# Patient Record
Sex: MALE | Race: OTHER | Employment: OTHER
[De-identification: names, ages, dates, MRNs, and addresses within clinical notes are randomized per-mention and may not be internally consistent; named-entity substitution may affect disease eponyms.]

---

## 2017-01-02 ENCOUNTER — OFFICE VISIT (OUTPATIENT)
Dept: FAMILY MEDICINE CLINIC | Facility: CLINIC | Age: 82
End: 2017-01-02

## 2017-01-02 VITALS
HEART RATE: 80 BPM | BODY MASS INDEX: 26.88 KG/M2 | OXYGEN SATURATION: 96 % | HEIGHT: 71 IN | DIASTOLIC BLOOD PRESSURE: 78 MMHG | WEIGHT: 192 LBS | SYSTOLIC BLOOD PRESSURE: 148 MMHG | TEMPERATURE: 97.5 F | RESPIRATION RATE: 17 BRPM

## 2017-01-02 DIAGNOSIS — I10 ESSENTIAL HYPERTENSION: Primary | ICD-10-CM

## 2017-01-02 DIAGNOSIS — E03.9 HYPOTHYROIDISM, UNSPECIFIED TYPE: ICD-10-CM

## 2017-01-02 PROCEDURE — 99213 OFFICE O/P EST LOW 20 MIN: CPT | Performed by: FAMILY MEDICINE

## 2017-01-02 RX ORDER — LEVOTHYROXINE SODIUM 0.03 MG/1
25 TABLET ORAL DAILY
Qty: 30 TABLET | Refills: 1 | Status: SHIPPED | OUTPATIENT
Start: 2017-01-02 | End: 2017-01-05 | Stop reason: SDUPTHER

## 2017-01-02 RX ORDER — ENALAPRIL MALEATE 10 MG/1
10 TABLET ORAL DAILY
Qty: 30 TABLET | Refills: 1 | Status: SHIPPED | OUTPATIENT
Start: 2017-01-02 | End: 2017-01-05 | Stop reason: SDUPTHER

## 2017-01-02 ASSESSMENT — ENCOUNTER SYMPTOMS
CHEST TIGHTNESS: 0
NAUSEA: 0
VOMITING: 0
DIARRHEA: 0
EYE DISCHARGE: 0
RHINORRHEA: 0
COUGH: 0
ABDOMINAL PAIN: 0
EYE PAIN: 0
SORE THROAT: 0

## 2017-01-05 ENCOUNTER — OFFICE VISIT (OUTPATIENT)
Dept: FAMILY MEDICINE CLINIC | Facility: CLINIC | Age: 82
End: 2017-01-05

## 2017-01-05 VITALS
SYSTOLIC BLOOD PRESSURE: 131 MMHG | HEIGHT: 68 IN | BODY MASS INDEX: 28.49 KG/M2 | TEMPERATURE: 98 F | OXYGEN SATURATION: 96 % | HEART RATE: 90 BPM | WEIGHT: 188 LBS | RESPIRATION RATE: 20 BRPM | DIASTOLIC BLOOD PRESSURE: 72 MMHG

## 2017-01-05 DIAGNOSIS — R06.09 DOE (DYSPNEA ON EXERTION): ICD-10-CM

## 2017-01-05 DIAGNOSIS — M20.42 HAMMER TOES, BILATERAL: ICD-10-CM

## 2017-01-05 DIAGNOSIS — E03.9 ACQUIRED HYPOTHYROIDISM: ICD-10-CM

## 2017-01-05 DIAGNOSIS — I10 ESSENTIAL HYPERTENSION: Primary | ICD-10-CM

## 2017-01-05 DIAGNOSIS — Z79.4 TYPE 2 DIABETES MELLITUS WITH DIABETIC POLYNEUROPATHY, WITH LONG-TERM CURRENT USE OF INSULIN (HCC): ICD-10-CM

## 2017-01-05 DIAGNOSIS — R01.1 MURMUR, CARDIAC: ICD-10-CM

## 2017-01-05 DIAGNOSIS — B35.1 ONYCHOMYCOSIS OF TOENAIL: ICD-10-CM

## 2017-01-05 DIAGNOSIS — M20.41 HAMMER TOES, BILATERAL: ICD-10-CM

## 2017-01-05 DIAGNOSIS — E78.5 HYPERLIPIDEMIA WITH TARGET LDL LESS THAN 70: ICD-10-CM

## 2017-01-05 DIAGNOSIS — E11.42 TYPE 2 DIABETES MELLITUS WITH DIABETIC POLYNEUROPATHY, WITH LONG-TERM CURRENT USE OF INSULIN (HCC): ICD-10-CM

## 2017-01-05 DIAGNOSIS — Z23 NEED FOR TDAP VACCINATION: ICD-10-CM

## 2017-01-05 LAB — HBA1C MFR BLD: 6.7 %

## 2017-01-05 PROCEDURE — 83036 HEMOGLOBIN GLYCOSYLATED A1C: CPT | Performed by: FAMILY MEDICINE

## 2017-01-05 PROCEDURE — 99204 OFFICE O/P NEW MOD 45 MIN: CPT | Performed by: FAMILY MEDICINE

## 2017-01-05 RX ORDER — LANOLIN ALCOHOL/MO/W.PET/CERES
1000 CREAM (GRAM) TOPICAL DAILY
COMMUNITY
End: 2017-09-01 | Stop reason: CLARIF

## 2017-01-05 RX ORDER — GLUCOSAMINE HCL/CHONDROITIN SU 500-400 MG
CAPSULE ORAL
Qty: 100 STRIP | Refills: 3 | Status: SHIPPED | OUTPATIENT
Start: 2017-01-05 | End: 2017-08-11 | Stop reason: SDUPTHER

## 2017-01-05 RX ORDER — ENALAPRIL MALEATE 10 MG/1
10 TABLET ORAL DAILY
Qty: 30 TABLET | Refills: 2 | Status: SHIPPED | OUTPATIENT
Start: 2017-01-05 | End: 2017-02-16 | Stop reason: SDUPTHER

## 2017-01-05 RX ORDER — BLOOD PRESSURE TEST KIT
KIT MISCELLANEOUS
Qty: 100 EACH | Refills: 2 | Status: SHIPPED | OUTPATIENT
Start: 2017-01-05

## 2017-01-05 RX ORDER — ATORVASTATIN CALCIUM 20 MG/1
20 TABLET, FILM COATED ORAL DAILY
Qty: 30 TABLET | Refills: 3 | Status: SHIPPED | OUTPATIENT
Start: 2017-01-05 | End: 2017-01-31 | Stop reason: SDUPTHER

## 2017-01-05 RX ORDER — ASPIRIN 81 MG/1
81 TABLET ORAL DAILY
Qty: 30 TABLET | Refills: 3 | Status: SHIPPED | OUTPATIENT
Start: 2017-01-05 | End: 2017-02-16

## 2017-01-05 RX ORDER — LEVOTHYROXINE SODIUM 0.03 MG/1
25 TABLET ORAL DAILY
Qty: 90 TABLET | Refills: 1 | Status: SHIPPED | OUTPATIENT
Start: 2017-01-05 | End: 2017-02-16 | Stop reason: SDUPTHER

## 2017-01-05 RX ORDER — LANCETS
EACH MISCELLANEOUS
Qty: 100 EACH | Refills: 3 | Status: SHIPPED | OUTPATIENT
Start: 2017-01-05 | End: 2017-10-19 | Stop reason: SDUPTHER

## 2017-01-05 ASSESSMENT — ENCOUNTER SYMPTOMS
CONSTIPATION: 0
NAUSEA: 0
WHEEZING: 0
ABDOMINAL DISTENTION: 0
CHEST TIGHTNESS: 0
DIARRHEA: 0
SHORTNESS OF BREATH: 1
VOMITING: 0
ABDOMINAL PAIN: 0

## 2017-01-05 ASSESSMENT — PATIENT HEALTH QUESTIONNAIRE - PHQ9
1. LITTLE INTEREST OR PLEASURE IN DOING THINGS: 0
2. FEELING DOWN, DEPRESSED OR HOPELESS: 1
SUM OF ALL RESPONSES TO PHQ QUESTIONS 1-9: 1
SUM OF ALL RESPONSES TO PHQ9 QUESTIONS 1 & 2: 1

## 2017-01-06 ENCOUNTER — TELEPHONE (OUTPATIENT)
Dept: FAMILY MEDICINE CLINIC | Facility: CLINIC | Age: 82
End: 2017-01-06

## 2017-01-06 DIAGNOSIS — D64.89 ANEMIA DUE TO OTHER CAUSE: ICD-10-CM

## 2017-01-06 DIAGNOSIS — Z79.4 TYPE 2 DIABETES MELLITUS WITH DIABETIC POLYNEUROPATHY, WITH LONG-TERM CURRENT USE OF INSULIN (HCC): Primary | ICD-10-CM

## 2017-01-06 DIAGNOSIS — E11.42 TYPE 2 DIABETES MELLITUS WITH DIABETIC POLYNEUROPATHY, WITH LONG-TERM CURRENT USE OF INSULIN (HCC): Primary | ICD-10-CM

## 2017-01-06 PROBLEM — D64.9 ANEMIA: Status: ACTIVE | Noted: 2017-01-06

## 2017-01-09 ENCOUNTER — TELEPHONE (OUTPATIENT)
Dept: FAMILY MEDICINE CLINIC | Facility: CLINIC | Age: 82
End: 2017-01-09

## 2017-01-16 DIAGNOSIS — D64.89 ANEMIA DUE TO OTHER CAUSE: ICD-10-CM

## 2017-01-16 DIAGNOSIS — R31.29 MICROSCOPIC HEMATURIA: Primary | ICD-10-CM

## 2017-01-31 DIAGNOSIS — E78.5 HYPERLIPIDEMIA WITH TARGET LDL LESS THAN 70: ICD-10-CM

## 2017-01-31 RX ORDER — ATORVASTATIN CALCIUM 20 MG/1
TABLET, FILM COATED ORAL
Qty: 90 TABLET | Refills: 3 | Status: SHIPPED | OUTPATIENT
Start: 2017-01-31 | End: 2018-02-26 | Stop reason: SDUPTHER

## 2017-02-01 ENCOUNTER — TELEPHONE (OUTPATIENT)
Dept: FAMILY MEDICINE CLINIC | Facility: CLINIC | Age: 82
End: 2017-02-01

## 2017-02-06 ENCOUNTER — TELEPHONE (OUTPATIENT)
Dept: FAMILY MEDICINE CLINIC | Facility: CLINIC | Age: 82
End: 2017-02-06

## 2017-02-16 ENCOUNTER — OFFICE VISIT (OUTPATIENT)
Dept: FAMILY MEDICINE CLINIC | Facility: CLINIC | Age: 82
End: 2017-02-16

## 2017-02-16 VITALS
TEMPERATURE: 98.4 F | DIASTOLIC BLOOD PRESSURE: 67 MMHG | SYSTOLIC BLOOD PRESSURE: 133 MMHG | BODY MASS INDEX: 28.55 KG/M2 | WEIGHT: 188.4 LBS | HEIGHT: 68 IN | OXYGEN SATURATION: 97 % | HEART RATE: 67 BPM

## 2017-02-16 DIAGNOSIS — Z23 NEED FOR INFLUENZA VACCINATION: ICD-10-CM

## 2017-02-16 DIAGNOSIS — E11.42 TYPE 2 DIABETES MELLITUS WITH DIABETIC POLYNEUROPATHY, WITHOUT LONG-TERM CURRENT USE OF INSULIN (HCC): Primary | ICD-10-CM

## 2017-02-16 DIAGNOSIS — R31.29 MICROSCOPIC HEMATURIA: ICD-10-CM

## 2017-02-16 DIAGNOSIS — Z23 PNEUMOCOCCAL VACCINATION ADMINISTERED AT CURRENT VISIT: ICD-10-CM

## 2017-02-16 DIAGNOSIS — I11.9 LVH (LEFT VENTRICULAR HYPERTROPHY) DUE TO HYPERTENSIVE DISEASE, WITHOUT HEART FAILURE: ICD-10-CM

## 2017-02-16 DIAGNOSIS — Z12.5 SCREENING PSA (PROSTATE SPECIFIC ANTIGEN): ICD-10-CM

## 2017-02-16 DIAGNOSIS — I10 ESSENTIAL HYPERTENSION: ICD-10-CM

## 2017-02-16 DIAGNOSIS — E03.9 ACQUIRED HYPOTHYROIDISM: ICD-10-CM

## 2017-02-16 DIAGNOSIS — I35.0 AORTIC STENOSIS, MODERATE: ICD-10-CM

## 2017-02-16 DIAGNOSIS — R71.8 OTHER ABNORMALITY OF RED BLOOD CELLS: ICD-10-CM

## 2017-02-16 DIAGNOSIS — D64.89 ANEMIA DUE TO OTHER CAUSE: ICD-10-CM

## 2017-02-16 DIAGNOSIS — E78.5 HYPERLIPIDEMIA WITH TARGET LDL LESS THAN 70: ICD-10-CM

## 2017-02-16 DIAGNOSIS — E11.21 DIABETIC NEPHROPATHY ASSOCIATED WITH TYPE 2 DIABETES MELLITUS (HCC): ICD-10-CM

## 2017-02-16 DIAGNOSIS — I51.89 DIASTOLIC DYSFUNCTION WITHOUT HEART FAILURE: ICD-10-CM

## 2017-02-16 PROCEDURE — G0008 ADMIN INFLUENZA VIRUS VAC: HCPCS | Performed by: FAMILY MEDICINE

## 2017-02-16 PROCEDURE — 99215 OFFICE O/P EST HI 40 MIN: CPT | Performed by: FAMILY MEDICINE

## 2017-02-16 PROCEDURE — 90662 IIV NO PRSV INCREASED AG IM: CPT | Performed by: FAMILY MEDICINE

## 2017-02-16 PROCEDURE — G0009 ADMIN PNEUMOCOCCAL VACCINE: HCPCS | Performed by: FAMILY MEDICINE

## 2017-02-16 PROCEDURE — 90732 PPSV23 VACC 2 YRS+ SUBQ/IM: CPT | Performed by: FAMILY MEDICINE

## 2017-02-16 RX ORDER — ENALAPRIL MALEATE 10 MG/1
10 TABLET ORAL DAILY
Qty: 90 TABLET | Refills: 2 | Status: SHIPPED | OUTPATIENT
Start: 2017-02-16 | End: 2017-05-12 | Stop reason: SDUPTHER

## 2017-02-16 RX ORDER — LEVOTHYROXINE SODIUM 0.03 MG/1
25 TABLET ORAL DAILY
Qty: 90 TABLET | Refills: 2 | Status: SHIPPED | OUTPATIENT
Start: 2017-02-16 | End: 2018-03-16 | Stop reason: SDUPTHER

## 2017-02-16 ASSESSMENT — ENCOUNTER SYMPTOMS
VOMITING: 0
SHORTNESS OF BREATH: 1
WHEEZING: 0
ABDOMINAL PAIN: 0
COUGH: 0
DIARRHEA: 0
CONSTIPATION: 0
NAUSEA: 0
CHEST TIGHTNESS: 0
ABDOMINAL DISTENTION: 0

## 2017-02-17 ENCOUNTER — HOSPITAL ENCOUNTER (OUTPATIENT)
Age: 82
Discharge: HOME | End: 2017-02-17
Payer: MEDICARE

## 2017-02-17 DIAGNOSIS — D64.89 ANEMIA DUE TO OTHER CAUSE: ICD-10-CM

## 2017-02-17 DIAGNOSIS — R71.8 OTHER ABNORMALITY OF RED BLOOD CELLS: ICD-10-CM

## 2017-02-17 DIAGNOSIS — Z12.5 SCREENING PSA (PROSTATE SPECIFIC ANTIGEN): ICD-10-CM

## 2017-02-17 DIAGNOSIS — R31.29 MICROSCOPIC HEMATURIA: ICD-10-CM

## 2017-02-17 LAB
ABSOLUTE EOS #: 0.1 K/UL (ref 0–0.4)
ABSOLUTE LYMPH #: 1.3 K/UL (ref 1–4.8)
ABSOLUTE MONO #: 0.5 K/UL (ref 0.1–1.3)
ANION GAP SERPL CALCULATED.3IONS-SCNC: 16 MMOL/L (ref 9–17)
BASOPHILS # BLD: 0 % (ref 0–2)
BASOPHILS ABSOLUTE: 0 K/UL (ref 0–0.2)
BUN BLDV-MCNC: 19 MG/DL (ref 8–23)
BUN/CREAT BLD: ABNORMAL (ref 9–20)
CALCIUM SERPL-MCNC: 9.4 MG/DL (ref 8.6–10.4)
CHLORIDE BLD-SCNC: 97 MMOL/L (ref 98–107)
CO2: 26 MMOL/L (ref 20–31)
CONTROL: YES
CREAT SERPL-MCNC: 0.84 MG/DL (ref 0.7–1.2)
DIFFERENTIAL TYPE: ABNORMAL
EOSINOPHILS RELATIVE PERCENT: 1 % (ref 0–4)
FERRITIN: 349 UG/L (ref 30–400)
GFR AFRICAN AMERICAN: >60 ML/MIN
GFR NON-AFRICAN AMERICAN: >60 ML/MIN
GFR SERPL CREATININE-BSD FRML MDRD: ABNORMAL ML/MIN/{1.73_M2}
GFR SERPL CREATININE-BSD FRML MDRD: ABNORMAL ML/MIN/{1.73_M2}
GLUCOSE BLD-MCNC: 183 MG/DL (ref 70–99)
HCT VFR BLD CALC: 37.2 % (ref 41–53)
HEMOCCULT STL QL: NEGATIVE
HEMOGLOBIN: 11.9 G/DL (ref 13.5–17.5)
IRON SATURATION: 20 % (ref 20–55)
IRON: 44 UG/DL (ref 59–158)
LYMPHOCYTES # BLD: 20 % (ref 24–44)
MCH RBC QN AUTO: 30 PG (ref 26–34)
MCHC RBC AUTO-ENTMCNC: 32 G/DL (ref 31–37)
MCV RBC AUTO: 93.8 FL (ref 80–100)
MONOCYTES # BLD: 7 % (ref 1–7)
PATHOLOGIST REVIEW: NORMAL
PDW BLD-RTO: 13.9 % (ref 11.5–14.9)
PLATELET # BLD: 170 K/UL (ref 150–450)
PLATELET ESTIMATE: ABNORMAL
PMV BLD AUTO: 9.6 FL (ref 6–12)
POTASSIUM SERPL-SCNC: 4.3 MMOL/L (ref 3.7–5.3)
PROSTATE SPECIFIC ANTIGEN: 0.5 UG/L
RBC # BLD: 3.96 M/UL (ref 4.5–5.9)
RBC # BLD: ABNORMAL 10*6/UL
SEG NEUTROPHILS: 72 % (ref 36–66)
SEGMENTED NEUTROPHILS ABSOLUTE COUNT: 4.7 K/UL (ref 1.3–9.1)
SODIUM BLD-SCNC: 139 MMOL/L (ref 135–144)
TOTAL IRON BINDING CAPACITY: 219 UG/DL (ref 250–450)
UNSATURATED IRON BINDING CAPACITY: 175 UG/DL (ref 112–347)
WBC # BLD: 6.6 K/UL (ref 3.5–11)
WBC # BLD: ABNORMAL 10*3/UL

## 2017-02-17 PROCEDURE — 83550 IRON BINDING TEST: CPT

## 2017-02-17 PROCEDURE — 84155 ASSAY OF PROTEIN SERUM: CPT

## 2017-02-17 PROCEDURE — 84153 ASSAY OF PSA TOTAL: CPT

## 2017-02-17 PROCEDURE — 84156 ASSAY OF PROTEIN URINE: CPT

## 2017-02-17 PROCEDURE — 84165 PROTEIN E-PHORESIS SERUM: CPT

## 2017-02-17 PROCEDURE — 80048 BASIC METABOLIC PNL TOTAL CA: CPT

## 2017-02-17 PROCEDURE — 82728 ASSAY OF FERRITIN: CPT

## 2017-02-17 PROCEDURE — 83540 ASSAY OF IRON: CPT

## 2017-02-17 PROCEDURE — 84166 PROTEIN E-PHORESIS/URINE/CSF: CPT

## 2017-02-17 PROCEDURE — 85025 COMPLETE CBC W/AUTO DIFF WBC: CPT

## 2017-02-17 PROCEDURE — 36415 COLL VENOUS BLD VENIPUNCTURE: CPT

## 2017-02-17 PROCEDURE — 82274 ASSAY TEST FOR BLOOD FECAL: CPT | Performed by: FAMILY MEDICINE

## 2017-02-20 ENCOUNTER — PATIENT MESSAGE (OUTPATIENT)
Dept: FAMILY MEDICINE CLINIC | Age: 82
End: 2017-02-20

## 2017-02-20 DIAGNOSIS — D50.8 OTHER IRON DEFICIENCY ANEMIA: Primary | ICD-10-CM

## 2017-02-20 PROBLEM — D50.9 IRON DEFICIENCY ANEMIA: Status: ACTIVE | Noted: 2017-02-20

## 2017-02-20 RX ORDER — LANOLIN ALCOHOL/MO/W.PET/CERES
325 CREAM (GRAM) TOPICAL
Qty: 30 TABLET | Refills: 3 | Status: SHIPPED | OUTPATIENT
Start: 2017-02-20 | End: 2018-01-19 | Stop reason: ALTCHOICE

## 2017-02-21 ENCOUNTER — OFFICE VISIT (OUTPATIENT)
Dept: UROLOGY | Facility: CLINIC | Age: 82
End: 2017-02-21

## 2017-02-21 ENCOUNTER — HOSPITAL ENCOUNTER (OUTPATIENT)
Age: 82
Setting detail: SPECIMEN
Discharge: HOME | End: 2017-02-21
Payer: MEDICARE

## 2017-02-21 VITALS
HEIGHT: 70 IN | SYSTOLIC BLOOD PRESSURE: 140 MMHG | HEART RATE: 77 BPM | TEMPERATURE: 97.6 F | DIASTOLIC BLOOD PRESSURE: 80 MMHG | WEIGHT: 188.49 LBS | BODY MASS INDEX: 26.99 KG/M2

## 2017-02-21 DIAGNOSIS — R31.29 MICROHEMATURIA: Primary | ICD-10-CM

## 2017-02-21 LAB
ALBUMIN (CALCULATED): 4 G/DL (ref 3.2–5.2)
ALBUMIN PERCENT: 58 % (ref 45–65)
ALPHA 1 PERCENT: 4 % (ref 3–6)
ALPHA 2 PERCENT: 12 % (ref 6–13)
ALPHA-1-GLOBULIN: 0.2 G/DL (ref 0.1–0.4)
ALPHA-2-GLOBULIN: 0.8 G/DL (ref 0.5–0.9)
BETA GLOBULIN: 0.9 G/DL (ref 0.7–1.4)
BETA PERCENT: 14 % (ref 11–19)
BILIRUBIN, POC: ABNORMAL
BLOOD URINE, POC: ABNORMAL
CLARITY, POC: CLEAR
COLOR, POC: YELLOW
GAMMA GLOBULIN %: 12 % (ref 9–20)
GAMMA GLOBULIN: 0.8 G/DL (ref 0.5–1.5)
GLUCOSE URINE, POC: ABNORMAL
KETONES, POC: ABNORMAL
LEUKOCYTE EST, POC: ABNORMAL
NITRITE, POC: ABNORMAL
P E INTERPRETATION, U: NORMAL
PATHOLOGIST: NORMAL
PATHOLOGIST: NORMAL
PH, POC: ABNORMAL
PROTEIN ELECTROPHORESIS, SERUM: NORMAL
PROTEIN, POC: ABNORMAL
SPECIFIC GRAVITY, POC: ABNORMAL
SPECIMEN TYPE: NORMAL
TOTAL PROT. SUM,%: 100 % (ref 98–102)
TOTAL PROT. SUM: 6.7 G/DL (ref 6.3–8.2)
TOTAL PROTEIN: 6.8 G/DL (ref 6.4–8.3)
URINE TOTAL PROTEIN: 17 MG/DL
UROBILINOGEN, POC: ABNORMAL

## 2017-02-21 PROCEDURE — 99204 OFFICE O/P NEW MOD 45 MIN: CPT | Performed by: UROLOGY

## 2017-02-21 PROCEDURE — 81002 URINALYSIS NONAUTO W/O SCOPE: CPT | Performed by: UROLOGY

## 2017-02-21 ASSESSMENT — ENCOUNTER SYMPTOMS
COUGH: 1
NAUSEA: 0
COLOR CHANGE: 0
EYE PAIN: 1
ABDOMINAL PAIN: 0
WHEEZING: 0
BACK PAIN: 1
VOMITING: 0
EYE REDNESS: 0
SHORTNESS OF BREATH: 1

## 2017-02-27 ENCOUNTER — OFFICE VISIT (OUTPATIENT)
Dept: PODIATRY | Facility: CLINIC | Age: 82
End: 2017-02-27

## 2017-02-27 VITALS
HEART RATE: 71 BPM | WEIGHT: 180 LBS | BODY MASS INDEX: 25.77 KG/M2 | HEIGHT: 70 IN | SYSTOLIC BLOOD PRESSURE: 132 MMHG | DIASTOLIC BLOOD PRESSURE: 73 MMHG

## 2017-02-27 DIAGNOSIS — E11.42 TYPE 2 DIABETES MELLITUS WITH DIABETIC POLYNEUROPATHY, WITHOUT LONG-TERM CURRENT USE OF INSULIN (HCC): ICD-10-CM

## 2017-02-27 DIAGNOSIS — M21.961 FOOT DEFORMITY, BILATERAL: ICD-10-CM

## 2017-02-27 DIAGNOSIS — B35.1 ONYCHOMYCOSIS: Primary | ICD-10-CM

## 2017-02-27 DIAGNOSIS — M21.962 FOOT DEFORMITY, BILATERAL: ICD-10-CM

## 2017-02-27 PROCEDURE — 11721 DEBRIDE NAIL 6 OR MORE: CPT | Performed by: PODIATRIST

## 2017-02-27 PROCEDURE — 99203 OFFICE O/P NEW LOW 30 MIN: CPT | Performed by: PODIATRIST

## 2017-02-27 ASSESSMENT — ENCOUNTER SYMPTOMS
VOMITING: 0
COLOR CHANGE: 0
DIARRHEA: 0
CONSTIPATION: 0
NAUSEA: 0

## 2017-03-01 LAB — UROTHELIAL CANCER DETECTION: NORMAL

## 2017-03-02 ENCOUNTER — TELEPHONE (OUTPATIENT)
Dept: PODIATRY | Facility: CLINIC | Age: 82
End: 2017-03-02

## 2017-03-03 ENCOUNTER — HOSPITAL ENCOUNTER (OUTPATIENT)
Dept: ULTRASOUND IMAGING | Age: 82
Discharge: HOME | End: 2017-03-03
Payer: MEDICARE

## 2017-03-03 DIAGNOSIS — R31.29 MICROHEMATURIA: ICD-10-CM

## 2017-03-03 PROCEDURE — 76775 US EXAM ABDO BACK WALL LIM: CPT

## 2017-03-09 ENCOUNTER — HOSPITAL ENCOUNTER (OUTPATIENT)
Dept: DIABETES SERVICES | Age: 82
Setting detail: THERAPIES SERIES
Discharge: HOME | End: 2017-03-09
Payer: MEDICARE

## 2017-03-09 VITALS
HEIGHT: 70 IN | SYSTOLIC BLOOD PRESSURE: 152 MMHG | DIASTOLIC BLOOD PRESSURE: 71 MMHG | WEIGHT: 187.39 LBS | BODY MASS INDEX: 26.83 KG/M2

## 2017-03-09 PROCEDURE — G0108 DIAB MANAGE TRN  PER INDIV: HCPCS

## 2017-03-21 ENCOUNTER — OFFICE VISIT (OUTPATIENT)
Dept: UROLOGY | Age: 82
End: 2017-03-21
Payer: MEDICARE

## 2017-03-21 VITALS
WEIGHT: 187.39 LBS | HEIGHT: 70 IN | BODY MASS INDEX: 26.83 KG/M2 | HEART RATE: 81 BPM | TEMPERATURE: 97.6 F | SYSTOLIC BLOOD PRESSURE: 133 MMHG | DIASTOLIC BLOOD PRESSURE: 65 MMHG

## 2017-03-21 DIAGNOSIS — N40.1 BENIGN NON-NODULAR PROSTATIC HYPERPLASIA WITH LOWER URINARY TRACT SYMPTOMS: Primary | ICD-10-CM

## 2017-03-21 DIAGNOSIS — R31.9 HEMATURIA: ICD-10-CM

## 2017-03-21 PROCEDURE — 99213 OFFICE O/P EST LOW 20 MIN: CPT | Performed by: UROLOGY

## 2017-03-21 ASSESSMENT — ENCOUNTER SYMPTOMS
COUGH: 0
VOMITING: 0
WHEEZING: 0
BACK PAIN: 0
NAUSEA: 0
COLOR CHANGE: 0
EYE PAIN: 0
SHORTNESS OF BREATH: 0
EYE REDNESS: 0
ABDOMINAL PAIN: 0

## 2017-04-10 ENCOUNTER — OFFICE VISIT (OUTPATIENT)
Dept: FAMILY MEDICINE CLINIC | Age: 82
End: 2017-04-10
Payer: MEDICARE

## 2017-04-10 VITALS
TEMPERATURE: 97.1 F | HEART RATE: 86 BPM | DIASTOLIC BLOOD PRESSURE: 75 MMHG | SYSTOLIC BLOOD PRESSURE: 137 MMHG | OXYGEN SATURATION: 96 % | WEIGHT: 186 LBS | BODY MASS INDEX: 26.63 KG/M2

## 2017-04-10 DIAGNOSIS — R05.3 PERSISTENT COUGH: ICD-10-CM

## 2017-04-10 DIAGNOSIS — J01.00 ACUTE NON-RECURRENT MAXILLARY SINUSITIS: Primary | ICD-10-CM

## 2017-04-10 PROCEDURE — 99213 OFFICE O/P EST LOW 20 MIN: CPT | Performed by: NURSE PRACTITIONER

## 2017-04-10 RX ORDER — BENZONATATE 100 MG/1
100 CAPSULE ORAL 3 TIMES DAILY PRN
Qty: 30 CAPSULE | Refills: 0 | Status: SHIPPED | OUTPATIENT
Start: 2017-04-10 | End: 2017-04-20

## 2017-04-10 RX ORDER — PREDNISONE 20 MG/1
20 TABLET ORAL
COMMUNITY
Start: 2017-04-05 | End: 2017-04-10

## 2017-04-10 RX ORDER — AMOXICILLIN AND CLAVULANATE POTASSIUM 875; 125 MG/1; MG/1
1 TABLET, FILM COATED ORAL 2 TIMES DAILY
Qty: 20 TABLET | Refills: 0 | Status: SHIPPED | OUTPATIENT
Start: 2017-04-10 | End: 2017-04-20

## 2017-04-10 RX ORDER — FLUTICASONE PROPIONATE 50 MCG
1 SPRAY, SUSPENSION (ML) NASAL DAILY
Qty: 1 BOTTLE | Refills: 0 | Status: SHIPPED | OUTPATIENT
Start: 2017-04-10 | End: 2018-07-20 | Stop reason: SDUPTHER

## 2017-04-10 RX ORDER — DEXTROMETHORPHAN POLISTIREX 30 MG/5ML
60 SUSPENSION ORAL
COMMUNITY
Start: 2017-04-05 | End: 2017-04-12

## 2017-04-10 ASSESSMENT — ENCOUNTER SYMPTOMS
ABDOMINAL PAIN: 0
SINUS PRESSURE: 1
VOMITING: 0
RHINORRHEA: 1
COUGH: 1
NAUSEA: 0
SHORTNESS OF BREATH: 1

## 2017-04-12 ENCOUNTER — HOSPITAL ENCOUNTER (OUTPATIENT)
Dept: DIABETES SERVICES | Age: 82
Setting detail: THERAPIES SERIES
Discharge: HOME | End: 2017-04-12
Payer: MEDICARE

## 2017-04-12 ENCOUNTER — HOSPITAL ENCOUNTER (OUTPATIENT)
Age: 82
Setting detail: THERAPIES SERIES
Discharge: HOME | End: 2017-04-12
Payer: MEDICARE

## 2017-04-12 DIAGNOSIS — E11.42 TYPE 2 DIABETES MELLITUS WITH DIABETIC POLYNEUROPATHY, WITHOUT LONG-TERM CURRENT USE OF INSULIN (HCC): Primary | ICD-10-CM

## 2017-04-12 PROCEDURE — G0108 DIAB MANAGE TRN  PER INDIV: HCPCS

## 2017-05-09 ENCOUNTER — HOSPITAL ENCOUNTER (OUTPATIENT)
Dept: DIABETES SERVICES | Age: 82
Setting detail: THERAPIES SERIES
Discharge: HOME | End: 2017-05-09
Payer: MEDICARE

## 2017-05-09 DIAGNOSIS — E11.42 TYPE 2 DIABETES MELLITUS WITH DIABETIC POLYNEUROPATHY, WITHOUT LONG-TERM CURRENT USE OF INSULIN (HCC): Primary | ICD-10-CM

## 2017-05-09 PROCEDURE — G0108 DIAB MANAGE TRN  PER INDIV: HCPCS

## 2017-05-12 ENCOUNTER — OFFICE VISIT (OUTPATIENT)
Dept: FAMILY MEDICINE CLINIC | Age: 82
End: 2017-05-12
Payer: MEDICARE

## 2017-05-12 VITALS
WEIGHT: 187 LBS | SYSTOLIC BLOOD PRESSURE: 160 MMHG | RESPIRATION RATE: 20 BRPM | TEMPERATURE: 98.3 F | DIASTOLIC BLOOD PRESSURE: 74 MMHG | BODY MASS INDEX: 26.77 KG/M2 | HEART RATE: 82 BPM | HEIGHT: 70 IN | OXYGEN SATURATION: 98 %

## 2017-05-12 DIAGNOSIS — I10 ESSENTIAL HYPERTENSION: ICD-10-CM

## 2017-05-12 DIAGNOSIS — N40.1 BENIGN PROSTATIC HYPERPLASIA WITH LOWER URINARY TRACT SYMPTOMS, UNSPECIFIED MORPHOLOGY: ICD-10-CM

## 2017-05-12 DIAGNOSIS — E78.5 HYPERLIPIDEMIA WITH TARGET LDL LESS THAN 100: ICD-10-CM

## 2017-05-12 DIAGNOSIS — Z23 NEED FOR TDAP VACCINATION: ICD-10-CM

## 2017-05-12 DIAGNOSIS — E11.42 TYPE 2 DIABETES MELLITUS WITH DIABETIC POLYNEUROPATHY, WITHOUT LONG-TERM CURRENT USE OF INSULIN (HCC): Primary | ICD-10-CM

## 2017-05-12 LAB — HBA1C MFR BLD: 7.7 %

## 2017-05-12 PROCEDURE — 83036 HEMOGLOBIN GLYCOSYLATED A1C: CPT | Performed by: FAMILY MEDICINE

## 2017-05-12 PROCEDURE — 99214 OFFICE O/P EST MOD 30 MIN: CPT | Performed by: FAMILY MEDICINE

## 2017-05-12 RX ORDER — TAMSULOSIN HYDROCHLORIDE 0.4 MG/1
0.4 CAPSULE ORAL DAILY
Qty: 30 CAPSULE | Refills: 3 | Status: SHIPPED | OUTPATIENT
Start: 2017-05-12 | End: 2017-10-13 | Stop reason: SDUPTHER

## 2017-05-12 RX ORDER — ENALAPRIL MALEATE 20 MG/1
20 TABLET ORAL DAILY
Qty: 90 TABLET | Refills: 1 | Status: SHIPPED | OUTPATIENT
Start: 2017-05-12 | End: 2017-11-13 | Stop reason: SDUPTHER

## 2017-05-12 RX ORDER — FERROUS SULFATE 325(65) MG
TABLET ORAL
Refills: 3 | COMMUNITY
Start: 2017-02-20 | End: 2017-05-20 | Stop reason: SDUPTHER

## 2017-05-12 ASSESSMENT — ENCOUNTER SYMPTOMS
VOMITING: 0
ABDOMINAL DISTENTION: 0
SHORTNESS OF BREATH: 1
CONSTIPATION: 0
NAUSEA: 0
DIARRHEA: 0
WHEEZING: 0
COUGH: 0
ABDOMINAL PAIN: 0
CHEST TIGHTNESS: 0

## 2017-05-19 ENCOUNTER — HOSPITAL ENCOUNTER (OUTPATIENT)
Age: 82
Discharge: HOME | End: 2017-05-19
Payer: MEDICARE

## 2017-05-19 DIAGNOSIS — E78.5 HYPERLIPIDEMIA WITH TARGET LDL LESS THAN 100: ICD-10-CM

## 2017-05-19 DIAGNOSIS — E11.42 TYPE 2 DIABETES MELLITUS WITH DIABETIC POLYNEUROPATHY, WITHOUT LONG-TERM CURRENT USE OF INSULIN (HCC): ICD-10-CM

## 2017-05-19 DIAGNOSIS — I10 ESSENTIAL HYPERTENSION: ICD-10-CM

## 2017-05-19 LAB
ALBUMIN SERPL-MCNC: 3.8 G/DL (ref 3.5–5.2)
ALBUMIN/GLOBULIN RATIO: ABNORMAL (ref 1–2.5)
ALP BLD-CCNC: 55 U/L (ref 40–129)
ALT SERPL-CCNC: 12 U/L (ref 5–41)
ANION GAP SERPL CALCULATED.3IONS-SCNC: 14 MMOL/L (ref 9–17)
AST SERPL-CCNC: 19 U/L
BILIRUB SERPL-MCNC: 0.49 MG/DL (ref 0.3–1.2)
BUN BLDV-MCNC: 25 MG/DL (ref 8–23)
BUN/CREAT BLD: ABNORMAL (ref 9–20)
CALCIUM SERPL-MCNC: 9.2 MG/DL (ref 8.6–10.4)
CHLORIDE BLD-SCNC: 102 MMOL/L (ref 98–107)
CHOLESTEROL/HDL RATIO: 3.7
CHOLESTEROL: 127 MG/DL
CO2: 25 MMOL/L (ref 20–31)
CREAT SERPL-MCNC: 0.98 MG/DL (ref 0.7–1.2)
GFR AFRICAN AMERICAN: >60 ML/MIN
GFR NON-AFRICAN AMERICAN: >60 ML/MIN
GFR SERPL CREATININE-BSD FRML MDRD: ABNORMAL ML/MIN/{1.73_M2}
GFR SERPL CREATININE-BSD FRML MDRD: ABNORMAL ML/MIN/{1.73_M2}
GLUCOSE BLD-MCNC: 166 MG/DL (ref 70–99)
HCT VFR BLD CALC: 33.3 % (ref 41–53)
HDLC SERPL-MCNC: 34 MG/DL
HEMOGLOBIN: 10.9 G/DL (ref 13.5–17.5)
LDL CHOLESTEROL: 76 MG/DL (ref 0–130)
MCH RBC QN AUTO: 31 PG (ref 26–34)
MCHC RBC AUTO-ENTMCNC: 32.6 G/DL (ref 31–37)
MCV RBC AUTO: 95 FL (ref 80–100)
PDW BLD-RTO: 14.6 % (ref 11.5–14.9)
PLATELET # BLD: 157 K/UL (ref 150–450)
PMV BLD AUTO: 9.1 FL (ref 6–12)
POTASSIUM SERPL-SCNC: 4.7 MMOL/L (ref 3.7–5.3)
RBC # BLD: 3.5 M/UL (ref 4.5–5.9)
SODIUM BLD-SCNC: 141 MMOL/L (ref 135–144)
TOTAL PROTEIN: 7.2 G/DL (ref 6.4–8.3)
TRIGL SERPL-MCNC: 87 MG/DL
VLDLC SERPL CALC-MCNC: ABNORMAL MG/DL (ref 1–30)
WBC # BLD: 3.9 K/UL (ref 3.5–11)

## 2017-05-19 PROCEDURE — 80061 LIPID PANEL: CPT

## 2017-05-19 PROCEDURE — 85027 COMPLETE CBC AUTOMATED: CPT

## 2017-05-19 PROCEDURE — 36415 COLL VENOUS BLD VENIPUNCTURE: CPT

## 2017-05-19 PROCEDURE — 80053 COMPREHEN METABOLIC PANEL: CPT

## 2017-05-20 DIAGNOSIS — D64.89 ANEMIA DUE TO OTHER CAUSE: Primary | ICD-10-CM

## 2017-05-22 ENCOUNTER — NURSE ONLY (OUTPATIENT)
Dept: FAMILY MEDICINE CLINIC | Age: 82
End: 2017-05-22
Payer: MEDICARE

## 2017-05-22 VITALS
RESPIRATION RATE: 20 BRPM | HEART RATE: 60 BPM | SYSTOLIC BLOOD PRESSURE: 130 MMHG | OXYGEN SATURATION: 100 % | DIASTOLIC BLOOD PRESSURE: 50 MMHG

## 2017-05-22 DIAGNOSIS — I10 ESSENTIAL HYPERTENSION: Primary | ICD-10-CM

## 2017-05-22 PROCEDURE — 99211 OFF/OP EST MAY X REQ PHY/QHP: CPT | Performed by: FAMILY MEDICINE

## 2017-06-12 ENCOUNTER — OFFICE VISIT (OUTPATIENT)
Dept: FAMILY MEDICINE CLINIC | Age: 82
End: 2017-06-12
Payer: MEDICARE

## 2017-06-12 VITALS
SYSTOLIC BLOOD PRESSURE: 140 MMHG | HEART RATE: 63 BPM | TEMPERATURE: 99.1 F | DIASTOLIC BLOOD PRESSURE: 64 MMHG | HEIGHT: 70 IN | BODY MASS INDEX: 26.34 KG/M2 | WEIGHT: 184 LBS | OXYGEN SATURATION: 96 % | RESPIRATION RATE: 20 BRPM

## 2017-06-12 DIAGNOSIS — I10 ESSENTIAL HYPERTENSION: ICD-10-CM

## 2017-06-12 DIAGNOSIS — D50.8 OTHER IRON DEFICIENCY ANEMIA: ICD-10-CM

## 2017-06-12 DIAGNOSIS — E11.42 TYPE 2 DIABETES MELLITUS WITH DIABETIC POLYNEUROPATHY, WITHOUT LONG-TERM CURRENT USE OF INSULIN (HCC): Primary | ICD-10-CM

## 2017-06-12 DIAGNOSIS — I35.0 AORTIC STENOSIS, MODERATE: ICD-10-CM

## 2017-06-12 DIAGNOSIS — R06.09 DOE (DYSPNEA ON EXERTION): ICD-10-CM

## 2017-06-12 DIAGNOSIS — E03.9 ACQUIRED HYPOTHYROIDISM: ICD-10-CM

## 2017-06-12 DIAGNOSIS — E78.5 HYPERLIPIDEMIA WITH TARGET LDL LESS THAN 70: ICD-10-CM

## 2017-06-12 PROCEDURE — 99215 OFFICE O/P EST HI 40 MIN: CPT | Performed by: FAMILY MEDICINE

## 2017-06-12 ASSESSMENT — ENCOUNTER SYMPTOMS
NAUSEA: 0
ABDOMINAL PAIN: 0
SHORTNESS OF BREATH: 1
CHEST TIGHTNESS: 0
VOMITING: 0
DIARRHEA: 0
CONSTIPATION: 0
COUGH: 0
ABDOMINAL DISTENTION: 0
WHEEZING: 0

## 2017-06-19 ENCOUNTER — NURSE ONLY (OUTPATIENT)
Dept: FAMILY MEDICINE CLINIC | Age: 82
End: 2017-06-19
Payer: MEDICARE

## 2017-06-19 ENCOUNTER — OFFICE VISIT (OUTPATIENT)
Dept: GASTROENTEROLOGY | Age: 82
End: 2017-06-19
Payer: MEDICARE

## 2017-06-19 VITALS
HEART RATE: 78 BPM | WEIGHT: 183.2 LBS | DIASTOLIC BLOOD PRESSURE: 69 MMHG | BODY MASS INDEX: 26.23 KG/M2 | TEMPERATURE: 98.1 F | SYSTOLIC BLOOD PRESSURE: 139 MMHG | RESPIRATION RATE: 12 BRPM | OXYGEN SATURATION: 99 % | HEIGHT: 70 IN

## 2017-06-19 VITALS — HEART RATE: 60 BPM | DIASTOLIC BLOOD PRESSURE: 65 MMHG | SYSTOLIC BLOOD PRESSURE: 152 MMHG

## 2017-06-19 DIAGNOSIS — Z12.11 SCREENING FOR COLORECTAL CANCER: ICD-10-CM

## 2017-06-19 DIAGNOSIS — R13.19 OTHER DYSPHAGIA: ICD-10-CM

## 2017-06-19 DIAGNOSIS — K21.9 GASTROESOPHAGEAL REFLUX DISEASE, ESOPHAGITIS PRESENCE NOT SPECIFIED: ICD-10-CM

## 2017-06-19 DIAGNOSIS — I10 ESSENTIAL HYPERTENSION: Primary | ICD-10-CM

## 2017-06-19 DIAGNOSIS — D64.89 ANEMIA DUE TO OTHER CAUSE: Primary | ICD-10-CM

## 2017-06-19 DIAGNOSIS — Z12.12 SCREENING FOR COLORECTAL CANCER: ICD-10-CM

## 2017-06-19 PROCEDURE — 99211 OFF/OP EST MAY X REQ PHY/QHP: CPT | Performed by: FAMILY MEDICINE

## 2017-06-19 PROCEDURE — 99204 OFFICE O/P NEW MOD 45 MIN: CPT | Performed by: INTERNAL MEDICINE

## 2017-06-19 RX ORDER — AMLODIPINE BESYLATE 5 MG/1
5 TABLET ORAL DAILY
Qty: 30 TABLET | Refills: 3 | Status: SHIPPED | OUTPATIENT
Start: 2017-06-19 | End: 2017-09-01 | Stop reason: CLARIF

## 2017-06-19 ASSESSMENT — ENCOUNTER SYMPTOMS
ANAL BLEEDING: 0
DIARRHEA: 0
RECTAL PAIN: 0
ABDOMINAL PAIN: 0
TROUBLE SWALLOWING: 1
ALLERGIC/IMMUNOLOGIC NEGATIVE: 1
CONSTIPATION: 0
ABDOMINAL DISTENTION: 0
BACK PAIN: 1
VOMITING: 0
GASTROINTESTINAL NEGATIVE: 1
BLOOD IN STOOL: 0
RESPIRATORY NEGATIVE: 1
NAUSEA: 0

## 2017-06-27 ENCOUNTER — NURSE ONLY (OUTPATIENT)
Dept: FAMILY MEDICINE CLINIC | Age: 82
End: 2017-06-27

## 2017-06-27 VITALS — DIASTOLIC BLOOD PRESSURE: 71 MMHG | HEART RATE: 82 BPM | SYSTOLIC BLOOD PRESSURE: 136 MMHG

## 2017-06-27 DIAGNOSIS — I10 ESSENTIAL HYPERTENSION: Primary | ICD-10-CM

## 2017-07-17 ENCOUNTER — HOSPITAL ENCOUNTER (OUTPATIENT)
Age: 82
Discharge: HOME | End: 2017-07-17
Payer: MEDICARE

## 2017-07-17 ENCOUNTER — INITIAL CONSULT (OUTPATIENT)
Dept: ONCOLOGY | Age: 82
End: 2017-07-17
Payer: MEDICARE

## 2017-07-17 VITALS
RESPIRATION RATE: 16 BRPM | TEMPERATURE: 98 F | WEIGHT: 178.1 LBS | BODY MASS INDEX: 25.55 KG/M2 | SYSTOLIC BLOOD PRESSURE: 156 MMHG | HEART RATE: 65 BPM | DIASTOLIC BLOOD PRESSURE: 71 MMHG

## 2017-07-17 DIAGNOSIS — D64.9 ANEMIA, UNSPECIFIED TYPE: Primary | ICD-10-CM

## 2017-07-17 DIAGNOSIS — D64.9 ANEMIA, UNSPECIFIED TYPE: ICD-10-CM

## 2017-07-17 LAB
ABSOLUTE EOS #: 0.1 K/UL (ref 0–0.4)
ABSOLUTE LYMPH #: 1.7 K/UL (ref 1–4.8)
ABSOLUTE MONO #: 0.5 K/UL (ref 0.1–1.2)
ABSOLUTE RETIC #: 0.03 M/UL (ref 0.02–0.1)
BASOPHILS # BLD: 1 %
BASOPHILS ABSOLUTE: 0 K/UL (ref 0–0.2)
DIFFERENTIAL TYPE: ABNORMAL
EOSINOPHILS RELATIVE PERCENT: 2 %
FERRITIN: 434 UG/L (ref 30–400)
FOLATE: 14.7 NG/ML
FREE KAPPA/LAMBDA RATIO: 1.41 (ref 0.26–1.65)
HCT VFR BLD CALC: 33.4 % (ref 41–53)
HEMOGLOBIN: 11 G/DL (ref 13.5–17.5)
IRON SATURATION: 34 % (ref 20–55)
IRON: 73 UG/DL (ref 59–158)
KAPPA FREE LIGHT CHAINS QNT: 4.36 MG/DL (ref 0.37–1.94)
LAMBDA FREE LIGHT CHAINS QNT: 3.09 MG/DL (ref 0.57–2.63)
LYMPHOCYTES # BLD: 33 %
MCH RBC QN AUTO: 31.3 PG (ref 26–34)
MCHC RBC AUTO-ENTMCNC: 33 G/DL (ref 31–37)
MCV RBC AUTO: 94.8 FL (ref 80–100)
MONOCYTES # BLD: 10 %
PDW BLD-RTO: 13.7 % (ref 12.5–15.4)
PLATELET # BLD: 212 K/UL (ref 140–450)
PLATELET ESTIMATE: ABNORMAL
PMV BLD AUTO: 8.3 FL (ref 6–12)
RBC # BLD: 3.52 M/UL (ref 4.5–5.9)
RBC # BLD: ABNORMAL 10*6/UL
RETIC %: 0.8 % (ref 0.5–2)
SEG NEUTROPHILS: 54 %
SEGMENTED NEUTROPHILS ABSOLUTE COUNT: 2.8 K/UL (ref 1.8–7.7)
TOTAL IRON BINDING CAPACITY: 212 UG/DL (ref 250–450)
UNSATURATED IRON BINDING CAPACITY: 139 UG/DL (ref 112–347)
VITAMIN B-12: 847 PG/ML (ref 211–946)
WBC # BLD: 5.1 K/UL (ref 3.5–11)
WBC # BLD: ABNORMAL 10*3/UL

## 2017-07-17 PROCEDURE — 36415 COLL VENOUS BLD VENIPUNCTURE: CPT

## 2017-07-17 PROCEDURE — G0463 HOSPITAL OUTPT CLINIC VISIT: HCPCS

## 2017-07-17 PROCEDURE — 83540 ASSAY OF IRON: CPT

## 2017-07-17 PROCEDURE — 86334 IMMUNOFIX E-PHORESIS SERUM: CPT

## 2017-07-17 PROCEDURE — 85045 AUTOMATED RETICULOCYTE COUNT: CPT

## 2017-07-17 PROCEDURE — 82607 VITAMIN B-12: CPT

## 2017-07-17 PROCEDURE — 99201 HC NEW PT, E/M LEVEL 1: CPT

## 2017-07-17 PROCEDURE — 82728 ASSAY OF FERRITIN: CPT

## 2017-07-17 PROCEDURE — 83883 ASSAY NEPHELOMETRY NOT SPEC: CPT

## 2017-07-17 PROCEDURE — 85025 COMPLETE CBC W/AUTO DIFF WBC: CPT

## 2017-07-17 PROCEDURE — 99203 OFFICE O/P NEW LOW 30 MIN: CPT | Performed by: INTERNAL MEDICINE

## 2017-07-17 PROCEDURE — 82746 ASSAY OF FOLIC ACID SERUM: CPT

## 2017-07-17 PROCEDURE — 83550 IRON BINDING TEST: CPT

## 2017-07-19 LAB
PATHOLOGIST: NORMAL
SERUM IFX INTERP: NORMAL

## 2017-08-04 ENCOUNTER — HOSPITAL ENCOUNTER (OUTPATIENT)
Age: 82
Discharge: HOME | End: 2017-08-04
Payer: MEDICARE

## 2017-08-04 DIAGNOSIS — E11.42 TYPE 2 DIABETES MELLITUS WITH DIABETIC POLYNEUROPATHY, WITHOUT LONG-TERM CURRENT USE OF INSULIN (HCC): ICD-10-CM

## 2017-08-04 DIAGNOSIS — D50.9 IRON DEFICIENCY ANEMIA, UNSPECIFIED IRON DEFICIENCY ANEMIA TYPE: ICD-10-CM

## 2017-08-04 DIAGNOSIS — I10 ESSENTIAL HYPERTENSION: ICD-10-CM

## 2017-08-04 LAB
ALBUMIN SERPL-MCNC: 3.8 G/DL (ref 3.5–5.2)
ALBUMIN/GLOBULIN RATIO: ABNORMAL (ref 1–2.5)
ALP BLD-CCNC: 56 U/L (ref 40–129)
ALT SERPL-CCNC: 10 U/L (ref 5–41)
ANION GAP SERPL CALCULATED.3IONS-SCNC: 13 MMOL/L (ref 9–17)
AST SERPL-CCNC: 15 U/L
BILIRUB SERPL-MCNC: 0.43 MG/DL (ref 0.3–1.2)
BUN BLDV-MCNC: 20 MG/DL (ref 8–23)
BUN/CREAT BLD: ABNORMAL (ref 9–20)
CALCIUM SERPL-MCNC: 9.2 MG/DL (ref 8.6–10.4)
CHLORIDE BLD-SCNC: 101 MMOL/L (ref 98–107)
CO2: 27 MMOL/L (ref 20–31)
CREAT SERPL-MCNC: 1.01 MG/DL (ref 0.7–1.2)
GFR AFRICAN AMERICAN: >60 ML/MIN
GFR NON-AFRICAN AMERICAN: >60 ML/MIN
GFR SERPL CREATININE-BSD FRML MDRD: ABNORMAL ML/MIN/{1.73_M2}
GFR SERPL CREATININE-BSD FRML MDRD: ABNORMAL ML/MIN/{1.73_M2}
GLUCOSE BLD-MCNC: 177 MG/DL (ref 70–99)
HCT VFR BLD CALC: 32.3 % (ref 41–53)
HEMOGLOBIN: 10.4 G/DL (ref 13.5–17.5)
MCH RBC QN AUTO: 30.9 PG (ref 26–34)
MCHC RBC AUTO-ENTMCNC: 32.2 G/DL (ref 31–37)
MCV RBC AUTO: 95.8 FL (ref 80–100)
PDW BLD-RTO: 14.2 % (ref 11.5–14.9)
PLATELET # BLD: 163 K/UL (ref 150–450)
PMV BLD AUTO: 9.1 FL (ref 6–12)
POTASSIUM SERPL-SCNC: 4.6 MMOL/L (ref 3.7–5.3)
RBC # BLD: 3.37 M/UL (ref 4.5–5.9)
SODIUM BLD-SCNC: 141 MMOL/L (ref 135–144)
TOTAL PROTEIN: 7 G/DL (ref 6.4–8.3)
WBC # BLD: 4.7 K/UL (ref 3.5–11)

## 2017-08-04 PROCEDURE — 80053 COMPREHEN METABOLIC PANEL: CPT

## 2017-08-04 PROCEDURE — 36415 COLL VENOUS BLD VENIPUNCTURE: CPT

## 2017-08-04 PROCEDURE — 85027 COMPLETE CBC AUTOMATED: CPT

## 2017-08-11 DIAGNOSIS — Z79.4 TYPE 2 DIABETES MELLITUS WITH DIABETIC POLYNEUROPATHY, WITH LONG-TERM CURRENT USE OF INSULIN (HCC): ICD-10-CM

## 2017-08-11 DIAGNOSIS — E11.42 TYPE 2 DIABETES MELLITUS WITH DIABETIC POLYNEUROPATHY, WITH LONG-TERM CURRENT USE OF INSULIN (HCC): ICD-10-CM

## 2017-08-14 ENCOUNTER — OFFICE VISIT (OUTPATIENT)
Dept: FAMILY MEDICINE CLINIC | Age: 82
End: 2017-08-14
Payer: MEDICARE

## 2017-08-14 VITALS
RESPIRATION RATE: 17 BRPM | OXYGEN SATURATION: 96 % | HEIGHT: 70 IN | SYSTOLIC BLOOD PRESSURE: 139 MMHG | WEIGHT: 181 LBS | HEART RATE: 78 BPM | TEMPERATURE: 97.2 F | BODY MASS INDEX: 25.91 KG/M2 | DIASTOLIC BLOOD PRESSURE: 62 MMHG

## 2017-08-14 DIAGNOSIS — M89.9 BONE DISORDER: ICD-10-CM

## 2017-08-14 DIAGNOSIS — M54.2 CHRONIC NECK PAIN: ICD-10-CM

## 2017-08-14 DIAGNOSIS — G89.29 CHRONIC NECK PAIN: ICD-10-CM

## 2017-08-14 DIAGNOSIS — I10 ESSENTIAL HYPERTENSION: ICD-10-CM

## 2017-08-14 DIAGNOSIS — E78.5 HYPERLIPIDEMIA WITH TARGET LDL LESS THAN 70: ICD-10-CM

## 2017-08-14 DIAGNOSIS — Z91.81 AT HIGH RISK FOR FALLS: ICD-10-CM

## 2017-08-14 DIAGNOSIS — E11.42 TYPE 2 DIABETES MELLITUS WITH DIABETIC POLYNEUROPATHY, WITHOUT LONG-TERM CURRENT USE OF INSULIN (HCC): Primary | ICD-10-CM

## 2017-08-14 DIAGNOSIS — H91.8X3 OTHER SPECIFIED HEARING LOSS OF BOTH EARS: ICD-10-CM

## 2017-08-14 DIAGNOSIS — D64.9 ANEMIA, UNSPECIFIED TYPE: ICD-10-CM

## 2017-08-14 DIAGNOSIS — I35.0 AORTIC STENOSIS, MODERATE: ICD-10-CM

## 2017-08-14 PROBLEM — H91.93 BILATERAL HEARING LOSS: Status: ACTIVE | Noted: 2017-08-14

## 2017-08-14 LAB — HBA1C MFR BLD: 7.1 %

## 2017-08-14 PROCEDURE — 99215 OFFICE O/P EST HI 40 MIN: CPT | Performed by: FAMILY MEDICINE

## 2017-08-14 PROCEDURE — 83036 HEMOGLOBIN GLYCOSYLATED A1C: CPT | Performed by: FAMILY MEDICINE

## 2017-08-14 RX ORDER — BLOOD SUGAR DIAGNOSTIC
STRIP MISCELLANEOUS
Qty: 100 STRIP | Refills: 3 | Status: SHIPPED | OUTPATIENT
Start: 2017-08-14 | End: 2018-01-18 | Stop reason: SDUPTHER

## 2017-08-14 RX ORDER — ACETAMINOPHEN 500 MG
500 TABLET ORAL EVERY 6 HOURS PRN
Qty: 120 TABLET | Refills: 1 | Status: SHIPPED | OUTPATIENT
Start: 2017-08-14 | End: 2017-09-01 | Stop reason: CLARIF

## 2017-08-14 ASSESSMENT — ENCOUNTER SYMPTOMS
SHORTNESS OF BREATH: 1
WHEEZING: 0
COUGH: 0
ABDOMINAL DISTENTION: 0
CONSTIPATION: 0
VOMITING: 0
ABDOMINAL PAIN: 0
CHEST TIGHTNESS: 0
NAUSEA: 0
DIARRHEA: 0
TROUBLE SWALLOWING: 0

## 2017-08-21 ENCOUNTER — HOSPITAL ENCOUNTER (OUTPATIENT)
Dept: PHYSICAL THERAPY | Age: 82
Setting detail: THERAPIES SERIES
Discharge: HOME | End: 2017-08-21
Payer: MEDICARE

## 2017-08-21 PROCEDURE — G0283 ELEC STIM OTHER THAN WOUND: HCPCS

## 2017-08-21 PROCEDURE — G8982 BODY POS GOAL STATUS: HCPCS

## 2017-08-21 PROCEDURE — G8981 BODY POS CURRENT STATUS: HCPCS

## 2017-08-21 PROCEDURE — 97161 PT EVAL LOW COMPLEX 20 MIN: CPT

## 2017-08-21 ASSESSMENT — PAIN DESCRIPTION - LOCATION: LOCATION: NECK

## 2017-08-21 ASSESSMENT — PAIN DESCRIPTION - DESCRIPTORS: DESCRIPTORS: CONSTANT;ACHING;SHARP

## 2017-08-21 ASSESSMENT — PAIN DESCRIPTION - PAIN TYPE: TYPE: CHRONIC PAIN

## 2017-08-21 ASSESSMENT — PAIN SCALES - GENERAL: PAINLEVEL_OUTOF10: 8

## 2017-08-21 ASSESSMENT — PAIN DESCRIPTION - FREQUENCY: FREQUENCY: CONTINUOUS

## 2017-08-21 ASSESSMENT — PAIN DESCRIPTION - ORIENTATION: ORIENTATION: RIGHT;LEFT;UPPER

## 2017-08-21 ASSESSMENT — PAIN DESCRIPTION - PROGRESSION: CLINICAL_PROGRESSION: GRADUALLY WORSENING

## 2017-08-21 ASSESSMENT — PAIN DESCRIPTION - ONSET: ONSET: ON-GOING

## 2017-08-23 ENCOUNTER — HOSPITAL ENCOUNTER (OUTPATIENT)
Dept: PHYSICAL THERAPY | Age: 82
Setting detail: THERAPIES SERIES
Discharge: HOME | End: 2017-08-23
Payer: MEDICARE

## 2017-08-23 PROCEDURE — 97110 THERAPEUTIC EXERCISES: CPT

## 2017-08-23 PROCEDURE — G0283 ELEC STIM OTHER THAN WOUND: HCPCS

## 2017-08-23 ASSESSMENT — PAIN DESCRIPTION - ORIENTATION: ORIENTATION: RIGHT;LEFT;UPPER

## 2017-08-23 ASSESSMENT — PAIN SCALES - GENERAL: PAINLEVEL_OUTOF10: 2

## 2017-08-23 ASSESSMENT — PAIN DESCRIPTION - PAIN TYPE: TYPE: CHRONIC PAIN

## 2017-08-23 ASSESSMENT — PAIN DESCRIPTION - LOCATION: LOCATION: NECK

## 2017-08-29 ENCOUNTER — HOSPITAL ENCOUNTER (OUTPATIENT)
Age: 82
Setting detail: OUTPATIENT SURGERY
Discharge: HOME | End: 2017-08-29
Attending: INTERNAL MEDICINE | Admitting: INTERNAL MEDICINE
Payer: MEDICARE

## 2017-08-29 VITALS
BODY MASS INDEX: 25.77 KG/M2 | DIASTOLIC BLOOD PRESSURE: 77 MMHG | SYSTOLIC BLOOD PRESSURE: 121 MMHG | HEART RATE: 54 BPM | TEMPERATURE: 97.2 F | OXYGEN SATURATION: 98 % | WEIGHT: 180 LBS | RESPIRATION RATE: 18 BRPM | HEIGHT: 70 IN

## 2017-08-29 LAB — GLUCOSE BLD-MCNC: 148 MG/DL (ref 75–110)

## 2017-08-29 PROCEDURE — 2580000003 HC RX 258: Performed by: INTERNAL MEDICINE

## 2017-08-29 PROCEDURE — 99152 MOD SED SAME PHYS/QHP 5/>YRS: CPT | Performed by: INTERNAL MEDICINE

## 2017-08-29 PROCEDURE — 7100000010 HC PHASE II RECOVERY - FIRST 15 MIN: Performed by: INTERNAL MEDICINE

## 2017-08-29 PROCEDURE — 88342 IMHCHEM/IMCYTCHM 1ST ANTB: CPT

## 2017-08-29 PROCEDURE — 6360000002 HC RX W HCPCS: Performed by: INTERNAL MEDICINE

## 2017-08-29 PROCEDURE — 82947 ASSAY GLUCOSE BLOOD QUANT: CPT

## 2017-08-29 PROCEDURE — 3609027000 HC COLONOSCOPY: Performed by: INTERNAL MEDICINE

## 2017-08-29 PROCEDURE — 88305 TISSUE EXAM BY PATHOLOGIST: CPT

## 2017-08-29 PROCEDURE — 7100000011 HC PHASE II RECOVERY - ADDTL 15 MIN: Performed by: INTERNAL MEDICINE

## 2017-08-29 PROCEDURE — 6370000000 HC RX 637 (ALT 250 FOR IP): Performed by: INTERNAL MEDICINE

## 2017-08-29 PROCEDURE — 3609012400 HC EGD TRANSORAL BIOPSY SINGLE/MULTIPLE: Performed by: INTERNAL MEDICINE

## 2017-08-29 RX ORDER — SODIUM CHLORIDE, SODIUM LACTATE, POTASSIUM CHLORIDE, CALCIUM CHLORIDE 600; 310; 30; 20 MG/100ML; MG/100ML; MG/100ML; MG/100ML
INJECTION, SOLUTION INTRAVENOUS CONTINUOUS
Status: DISCONTINUED | OUTPATIENT
Start: 2017-08-29 | End: 2017-08-29 | Stop reason: HOSPADM

## 2017-08-29 RX ORDER — MEPERIDINE HYDROCHLORIDE 50 MG/ML
INJECTION INTRAMUSCULAR; INTRAVENOUS; SUBCUTANEOUS PRN
Status: DISCONTINUED | OUTPATIENT
Start: 2017-08-29 | End: 2017-08-29 | Stop reason: HOSPADM

## 2017-08-29 RX ORDER — MIDAZOLAM HYDROCHLORIDE 1 MG/ML
INJECTION INTRAMUSCULAR; INTRAVENOUS PRN
Status: DISCONTINUED | OUTPATIENT
Start: 2017-08-29 | End: 2017-08-29 | Stop reason: HOSPADM

## 2017-08-29 RX ADMIN — SODIUM CHLORIDE, POTASSIUM CHLORIDE, SODIUM LACTATE AND CALCIUM CHLORIDE: 600; 310; 30; 20 INJECTION, SOLUTION INTRAVENOUS at 07:38

## 2017-08-29 ASSESSMENT — PAIN SCALES - GENERAL: PAINLEVEL_OUTOF10: 0

## 2017-08-29 ASSESSMENT — PAIN - FUNCTIONAL ASSESSMENT: PAIN_FUNCTIONAL_ASSESSMENT: 0-10

## 2017-08-30 LAB — SURGICAL PATHOLOGY REPORT: NORMAL

## 2017-08-31 ENCOUNTER — APPOINTMENT (OUTPATIENT)
Dept: PHYSICAL THERAPY | Age: 82
End: 2017-08-31
Payer: MEDICARE

## 2017-08-31 PROBLEM — K29.70 HELICOBACTER PYLORI GASTRITIS: Status: ACTIVE | Noted: 2017-08-31

## 2017-08-31 PROBLEM — B96.81 HELICOBACTER PYLORI GASTRITIS: Status: ACTIVE | Noted: 2017-08-31

## 2017-08-31 RX ORDER — AMOXICILLIN 500 MG/1
1000 TABLET, FILM COATED ORAL 2 TIMES DAILY
Qty: 40 TABLET | Refills: 0 | Status: SHIPPED | OUTPATIENT
Start: 2017-08-31 | End: 2017-09-10

## 2017-08-31 RX ORDER — OMEPRAZOLE 20 MG/1
20 CAPSULE, DELAYED RELEASE ORAL 2 TIMES DAILY
Qty: 20 CAPSULE | Refills: 0 | Status: SHIPPED | OUTPATIENT
Start: 2017-08-31 | End: 2017-11-15 | Stop reason: SDUPTHER

## 2017-08-31 RX ORDER — METRONIDAZOLE 500 MG/1
500 TABLET ORAL 3 TIMES DAILY
Qty: 30 TABLET | Refills: 0 | Status: SHIPPED | OUTPATIENT
Start: 2017-08-31 | End: 2017-09-10

## 2017-09-01 ENCOUNTER — HOSPITAL ENCOUNTER (OUTPATIENT)
Age: 82
Discharge: HOME | End: 2017-09-01
Payer: MEDICARE

## 2017-09-01 ENCOUNTER — OFFICE VISIT (OUTPATIENT)
Dept: FAMILY MEDICINE CLINIC | Age: 82
End: 2017-09-01
Payer: MEDICARE

## 2017-09-01 VITALS
TEMPERATURE: 97.3 F | BODY MASS INDEX: 25.91 KG/M2 | WEIGHT: 181 LBS | HEIGHT: 70 IN | OXYGEN SATURATION: 98 % | DIASTOLIC BLOOD PRESSURE: 43 MMHG | RESPIRATION RATE: 18 BRPM | HEART RATE: 55 BPM | SYSTOLIC BLOOD PRESSURE: 127 MMHG

## 2017-09-01 DIAGNOSIS — E11.42 TYPE 2 DIABETES MELLITUS WITH DIABETIC POLYNEUROPATHY, WITHOUT LONG-TERM CURRENT USE OF INSULIN (HCC): ICD-10-CM

## 2017-09-01 DIAGNOSIS — M89.9 BONE DISORDER: ICD-10-CM

## 2017-09-01 DIAGNOSIS — J20.9 ACUTE BRONCHITIS DUE TO INFECTION: Primary | ICD-10-CM

## 2017-09-01 LAB
ALBUMIN SERPL-MCNC: 3.7 G/DL (ref 3.5–5.2)
ALBUMIN/GLOBULIN RATIO: ABNORMAL (ref 1–2.5)
ALP BLD-CCNC: 59 U/L (ref 40–129)
ALT SERPL-CCNC: 10 U/L (ref 5–41)
ANION GAP SERPL CALCULATED.3IONS-SCNC: 13 MMOL/L (ref 9–17)
AST SERPL-CCNC: 31 U/L
BILIRUB SERPL-MCNC: 0.6 MG/DL (ref 0.3–1.2)
BUN BLDV-MCNC: 24 MG/DL (ref 8–23)
BUN/CREAT BLD: ABNORMAL (ref 9–20)
CALCIUM SERPL-MCNC: 8.8 MG/DL (ref 8.6–10.4)
CHLORIDE BLD-SCNC: 100 MMOL/L (ref 98–107)
CO2: 27 MMOL/L (ref 20–31)
CREAT SERPL-MCNC: 1.28 MG/DL (ref 0.7–1.2)
GFR AFRICAN AMERICAN: >60 ML/MIN
GFR NON-AFRICAN AMERICAN: 53 ML/MIN
GFR SERPL CREATININE-BSD FRML MDRD: ABNORMAL ML/MIN/{1.73_M2}
GFR SERPL CREATININE-BSD FRML MDRD: ABNORMAL ML/MIN/{1.73_M2}
GLUCOSE BLD-MCNC: 214 MG/DL (ref 70–99)
HCT VFR BLD CALC: 31 % (ref 41–53)
HEMOGLOBIN: 10.1 G/DL (ref 13.5–17.5)
MCH RBC QN AUTO: 31.5 PG (ref 26–34)
MCHC RBC AUTO-ENTMCNC: 32.4 G/DL (ref 31–37)
MCV RBC AUTO: 97 FL (ref 80–100)
PDW BLD-RTO: 14.2 % (ref 11.5–14.9)
PLATELET # BLD: 154 K/UL (ref 150–450)
PMV BLD AUTO: 10.3 FL (ref 6–12)
POTASSIUM SERPL-SCNC: 4.5 MMOL/L (ref 3.7–5.3)
RBC # BLD: 3.2 M/UL (ref 4.5–5.9)
SODIUM BLD-SCNC: 140 MMOL/L (ref 135–144)
TOTAL PROTEIN: 6.7 G/DL (ref 6.4–8.3)
WBC # BLD: 5.2 K/UL (ref 3.5–11)

## 2017-09-01 PROCEDURE — 80053 COMPREHEN METABOLIC PANEL: CPT

## 2017-09-01 PROCEDURE — 85027 COMPLETE CBC AUTOMATED: CPT

## 2017-09-01 PROCEDURE — 99213 OFFICE O/P EST LOW 20 MIN: CPT | Performed by: FAMILY MEDICINE

## 2017-09-01 PROCEDURE — 36415 COLL VENOUS BLD VENIPUNCTURE: CPT

## 2017-09-01 PROCEDURE — 82306 VITAMIN D 25 HYDROXY: CPT

## 2017-09-01 RX ORDER — INHALER, ASSIST DEVICES
SPACER (EA) MISCELLANEOUS
Qty: 1 EACH | Refills: 0 | Status: SHIPPED | OUTPATIENT
Start: 2017-09-01 | End: 2017-09-19 | Stop reason: ALTCHOICE

## 2017-09-01 RX ORDER — GUAIFENESIN AND CODEINE PHOSPHATE 100; 10 MG/5ML; MG/5ML
5 SOLUTION ORAL 4 TIMES DAILY PRN
Qty: 140 ML | Refills: 0 | Status: SHIPPED | OUTPATIENT
Start: 2017-09-01 | End: 2017-09-08

## 2017-09-01 RX ORDER — ALBUTEROL SULFATE 90 UG/1
2 AEROSOL, METERED RESPIRATORY (INHALATION) EVERY 6 HOURS PRN
Qty: 1 INHALER | Refills: 1 | Status: SHIPPED | OUTPATIENT
Start: 2017-09-01 | End: 2017-09-19 | Stop reason: ALTCHOICE

## 2017-09-01 RX ORDER — AMOXICILLIN 500 MG/1
CAPSULE ORAL
Refills: 0 | COMMUNITY
Start: 2017-08-31 | End: 2017-09-01 | Stop reason: CLARIF

## 2017-09-01 ASSESSMENT — ENCOUNTER SYMPTOMS
SHORTNESS OF BREATH: 1
COUGH: 1
WHEEZING: 1
STRIDOR: 0
CHEST TIGHTNESS: 0
CHOKING: 0

## 2017-09-02 DIAGNOSIS — E55.9 VITAMIN D DEFICIENCY: Primary | ICD-10-CM

## 2017-09-02 LAB — VITAMIN D 25-HYDROXY: 21.1 NG/ML (ref 30–100)

## 2017-09-02 RX ORDER — CHOLECALCIFEROL (VITAMIN D3) 50 MCG
2000 TABLET ORAL DAILY
Qty: 30 TABLET | Refills: 3 | Status: SHIPPED | OUTPATIENT
Start: 2017-09-02 | End: 2018-04-19 | Stop reason: SDUPTHER

## 2017-09-19 ENCOUNTER — OFFICE VISIT (OUTPATIENT)
Dept: UROLOGY | Age: 82
End: 2017-09-19
Payer: MEDICARE

## 2017-09-19 VITALS
HEIGHT: 70 IN | WEIGHT: 176 LBS | TEMPERATURE: 97.7 F | BODY MASS INDEX: 25.2 KG/M2 | DIASTOLIC BLOOD PRESSURE: 67 MMHG | SYSTOLIC BLOOD PRESSURE: 149 MMHG | HEART RATE: 67 BPM

## 2017-09-19 DIAGNOSIS — N40.1 BENIGN NON-NODULAR PROSTATIC HYPERPLASIA WITH LOWER URINARY TRACT SYMPTOMS: Primary | ICD-10-CM

## 2017-09-19 DIAGNOSIS — R35.1 NOCTURIA: ICD-10-CM

## 2017-09-19 PROBLEM — I27.20 PULMONARY HYPERTENSION (HCC): Status: ACTIVE | Noted: 2017-09-19

## 2017-09-19 PROCEDURE — 99213 OFFICE O/P EST LOW 20 MIN: CPT | Performed by: UROLOGY

## 2017-09-19 RX ORDER — AMLODIPINE BESYLATE 5 MG/1
1 TABLET ORAL DAILY
Refills: 3 | COMMUNITY
Start: 2017-09-09 | End: 2017-10-10 | Stop reason: SDUPTHER

## 2017-09-19 RX ORDER — METRONIDAZOLE 500 MG/1
500 TABLET ORAL DAILY
COMMUNITY
End: 2017-09-25 | Stop reason: ALTCHOICE

## 2017-09-19 ASSESSMENT — ENCOUNTER SYMPTOMS
SHORTNESS OF BREATH: 0
VOMITING: 0
NAUSEA: 0
WHEEZING: 0
EYE PAIN: 0
ABDOMINAL PAIN: 0
COLOR CHANGE: 0
EYE REDNESS: 0
BACK PAIN: 1
COUGH: 0

## 2017-09-20 ENCOUNTER — HOSPITAL ENCOUNTER (OUTPATIENT)
Dept: PHYSICAL THERAPY | Age: 82
Setting detail: THERAPIES SERIES
Discharge: HOME | End: 2017-09-20
Payer: MEDICARE

## 2017-09-20 PROCEDURE — G0283 ELEC STIM OTHER THAN WOUND: HCPCS

## 2017-09-20 PROCEDURE — G8981 BODY POS CURRENT STATUS: HCPCS

## 2017-09-20 PROCEDURE — G8982 BODY POS GOAL STATUS: HCPCS

## 2017-09-20 PROCEDURE — 97110 THERAPEUTIC EXERCISES: CPT

## 2017-09-20 ASSESSMENT — PAIN DESCRIPTION - ORIENTATION: ORIENTATION: RIGHT;LEFT;UPPER

## 2017-09-20 ASSESSMENT — PAIN SCALES - GENERAL: PAINLEVEL_OUTOF10: 2

## 2017-09-20 ASSESSMENT — PAIN DESCRIPTION - LOCATION: LOCATION: NECK

## 2017-09-20 ASSESSMENT — PAIN DESCRIPTION - PAIN TYPE: TYPE: CHRONIC PAIN

## 2017-09-20 ASSESSMENT — PAIN DESCRIPTION - PROGRESSION: CLINICAL_PROGRESSION: GRADUALLY IMPROVING

## 2017-09-25 ENCOUNTER — TELEPHONE (OUTPATIENT)
Dept: ONCOLOGY | Age: 82
End: 2017-09-25

## 2017-09-25 ENCOUNTER — OFFICE VISIT (OUTPATIENT)
Dept: ONCOLOGY | Age: 82
End: 2017-09-25
Payer: MEDICARE

## 2017-09-25 VITALS
HEART RATE: 68 BPM | RESPIRATION RATE: 16 BRPM | DIASTOLIC BLOOD PRESSURE: 59 MMHG | SYSTOLIC BLOOD PRESSURE: 147 MMHG | WEIGHT: 174.5 LBS | BODY MASS INDEX: 25.04 KG/M2 | TEMPERATURE: 98.4 F

## 2017-09-25 DIAGNOSIS — A04.8 H. PYLORI INFECTION: ICD-10-CM

## 2017-09-25 DIAGNOSIS — K29.70 HELICOBACTER PYLORI GASTRITIS: ICD-10-CM

## 2017-09-25 DIAGNOSIS — Z86.2 HISTORY OF ANEMIA DUE TO CHRONIC KIDNEY DISEASE: ICD-10-CM

## 2017-09-25 DIAGNOSIS — B96.81 HELICOBACTER PYLORI GASTRITIS: ICD-10-CM

## 2017-09-25 DIAGNOSIS — D50.9 IRON DEFICIENCY ANEMIA, UNSPECIFIED IRON DEFICIENCY ANEMIA TYPE: Primary | ICD-10-CM

## 2017-09-25 DIAGNOSIS — K21.00 GASTROESOPHAGEAL REFLUX DISEASE WITH ESOPHAGITIS: ICD-10-CM

## 2017-09-25 DIAGNOSIS — N18.9 HISTORY OF ANEMIA DUE TO CHRONIC KIDNEY DISEASE: ICD-10-CM

## 2017-09-25 PROCEDURE — 99214 OFFICE O/P EST MOD 30 MIN: CPT | Performed by: INTERNAL MEDICINE

## 2017-09-25 RX ORDER — OMEPRAZOLE 20 MG/1
20 CAPSULE, DELAYED RELEASE ORAL 2 TIMES DAILY
Qty: 60 CAPSULE | Refills: 3 | Status: SHIPPED | OUTPATIENT
Start: 2017-09-25 | End: 2018-02-14 | Stop reason: SDUPTHER

## 2017-09-26 ENCOUNTER — OFFICE VISIT (OUTPATIENT)
Dept: GASTROENTEROLOGY | Age: 82
End: 2017-09-26
Payer: MEDICARE

## 2017-09-26 ENCOUNTER — HOSPITAL ENCOUNTER (OUTPATIENT)
Dept: PHYSICAL THERAPY | Age: 82
Setting detail: THERAPIES SERIES
Discharge: HOME | End: 2017-09-26
Payer: MEDICARE

## 2017-09-26 VITALS
OXYGEN SATURATION: 99 % | WEIGHT: 175.4 LBS | SYSTOLIC BLOOD PRESSURE: 112 MMHG | DIASTOLIC BLOOD PRESSURE: 57 MMHG | HEIGHT: 70 IN | HEART RATE: 78 BPM | BODY MASS INDEX: 25.11 KG/M2 | RESPIRATION RATE: 14 BRPM | TEMPERATURE: 98.1 F

## 2017-09-26 DIAGNOSIS — A04.8 H. PYLORI INFECTION: Primary | ICD-10-CM

## 2017-09-26 DIAGNOSIS — K21.00 GASTROESOPHAGEAL REFLUX DISEASE WITH ESOPHAGITIS: ICD-10-CM

## 2017-09-26 DIAGNOSIS — K59.01 SLOW TRANSIT CONSTIPATION: ICD-10-CM

## 2017-09-26 DIAGNOSIS — D64.9 ANEMIA, UNSPECIFIED TYPE: ICD-10-CM

## 2017-09-26 PROCEDURE — 99214 OFFICE O/P EST MOD 30 MIN: CPT | Performed by: INTERNAL MEDICINE

## 2017-09-26 PROCEDURE — 97110 THERAPEUTIC EXERCISES: CPT

## 2017-09-26 PROCEDURE — G0283 ELEC STIM OTHER THAN WOUND: HCPCS

## 2017-09-26 ASSESSMENT — PAIN DESCRIPTION - PROGRESSION: CLINICAL_PROGRESSION: GRADUALLY IMPROVING

## 2017-09-26 ASSESSMENT — ENCOUNTER SYMPTOMS
ALLERGIC/IMMUNOLOGIC NEGATIVE: 1
BLOOD IN STOOL: 0
CONSTIPATION: 0
DIARRHEA: 0
RESPIRATORY NEGATIVE: 1
ABDOMINAL DISTENTION: 0
NAUSEA: 0
RECTAL PAIN: 0
GASTROINTESTINAL NEGATIVE: 1
TROUBLE SWALLOWING: 1
ABDOMINAL PAIN: 0
VOMITING: 0
ANAL BLEEDING: 0
BACK PAIN: 1

## 2017-09-26 ASSESSMENT — PAIN DESCRIPTION - LOCATION: LOCATION: NECK

## 2017-09-26 ASSESSMENT — PAIN DESCRIPTION - ORIENTATION: ORIENTATION: RIGHT;LEFT;UPPER

## 2017-09-26 ASSESSMENT — PAIN DESCRIPTION - PAIN TYPE: TYPE: CHRONIC PAIN

## 2017-09-29 ENCOUNTER — HOSPITAL ENCOUNTER (OUTPATIENT)
Dept: PHYSICAL THERAPY | Age: 82
Setting detail: THERAPIES SERIES
Discharge: HOME | End: 2017-09-29
Payer: MEDICARE

## 2017-09-29 PROCEDURE — 97110 THERAPEUTIC EXERCISES: CPT

## 2017-09-29 PROCEDURE — G0283 ELEC STIM OTHER THAN WOUND: HCPCS

## 2017-09-29 RX ORDER — LORAZEPAM 1 MG/1
1 TABLET ORAL ONCE
Status: CANCELLED | OUTPATIENT
Start: 2017-09-29 | End: 2017-09-29

## 2017-09-29 ASSESSMENT — PAIN DESCRIPTION - PROGRESSION: CLINICAL_PROGRESSION: GRADUALLY IMPROVING

## 2017-09-29 ASSESSMENT — PAIN DESCRIPTION - LOCATION: LOCATION: NECK

## 2017-09-29 ASSESSMENT — PAIN SCALES - GENERAL: PAINLEVEL_OUTOF10: 0

## 2017-10-03 ENCOUNTER — HOSPITAL ENCOUNTER (OUTPATIENT)
Age: 82
Setting detail: OUTPATIENT SURGERY
Discharge: HOME | End: 2017-10-03
Attending: OPHTHALMOLOGY | Admitting: OPHTHALMOLOGY
Payer: MEDICARE

## 2017-10-03 VITALS
SYSTOLIC BLOOD PRESSURE: 137 MMHG | RESPIRATION RATE: 16 BRPM | OXYGEN SATURATION: 99 % | WEIGHT: 174 LBS | HEIGHT: 70 IN | HEART RATE: 55 BPM | DIASTOLIC BLOOD PRESSURE: 47 MMHG | BODY MASS INDEX: 24.91 KG/M2 | TEMPERATURE: 97.5 F

## 2017-10-03 PROBLEM — H25.12 CATARACT, NUCLEAR SCLEROTIC, LEFT EYE: Status: RESOLVED | Noted: 2017-10-03 | Resolved: 2017-10-03

## 2017-10-03 LAB — GLUCOSE BLD-MCNC: 167 MG/DL (ref 75–110)

## 2017-10-03 PROCEDURE — 3600000003 HC SURGERY LEVEL 3 BASE: Performed by: OPHTHALMOLOGY

## 2017-10-03 PROCEDURE — 3600000013 HC SURGERY LEVEL 3 ADDTL 15MIN: Performed by: OPHTHALMOLOGY

## 2017-10-03 PROCEDURE — 2580000003 HC RX 258: Performed by: OPHTHALMOLOGY

## 2017-10-03 PROCEDURE — 7100000011 HC PHASE II RECOVERY - ADDTL 15 MIN: Performed by: OPHTHALMOLOGY

## 2017-10-03 PROCEDURE — 7100000010 HC PHASE II RECOVERY - FIRST 15 MIN: Performed by: OPHTHALMOLOGY

## 2017-10-03 PROCEDURE — 6370000000 HC RX 637 (ALT 250 FOR IP): Performed by: OPHTHALMOLOGY

## 2017-10-03 PROCEDURE — 2500000003 HC RX 250 WO HCPCS: Performed by: OPHTHALMOLOGY

## 2017-10-03 PROCEDURE — 6360000002 HC RX W HCPCS: Performed by: OPHTHALMOLOGY

## 2017-10-03 PROCEDURE — 99153 MOD SED SAME PHYS/QHP EA: CPT | Performed by: OPHTHALMOLOGY

## 2017-10-03 PROCEDURE — 82947 ASSAY GLUCOSE BLOOD QUANT: CPT

## 2017-10-03 PROCEDURE — V2632 POST CHMBR INTRAOCULAR LENS: HCPCS | Performed by: OPHTHALMOLOGY

## 2017-10-03 PROCEDURE — 99152 MOD SED SAME PHYS/QHP 5/>YRS: CPT | Performed by: OPHTHALMOLOGY

## 2017-10-03 PROCEDURE — A6402 STERILE GAUZE <= 16 SQ IN: HCPCS | Performed by: OPHTHALMOLOGY

## 2017-10-03 DEVICE — ACRYSOF(R) IQ ASPHERIC NATURAL IOL, SINGLE-PIECE ACRYLIC FOLDABLE PCL, UV WITH BLUE LIGHTFILTER, 13.0MM LENGTH, 6.0MM ANTERIORASYMMETRIC BICONVEX OPTIC, PLANAR HAPTICS.
Type: IMPLANTABLE DEVICE | Site: EYE | Status: FUNCTIONAL
Brand: ACRYSOF®

## 2017-10-03 RX ORDER — SODIUM CHLORIDE, SODIUM LACTATE, POTASSIUM CHLORIDE, CALCIUM CHLORIDE 600; 310; 30; 20 MG/100ML; MG/100ML; MG/100ML; MG/100ML
INJECTION, SOLUTION INTRAVENOUS CONTINUOUS
Status: DISCONTINUED | OUTPATIENT
Start: 2017-10-03 | End: 2017-10-03 | Stop reason: HOSPADM

## 2017-10-03 RX ORDER — TOBRAMYCIN 3 MG/ML
SOLUTION/ DROPS OPHTHALMIC PRN
Status: DISCONTINUED | OUTPATIENT
Start: 2017-10-03 | End: 2017-10-03 | Stop reason: HOSPADM

## 2017-10-03 RX ORDER — TOBRAMYCIN 3 MG/ML
1 SOLUTION/ DROPS OPHTHALMIC EVERY 5 MIN PRN
Status: COMPLETED | OUTPATIENT
Start: 2017-10-03 | End: 2017-10-03

## 2017-10-03 RX ORDER — BUPIVACAINE HYDROCHLORIDE 5 MG/ML
1 INJECTION, SOLUTION EPIDURAL; INTRACAUDAL ONCE
Status: COMPLETED | OUTPATIENT
Start: 2017-10-03 | End: 2017-10-03

## 2017-10-03 RX ORDER — LIDOCAINE HYDROCHLORIDE 10 MG/ML
INJECTION, SOLUTION INFILTRATION; PERINEURAL PRN
Status: DISCONTINUED | OUTPATIENT
Start: 2017-10-03 | End: 2017-10-03 | Stop reason: HOSPADM

## 2017-10-03 RX ORDER — BUPIVACAINE HYDROCHLORIDE 5 MG/ML
INJECTION, SOLUTION EPIDURAL; INTRACAUDAL PRN
Status: DISCONTINUED | OUTPATIENT
Start: 2017-10-03 | End: 2017-10-03 | Stop reason: HOSPADM

## 2017-10-03 RX ORDER — PHENYLEPHRINE HCL 2.5 %
1 DROPS OPHTHALMIC (EYE) EVERY 5 MIN PRN
Status: COMPLETED | OUTPATIENT
Start: 2017-10-03 | End: 2017-10-03

## 2017-10-03 RX ORDER — TIMOLOL MALEATE 5 MG/ML
SOLUTION/ DROPS OPHTHALMIC PRN
Status: DISCONTINUED | OUTPATIENT
Start: 2017-10-03 | End: 2017-10-03 | Stop reason: HOSPADM

## 2017-10-03 RX ORDER — CYCLOPENTOLATE HYDROCHLORIDE 10 MG/ML
1 SOLUTION/ DROPS OPHTHALMIC EVERY 5 MIN PRN
Status: COMPLETED | OUTPATIENT
Start: 2017-10-03 | End: 2017-10-03

## 2017-10-03 RX ORDER — KETOROLAC TROMETHAMINE 5 MG/ML
1 SOLUTION OPHTHALMIC EVERY 5 MIN PRN
Status: COMPLETED | OUTPATIENT
Start: 2017-10-03 | End: 2017-10-03

## 2017-10-03 RX ORDER — MIDAZOLAM HYDROCHLORIDE 1 MG/ML
INJECTION INTRAMUSCULAR; INTRAVENOUS PRN
Status: DISCONTINUED | OUTPATIENT
Start: 2017-10-03 | End: 2017-10-03 | Stop reason: HOSPADM

## 2017-10-03 RX ORDER — BALANCED SALT SOLUTION ENRICHED WITH BICARBONATE, DEXTROSE, AND GLUTATHIONE
KIT INTRAOCULAR PRN
Status: DISCONTINUED | OUTPATIENT
Start: 2017-10-03 | End: 2017-10-03 | Stop reason: HOSPADM

## 2017-10-03 RX ADMIN — PHENYLEPHRINE HYDROCHLORIDE 1 DROP: 25 SOLUTION/ DROPS OPHTHALMIC at 06:57

## 2017-10-03 RX ADMIN — TOBRAMYCIN 1 DROP: 3 SOLUTION OPHTHALMIC at 07:00

## 2017-10-03 RX ADMIN — KETOROLAC TROMETHAMINE 1 DROP: 5 SOLUTION OPHTHALMIC at 06:44

## 2017-10-03 RX ADMIN — BUPIVACAINE HYDROCHLORIDE 2 MG: 5 INJECTION, SOLUTION EPIDURAL; INTRACAUDAL at 06:44

## 2017-10-03 RX ADMIN — CYCLOPENTOLATE HYDROCHLORIDE 1 DROP: 10 SOLUTION/ DROPS OPHTHALMIC at 06:44

## 2017-10-03 RX ADMIN — KETOROLAC TROMETHAMINE 1 DROP: 5 SOLUTION OPHTHALMIC at 06:57

## 2017-10-03 RX ADMIN — PHENYLEPHRINE HYDROCHLORIDE 1 DROP: 25 SOLUTION/ DROPS OPHTHALMIC at 06:44

## 2017-10-03 RX ADMIN — BUPIVACAINE HYDROCHLORIDE 2 MG: 5 INJECTION, SOLUTION EPIDURAL; INTRACAUDAL at 06:58

## 2017-10-03 RX ADMIN — SODIUM CHLORIDE, POTASSIUM CHLORIDE, SODIUM LACTATE AND CALCIUM CHLORIDE: 600; 310; 30; 20 INJECTION, SOLUTION INTRAVENOUS at 07:03

## 2017-10-03 RX ADMIN — TOBRAMYCIN 1 DROP: 3 SOLUTION OPHTHALMIC at 06:45

## 2017-10-03 RX ADMIN — CYCLOPENTOLATE HYDROCHLORIDE 1 DROP: 10 SOLUTION/ DROPS OPHTHALMIC at 06:57

## 2017-10-03 ASSESSMENT — PAIN SCALES - GENERAL
PAINLEVEL_OUTOF10: 0

## 2017-10-03 ASSESSMENT — PAIN - FUNCTIONAL ASSESSMENT: PAIN_FUNCTIONAL_ASSESSMENT: 0-10

## 2017-10-03 NOTE — IP AVS SNAPSHOT
Patient Information     Patient Name Pavel Chaves,  Catalina Bruno 9/20/1924         This is your updated medication list to keep with you all times      TAKE these medications     ACCU-CHEK JOEL PLUS strip   Generic drug:  glucose blood VI test strips   TEST 1-2 TIMES DAILY       Accu-Chek Multiclix Lancet Dev Kit   Please dispense according to patients insurance. Accu-Chek Multiclix Lancets Misc   Please dispense according to patients insurance. Alcohol Swabs Pads   Please dispense according to patients insurance/device. Testing 1-2 /day       amLODIPine 5 MG tablet   Commonly known as:  NORVASC       atorvastatin 20 MG tablet   Commonly known as:  LIPITOR   TAKE 1 TABLET BY MOUTH DAILY       enalapril 20 MG tablet   Commonly known as:  VASOTEC   Take 1 tablet by mouth daily DOSE INCREASED       ferrous sulfate 325 (65 Fe) MG EC tablet   Commonly known as:  FE TABS   Take 1 tablet by mouth daily (with breakfast)       fluticasone 50 MCG/ACT nasal spray   Commonly known as:  FLONASE   1 spray by Nasal route daily       levothyroxine 25 MCG tablet   Commonly known as:  LEVOTHROID   Take 1 tablet by mouth daily       metFORMIN 1000 MG tablet   Commonly known as:  GLUCOPHAGE   Take 1 tablet by mouth 2 times daily (with meals) DOSE INCREASED       * omeprazole 20 MG delayed release capsule   Commonly known as:  PRILOSEC   Take 1 capsule by mouth 2 times daily for 10 days       * omeprazole 20 MG delayed release capsule   Commonly known as:  PRILOSEC   Take 1 capsule by mouth 2 times daily       tamsulosin 0.4 MG capsule   Commonly known as:  FLOMAX   Take 1 capsule by mouth daily For benign prostatic hypertrophy. STOP IF ANY RASH DEVELOPS.       vitamin D 2000 units Tabs tablet   Take 1 tablet by mouth daily       * Notice: This list has 2 medication(s) that are the same as other medications prescribed for you. Read the directions carefully, and ask your doctor or other care provider to review them with you.

## 2017-10-03 NOTE — OP NOTE
viscoelastic and a foldable posterior chamber intraocular lens was injected into the capsular bag and rotated into position model SN60WF 19.5 diopter power. Irrigation/aspiration was used to remove all excess viscoelastic. The eye was pressurized and the wounds were check for leaks and none were found. The patient had antibiotic, steroid, timoptic and antibiotic/steroid ointment placed in the eye. The lid speculum was removed. The eye was covered with a shield. The patient went to the PACU in excellent condition, having tolerated the procedure extremely well and will follow up with me tomorrow for postop day 1 care. Post-op instructions were discussed with patient and family.      Michael Oliver MD

## 2017-10-03 NOTE — H&P
soft, non-tender  Ext: no edema  Neuro: no focal deficits    Assessment:   1. Visually significant cataract   2. See preoperative ophthalmology notes    Plan:   1. Risks, benefits, alternatives to surgery discussed with the patient and family. 2. All questions were answered to their satisfaction. 3. Ok to proceed with surgery as planned.     Stevie Andre

## 2017-10-03 NOTE — IP AVS SNAPSHOT
fluticasone 50 MCG/ACT nasal spray   Commonly known as:  FLONASE   1 spray by Nasal route daily                                         levothyroxine 25 MCG tablet   Commonly known as:  LEVOTHROID   Take 1 tablet by mouth daily                                         metFORMIN 1000 MG tablet   Commonly known as:  GLUCOPHAGE   Take 1 tablet by mouth 2 times daily (with meals) DOSE INCREASED                                         omeprazole 20 MG delayed release capsule   Commonly known as:  PRILOSEC   Take 1 capsule by mouth 2 times daily for 10 days                                         omeprazole 20 MG delayed release capsule   Commonly known as:  PRILOSEC   Take 1 capsule by mouth 2 times daily                                         tamsulosin 0.4 MG capsule   Commonly known as:  FLOMAX   Take 1 capsule by mouth daily For benign prostatic hypertrophy. STOP IF ANY RASH DEVELOPS.                                         vitamin D 2000 units Tabs tablet   Take 1 tablet by mouth daily                                                 Allergies as of 10/3/2017        Reactions    Aspirin     Anemia, hematuria    Sulfa Antibiotics Rash      Immunizations as of 10/3/2017     Name Date Dose VIS Date Route    Influenza, High Dose 2/16/2017 0.5 mL 8/7/2015 Intramuscular    Pneumococcal 13-valent Conjugate (Elham Cushing) 11/12/2015 -- -- --    Comment: eulogio    Pneumococcal Polysaccharide ([id number]) 2/16/2017 0.5 mL 4/24/2015 Intramuscular    Zoster 8/20/2015 -- -- --      Last Vitals          Most Recent Value    Temp  97.5 °F (36.4 °C)    Pulse  57    Resp  16    BP  (!)  131/48         After Visit Summary    This summary was created for you. Thank you for entrusting your care to us.   The following information includes details about your hospital/visit stay along with steps you should take to help with your recovery once you leave the hospital.  In this packet, you will find information about the topics listed below:    · Instructions about your medications including a list of your home medications  · A summary of your hospital visit  · Follow-up appointments once you have left the hospital  · Your care plan at home      You may receive a survey regarding the care you received during your stay. Your input is valuable to us. We encourage you to complete and return your survey in the envelope provided. We hope you will choose us in the future for your healthcare needs. Patient Information     Patient Name Pavel Chaves, Slade Bruno 9/20/1924      Care Provided at:     Name Address Phone       [facility] [address] [facility]  [address] [phone]            Your Visit    Here you will find information about your visit, including the reason for your visit. Please take this sheet with you when you visit your doctor or other health care provider in the future. It will help determine the best possible medical care for you at that time. If you have any questions once you leave the hospital, please call the department phone number listed below. Why you were here     Your primary diagnosis was:  Not on File    Your diagnoses also included:  Cataract, Nuclear Sclerotic, Left Eye      Visit Information     Date & Time Provider Department Dept. Phone    10/3/2017 Ellie Bass MD [address] [place] [address] [phone]       Follow-up Appointments    Below is a list of your follow-up and future appointments. This may not be a complete list as you may have made appointments directly with providers that we are not aware of or your providers may have made some for you. Please call your providers to confirm appointments. It is important to keep your appointments. Please bring your current insurance card, photo ID, co-pay, and all medication bottles to your appointment. If self-pay, payment is expected at the time of service. Follow-up Information     Follow up with Carlos Sánchez MD. Go in 1 day. Specialty:  Ophthalmology    Why:  For wound re-check    Contact information:    [facility] [address] [facility] [id number] [phone]        Future Appointments     10/4/2017 2:00 PM     Appointment with Carmenza East PT at [facility] ([phone])   [address]       10/5/2017 2:00 PM     Appointment with Carmenza East PT at [facility] ([phone])   [address]       11/15/2017 3:00 PM     Appointment with Yash Neff MD at [facility] ([phone])   Please arrive 15 minutes prior to appointment, bring photo ID and insurance card. Roberto [address]  [address]       12/18/2017 1:40 PM     Appointment with Whitney Morgan MD at [facility] ([phone])   Please arrive 15 minutes prior to appointment, bring photo ID and insurance card. [address] [facility].       1/2/2018 2:15 PM     Appointment with Stevenson Sever, MD at [facility] ([phone])   Please arrive 15 minutes prior to appointment, bring photo ID and insurance card. [facility]         Preventive Care        Date Due    Tetanus Combination Vaccine (1 - Tdap) 9/20/1943    Yearly Flu Vaccine (1) 9/1/2017                 Care Plan Once You Return Home    This section includes instructions you will need to follow once you leave the hospital.  Your care team will discuss these with you, so you and those caring for you know how to best care for your health needs at home. This section may also include educational information about certain health topics that may be of help to you. Discharge Instructions       1. Remove the shield at 1:00 p.m. today and begin all the eye drops. 2.  Repeat the eye drops at 5:00 p.m. and before bedtime one drop per bottle, any order. Wait 3-4 minutes between drops. The drops are:    ANTIBIOTIC:      Vigamox     Tobramycin     Zymaxid       Gatifloxacin     STEROID:        Prednisolone Acetate        Pred Forte      Durezol    ANTI-INFLAMMATORY         Nevenac       Ketorolac      Ilevro      Prolensa             3. Place one drop from each bottle by looking up, pull the lower lid down gently and let the drop fall in.    4. You may wipe the eyelid gently with a clean tissue or cotton. 5. Place only the shield over the eye when sleeping for (4) four days:  Fix with tape    6. In the morning remove the shield and start putting in the drops, one drop from each bottle. Replace the shield or wear glasses during the day. 7. Please call Dr Sintia Gaytan if you have any problems:    Office [phone] or [phone]    Home [phone]    8. Continue all your previous medications. You may use Aspirin, Tylenol, or Advil if needed. 9.  You have an appointment in the office tomorrow at    8 am    10. Please bring your drops into the office at your next visit. Important information for a smoker       SMOKING: QUIT SMOKING. THIS IS THE MOST IMPORTANT ACTION YOU CAN TAKE TO IMPROVE YOUR CURRENT AND FUTURE HEALTH. Call the [address] [place] [address] at [place] NOW ([phone])    Smoking harms nonsmokers. When nonsmokers are around people who smoke, they absorb nicotine, carbon monoxide, and other ingredients of tobacco smoke. DO NOT SMOKE AROUND CHILDREN     Children exposed to secondhand smoke are at an increased risk of:  Sudden Infant Death Syndrome (SIDS), acute respiratory infections, inflammation of the middle ear, and severe asthma. Over a longer time, it causes heart disease and lung cancer. There is no safe level of exposure to secondhand smoke.                 MyChart Signup Our records indicate that you have an active [employer] account. You can view your After Visit Summary by going to [email or web address] and logging in with your [employer]t username and password. If you don't have a [facility] username and password but a parent or guardian has access to your record, the parent or guardian should login with their own [employer]t username and password and access your record to view the After Visit Summary. Additional Information  If you have questions, please contact the physician practice where you receive care. Remember, [facility] is NOT to be used for urgent needs. For medical emergencies, dial 911. For questions regarding your [employer] account call [phone]. If you have a clinical question, please call your doctor's office. View your information online  ? Review your current list of  medications, immunization, and allergies. ? Review your future test results online . ? Review your discharge instructions provided by your caregivers at discharge    Certain functionality such as prescription refills, scheduling appointments or sending messages to your provider are not activated if your provider does not use [employer] in his/her office    For questions regarding your [employer] account call [phone]. If you have a clinical question, please call your doctor's office. The information on all pages of the After Visit Summary has been reviewed with me, the patient and/or responsible adult, by my health care provider(s). I had the opportunity to ask questions regarding this information. I understand I should dispose of my armband safely at home to protect my health information. A complete copy of the After Visit Summary has been given to me, the patient and/or responsible adult.            Patient Signature/Responsible Adult:____________________    Clinician Signature:_____________________    Date:_____________________

## 2017-10-04 ENCOUNTER — HOSPITAL ENCOUNTER (OUTPATIENT)
Dept: PHYSICAL THERAPY | Age: 82
Setting detail: THERAPIES SERIES
Discharge: HOME | End: 2017-10-04
Payer: MEDICARE

## 2017-10-04 PROCEDURE — 97110 THERAPEUTIC EXERCISES: CPT

## 2017-10-04 PROCEDURE — G0283 ELEC STIM OTHER THAN WOUND: HCPCS

## 2017-10-04 ASSESSMENT — PAIN DESCRIPTION - PROGRESSION: CLINICAL_PROGRESSION: GRADUALLY IMPROVING

## 2017-10-04 ASSESSMENT — PAIN SCALES - GENERAL: PAINLEVEL_OUTOF10: 0

## 2017-10-04 NOTE — PROGRESS NOTES
Physical Therapy  Daily Treatment Note  Date: 10/4/2017  Patient Name: Tiff Oliveros  MRN: [id number]     :   1924    Subjective:   General  Referring Practitioner: Dr. Raffi Cobb   PT Visit Information  Onset Date: 14  PT Insurance Information: [facility] Medicare  Total # of Visits Approved: 12  Total # of Visits to Date: 6  No Show: 0  Canceled Appointment: 2  Subjective  Subjective: Reports neck a lot better, sleeping better and doesn't feel stiff in the morning. Just minimal ache on shoulders. Pain Screening  Patient Currently in Pain: Denies  Pain Assessment  Pain Assessment: 0-10  Pain Level: 0  Clinical Progression: Gradually improving  Vital Signs  Patient Currently in Pain: Denies       Treatment Activities:     Exercises  Exercise 1: Cervical ROM all planes x 10  Exercise 2: B upper trap stretch, 15\"x3@  Exercise 3: pulley 20x@  Exercise 5: Biodex 1.5, 5'  Exercise 6: IFC and cervical HP 15'- seated  Exercise 7: Pulldown/rows, R2, 10x3@    Assessment:   Conditions Requiring Skilled Therapeutic Intervention  Body structures, Functions, Activity limitations: Decreased ADL status; Decreased ROM  Assessment: Other problem: Pain. Modified exercise program, had cataract removal 3 dyas ago, not allowed strenuous activity. Treatment Diagnosis: Pain, MM guarding, decreased ROM/ function of cervical spine  Prognosis: Good  Patient Education: Issued green Tband, given written instructions and demo of exercises. REQUIRES PT FOLLOW UP: Yes  Discharge Recommendations: Continue to assess pending progress       Plan:    Plan  Times per week: Prefers 2x/wk for 6 weeks  Current Treatment Recommendations: Strengthening, ROM, Manual Therapy - Joint Manipulation, Modalities, Home Exercise Program  Plan Comment: Recheck on next visit for possible discharge  Timed Code Treatment Minutes: 20 Minutes (Thera.  Ex.- 20'; Electrical stim - 15')     Therapy Time   Individual Concurrent Group Co-treatment   Time In 7387

## 2017-10-05 ENCOUNTER — HOSPITAL ENCOUNTER (OUTPATIENT)
Dept: PHYSICAL THERAPY | Age: 82
Setting detail: THERAPIES SERIES
Discharge: HOME | End: 2017-10-05
Payer: MEDICARE

## 2017-10-05 PROCEDURE — G8982 BODY POS GOAL STATUS: HCPCS

## 2017-10-05 PROCEDURE — G0283 ELEC STIM OTHER THAN WOUND: HCPCS

## 2017-10-05 PROCEDURE — G8983 BODY POS D/C STATUS: HCPCS

## 2017-10-05 PROCEDURE — 97110 THERAPEUTIC EXERCISES: CPT

## 2017-10-05 ASSESSMENT — PAIN DESCRIPTION - PAIN TYPE: TYPE: CHRONIC PAIN

## 2017-10-05 ASSESSMENT — PAIN DESCRIPTION - PROGRESSION: CLINICAL_PROGRESSION: GRADUALLY IMPROVING

## 2017-10-05 ASSESSMENT — PAIN SCALES - GENERAL: PAINLEVEL_OUTOF10: 0

## 2017-10-05 ASSESSMENT — PAIN DESCRIPTION - LOCATION: LOCATION: NECK

## 2017-10-05 NOTE — PROGRESS NOTES
Physical Therapy  Discharge Note  Date: 10/5/2017  Patient Name: Catia Kim  MRN: [id number]     :   1924    Subjective:   General  Referring Practitioner: Dr. Richmond Jj   PT Visit Information  Onset Date: 14  PT Insurance Information: [facility] Medicare  Total # of Visits Approved: 12  Total # of Visits to Date: 7  No Show: 0  Canceled Appointment: 2  Subjective  Subjective: States neck and shoulders a lot better, able to sleep better. Pain Screening  Patient Currently in Pain: Denies  Pain Assessment  Pain Assessment: 0-10  Pain Level: 0  Pain Type: Chronic pain  Pain Location: Neck  Clinical Progression: Gradually improving  Vital Signs  Patient Currently in Pain: Denies       Treatment Activities:     AROM RUE (degrees)  RUE AROM : WFL  AROM LUE (degrees)  LUE AROM : WFL  Spine  Cervical: Flexion: 60 with pain at end range ( NOW 64, NO PAIN )    Extension: 20 with pain *  ( NOW 50 WITH PAIN ) Sidebend: R 30 *  ( NOW 40 )  L 20 *  ( 32 ) Rotation: R 40 *  ( 55 ) L 40 *  ( 45 )   Exercises  Exercise 1: Cervical ROM all planes x 10  Exercise 2: B upper trap stretch, 15\"x3@  Exercise 3: pulley 20x@  Exercise 5: Biodex 1.5, 5'  Exercise 6: IFC and cervical HP 15'- seated  Exercise 7: Pulldown/rows, R2, 10x3@   Assessment:   Conditions Requiring Skilled Therapeutic Intervention  Body structures, Functions, Activity limitations: Decreased ADL status; Decreased ROM  Assessment: Other problem: Pain. Patient doing well, meeting 4/4 short term goals, 1/1 long term goal.  Treatment Diagnosis: Pain, MM guarding, decreased ROM/ function of cervical spine  Prognosis: Good  Patient Education: Reviewed HEP  REQUIRES PT FOLLOW UP: No      G-Code:  PT G-Codes  Functional Assessment Tool Used: OPTIMAL  Score: Reports no limitation - back to normal in sitting, sleeping, pushing  Functional Limitation: Changing and maintaining body position  Changing and Maintaining Body Position Goal Status ():  At least 1

## 2017-10-10 DIAGNOSIS — E11.42 TYPE 2 DIABETES MELLITUS WITH DIABETIC POLYNEUROPATHY, WITHOUT LONG-TERM CURRENT USE OF INSULIN (HCC): ICD-10-CM

## 2017-10-10 DIAGNOSIS — I10 ESSENTIAL HYPERTENSION: ICD-10-CM

## 2017-10-10 RX ORDER — AMLODIPINE BESYLATE 5 MG/1
5 TABLET ORAL DAILY
Qty: 30 TABLET | Refills: 3 | Status: SHIPPED | OUTPATIENT
Start: 2017-10-10 | End: 2018-01-04 | Stop reason: SDUPTHER

## 2017-10-13 DIAGNOSIS — N40.1 BENIGN PROSTATIC HYPERPLASIA WITH LOWER URINARY TRACT SYMPTOMS, UNSPECIFIED MORPHOLOGY: ICD-10-CM

## 2017-10-13 RX ORDER — TAMSULOSIN HYDROCHLORIDE 0.4 MG/1
CAPSULE ORAL
Qty: 30 CAPSULE | Refills: 5 | Status: SHIPPED | OUTPATIENT
Start: 2017-10-13 | End: 2018-03-16 | Stop reason: SDUPTHER

## 2017-10-19 DIAGNOSIS — E11.42 TYPE 2 DIABETES MELLITUS WITH DIABETIC POLYNEUROPATHY, WITH LONG-TERM CURRENT USE OF INSULIN (HCC): ICD-10-CM

## 2017-10-19 DIAGNOSIS — Z79.4 TYPE 2 DIABETES MELLITUS WITH DIABETIC POLYNEUROPATHY, WITH LONG-TERM CURRENT USE OF INSULIN (HCC): ICD-10-CM

## 2017-10-20 RX ORDER — LANCETS
EACH MISCELLANEOUS
Qty: 100 EACH | Refills: 5 | Status: SHIPPED | OUTPATIENT
Start: 2017-10-20 | End: 2018-06-03 | Stop reason: SDUPTHER

## 2017-11-08 ENCOUNTER — HOSPITAL ENCOUNTER (OUTPATIENT)
Age: 82
Discharge: HOME | End: 2017-11-08
Payer: MEDICARE

## 2017-11-08 ENCOUNTER — HOSPITAL ENCOUNTER (OUTPATIENT)
Dept: GENERAL RADIOLOGY | Age: 82
Discharge: HOME | End: 2017-11-08
Payer: MEDICARE

## 2017-11-08 DIAGNOSIS — M54.2 CHRONIC NECK PAIN: ICD-10-CM

## 2017-11-08 DIAGNOSIS — G89.29 CHRONIC NECK PAIN: ICD-10-CM

## 2017-11-08 DIAGNOSIS — A04.8 H. PYLORI INFECTION: ICD-10-CM

## 2017-11-08 DIAGNOSIS — K59.01 SLOW TRANSIT CONSTIPATION: ICD-10-CM

## 2017-11-08 DIAGNOSIS — K21.00 GASTROESOPHAGEAL REFLUX DISEASE WITH ESOPHAGITIS: ICD-10-CM

## 2017-11-08 DIAGNOSIS — D64.9 ANEMIA, UNSPECIFIED TYPE: ICD-10-CM

## 2017-11-08 LAB
ABSOLUTE EOS #: 0.1 K/UL (ref 0–0.4)
ABSOLUTE IMMATURE GRANULOCYTE: ABNORMAL K/UL (ref 0–0.3)
ABSOLUTE LYMPH #: 1.5 K/UL (ref 1–4.8)
ABSOLUTE MONO #: 0.5 K/UL (ref 0.1–1.3)
BASOPHILS # BLD: 0 %
BASOPHILS ABSOLUTE: 0 K/UL (ref 0–0.2)
DIFFERENTIAL TYPE: ABNORMAL
EOSINOPHILS RELATIVE PERCENT: 3 %
HCT VFR BLD CALC: 33 % (ref 41–53)
HEMOGLOBIN: 10.9 G/DL (ref 13.5–17.5)
IMMATURE GRANULOCYTES: ABNORMAL %
LYMPHOCYTES # BLD: 29 %
MCH RBC QN AUTO: 31.7 PG (ref 26–34)
MCHC RBC AUTO-ENTMCNC: 33 G/DL (ref 31–37)
MCV RBC AUTO: 96.1 FL (ref 80–100)
MONOCYTES # BLD: 9 %
PDW BLD-RTO: 14.2 % (ref 11.5–14.9)
PLATELET # BLD: 171 K/UL (ref 150–450)
PLATELET ESTIMATE: ABNORMAL
PMV BLD AUTO: 9.1 FL (ref 6–12)
RBC # BLD: 3.44 M/UL (ref 4.5–5.9)
RBC # BLD: ABNORMAL 10*6/UL
SEG NEUTROPHILS: 59 %
SEGMENTED NEUTROPHILS ABSOLUTE COUNT: 3.2 K/UL (ref 1.3–9.1)
WBC # BLD: 5.4 K/UL (ref 3.5–11)
WBC # BLD: ABNORMAL 10*3/UL

## 2017-11-08 PROCEDURE — 36415 COLL VENOUS BLD VENIPUNCTURE: CPT

## 2017-11-08 PROCEDURE — 85025 COMPLETE CBC W/AUTO DIFF WBC: CPT

## 2017-11-08 PROCEDURE — 72040 X-RAY EXAM NECK SPINE 2-3 VW: CPT

## 2017-11-09 NOTE — PROGRESS NOTES
Pt's son was made aware of results,a nd will discuss PT with his dad to see if that's something he wants to do.

## 2017-11-13 DIAGNOSIS — I10 ESSENTIAL HYPERTENSION: ICD-10-CM

## 2017-11-13 RX ORDER — ENALAPRIL MALEATE 20 MG/1
TABLET ORAL
Qty: 90 TABLET | Refills: 3 | Status: SHIPPED | OUTPATIENT
Start: 2017-11-13 | End: 2018-11-20 | Stop reason: SDUPTHER

## 2017-11-13 NOTE — TELEPHONE ENCOUNTER
Please Approve or Refuse. Next Visit Date:  11/15/2017    Hemoglobin A1C (%)   Date Value   08/14/2017 7.1   05/12/2017 7.7   01/05/2017 6.7             ( goal A1C is < 7)   Microalb/Crt.  Ratio (mcg/mg creat)   Date Value   01/06/2017 30 (H)     LDL Cholesterol (mg/dL)   Date Value   05/19/2017 76       (goal LDL is <100)   AST (U/L)   Date Value   09/01/2017 31     ALT (U/L)   Date Value   09/01/2017 10     BUN (mg/dL)   Date Value   09/01/2017 24 (H)     BP Readings from Last 3 Encounters:   10/03/17 (!) 137/47   09/26/17 (!) 112/57   09/25/17 (!) 147/59          (goal 120/80)        Patient Active Problem List:     Hypothyroidism     Type 2 diabetes mellitus with diabetic polyneuropathy, without long-term current use of insulin (HCC)     Essential hypertension     Hyperlipidemia with target LDL less than 70     Hammer toes, bilateral     Onychomycosis of toenail     Murmur, cardiac     PEREYRA (dyspnea on exertion)     Anemia, ISABELLA     Microscopic hematuria     LVH (left ventricular hypertrophy) due to hypertensive disease     Diastolic dysfunction without heart failure     Aortic stenosis, moderate     Diabetic nephropathy associated with type 2 diabetes mellitus (HCC)     Iron deficiency anemia     Benign prostatic hyperplasia with lower urinary tract symptoms     Chronic neck pain     At high risk for falls     Bilateral hearing loss     Bone disorder     Helicobacter pylori gastritis     Acute bronchitis due to infection     Vitamin D deficiency     Pulmonary hypertension     H. pylori infection     History of anemia due to chronic kidney disease     Gastroesophageal reflux disease with esophagitis

## 2017-11-15 ENCOUNTER — OFFICE VISIT (OUTPATIENT)
Dept: FAMILY MEDICINE CLINIC | Age: 82
End: 2017-11-15
Payer: MEDICARE

## 2017-11-15 ENCOUNTER — HOSPITAL ENCOUNTER (OUTPATIENT)
Age: 82
Discharge: HOME | End: 2017-11-15
Payer: MEDICARE

## 2017-11-15 VITALS
HEIGHT: 70 IN | BODY MASS INDEX: 25.71 KG/M2 | SYSTOLIC BLOOD PRESSURE: 161 MMHG | DIASTOLIC BLOOD PRESSURE: 64 MMHG | WEIGHT: 179.6 LBS | TEMPERATURE: 97.9 F | OXYGEN SATURATION: 98 % | HEART RATE: 70 BPM

## 2017-11-15 DIAGNOSIS — Z23 NEEDS FLU SHOT: ICD-10-CM

## 2017-11-15 DIAGNOSIS — K21.00 GASTROESOPHAGEAL REFLUX DISEASE WITH ESOPHAGITIS: ICD-10-CM

## 2017-11-15 DIAGNOSIS — I10 ESSENTIAL HYPERTENSION: ICD-10-CM

## 2017-11-15 DIAGNOSIS — I35.0 AORTIC STENOSIS, MODERATE: ICD-10-CM

## 2017-11-15 DIAGNOSIS — D50.9 IRON DEFICIENCY ANEMIA, UNSPECIFIED IRON DEFICIENCY ANEMIA TYPE: ICD-10-CM

## 2017-11-15 DIAGNOSIS — R10.13 DYSPEPSIA: Primary | ICD-10-CM

## 2017-11-15 DIAGNOSIS — E11.42 TYPE 2 DIABETES MELLITUS WITH DIABETIC POLYNEUROPATHY, WITHOUT LONG-TERM CURRENT USE OF INSULIN (HCC): ICD-10-CM

## 2017-11-15 DIAGNOSIS — R14.0 BLOATING: ICD-10-CM

## 2017-11-15 DIAGNOSIS — Z86.19 HISTORY OF HELICOBACTER PYLORI INFECTION: ICD-10-CM

## 2017-11-15 LAB
-: ABNORMAL
AMORPHOUS: ABNORMAL
BACTERIA: ABNORMAL
BILIRUBIN URINE: NEGATIVE
CASTS UA: ABNORMAL /LPF
COLOR: YELLOW
COMMENT UA: ABNORMAL
CREATININE URINE: 54.9 MG/DL (ref 39–259)
CRYSTALS, UA: ABNORMAL /HPF
EPITHELIAL CELLS UA: ABNORMAL /HPF
GLUCOSE URINE: NEGATIVE
HBA1C MFR BLD: 6.8 %
KETONES, URINE: NEGATIVE
LEUKOCYTE ESTERASE, URINE: NEGATIVE
MICROALBUMIN/CREAT 24H UR: 58 MG/L
MICROALBUMIN/CREAT UR-RTO: 106 MCG/MG CREAT
MUCUS: ABNORMAL
NITRITE, URINE: NEGATIVE
OTHER OBSERVATIONS UA: ABNORMAL
PH UA: 6 (ref 5–8)
PROTEIN UA: NEGATIVE
RBC UA: ABNORMAL /HPF
RENAL EPITHELIAL, UA: ABNORMAL /HPF
SPECIFIC GRAVITY UA: 1.01 (ref 1–1.03)
TRICHOMONAS: ABNORMAL
TURBIDITY: CLEAR
URINE HGB: ABNORMAL
UROBILINOGEN, URINE: NORMAL
WBC UA: ABNORMAL /HPF
YEAST: ABNORMAL

## 2017-11-15 PROCEDURE — 83036 HEMOGLOBIN GLYCOSYLATED A1C: CPT | Performed by: FAMILY MEDICINE

## 2017-11-15 PROCEDURE — G8417 CALC BMI ABV UP PARAM F/U: HCPCS | Performed by: FAMILY MEDICINE

## 2017-11-15 PROCEDURE — 99215 OFFICE O/P EST HI 40 MIN: CPT | Performed by: FAMILY MEDICINE

## 2017-11-15 PROCEDURE — 82570 ASSAY OF URINE CREATININE: CPT

## 2017-11-15 PROCEDURE — 4040F PNEUMOC VAC/ADMIN/RCVD: CPT | Performed by: FAMILY MEDICINE

## 2017-11-15 PROCEDURE — 1123F ACP DISCUSS/DSCN MKR DOCD: CPT | Performed by: FAMILY MEDICINE

## 2017-11-15 PROCEDURE — 90662 IIV NO PRSV INCREASED AG IM: CPT | Performed by: FAMILY MEDICINE

## 2017-11-15 PROCEDURE — 81001 URINALYSIS AUTO W/SCOPE: CPT

## 2017-11-15 PROCEDURE — G8427 DOCREV CUR MEDS BY ELIG CLIN: HCPCS | Performed by: FAMILY MEDICINE

## 2017-11-15 PROCEDURE — 1036F TOBACCO NON-USER: CPT | Performed by: FAMILY MEDICINE

## 2017-11-15 PROCEDURE — G0008 ADMIN INFLUENZA VIRUS VAC: HCPCS | Performed by: FAMILY MEDICINE

## 2017-11-15 PROCEDURE — G8484 FLU IMMUNIZE NO ADMIN: HCPCS | Performed by: FAMILY MEDICINE

## 2017-11-15 PROCEDURE — 82043 UR ALBUMIN QUANTITATIVE: CPT

## 2017-11-15 RX ORDER — METFORMIN HYDROCHLORIDE 500 MG/1
500 TABLET, EXTENDED RELEASE ORAL
Qty: 30 TABLET | Refills: 3 | Status: SHIPPED | OUTPATIENT
Start: 2017-11-15 | End: 2017-12-20 | Stop reason: ALTCHOICE

## 2017-11-15 ASSESSMENT — ENCOUNTER SYMPTOMS
SHORTNESS OF BREATH: 1
ABDOMINAL PAIN: 0
VOMITING: 0
NAUSEA: 0
DIARRHEA: 0
CONSTIPATION: 0
COUGH: 0
ABDOMINAL DISTENTION: 1
WHEEZING: 0
BLOOD IN STOOL: 0
CHEST TIGHTNESS: 0

## 2017-11-15 NOTE — PROGRESS NOTES
Addressed during office visit today, A1c abnormal, improved from prior, continue treatment recommended during the office visit

## 2017-11-15 NOTE — PATIENT INSTRUCTIONS
Call for GI appointment      Patient Education        Татьяна Maryellen de las pautas alimentarias para diabetes - [ Learning About Diabetes Food Guidelines ]  Instrucciones de cuidado  La planificación de las comidas es importante para el manejo de la diabetes. Ayuda a mantener el azúcar en la denver en el nivel ideal (que usted fija con jacobo médico). Usted no tiene que comer alimentos especiales. Puede comer lo mismo que jacobo kenya, incluso dulces de vez en cuando. Ольга debe prestar atención a la cantidad y la frecuencia con que come ciertos alimentos. Kaushal vez desee colaborar con un dietista o educador de diabetes certificado (CDE, por erin siglas en inglés) para que le ayuden a planificar las comidas y los refrigerios. Un dietista o CDE también puede ayudarle a bajar de peso si olamide es emanuel de erin objetivos. ¿Qué debería saber acerca de ingerir carbohidratos? Manejar la cantidad de carbohidratos que ingiere es calli parte importante de la alimentación saludable cuando tiene diabetes. Los carbohidratos se encuentran en muchos alimentos. · Sepa qué alimentos contienen carbohidratos. Y aprenda qué cantidad de carbohidratos contienen los diferentes alimentos. ¨ El pan, los cereales, la pasta y el arroz tienen aproximadamente 15 gramos de carbohidratos por porción. Calli porción equivale a 1 rebanada de pan (1 onza o 28 g), ½ taza de cereal cocido o 1/3 de taza de pasta o arroz cocidos. ¨ Las frutas tienen 15 gramos de carbohidratos por porción. [address] porción es 1 fruta fresca pequeña, jayshree calli [place] o calli naranja; ½ banana (plátano); ½ taza de fruta cocida o enlatada; ½ taza de jugo de fruta; 1 taza de melón o frambuesas; o 2 cucharadas de frutas secas. ¨ La leche y el yogur sin azúcar agregado tienen 15 gramos de carbohidratos por porción. [address] porción es 1 taza de [place] o 2/3 taza de yogur sin azúcar agregado. ¨ Las verduras con almidón tienen 15 gramos de carbohidratos por porción.  [address] porción es ½ taza de puré de papa o camote (batata, boniato); 1 taza de calabacín; ½ papa horneada pequeña; ½ taza de frijoles cocidos; o ½ taza de maíz (elote) o arvejas (chícharos) cocidos. · Aprenda cuántos carbohidratos debe consumir cada día y en cada comida. Un dietista o CDE le puede enseñar cómo llevar la cuenta de los carbohidratos que consume. A esto se le llama recuento de carbohidratos. · Si no está seguro de cómo [place] gramos de carbohidratos, utilice el [place] para planificar las comidas. Es calli [place] y rápida de asegurarse de que consuma comidas equilibradas. También le ayuda a distribuir los carbohidratos paulino el día. ¨ Divida el plato por tipo de alimento. Llene medio plato con verduras sin almidón, ponga carne u otras proteínas en calli cuarta parte del plato y granos o verduras con almidón en el último cuarto del plato. A esto puede agregarle un pequeño pedazo de fruta y 3 taza de [place] o yogur, según la cantidad de carbohidratos que deba consumir en calli comida. · Trate de comer aproximadamente la misma cantidad de carbohidratos en cada comida. No \"reserve\" jacobo cantidad diaria de carbohidratos para consumirlos en calli sixto comida. · Las proteínas contienen muy pocos o nada de carbohidratos por porción. Los ejemplos de proteínas incluyen carne de res, [place], [place], [place], [place], tofu, Moi-barre, requesón (\"cottage cheese\") y la mantequilla de cacahuate [place]). Calli porción de carne son 3 onzas (85 g), lo cual es aproximadamente del tamaño de calli baraja de naipes. Los ejemplos de porciones de sustitutos de la carne (equivalente a 1 onza o 28 g de carne) son 1/4 de taza de requesón, 1 huevo, 1 cucharada de [place] de cacahuate y ½ taza de tofu. ¿Cómo puede comer fuera y aún así comer de modo saludable? · Aprenda a calcular los tamaños de las porciones de alimentos que contienen carbohidratos. Si mide la comida en casa, será más fácil calcular la cantidad en calli porción de comida de restaurante.   · Si el 2017  Versión del contenido: 11.3  © 6405-9518 [employer]wise, Incorporated. Las instrucciones de cuidado fueron adaptadas bajo licencia por [facility] ([facility]). Si usted tiene [place] afección médica o sobre estas instrucciones, siempre pregunte a jacobo profesional de baudilio. Healthwise, Incorporated niega toda garantía o responsabilidad por jacobo uso de esta información.

## 2017-11-15 NOTE — PROGRESS NOTES
recently     Current monitoring regimen: home blood tests - daily  Home blood sugar records: fasting range: 143-140's-160's   Any episodes of hypoglycemia? no    Is He on ACE inhibitor or angiotensin II receptor blocker? Yes       Lab Results   Component Value Date    LABA1C 6.8 11/15/2017       . Not on Aspirin due to anemia and high risk for bleeding  Doesn't smoke      Urine microabumin was high. Lab Results   Component Value Date    LABMICR 30 (H) 01/06/2017       LDL controlled    Lab Results   Component Value Date    LDLCHOLESTEROL 76 05/19/2017       BP (!) 161/64   Pulse 70   Temp 97.9 °F (36.6 °C) (Tympanic)   Ht 5' 10\" (1.778 m)   Wt 179 lb 9.6 oz (81.5 kg)   SpO2 98% Comment: ra @ rest  BMI 25.77 kg/m²   Body mass index is 25.77 kg/m². Hypertension: Patient here for follow-up of elevated blood pressure. He is not exercising much, but he did complete the physical therapy and has been walking but not too much, and is adherent to low salt diet. Blood pressure is well controlled at home. Cardiac symptoms dyspnea and fatigue. Patient denies chest pain, chest pressure/discomfort, claudication, exertional chest pressure/discomfort, irregular heart beat, lower extremity edema, near-syncope, orthopnea, palpitations, paroxysmal nocturnal dyspnea, syncope and tachypnea. Cardiovascular risk factors: advanced age (older than 54 for men, 72 for women), diabetes mellitus, dyslipidemia, hypertension, male gender, microalbuminuria and sedentary lifestyle. Use of agents associated with hypertension: thyroid hormones. History of target organ damage: angina/ prior myocardial infarction and left ventricular hypertrophy, diastolic dysfunction. He has aortic stenosis which is moderate severe. He did see Dr. Reagan Linares and he discussed options for the aortic stenosis. He has chosen not to pursue surgery. Did take BP meds.  High BP today    BP Readings from Last 3 Encounters:   11/15/17 (!) 161/64   10/03/17 (!) 137/47   09/26/17 (!) 112/57      Pulse controlled. Pulse Readings from Last 3 Encounters:   11/15/17 70   10/03/17 55   09/26/17 78     Negative depression screening. PHQ Scores 1/5/2017   PHQ2 Score 1   PHQ9 Score 1     Interpretation of Total Score Depression Severity: 1-4 = Minimal depression, 5-9 = Mild depression, 10-14 = Moderate depression, 15-19 = Moderately severe depression, 20-27 = Severe depression    Discussed testing with the patient and all questions fully answered. Anemia stable  Hyperglycemia  CKD stage III  Hyperlipidemia has improved  Vitamin D deficiency  Hospital Outpatient Visit on 11/08/2017   Component Date Value Ref Range Status    WBC 11/08/2017 5.4  3.5 - 11.0 k/uL Final    RBC 11/08/2017 3.44* 4.5 - 5.9 m/uL Final    Hemoglobin 11/08/2017 10.9* 13.5 - 17.5 g/dL Final    Hematocrit 11/08/2017 33.0* 41 - 53 % Final    MCV 11/08/2017 96.1  80 - 100 fL Final    MCH 11/08/2017 31.7  26 - 34 pg Final    MCHC 11/08/2017 33.0  31 - 37 g/dL Final    RDW 11/08/2017 14.2  11.5 - 14.9 % Final    Platelets 73/98/5858 171  150 - 450 k/uL Final    MPV 11/08/2017 9.1  6.0 - 12.0 fL Final    Differential Type 11/08/2017 NOT REPORTED   Final    Seg Neutrophils 11/08/2017 59  % Final    Lymphocytes 11/08/2017 29  % Final    Monocytes 11/08/2017 9  % Final    Eosinophils % 11/08/2017 3  % Final    Basophils 11/08/2017 0  % Final    Immature Granulocytes 11/08/2017 NOT REPORTED  0 % Final    Segs Absolute 11/08/2017 3.20  1.3 - 9.1 k/uL Final    Absolute Lymph # 11/08/2017 1.50  1.0 - 4.8 k/uL Final    Absolute Mono # 11/08/2017 0.50  0.1 - 1.3 k/uL Final    Absolute Eos # 11/08/2017 0.10  0.0 - 0.4 k/uL Final    Basophils # 11/08/2017 0.00  0.0 - 0.2 k/uL Final    Comment: Performed at [facility]: [facility] [address]  [address]   [phone]      Absolute Immature Granulocyte 11/08/2017 NOT REPORTED  0.00 - 0.30 k/uL Final    WBC Morphology 11/08/2017 NOT REPORTED   Final    RBC Morphology 11/08/2017 NOT REPORTED   Final    Platelet Estimate 44/91/6454 NOT REPORTED   Final     Lab Results   Component Value Date     09/01/2017    K 4.5 09/01/2017     09/01/2017    CO2 27 09/01/2017    BUN 24 09/01/2017    CREATININE 1.28 09/01/2017    GLUCOSE 214 09/01/2017    CALCIUM 8.8 09/01/2017        Lab Results   Component Value Date    ALT 10 09/01/2017    AST 31 09/01/2017    ALKPHOS 59 09/01/2017    BILITOT 0.60 09/01/2017       Lab Results   Component Value Date    TSH 3.07 01/06/2017   Hyperlipidemia . he  is already on statin, tolerates it well, denies muscle cramps. Reports compliance with statin.        Lab Results   Component Value Date    CHOL 127 05/19/2017    CHOL 211 (H) 01/06/2017     Lab Results   Component Value Date    TRIG 87 05/19/2017    TRIG 94 01/06/2017     Lab Results   Component Value Date    HDL 34 (L) 05/19/2017    HDL 46 01/06/2017     Lab Results   Component Value Date    LDLCHOLESTEROL 76 05/19/2017    LDLCHOLESTEROL 146 (H) 01/06/2017       Lab Results   Component Value Date    CHOLHDLRATIO 3.7 05/19/2017    CHOLHDLRATIO 4.6 01/06/2017         Lab Results   Component Value Date    WROYNHKK36 847 07/17/2017     Lab Results   Component Value Date    FOLATE 14.7 07/17/2017     Lab Results   Component Value Date    VITD25 21.1 (L) 09/01/2017             Current Outpatient Prescriptions   Medication Sig Dispense Refill    enalapril (VASOTEC) 20 MG tablet TAKE 1 TABLET BY MOUTH DAILY 90 tablet 3    ACCU-CHEK FASTCLIX LANCETS MISC USE AS DIRECTED 100 each 5    tamsulosin (FLOMAX) 0.4 MG capsule TAKE 1 CAPSULE BY MOUTH DAILY, FOR BENGIN PROSTATIC HYPERTROPHY, STOP IF ANY RASH DEVELOPS 30 capsule 5    amLODIPine (NORVASC) 5 MG tablet TAKE 1 TABLET BY MOUTH DAILY 30 tablet 3    metFORMIN (GLUCOPHAGE) 1000 MG tablet TAKE 1 TABLET BY MOUTH TWICE DAILY WITH MEALS 60 tablet 3    omeprazole (PRILOSEC) 20 MG delayed release polyneuropathy, with long-term current use of insulin ([address] 10/27/2016       Social History     Social History    Marital status:      Spouse name: N/A    Number of children: N/A    Years of education: N/A     Occupational History    Not on file. Social History Main Topics    Smoking status: Former Smoker     Quit date: 10/27/1967    Smokeless tobacco: Never Used    Alcohol use No    Drug use: No    Sexual activity: Not Currently     Other Topics Concern    Not on file     Social History Narrative    No narrative on file     Counseling given: Yes                  Review of Systems   Constitutional: Positive for appetite change, fatigue and unexpected weight change. Negative for activity change, chills, diaphoresis and fever. HENT: Positive for hearing loss (has hearing aids). Eyes: Positive for visual disturbance. Respiratory: Positive for shortness of breath. Negative for cough, chest tightness and wheezing. Cardiovascular: Negative for chest pain, palpitations and leg swelling. Gastrointestinal: Positive for abdominal distention. Negative for abdominal pain, blood in stool, constipation, diarrhea, nausea and vomiting. Endocrine: Negative for cold intolerance, heat intolerance, polydipsia, polyphagia and polyuria. Genitourinary: Positive for difficulty urinating (better with Flomax, nocturia). Negative for dysuria, hematuria and urgency. Musculoskeletal: Positive for arthralgias and neck pain. Negative for myalgias. Neurological: Positive for numbness. Hematological: Does not bruise/bleed easily. Psychiatric/Behavioral: Positive for sleep disturbance (wakes up to urinate once or twice). Negative for dysphoric mood. The patient is not nervous/anxious. Physical Exam   Constitutional: He is oriented to person, place, and time. He appears well-developed and well-nourished. No distress. HENT:   Head: Normocephalic and atraumatic.    Right Ear: Decreased hearing Future  - Microalbumin / Creatinine Urine Ratio; Future  - metFORMIN (GLUCOPHAGE-XR) 500 MG extended release tablet; Take 1 tablet by mouth daily (with breakfast) **stop Metformin short acting** at noon with lunch  Dispense: 30 tablet; Refill: 3    -advised home blood glucose testing daily  -daily feet exam, Foot care: advised to wash feet daily, pat dry and apply lotion at night, avoiding between toes. Need to look at feet daily and report to a physician any signs of inflammation or skin damage  -annual dilated eye exam  -Low carb, low fat diet, increase fruits and vegetables, and exercise 4-5 times a day 30-40 minutes a day discussed  Not on Aspirin due to anemia  -continue ACEI and statin      3. Essential hypertension  Not controlled  Continue current treatment. Discussed low salt diet and BP and pulse monitoring daily, BP log given  Needs nurse visit appointment for BP check in 1 week    - CBC; Future  - Comprehensive Metabolic Panel; Future  - UA W/REFLEX CULTURE; Future    4. Bloating  - H. pylori antigen; Future  follow up with GI  Discussed need for colonoscopy    5. Needs flu shot    - INFLUENZA, HIGH DOSE, 65 YRS +, IM, PF, PREFILL SYR, 0.5ML (FLUZONE HD)    6. History of Helicobacter pylori infection  H pylori check     7. Aortic stenosis, moderate  Deferred cardiac surgery and I support his decision     8. Gastroesophageal reflux disease with esophagitis  Continue Prilosec 20 mg BID      9.  Iron deficiency anemia, unspecified iron deficiency anemia type  Needs colonoscopy  Iron once a day if tolerated        Orders Placed This Encounter   Procedures    INFLUENZA, HIGH DOSE, 65 YRS +, IM, PF, PREFILL SYR, 0.5ML (FLUZONE HD)    H. pylori antigen     Standing Status:   Future     Standing Expiration Date:   11/15/2018    CBC     Standing Status:   Future     Standing Expiration Date:   11/15/2018    Comprehensive Metabolic Panel     Standing Status:   Future     Standing Expiration Date: 11/15/2018    Microalbumin / Creatinine Urine Ratio     Standing Status:   Future     Number of Occurrences:   1     Standing Expiration Date:   11/16/2018    UA W/REFLEX CULTURE     Culture and sensitivity     Standing Status:   Future     Number of Occurrences:   1     Standing Expiration Date:   11/15/2018    POCT glycosylated hemoglobin (Hb A1C)       Medications Discontinued During This Encounter   Medication Reason    omeprazole (PRILOSEC) 20 MG delayed release capsule Duplicate Order    metFORMIN (GLUCOPHAGE) 1000 MG tablet Alternate therapy         Corbin received counseling on the following healthy behaviors: nutrition, exercise and medication adherence  Reviewed prior labs and health maintenance  Continue current medications, diet and exercise. Discussed use, benefit, and side effects of prescribed medications. Barriers to medication compliance addressed. Patient given educational materials - see patient instructions  Was a self-tracking handout given in paper form or via [employer]? Yes    Requested Prescriptions     Signed Prescriptions Disp Refills    metFORMIN (GLUCOPHAGE-XR) 500 MG extended release tablet 30 tablet 3     Sig: Take 1 tablet by mouth daily (with breakfast) **stop Metformin short acting** at noon with lunch       All patient questions answered. Patient voiced understanding. Quality Measures    Body mass index is 25.77 kg/m². Normal. Weight control planned discussed Healthy diet and regular exercise. BP: (!) 161/64. Blood pressure is normal. Treatment plan consists of DASH Eating Plan, Dietary Sodium Restriction, Increased Physical Activity, Patient In-home Blood Pressure Monitoring and No treatment change needed. Needs nurse visit appointment for BP check in 1 week     Fall Risk 1/5/2017   2 or more falls in past year? no   Fall with injury in past year? no     The patient does not have a history of falls. I did , complete a risk assessment for falls.  A plan of care for falls

## 2017-11-15 NOTE — PROGRESS NOTES
DIABETES and HYPERTENSION visit    BP Readings from Last 3 Encounters:   11/15/17 (!) 146/67   10/03/17 (!) 137/47   09/26/17 (!) 112/57        Hemoglobin A1C (%)   Date Value   08/14/2017 7.1   05/12/2017 7.7   01/05/2017 6.7     Microalb/Crt. Ratio (mcg/mg creat)   Date Value   01/06/2017 30 (H)     LDL Cholesterol (mg/dL)   Date Value   05/19/2017 76     HDL (mg/dL)   Date Value   05/19/2017 34 (L)     BUN (mg/dL)   Date Value   09/01/2017 24 (H)     CREATININE (mg/dL)   Date Value   09/01/2017 1.28 (H)     Glucose (mg/dL)   Date Value   09/01/2017 214 (H)            Have you changed or started any medications since your last visit including any over-the-counter medicines, vitamins, or herbal medicines? no   Have you stopped taking any of your medications? Is so, why? -  no  Are you having any side effects from any of your medications? - no    Have you seen any other physician or provider since your last visit? yes - onc   Have you had any other diagnostic tests since your last visit? yes - xray   Have you been seen in the emergency room and/or had an admission in a hospital since we last saw you?  no   Have you had your routine dental cleaning in the past 6 months?  no     Have you had your annual diabetic retinal (eye) exam? No   (ensure copy of exam is in the chart)    Do you have an active MyChart account? If no, what is the barrier?   Yes    Patient Care Team:  Molly Paul MD as PCP - General (Family Medicine)  Molly Paul MD as PCP - [facility] Attributed Provider  Lee Arora (Optometry)  Argentina Conway MD as Surgeon (Cardiology)  Benja Orellana MD as Consulting Physician (Urology)  Charlie Patel MD as Consulting Physician (Gastroenterology)  oJvani Gomez MD as Surgeon (Ophthalmology)  Bob Bell (Certified Nurse Practitioner)    Medical History Review  Past Medical, Family, and Social History reviewed and does contribute to the patient presenting condition    Health Maintenance   Topic Date Due    DTaP/Tdap/Td vaccine (1 - Tdap) 09/20/1943    Flu vaccine (1) 09/01/2017    Zostavax vaccine  Completed    Pneumococcal low/med risk  Completed

## 2017-11-16 NOTE — PROGRESS NOTES
Leonidhart comment sent to patient. Very little blood in the urine, same as before.   There is worsening proteins in the urine, nephropathy, related to diabetes, continue good control for diabetes, and an normal.

## 2017-11-22 ENCOUNTER — NURSE ONLY (OUTPATIENT)
Dept: FAMILY MEDICINE CLINIC | Age: 82
End: 2017-11-22
Payer: MEDICARE

## 2017-11-22 ENCOUNTER — TELEPHONE (OUTPATIENT)
Dept: FAMILY MEDICINE CLINIC | Age: 82
End: 2017-11-22

## 2017-11-22 VITALS — SYSTOLIC BLOOD PRESSURE: 126 MMHG | DIASTOLIC BLOOD PRESSURE: 62 MMHG

## 2017-11-22 DIAGNOSIS — I10 ESSENTIAL HYPERTENSION: Primary | ICD-10-CM

## 2017-11-22 PROCEDURE — 99211 OFF/OP EST MAY X REQ PHY/QHP: CPT | Performed by: FAMILY MEDICINE

## 2017-12-15 DIAGNOSIS — D50.8 OTHER IRON DEFICIENCY ANEMIA: Primary | ICD-10-CM

## 2017-12-18 ENCOUNTER — OFFICE VISIT (OUTPATIENT)
Dept: ONCOLOGY | Age: 82
End: 2017-12-18
Payer: MEDICARE

## 2017-12-18 ENCOUNTER — HOSPITAL ENCOUNTER (OUTPATIENT)
Age: 82
Discharge: HOME | End: 2017-12-18
Payer: MEDICARE

## 2017-12-18 VITALS
HEART RATE: 62 BPM | RESPIRATION RATE: 16 BRPM | SYSTOLIC BLOOD PRESSURE: 120 MMHG | DIASTOLIC BLOOD PRESSURE: 56 MMHG | BODY MASS INDEX: 24.88 KG/M2 | WEIGHT: 173.4 LBS | TEMPERATURE: 97.5 F

## 2017-12-18 DIAGNOSIS — D50.8 OTHER IRON DEFICIENCY ANEMIA: Primary | ICD-10-CM

## 2017-12-18 DIAGNOSIS — D50.8 OTHER IRON DEFICIENCY ANEMIA: ICD-10-CM

## 2017-12-18 LAB
ABSOLUTE EOS #: 0.1 K/UL (ref 0–0.4)
ABSOLUTE IMMATURE GRANULOCYTE: ABNORMAL K/UL (ref 0–0.3)
ABSOLUTE LYMPH #: 1.7 K/UL (ref 1–4.8)
ABSOLUTE MONO #: 0.4 K/UL (ref 0.1–1.2)
BASOPHILS # BLD: 1 % (ref 0–2)
BASOPHILS ABSOLUTE: 0 K/UL (ref 0–0.2)
DIFFERENTIAL TYPE: ABNORMAL
EOSINOPHILS RELATIVE PERCENT: 3 % (ref 1–4)
HCT VFR BLD CALC: 33.8 % (ref 41–53)
HEMOGLOBIN: 11.3 G/DL (ref 13.5–17.5)
IMMATURE GRANULOCYTES: ABNORMAL %
LYMPHOCYTES # BLD: 35 % (ref 24–44)
MCH RBC QN AUTO: 31.8 PG (ref 26–34)
MCHC RBC AUTO-ENTMCNC: 33.4 G/DL (ref 31–37)
MCV RBC AUTO: 95.3 FL (ref 80–100)
MONOCYTES # BLD: 8 % (ref 2–11)
PDW BLD-RTO: 14.2 % (ref 12.5–15.4)
PLATELET # BLD: 192 K/UL (ref 140–450)
PLATELET ESTIMATE: ABNORMAL
PMV BLD AUTO: 8.7 FL (ref 6–12)
RBC # BLD: 3.55 M/UL (ref 4.5–5.9)
RBC # BLD: ABNORMAL 10*6/UL
SEG NEUTROPHILS: 53 % (ref 36–66)
SEGMENTED NEUTROPHILS ABSOLUTE COUNT: 2.7 K/UL (ref 1.8–7.7)
WBC # BLD: 5 K/UL (ref 3.5–11)
WBC # BLD: ABNORMAL 10*3/UL

## 2017-12-18 PROCEDURE — 85025 COMPLETE CBC W/AUTO DIFF WBC: CPT

## 2017-12-18 PROCEDURE — 4040F PNEUMOC VAC/ADMIN/RCVD: CPT | Performed by: INTERNAL MEDICINE

## 2017-12-18 PROCEDURE — 1123F ACP DISCUSS/DSCN MKR DOCD: CPT | Performed by: INTERNAL MEDICINE

## 2017-12-18 PROCEDURE — 1036F TOBACCO NON-USER: CPT | Performed by: INTERNAL MEDICINE

## 2017-12-18 PROCEDURE — 36415 COLL VENOUS BLD VENIPUNCTURE: CPT

## 2017-12-18 PROCEDURE — G8420 CALC BMI NORM PARAMETERS: HCPCS | Performed by: INTERNAL MEDICINE

## 2017-12-18 PROCEDURE — G8427 DOCREV CUR MEDS BY ELIG CLIN: HCPCS | Performed by: INTERNAL MEDICINE

## 2017-12-18 PROCEDURE — 99214 OFFICE O/P EST MOD 30 MIN: CPT | Performed by: INTERNAL MEDICINE

## 2017-12-18 PROCEDURE — 99211 OFF/OP EST MAY X REQ PHY/QHP: CPT

## 2017-12-18 PROCEDURE — G8484 FLU IMMUNIZE NO ADMIN: HCPCS | Performed by: INTERNAL MEDICINE

## 2017-12-18 NOTE — PROGRESS NOTES
_           Chief Complaint   Patient presents with    Follow-up     review status of disease    Other     glucose levels have been high     DIAGNOSIS:       Normocytic anemia with evidence of iron deficiency. Anemia multifactorial, Anemia of chronic renal insufficiency, iron deficiency and possible bone marrow disease. Gastritis with positive H. pylori status post treatment      CURRENT THERAPY:         Oral iron replacement  Recent treatment for H. Pylori  We will give IV iron dextran December 2017    BRIEF CASE HISTORY:      Mr. Jinny Medrano is a very pleasant 80 y.o. male with history of multiple comorbidities as stated below. Patient had history of anemia for the last few months and he was maintained on oral iron. He has some GI symptoms and he is unable to tolerates treatment. Patient denies any active bleeding. No melena or hematochezia. No hematemesis. No history of heartburn or acid reflux. No hematuria. No nausea or vomiting. No weight loss or decreased appetite. No fever or lymphadenopathy. He has increasing weakness and fatigue. No other complaints. INTERIM HISTORY:   The patient is seen for follow-up anemia. He had evidence of iron deficiency. He had oral iron. He has significant side effects related to the oral treatment. He cannot tolerate the pills. He had GI workup which showed gastritis with no active bleeding. H Pylori was positive. Received antibiotics. Patient has had acid reflux. No melena or hematochezia. No other complaints. PAST MEDICAL HISTORY: has a past medical history of Acute bronchitis due to infection; Anemia; Aortic stenosis, moderate-severe; Diabetes ([address] Diabetic nephropathy associated with type 2 diabetes mellitus ([address] Diastolic dysfunction without heart failure; H. pylori infection; Helicobacter pylori gastritis; Hyperlipidemia;  Hypertension; · Genitourinary: No dysuria, hematuria, frequency or urgency. · Musculoskeletal: No muscle aches or pains. No limitation of movement. No back pain. No gait disturbance, No joint complaints. · Dermatologic: No skin rashes or pruritus. No skin lesions or discolorations. · Psychiatric: No depression, anxiety, or stress or signs of schizophrenia. No change in mood or affect. · Hematologic: No history of bleeding tendency. No bruises or ecchymosis. No history of clotting problems. · Infectious disease: No fever, chills or frequent infections. · Endocrine: No problems with opacity. No polydipsia or polyuria. No temperature intolerance. · Neurologic: No headaches or dizziness. No weakness or numbness of the extremities. No changes in balance, coordination,  memory, mentation, behavior. · Allergic/Immunologic: No nasal congestion or hives. No repeated infections. PHYSICAL EXAM:  The patient is not in acute distress. Vital signs: Blood pressure (!) 120/56, pulse 62, temperature 97.5 °F (36.4 °C), temperature source Oral, resp. rate 16, weight 173 lb 6.4 oz (78.7 kg). HEENT:  Eyes are normal. Ears, nose and throat are normal.  Neck: Supple. No lymph node enlargement. No thyroid enlargement. Trachea is centrally located. Chest:  Clear to auscultation. No wheezes or crepitations. Heart: Regular sinus rhythm. Abdomen: Soft, nontender. No hepatosplenomegaly. No masses. Extremities:  With no edema. Lymph Nodes:  No cervical, axillary or inguinal lymph node enlargement. Neurologic:  Conscious and oriented. No focal neurological deficits. Psychosocial: No depression, anxiety or stress. Skin: No rashes, bruises or ecchymoses.       Review of Diagnostic data:   Lab Results   Component Value Date    WBC 5.0 12/18/2017    HGB 11.3 (L) 12/18/2017    HCT 33.8 (L) 12/18/2017    MCV 95.3 12/18/2017     12/18/2017       Chemistry        Component Value Date/Time     09/01/2017 1355    K 4.5 09/01/2017 1355     09/01/2017 1355    CO2 27 09/01/2017 1355    BUN 24 (H) 09/01/2017 1355    CREATININE 1.28 (H) 09/01/2017 1355        Component Value Date/Time    CALCIUM 8.8 09/01/2017 1355    ALKPHOS 59 09/01/2017 1355    AST 31 09/01/2017 1355    ALT 10 09/01/2017 1355    BILITOT 0.60 09/01/2017 1355        Lab Results   Component Value Date    IRON 73 07/17/2017    TIBC 212 (L) 07/17/2017    FERRITIN 434 (H) 07/17/2017     Vitamin B12 and folate are normal.    IMPRESSION:   Normocytic anemia with evidence of iron deficiency. Intolerable side effects to oral iron. Anemia multifactorial, Anemia of chronic renal insufficiency, iron deficiency and possible bone marrow disease. Gastritis with positive H. pylori status post treatment    PLAN:   Obviously patient does not tolerate PO IRON or VITAMIN C. he continues to have iron deficiency and anemia related to iron deficiency. We will stop the oral iron and we will give IV Iron. Explained the benefits and side effects and he understands and agrees. In addition he has evidence of chronic renal insufficiency. And considering his age likely could be having element of MDS. However the present time he has minimal symptoms and I do not see a need to do bone marrow test.  Continue monitoring would be appropriate. Patient was treated recently for H. pylori infection. I will prescribed omeprazole to be used for GERD and gastritis symptoms. We will see him again in 3 months with repeated labs. Patient's questions were answered to the best of his satisfaction and he verbalized full understanding and agreement.

## 2017-12-20 ENCOUNTER — OFFICE VISIT (OUTPATIENT)
Dept: FAMILY MEDICINE CLINIC | Age: 82
End: 2017-12-20
Payer: MEDICARE

## 2017-12-20 VITALS
RESPIRATION RATE: 20 BRPM | WEIGHT: 176 LBS | SYSTOLIC BLOOD PRESSURE: 145 MMHG | HEART RATE: 56 BPM | BODY MASS INDEX: 25.2 KG/M2 | HEIGHT: 70 IN | DIASTOLIC BLOOD PRESSURE: 58 MMHG | TEMPERATURE: 96.8 F

## 2017-12-20 DIAGNOSIS — E11.42 TYPE 2 DIABETES MELLITUS WITH DIABETIC POLYNEUROPATHY, WITHOUT LONG-TERM CURRENT USE OF INSULIN (HCC): ICD-10-CM

## 2017-12-20 PROCEDURE — G8484 FLU IMMUNIZE NO ADMIN: HCPCS | Performed by: FAMILY MEDICINE

## 2017-12-20 PROCEDURE — 1036F TOBACCO NON-USER: CPT | Performed by: FAMILY MEDICINE

## 2017-12-20 PROCEDURE — G8417 CALC BMI ABV UP PARAM F/U: HCPCS | Performed by: FAMILY MEDICINE

## 2017-12-20 PROCEDURE — 1123F ACP DISCUSS/DSCN MKR DOCD: CPT | Performed by: FAMILY MEDICINE

## 2017-12-20 PROCEDURE — G8427 DOCREV CUR MEDS BY ELIG CLIN: HCPCS | Performed by: FAMILY MEDICINE

## 2017-12-20 PROCEDURE — 99213 OFFICE O/P EST LOW 20 MIN: CPT | Performed by: FAMILY MEDICINE

## 2017-12-20 PROCEDURE — 4040F PNEUMOC VAC/ADMIN/RCVD: CPT | Performed by: FAMILY MEDICINE

## 2017-12-20 ASSESSMENT — ENCOUNTER SYMPTOMS
ABDOMINAL PAIN: 1
ABDOMINAL DISTENTION: 1
SHORTNESS OF BREATH: 0
WHEEZING: 0
DIARRHEA: 1
NAUSEA: 1
COUGH: 0

## 2017-12-20 NOTE — PROGRESS NOTES
(with breakfast) Takes 2 or 3 times a week.) 30 tablet 3    levothyroxine (LEVOTHROID) 25 MCG tablet Take 1 tablet by mouth daily 90 tablet 2    atorvastatin (LIPITOR) 20 MG tablet TAKE 1 TABLET BY MOUTH DAILY 90 tablet 3    Lancets Misc. (ACCU-CHEK MULTICLIX LANCET DEV) KIT Please dispense according to patients insurance. 1 kit 0    Alcohol Swabs PADS Please dispense according to patients insurance/device. Testing 1-2 /day 100 each 2     No current facility-administered medications for this visit. Social History     Social History    Marital status:      Spouse name: N/A    Number of children: N/A    Years of education: N/A     Occupational History    Not on file. Social History Main Topics    Smoking status: Former Smoker     Quit date: 10/27/1967    Smokeless tobacco: Never Used    Alcohol use No    Drug use: No    Sexual activity: Not Currently     Other Topics Concern    Not on file     Social History Narrative    No narrative on file     Counseling given: Yes        Family History   Problem Relation Age of Onset    High Blood Pressure Mother     Diabetes Mother     High Blood Pressure Father     Thyroid Disease Father     Thyroid Disease Brother              -rest of complaints with corresponding details per ROS    The patient's past medical, surgical, social, and family history as well as his current medications and allergies were reviewed as documented in today's encounter. Review of Systems   Constitutional: Negative for chills, diaphoresis and fever. Respiratory: Negative for cough, shortness of breath and wheezing. Cardiovascular: Negative for chest pain and palpitations. Gastrointestinal: Positive for abdominal distention, abdominal pain, diarrhea and nausea. Endocrine: Negative for polydipsia, polyphagia and polyuria. Genitourinary: Negative for flank pain, frequency and urgency. Neurological: Negative for numbness and headaches. Physical Exam    PHYSICAL EXAM:   VITALS:   Vitals:    12/20/17 1402   BP: (!) 145/58   Pulse: 56   Resp: 20   Temp: 96.8 °F (36 °C)     GENERAL:  Patient is a well-developed, well-nourished male  in no acute distress, alert and oriented x3, appropriate and pleasant conversation. HEAD: Normocephalic, atraumatic. NECK: Supple. No masses. No lymphadenopathy. CARDIOVASCULAR: Regular rate and rhythm. PULMONARY: Lungs are clear to auscultation bilaterally. ABDOMEN: Soft, nontender, nondistended. Positive bowel sounds. MUSCULOSKELETAL: Strength 5/5 bilaterally in all extremities. No tenderness to   palpation of the ribs, long bones, or spine. NEUROLOGIC: Cranial nerves II through XII grossly intact. No focal deficits are noted. ASSESSMENT AND PLAN      1. Type 2 diabetes mellitus with diabetic polyneuropathy, without long-term current use of insulin (HCC)  -Discussed with patient in detail that your sugars are within normal range according to her age. Discontinued metformin due to side effects and intolerance, started on Januvia. Advised blood sugar log fasting and low carb diet.  - SITagliptin (JANUVIA) 50 MG tablet; Take 1 tablet by mouth daily  Dispense: 30 tablet; Refill: 3    2. Hypertension:  Controlled, slightly high today will monitor. No orders of the defined types were placed in this encounter. Medications Discontinued During This Encounter   Medication Reason    metFORMIN (GLUCOPHAGE-XR) 500 MG extended release tablet Alternate therapy       Corbin received counseling on the following healthy behaviors: nutrition, exercise and medication adherence  Reviewed prior labs and health maintenance  Continue current medications, diet and exercise. Discussed use, benefit, and side effects of prescribed medications. Barriers to medication compliance addressed.   Patient given educational materials - see patient instructions  Was a self-tracking handout given in paper form or via [facility]? Yes    Requested Prescriptions     Signed Prescriptions Disp Refills    SITagliptin (JANUVIA) 50 MG tablet 30 tablet 3     Sig: Take 1 tablet by mouth daily       All patient questions answered. Patient voiced understanding. Quality Measures    Body mass index is 25.25 kg/m². Normal. Weight control planned discussed Healthy diet and regular exercise. BP: (!) 145/58. Blood pressure is high. Treatment plan consists of Dietary Sodium Restriction and No treatment change needed. Fall Risk 1/5/2017   2 or more falls in past year? no   Fall with injury in past year? no     The patient does not have a history of falls. I did , complete a risk assessment for falls. A plan of care for falls in-office gait and balance testing performed using The Timed Up and Go Test was negative for increased falls risk- no further intervention is currently indicated, No Treatment plan indicated    Lab Results   Component Value Date    LDLCHOLESTEROL 76 05/19/2017    (goal LDL reduction with dx if diabetes is 50% LDL reduction)    PHQ Scores 1/5/2017   PHQ2 Score 1   PHQ9 Score 1     Interpretation of Total Score Depression Severity: 1-4 = Minimal depression, 5-9 = Mild depression, 10-14 = Moderate depression, 15-19 = Moderately severe depression, 20-27 = Severe depression        The patient's past medical, surgical, social, and family history as well as his   current medications and allergies were reviewed as documented in today's encounter. Medications, labs, diagnostic studies, consultations and follow-up as documented in this encounter. Return in about 4 weeks (around 1/17/2018) for for type DM< . Patient was seen with total face to face time of 15 minutes. More than 50% of this visit was counseling and education.        Future Appointments  Date Time Provider Toy Watts   1[facility]19/2018 1:30 PM [facility]   1[facility]29/2018 2:30 PM [facility]   2/15/2018 3:00

## 2017-12-20 NOTE — PATIENT INSTRUCTIONS
Patient Education          sitagliptin  Pronunciation:  MICHAEL hdez glip tin  Brand:  Januvia  What is the most important information I should know about sitagliptin? You should not use this medicine if you are in a state of diabetic ketoacidosis (call your doctor for treatment with insulin). What is sitagliptin? Sitagliptin is an oral diabetes medicine that helps control blood sugar levels. It works by regulating the levels of insulin your body produces after eating. Sitagliptin is for people with type 2 diabetes. Sitagliptin is sometimes used in combination with other diabetes medications, but is not for treating type 1 diabetes. Sitagliptin may also be used for purposes not listed in this medication guide. What should I discuss with my healthcare provider before taking sitagliptin? You should not use sitagliptin if you are allergic to it, or if you are in a state of diabetic ketoacidosis (call your doctor for treatment with insulin). To make sure sitagliptin is safe for you, tell your doctor if you have:  · kidney disease (or if you are on dialysis);  · pancreatitis;  · high triglycerides (a type of fat in the blood);  · gallstones; or  · a history of alcoholism. This medicine is not expected to harm an unborn baby. Tell your doctor if you are pregnant or plan to become pregnant. Your name may need to be listed on a sitagliptin pregnancy registry when you start using this medicine. It is not known whether sitagliptin passes into breast milk or if it could harm a nursing baby. Tell your doctor if you are breast-feeding a baby. Sitagliptin is not approved for use by anyone younger than 25years old. How should I take sitagliptin? Follow all directions on your prescription label. Your doctor may occasionally change your dose to make sure you get the best results. Do not take this medicine in larger or smaller amounts or for longer than recommended. You may take this medicine with or without food.  Follow your doctor's instructions. Your blood sugar will need to be checked often, and you may need other blood tests at your doctor's office. Low blood sugar (hypoglycemia) can happen to everyone who has diabetes. Symptoms include headache, hunger, sweating, pale skin, irritability, dizziness, feeling shaky, or trouble concentrating. Keep a source of sugar with you in case you have low blood sugar. Sugar sources include fruit juice, hard candy, crackers, raisins, and non-diet soda. Be sure your family and close friends know how to help you in an emergency. If you have severe hypoglycemia and cannot eat or drink, use a glucagon injection. Your doctor can prescribe a glucagon emergency injection kit and tell you how to use it. Also watch for signs of high blood sugar (hyperglycemia) such as increased thirst, increased urination, hunger, dry mouth, fruity breath odor, drowsiness, dry skin, blurred vision, and weight loss. Check your blood sugar carefully during times of stress, travel, illness, surgery or medical emergency, vigorous exercise, or if you drink alcohol or skip meals. These things can affect your glucose levels and your dose needs may also change. Do not change your medication dose or schedule without your doctor's advice. Sitagliptin is only part of a complete treatment program that may also include diet, exercise, weight control, regular blood sugar testing, and special medical care. Follow your doctor's instructions very closely. Store at room temperature away from moisture, heat, and light. What happens if I miss a dose? Take the missed dose as soon as you remember. Skip the missed dose if it is almost time for your next scheduled dose. Do not take extra medicine to make up the missed dose. What happens if I overdose? Seek emergency medical attention or call the Poison Help line at 1-918.152.5780.  You may have signs of low blood sugar, such as extreme weakness, blurred vision, sweating, trouble speaking, tremors, stomach pain, confusion, and seizure (convulsions). What should I avoid while taking sitagliptin? Follow your doctor's instructions about any restrictions on food, beverages, or activity. What are the possible side effects of sitagliptin? Get emergency medical help if you have signs of an allergic reaction: hives; difficulty breathing; swelling of your face, lips, tongue, or throat. Stop taking sitagliptin and call your doctor right away if you have symptoms of pancreatitis: severe pain in your upper stomach spreading to your back, nausea and vomiting, loss of appetite, or fast heartbeats. Call your doctor at once if you have:  · severe autoimmune reaction --itching, blisters, breakdown of the outer layer of skin;  · severe or ongoing pain in your joints;  · little or no urinating;  · swelling, weight gain, feeling short of breath; or  · severe skin reaction --fever, sore throat, swelling in your face or tongue, burning in your eyes, skin pain followed by a red or purple skin rash that spreads (especially in the face or upper body) and causes blistering and peeling. Common side effects may include:  · runny or stuffy nose, sore throat;  · headache, back pain, joint or muscle pain; or  · nausea, stomach pain, diarrhea, constipation. This is not a complete list of side effects and others may occur. Call your doctor for medical advice about side effects. You may report side effects to FDA at 6-973-FDA-4333. What other drugs will affect sitagliptin? Other drugs may increase or decrease the effects of sitagliptin on lowering your blood sugar. Tell your doctor about all medications you use. This includes prescription and over-the-counter medicines, vitamins, and herbal products. Not all possible interactions are listed in this medication guide. Where can I get more information? Your pharmacist can provide more information about sitagliptin.     Remember, keep this and all other medicines out

## 2017-12-20 NOTE — PROGRESS NOTES
presenting condition    Health Maintenance   Topic Date Due    DTaP/Tdap/Td vaccine (1 - Tdap) 09/20/1943    Zostavax vaccine  Completed    Flu vaccine  Completed    Pneumococcal low/med risk  Completed

## 2017-12-20 NOTE — PROGRESS NOTES
BP Readings from Last 3 Encounters:   12/20/17 (!) 145/58   12/18/17 (!) 120/56   11/22/17 126/62     Will monitor BP, has appointment with me coming up    Future Appointments  Date Time Provider Toy Watts   1/19/2018 1:30 PM Myah Hernandez MD [facility]   1/29/2018 2:30 PM Hitesh Ramey MD [facility]   2/15/2018 3:00 PM Myah Hernandez MD [facility]   3/19/2018 2:20 PM Lana Perera MD [address] [place] [address]

## 2017-12-22 ENCOUNTER — HOSPITAL ENCOUNTER (OUTPATIENT)
Age: 82
Discharge: HOME | End: 2017-12-22
Payer: MEDICARE

## 2017-12-22 DIAGNOSIS — I10 ESSENTIAL HYPERTENSION: ICD-10-CM

## 2017-12-22 DIAGNOSIS — E11.42 TYPE 2 DIABETES MELLITUS WITH DIABETIC POLYNEUROPATHY, WITHOUT LONG-TERM CURRENT USE OF INSULIN (HCC): ICD-10-CM

## 2017-12-22 LAB
ALBUMIN SERPL-MCNC: 4.1 G/DL (ref 3.5–5.2)
ALBUMIN/GLOBULIN RATIO: ABNORMAL (ref 1–2.5)
ALP BLD-CCNC: 61 U/L (ref 40–129)
ALT SERPL-CCNC: 13 U/L (ref 5–41)
ANION GAP SERPL CALCULATED.3IONS-SCNC: 12 MMOL/L (ref 9–17)
AST SERPL-CCNC: 15 U/L
BILIRUB SERPL-MCNC: 0.61 MG/DL (ref 0.3–1.2)
BUN BLDV-MCNC: 20 MG/DL (ref 8–23)
BUN/CREAT BLD: ABNORMAL (ref 9–20)
CALCIUM SERPL-MCNC: 10 MG/DL (ref 8.6–10.4)
CHLORIDE BLD-SCNC: 102 MMOL/L (ref 98–107)
CO2: 28 MMOL/L (ref 20–31)
CREAT SERPL-MCNC: 0.77 MG/DL (ref 0.7–1.2)
GFR AFRICAN AMERICAN: >60 ML/MIN
GFR NON-AFRICAN AMERICAN: >60 ML/MIN
GFR SERPL CREATININE-BSD FRML MDRD: ABNORMAL ML/MIN/{1.73_M2}
GFR SERPL CREATININE-BSD FRML MDRD: ABNORMAL ML/MIN/{1.73_M2}
GLUCOSE BLD-MCNC: 170 MG/DL (ref 70–99)
HCT VFR BLD CALC: 34 % (ref 41–53)
HEMOGLOBIN: 11 G/DL (ref 13.5–17.5)
MCH RBC QN AUTO: 31.1 PG (ref 26–34)
MCHC RBC AUTO-ENTMCNC: 32.3 G/DL (ref 31–37)
MCV RBC AUTO: 96 FL (ref 80–100)
PDW BLD-RTO: 14.5 % (ref 11.5–14.9)
PLATELET # BLD: 166 K/UL (ref 150–450)
PMV BLD AUTO: 8.9 FL (ref 6–12)
POTASSIUM SERPL-SCNC: 4.5 MMOL/L (ref 3.7–5.3)
RBC # BLD: 3.54 M/UL (ref 4.5–5.9)
SODIUM BLD-SCNC: 142 MMOL/L (ref 135–144)
TOTAL PROTEIN: 7.1 G/DL (ref 6.4–8.3)
WBC # BLD: 4.7 K/UL (ref 3.5–11)

## 2017-12-22 PROCEDURE — 80053 COMPREHEN METABOLIC PANEL: CPT

## 2017-12-22 PROCEDURE — 85027 COMPLETE CBC AUTOMATED: CPT

## 2017-12-22 PROCEDURE — 36415 COLL VENOUS BLD VENIPUNCTURE: CPT

## 2017-12-28 ENCOUNTER — HOSPITAL ENCOUNTER (OUTPATIENT)
Age: 82
Setting detail: SPECIMEN
Discharge: HOME | End: 2017-12-28
Payer: MEDICARE

## 2017-12-28 DIAGNOSIS — R14.0 BLOATING: ICD-10-CM

## 2017-12-28 DIAGNOSIS — R10.13 DYSPEPSIA: ICD-10-CM

## 2017-12-28 PROCEDURE — 87338 HPYLORI STOOL AG IA: CPT

## 2017-12-29 LAB
DIRECT EXAM: NEGATIVE
DIRECT EXAM: NORMAL
Lab: NORMAL
SPECIMEN DESCRIPTION: NORMAL
SPECIMEN DESCRIPTION: NORMAL
STATUS: NORMAL

## 2018-01-04 DIAGNOSIS — I10 ESSENTIAL HYPERTENSION: ICD-10-CM

## 2018-01-04 RX ORDER — AMLODIPINE BESYLATE 5 MG/1
TABLET ORAL
Qty: 90 TABLET | Refills: 3 | Status: SHIPPED | OUTPATIENT
Start: 2018-01-04 | End: 2019-02-15 | Stop reason: SDUPTHER

## 2018-01-04 NOTE — TELEPHONE ENCOUNTER
lower urinary tract symptoms     Chronic neck pain     At high risk for falls     Bilateral hearing loss     Bone disorder     Vitamin D deficiency     Pulmonary hypertension     History of anemia due to chronic kidney disease     Gastroesophageal reflux disease with esophagitis     History of Helicobacter pylori infection     Dyspepsia     Bloating

## 2018-01-08 DIAGNOSIS — K90.89 OTHER SPECIFIED INTESTINAL MALABSORPTION: ICD-10-CM

## 2018-01-08 PROBLEM — K90.9 MALABSORPTION: Status: ACTIVE | Noted: 2018-01-08

## 2018-01-08 RX ORDER — SODIUM CHLORIDE 0.9 % (FLUSH) 0.9 %
5 SYRINGE (ML) INJECTION PRN
Status: CANCELLED | OUTPATIENT
Start: 2018-01-15

## 2018-01-08 RX ORDER — 0.9 % SODIUM CHLORIDE 0.9 %
10 VIAL (ML) INJECTION ONCE
Status: CANCELLED | OUTPATIENT
Start: 2018-01-15 | End: 2018-01-15

## 2018-01-08 RX ORDER — DIPHENHYDRAMINE HYDROCHLORIDE 50 MG/ML
50 INJECTION INTRAMUSCULAR; INTRAVENOUS ONCE
Status: CANCELLED | OUTPATIENT
Start: 2018-01-15 | End: 2018-01-15

## 2018-01-08 RX ORDER — METHYLPREDNISOLONE SODIUM SUCCINATE 125 MG/2ML
125 INJECTION, POWDER, LYOPHILIZED, FOR SOLUTION INTRAMUSCULAR; INTRAVENOUS ONCE
Status: CANCELLED | OUTPATIENT
Start: 2018-01-15 | End: 2018-01-15

## 2018-01-08 RX ORDER — HEPARIN SODIUM (PORCINE) LOCK FLUSH IV SOLN 100 UNIT/ML 100 UNIT/ML
500 SOLUTION INTRAVENOUS PRN
Status: CANCELLED | OUTPATIENT
Start: 2018-01-15

## 2018-01-08 RX ORDER — SODIUM CHLORIDE 0.9 % (FLUSH) 0.9 %
10 SYRINGE (ML) INJECTION PRN
Status: CANCELLED | OUTPATIENT
Start: 2018-01-15

## 2018-01-08 RX ORDER — DIPHENHYDRAMINE HYDROCHLORIDE 50 MG/ML
25 INJECTION INTRAMUSCULAR; INTRAVENOUS ONCE
Status: CANCELLED | OUTPATIENT
Start: 2018-01-15 | End: 2018-01-15

## 2018-01-08 RX ORDER — SODIUM CHLORIDE 9 MG/ML
INJECTION, SOLUTION INTRAVENOUS ONCE
Status: CANCELLED | OUTPATIENT
Start: 2018-01-15 | End: 2018-01-15

## 2018-01-08 RX ORDER — SODIUM CHLORIDE 9 MG/ML
INJECTION, SOLUTION INTRAVENOUS CONTINUOUS
Status: CANCELLED | OUTPATIENT
Start: 2018-01-15

## 2018-01-15 ENCOUNTER — HOSPITAL ENCOUNTER (OUTPATIENT)
Dept: INFUSION THERAPY | Age: 83
Discharge: HOME | End: 2018-01-15
Payer: MEDICARE

## 2018-01-15 VITALS
TEMPERATURE: 97.6 F | HEART RATE: 46 BPM | DIASTOLIC BLOOD PRESSURE: 49 MMHG | SYSTOLIC BLOOD PRESSURE: 122 MMHG | RESPIRATION RATE: 18 BRPM

## 2018-01-15 DIAGNOSIS — K90.89 OTHER SPECIFIED INTESTINAL MALABSORPTION: ICD-10-CM

## 2018-01-15 DIAGNOSIS — D50.8 OTHER IRON DEFICIENCY ANEMIA: ICD-10-CM

## 2018-01-15 PROCEDURE — 2580000003 HC RX 258: Performed by: INTERNAL MEDICINE

## 2018-01-15 PROCEDURE — 96366 THER/PROPH/DIAG IV INF ADDON: CPT

## 2018-01-15 PROCEDURE — 6360000002 HC RX W HCPCS: Performed by: INTERNAL MEDICINE

## 2018-01-15 PROCEDURE — 96375 TX/PRO/DX INJ NEW DRUG ADDON: CPT

## 2018-01-15 PROCEDURE — 96365 THER/PROPH/DIAG IV INF INIT: CPT

## 2018-01-15 RX ORDER — SODIUM CHLORIDE 9 MG/ML
INJECTION, SOLUTION INTRAVENOUS ONCE
Status: COMPLETED | OUTPATIENT
Start: 2018-01-15 | End: 2018-01-15

## 2018-01-15 RX ORDER — DIPHENHYDRAMINE HYDROCHLORIDE 50 MG/ML
25 INJECTION INTRAMUSCULAR; INTRAVENOUS ONCE
Status: COMPLETED | OUTPATIENT
Start: 2018-01-15 | End: 2018-01-15

## 2018-01-15 RX ORDER — SODIUM CHLORIDE 0.9 % (FLUSH) 0.9 %
5 SYRINGE (ML) INJECTION PRN
Status: CANCELLED | OUTPATIENT
Start: 2018-01-15

## 2018-01-15 RX ORDER — DIPHENHYDRAMINE HYDROCHLORIDE 50 MG/ML
25 INJECTION INTRAMUSCULAR; INTRAVENOUS ONCE
Status: CANCELLED | OUTPATIENT
Start: 2018-01-15 | End: 2018-01-15

## 2018-01-15 RX ORDER — DIPHENHYDRAMINE HYDROCHLORIDE 50 MG/ML
50 INJECTION INTRAMUSCULAR; INTRAVENOUS ONCE
Status: CANCELLED | OUTPATIENT
Start: 2018-01-15 | End: 2018-01-15

## 2018-01-15 RX ORDER — 0.9 % SODIUM CHLORIDE 0.9 %
10 VIAL (ML) INJECTION ONCE
Status: CANCELLED | OUTPATIENT
Start: 2018-01-15 | End: 2018-01-15

## 2018-01-15 RX ORDER — EPINEPHRINE 1 MG/ML
0.3 INJECTION, SOLUTION, CONCENTRATE INTRAVENOUS PRN
Status: CANCELLED | OUTPATIENT
Start: 2018-01-15

## 2018-01-15 RX ORDER — SODIUM CHLORIDE 9 MG/ML
INJECTION, SOLUTION INTRAVENOUS CONTINUOUS
Status: CANCELLED | OUTPATIENT
Start: 2018-01-15

## 2018-01-15 RX ORDER — METHYLPREDNISOLONE SODIUM SUCCINATE 125 MG/2ML
125 INJECTION, POWDER, LYOPHILIZED, FOR SOLUTION INTRAMUSCULAR; INTRAVENOUS ONCE
Status: CANCELLED | OUTPATIENT
Start: 2018-01-15 | End: 2018-01-15

## 2018-01-15 RX ORDER — HEPARIN SODIUM (PORCINE) LOCK FLUSH IV SOLN 100 UNIT/ML 100 UNIT/ML
500 SOLUTION INTRAVENOUS PRN
Status: CANCELLED | OUTPATIENT
Start: 2018-01-15

## 2018-01-15 RX ORDER — SODIUM CHLORIDE 0.9 % (FLUSH) 0.9 %
10 SYRINGE (ML) INJECTION PRN
Status: CANCELLED | OUTPATIENT
Start: 2018-01-15

## 2018-01-15 RX ORDER — SODIUM CHLORIDE 9 MG/ML
INJECTION, SOLUTION INTRAVENOUS ONCE
Status: CANCELLED | OUTPATIENT
Start: 2018-01-15 | End: 2018-01-15

## 2018-01-15 RX ADMIN — IRON DEXTRAN 1475 MG: 50 INJECTION INTRAMUSCULAR; INTRAVENOUS at 10:44

## 2018-01-15 RX ADMIN — DIPHENHYDRAMINE HYDROCHLORIDE 25 MG: 50 INJECTION, SOLUTION INTRAMUSCULAR; INTRAVENOUS at 09:13

## 2018-01-15 RX ADMIN — Medication 25 MG: at 09:31

## 2018-01-15 RX ADMIN — SODIUM CHLORIDE: 9 INJECTION, SOLUTION INTRAVENOUS at 09:11

## 2018-01-17 ENCOUNTER — PATIENT MESSAGE (OUTPATIENT)
Dept: FAMILY MEDICINE CLINIC | Age: 83
End: 2018-01-17

## 2018-01-17 ENCOUNTER — TELEPHONE (OUTPATIENT)
Dept: FAMILY MEDICINE CLINIC | Age: 83
End: 2018-01-17

## 2018-01-17 DIAGNOSIS — E11.42 TYPE 2 DIABETES MELLITUS WITH DIABETIC POLYNEUROPATHY, WITHOUT LONG-TERM CURRENT USE OF INSULIN (HCC): Primary | ICD-10-CM

## 2018-01-18 RX ORDER — GLUCOSAMINE HCL/CHONDROITIN SU 500-400 MG
CAPSULE ORAL
Qty: 100 STRIP | Refills: 3 | Status: SHIPPED | OUTPATIENT
Start: 2018-01-18 | End: 2018-10-19 | Stop reason: SDUPTHER

## 2018-01-18 RX ORDER — BLOOD-GLUCOSE METER
KIT MISCELLANEOUS
Qty: 1 KIT | Refills: 0 | Status: SHIPPED | OUTPATIENT
Start: 2018-01-18 | End: 2018-10-19 | Stop reason: SDUPTHER

## 2018-01-19 ENCOUNTER — OFFICE VISIT (OUTPATIENT)
Dept: FAMILY MEDICINE CLINIC | Age: 83
End: 2018-01-19
Payer: MEDICARE

## 2018-01-19 VITALS
WEIGHT: 176 LBS | BODY MASS INDEX: 24.64 KG/M2 | DIASTOLIC BLOOD PRESSURE: 78 MMHG | SYSTOLIC BLOOD PRESSURE: 135 MMHG | OXYGEN SATURATION: 95 % | HEART RATE: 76 BPM | HEIGHT: 71 IN

## 2018-01-19 DIAGNOSIS — I10 ESSENTIAL HYPERTENSION: ICD-10-CM

## 2018-01-19 DIAGNOSIS — E78.5 HYPERLIPIDEMIA WITH TARGET LDL LESS THAN 70: ICD-10-CM

## 2018-01-19 DIAGNOSIS — E55.9 VITAMIN D DEFICIENCY: ICD-10-CM

## 2018-01-19 DIAGNOSIS — D50.8 OTHER IRON DEFICIENCY ANEMIA: ICD-10-CM

## 2018-01-19 DIAGNOSIS — I51.89 DIASTOLIC DYSFUNCTION WITHOUT HEART FAILURE: ICD-10-CM

## 2018-01-19 DIAGNOSIS — Z23 NEED FOR DIPHTHERIA-TETANUS-PERTUSSIS (TDAP) VACCINE: ICD-10-CM

## 2018-01-19 DIAGNOSIS — E03.9 ACQUIRED HYPOTHYROIDISM: ICD-10-CM

## 2018-01-19 DIAGNOSIS — E11.42 TYPE 2 DIABETES MELLITUS WITH DIABETIC POLYNEUROPATHY, WITHOUT LONG-TERM CURRENT USE OF INSULIN (HCC): Primary | ICD-10-CM

## 2018-01-19 DIAGNOSIS — E11.21 DIABETIC NEPHROPATHY ASSOCIATED WITH TYPE 2 DIABETES MELLITUS (HCC): ICD-10-CM

## 2018-01-19 PROCEDURE — 4040F PNEUMOC VAC/ADMIN/RCVD: CPT | Performed by: FAMILY MEDICINE

## 2018-01-19 PROCEDURE — 1123F ACP DISCUSS/DSCN MKR DOCD: CPT | Performed by: FAMILY MEDICINE

## 2018-01-19 PROCEDURE — G8420 CALC BMI NORM PARAMETERS: HCPCS | Performed by: FAMILY MEDICINE

## 2018-01-19 PROCEDURE — G8484 FLU IMMUNIZE NO ADMIN: HCPCS | Performed by: FAMILY MEDICINE

## 2018-01-19 PROCEDURE — G8427 DOCREV CUR MEDS BY ELIG CLIN: HCPCS | Performed by: FAMILY MEDICINE

## 2018-01-19 PROCEDURE — 99215 OFFICE O/P EST HI 40 MIN: CPT | Performed by: FAMILY MEDICINE

## 2018-01-19 PROCEDURE — 1036F TOBACCO NON-USER: CPT | Performed by: FAMILY MEDICINE

## 2018-01-19 RX ORDER — METFORMIN HYDROCHLORIDE 500 MG/1
TABLET, EXTENDED RELEASE ORAL
Refills: 3 | COMMUNITY
Start: 2018-01-16 | End: 2018-04-16 | Stop reason: SDUPTHER

## 2018-01-19 RX ORDER — SYRING-NEEDL,DISP,INSUL,0.3 ML 30 GX5/16"
SYRINGE, EMPTY DISPOSABLE MISCELLANEOUS
Qty: 1 DEVICE | Refills: 0 | Status: SHIPPED | OUTPATIENT
Start: 2018-01-19 | End: 2019-01-23 | Stop reason: SDUPTHER

## 2018-01-19 ASSESSMENT — PATIENT HEALTH QUESTIONNAIRE - PHQ9
SUM OF ALL RESPONSES TO PHQ9 QUESTIONS 1 & 2: 0
1. LITTLE INTEREST OR PLEASURE IN DOING THINGS: 0
2. FEELING DOWN, DEPRESSED OR HOPELESS: 0
SUM OF ALL RESPONSES TO PHQ QUESTIONS 1-9: 0

## 2018-01-19 ASSESSMENT — ENCOUNTER SYMPTOMS
SHORTNESS OF BREATH: 1
COUGH: 0
DIARRHEA: 0
ABDOMINAL DISTENTION: 0
WHEEZING: 0
VOMITING: 0
NAUSEA: 0
BLOOD IN STOOL: 0
CHEST TIGHTNESS: 0
ABDOMINAL PAIN: 0
CONSTIPATION: 0

## 2018-01-19 NOTE — PATIENT INSTRUCTIONS
contenido: 11.5  © 2318-7236 [employer], [employer]. Las instrucciones de cuidado fueron adaptadas bajo licencia por [facility] ([facility]). Si usted tiene [place] afección médica o sobre estas instrucciones, siempre pregunte a jacobo profesional de baudilio. [employer], Inc[employer] niega toda garantía o responsabilidad por jacobo uso de esta información. Patient Education        Recuento de carbohidratos cuando [place] insulina: Instrucciones de cuidado - [ Counting Carbohydrates: Care Instructions ]  Instrucciones de cuidado    Usted no tiene que comer alimentos especiales cuando tiene diabetes. Solo tiene que tener cuidado y comer alimentos saludables. Los carbohidratos aumentan el nivel de azúcar en la [place] más y con mayor rapidez que cualquier otro nutriente. Los carbohidratos se encuentran en postres, panes y cereales, y en frutas. También se encuentran en verduras con almidón. Estas incluyen las maricel, el maíz (elote), y granos jayshree el arroz y la pasta. Los carbohidratos también se encuentran en la [place] y el yogur. Mientras mayor cantidad de carbohidratos consuma en calli sixto comida, más aumentará jacobo nivel de azúcar en la [place]. Distribuir el consumo de carbohidratos a lo emma del día le ayuda a mantener el azúcar en la [place] en los límites ideales para usted. Si usted [place] insulina, contar carbohidratos le ayuda a adecuar la dosis de insulina a la cantidad de gramos de carbohidratos que consume en calli comida. De olamide modo, usted puede cambiar lo que come y la dosis de insulina de acuerdo a erin necesidades. Revisarse el nivel de azúcar en la [place] varias veces al día puede ayudarle a saber cómo los carbohidratos afectan jacobo nivel de azúcar en la [place]. Un dietista o educador de diabetes certificado puede ayudarle a planificar las comidas y los refrigerios. La atención de seguimiento es calli parte clave de jacobo tratamiento y seguridad.  Asegúrese de hacer y acudir a todas las citas, y llame a jacobo médico si está \"Sign Up Now\". Revisado: Con 13, 2017  Versión del contenido: 11.5  © 7700-4131 [employer]wise, Inc[employer]. Las instrucciones de cuidado fueron adaptadas bajo licencia por [facility] ([facility]). Si usted tiene [place] afección médica o sobre estas instrucciones, siempre pregunte a jacobo profesional de baudilio. [employer], Incorporated niega toda garantía o responsabilidad por jacobo uso de esta información. Patient Education        [place] de la diabetes y los dientes - [ Learning About Diabetes and Your Teeth ]  ¿Cómo afecta la diabetes a los dientes y a las encías? Cuando usted tiene diabetes, es importante controlar los niveles de azúcar en la [place] y cuidarse nitin los dientes y las encías. Cuando los niveles de azúcar en la [place] están altos, hay un mayor riesgo de:  · Enfermedad de las encías (periodontal). · Caries. · Infecciones por hongos en la boca, jayshree candidiasis (aftas). · Sequedad de boca o xerostomía. La boca necesita saliva para neutralizar los ácidos en la boca. Estos ácidos pueden provocar enfermedad de las encías y caries dental.  Mantener los niveles de azúcar en la [place] dentro de erin límites ideales puede ayudar a prevenir problemas con los dientes y las encías. Si usted tiene algún problema con erin dientes o encías, consulte a jacobo dentista. ¿Cómo puede cuidarse los dientes y las encías cuando tiene diabetes? · Cepíllese los [place] veces al día. · Límpiese con hilo dental a diario. Asegúrese de presionar el hilo dental contra los dientes y no contra las encías. · Examínese las encías a diario para bar si hay zonas en las que están enrojecidas o adoloridas. No dude en informar a jacobo dentista si tiene llagas en la boca. · Visite a jacobo dentista con regularidad para calli limpieza profesional de los dientes y para detectar problemas de las encías. Muchos dentistas recomiendan Momo Goode al año.  Recuérdele a jacobo dentista que usted tiene diabetes antes de cualquier

## 2018-01-19 NOTE — PROGRESS NOTES
Chief Complaint   Patient presents with    Diabetes     pt is bring blood sugar log    Hypertension    Results       Julio Bears  here today for follow up on chronic medical problems, go over labs and/or diagnostic studies, and medication refills. Diabetes (pt is bring blood sugar log); Hypertension; and Results    Comes with his son. Nayeli Gonzales has some [place] knowledge, but still needs help from his son and I frequently have to repeat the information. Nayeli Gonzales is able to express himself in basic [place]. He was born in [facility] [facility] and lived in [place] for many years, but then he returned back to [facility] where he lived for a few more years. Currently he lives in [place]. Diabetes Mellitus Type II, Follow-up: Patient here for follow-up of Type 2 diabetes mellitus. Current symptoms/problems include paresthesia of the feet and visual disturbances and have been stable. Symptoms have been present for several years. Known diabetic complications: peripheral neuropathy, cardiovascular disease and cataracts  Cardiovascular risk factors: advanced age (older than 54 for men, 72 for women), diabetes mellitus, dyslipidemia, hypertension, male gender and sedentary lifestyle  Current diabetic medications include : was seen recently by my partner and he was started on Januvia, but he is not taking it. Januvia too expensive  Taking Metformin again. Eye exam current (within one year): yes. Had cataract on the left eye, and has scheduled the right eye cataract surgery. Weight trend: stable    Wt Readings from Last 3 Encounters:   01/19/18 176 lb (79.8 kg)   12/20/17 176 lb (79.8 kg)   12/18/17 173 lb 6.4 oz (78.7 kg)       Prior visit with dietician: yes   Current diet: in general, a \"healthy\" diet    Except for \"some cake, regular icecream, candy , juice\" as his granddaughters give him often per his son. Current exercise: none, just limited walking.  We had a long discussion about need to exercise his limbs even when he is siting. He completed the physical therapy       Current monitoring regimen: home blood tests - daily  Home blood sugar records: fasting range: 130-217  Any episodes of hypoglycemia? no    Is He on ACE inhibitor or angiotensin II receptor blocker? Yes     A1c improved from prior of 7.1 on 8/14/17. He is not due for A1c    Lab Results   Component Value Date    LABA1C 6.8 11/15/2017       . NOT taking Aspirin due to anemia and risk of bleeding  Doesn't smoke      Urine microabumin was high, worsening nephropathy      Lab Results   Component Value Date    LABMICR 106 (H) 11/15/2017     Lab Results   Component Value Date     LABMICR 30 (H) 01/06/2017           LDL controlled    Lab Results   Component Value Date    LDLCHOLESTEROL 76 05/19/2017       -vital signs stable and within normal limits except     /78   Pulse 76   Ht 5' 11\" (1.803 m)   Wt 176 lb (79.8 kg)   SpO2 95%   BMI 24.55 kg/m²   Body mass index is 24.55 kg/m². Hypertension: Patient here for follow-up of elevated blood pressure. He is not exercising and is adherent to low salt diet. Blood pressure is well controlled at home. Cardiac symptoms dyspnea and fatigue. Patient denies chest pain, chest pressure/discomfort, claudication, exertional chest pressure/discomfort, irregular heart beat, lower extremity edema, near-syncope, orthopnea, palpitations, paroxysmal nocturnal dyspnea, syncope and tachypnea. Cardiovascular risk factors: advanced age (older than 54 for men, 72 for women), diabetes mellitus, dyslipidemia, hypertension, male gender, microalbuminuria and sedentary lifestyle. Use of agents associated with hypertension: thyroid hormones. History of target organ damage: angina/ prior myocardial infarction and left ventricular hypertrophy, diastolic dysfunction   Has moderate severe aortic stenosis. He saw cardiology and he chose not to have the surgery. BP controlled.  Radha Arthur reports compliance with BP medications, and  Not on file. Social History Main Topics    Smoking status: Former Smoker     Quit date: 10/27/1967    Smokeless tobacco: Never Used    Alcohol use No    Drug use: No    Sexual activity: Not Currently     Other Topics Concern    Not on file     Social History Narrative    No narrative on file     Counseling given: Yes            Quality & Risk Score Accuracy - MEDICARE ADVANTAGE    Visit Dx:  Type 2 diabetes mellitus with diabetic polyneuropathy, without long-term current use of insulin (HCC)  Stable Diabetes type 2 and complications based on lab values and symptoms. Continue present treatment plan, control of risk factors, and recheck at least yearly. Visit Dx:  Diabetic nephropathy associated with type 2 diabetes mellitus ([facility] [address]  Worsening Diabetes type 2 and complications based on lab values and symptoms. See progress note/orders for treatment plan changes. Last edited 01/24/18 07:08 EST by Rich Bush MD               Review of Systems   Constitutional: Positive for appetite change and fatigue. Negative for activity change, chills, diaphoresis, fever and unexpected weight change. HENT: Positive for hearing loss (has hearing aids). Eyes: Positive for visual disturbance. Respiratory: Positive for shortness of breath. Negative for cough, chest tightness and wheezing. Cardiovascular: Negative for chest pain, palpitations and leg swelling. Gastrointestinal: Negative for abdominal distention, abdominal pain, blood in stool, constipation, diarrhea, nausea and vomiting. Endocrine: Negative for cold intolerance, heat intolerance, polydipsia, polyphagia and polyuria. Genitourinary: Positive for difficulty urinating (better with Flomax, nocturia). Negative for dysuria, hematuria and urgency. Musculoskeletal: Positive for arthralgias (same as before). Negative for myalgias, neck pain and neck stiffness. Neurological: Positive for numbness.    Hematological: Does not bruise/bleed without Reflex; Future  - Lancet Device MISC; For use with lancets for blood glucose checks,  Please dispense according to patients's lancets  Dispense: 1 Device; Refill: 0  - Hemoglobin A1C; Future    -advised home blood glucose testing  daily  -daily feet exam, Foot care: advised to wash feet daily, pat dry and apply lotion at night, avoiding between toes. Need to look at feet daily and report to a physician any signs of inflammation or skin damage  -annual dilated eye exam  -Low carb, low fat diet, increase fruits and vegetables, and exercise 4-5 times a day 30-40 minutes a day discussed  -continue:curent treatment  -not on aspirin due to risk outweigh benefits   -continue ACEI and statin  Will have cataract surgery  Referred to [facility]   Doesn't want diabetic education   Avoid sweets or buy sugar free ice cream      2. Essential hypertension  Controlled  Discussed low salt diet and BP and pulse monitoring daily, BP log given  Continue current treatment. - CBC; Future  - Comprehensive Metabolic Panel; Future  - TSH without Reflex; Future    3. Diastolic dysfunction without heart failure    - CBC; Future  - Comprehensive Metabolic Panel; Future  - TSH without Reflex; Future    4. Acquired hypothyroidism  controlled    - TSH without Reflex; Future  -continue Synthroid 25 mcg  Advised to take Synthroid in the morning, on empty stomach, without any other meds and with a full glass of water. 5. Hyperlipidemia with target LDL less than 70  Controlled, almost at goal  Continue Lipitor 20 mg    6. Vitamin D deficiency    - Vitamin D 25 Hydroxy; Future  continue vitamin D 2000 units daily    7. Need for diphtheria-tetanus-pertussis (Tdap) vaccine    - Tdap (ADACEL) 5-2-15.5 LF-MCG/0.5 injection; Inject 0.5 mLs into the muscle once for 1 dose  Dispense: 0.5 mL; Refill: 0    8.  Diabetic nephropathy associated with type 2 diabetes mellitus (HCC)  Worsening nephropathy   Good Blood Glucose control Tdap (ADACEL) 5-2-15.5 LF-MCG/0.5 injection 0.5 mL 0     Sig: Inject 0.5 mLs into the muscle once for 1 dose       All patient questions answered. Patient voiced understanding. Quality Measures    Body mass index is 24.55 kg/m². Normal. Weight control planned discussed conventional weight loss and Healthy diet and regular exercise. BP: 135/78. Blood pressure is normal. Treatment plan consists of Weight Reduction, DASH Eating Plan, Dietary Sodium Restriction, Increased Physical Activity, Patient In-home Blood Pressure Monitoring and No treatment change needed. Fall Risk 1/19/2018 1/5/2017   2 or more falls in past year? no no   Fall with injury in past year? no no     The patient does not have a history of falls. I did , complete a risk assessment for falls. A plan of care for falls in-office gait and balance testing performed using The Timed Up and Go Test was positive for increased falls risk, No Treatment plan indicated, -Home exercises advised, given patient instructions; defers PT at this time as he had it last year    Lab Results   Component Value Date    LDLCHOLESTEROL 76 05/19/2017    (goal LDL reduction with dx if diabetes is 50% LDL reduction)      Negative depression screening. PHQ Scores 1/19/2018 1/5/2017   PHQ2 Score 0 1   PHQ9 Score 0 1     Interpretation of Total Score Depression Severity: 1-4 = Minimal depression, 5-9 = Mild depression, 10-14 = Moderate depression, 15-19 = Moderately severe depression, 20-27 = Severe depression          The patient's past medical, surgical, social, and family history as well as his   current medications and allergies were reviewed as documented in today's encounter. Medications, labs, diagnostic studies, consultations and follow-up as documented in this encounter. Return in about 3 months (around 4/19/2018) for ALWAYS NEEDS 30 MINS APPTS., DM2, HTN, HLP, LABS F/U. Patient was seen with total face to face time of  40 minutes.  More than 50% of this visit was counseling and education and communication as his first language [place] Future Appointments  Date Time Provider Department Center   3/19/2018 2:20 PM Ramos Singleton MD [address] [place] [address]   4/19/2018 1:30 PM Jay Wilson MD [facility] [address] [facility]       This note was completed by using the assistance of a speech-recognition program. However, inadvertent computerized transcription errors may be present. Although every effort was made to ensure accuracy, no guarantees can be provided that every mistake has been identified and corrected by editing .     Electronically signed by Jay Wilson MD on 1/24/2018 at 7:14 AM

## 2018-01-24 RX ORDER — BLOOD-GLUCOSE METER
EACH MISCELLANEOUS
Refills: 0 | COMMUNITY
Start: 2018-01-18 | End: 2019-09-20 | Stop reason: SDUPTHER

## 2018-02-14 RX ORDER — OMEPRAZOLE 20 MG/1
20 CAPSULE, DELAYED RELEASE ORAL 2 TIMES DAILY
Qty: 60 CAPSULE | Refills: 3 | Status: SHIPPED | OUTPATIENT
Start: 2018-02-14 | End: 2018-03-26 | Stop reason: SDUPTHER

## 2018-02-26 DIAGNOSIS — E78.5 HYPERLIPIDEMIA WITH TARGET LDL LESS THAN 70: ICD-10-CM

## 2018-02-26 RX ORDER — ATORVASTATIN CALCIUM 20 MG/1
TABLET, FILM COATED ORAL
Qty: 90 TABLET | Refills: 3 | Status: SHIPPED | OUTPATIENT
Start: 2018-02-26 | End: 2019-03-24 | Stop reason: SDUPTHER

## 2018-03-01 ENCOUNTER — HOSPITAL ENCOUNTER (OUTPATIENT)
Age: 83
Setting detail: OUTPATIENT SURGERY
Discharge: HOME | End: 2018-03-01
Attending: OPHTHALMOLOGY | Admitting: OPHTHALMOLOGY
Payer: MEDICARE

## 2018-03-01 VITALS
HEIGHT: 70 IN | OXYGEN SATURATION: 100 % | WEIGHT: 173 LBS | DIASTOLIC BLOOD PRESSURE: 47 MMHG | BODY MASS INDEX: 24.77 KG/M2 | HEART RATE: 54 BPM | TEMPERATURE: 97.7 F | SYSTOLIC BLOOD PRESSURE: 131 MMHG | RESPIRATION RATE: 16 BRPM

## 2018-03-01 PROBLEM — H25.11 AGE-RELATED NUCLEAR CATARACT, RIGHT: Status: RESOLVED | Noted: 2018-03-01 | Resolved: 2018-03-01

## 2018-03-01 LAB — GLUCOSE BLD-MCNC: 160 MG/DL (ref 75–110)

## 2018-03-01 PROCEDURE — 6370000000 HC RX 637 (ALT 250 FOR IP): Performed by: OPHTHALMOLOGY

## 2018-03-01 PROCEDURE — 2500000003 HC RX 250 WO HCPCS: Performed by: OPHTHALMOLOGY

## 2018-03-01 PROCEDURE — A6402 STERILE GAUZE <= 16 SQ IN: HCPCS | Performed by: OPHTHALMOLOGY

## 2018-03-01 PROCEDURE — 7100000010 HC PHASE II RECOVERY - FIRST 15 MIN: Performed by: OPHTHALMOLOGY

## 2018-03-01 PROCEDURE — 3600000012 HC SURGERY LEVEL 2 ADDTL 15MIN: Performed by: OPHTHALMOLOGY

## 2018-03-01 PROCEDURE — 7100000011 HC PHASE II RECOVERY - ADDTL 15 MIN: Performed by: OPHTHALMOLOGY

## 2018-03-01 PROCEDURE — 99153 MOD SED SAME PHYS/QHP EA: CPT | Performed by: OPHTHALMOLOGY

## 2018-03-01 PROCEDURE — 6360000002 HC RX W HCPCS: Performed by: OPHTHALMOLOGY

## 2018-03-01 PROCEDURE — 2580000003 HC RX 258: Performed by: OPHTHALMOLOGY

## 2018-03-01 PROCEDURE — 2500000003 HC RX 250 WO HCPCS

## 2018-03-01 PROCEDURE — 6360000002 HC RX W HCPCS

## 2018-03-01 PROCEDURE — 82947 ASSAY GLUCOSE BLOOD QUANT: CPT

## 2018-03-01 PROCEDURE — V2632 POST CHMBR INTRAOCULAR LENS: HCPCS | Performed by: OPHTHALMOLOGY

## 2018-03-01 PROCEDURE — 99152 MOD SED SAME PHYS/QHP 5/>YRS: CPT | Performed by: OPHTHALMOLOGY

## 2018-03-01 PROCEDURE — 3600000002 HC SURGERY LEVEL 2 BASE: Performed by: OPHTHALMOLOGY

## 2018-03-01 DEVICE — LENS INTOCU +20.0 DIOPT L13MM DIA6MM 0DEG HAPTIC ANG A: Type: IMPLANTABLE DEVICE | Site: EYE | Status: FUNCTIONAL

## 2018-03-01 RX ORDER — BALANCED SALT SOLUTION ENRICHED WITH BICARBONATE, DEXTROSE, AND GLUTATHIONE
KIT INTRAOCULAR PRN
Status: DISCONTINUED | OUTPATIENT
Start: 2018-03-01 | End: 2018-03-01 | Stop reason: HOSPADM

## 2018-03-01 RX ORDER — TIMOLOL MALEATE 5 MG/ML
SOLUTION/ DROPS OPHTHALMIC PRN
Status: DISCONTINUED | OUTPATIENT
Start: 2018-03-01 | End: 2018-03-01 | Stop reason: HOSPADM

## 2018-03-01 RX ORDER — MIDAZOLAM HYDROCHLORIDE 1 MG/ML
INJECTION INTRAMUSCULAR; INTRAVENOUS PRN
Status: DISCONTINUED | OUTPATIENT
Start: 2018-03-01 | End: 2018-03-01 | Stop reason: HOSPADM

## 2018-03-01 RX ORDER — PHENYLEPHRINE HCL 2.5 %
1 DROPS OPHTHALMIC (EYE) EVERY 5 MIN PRN
Status: COMPLETED | OUTPATIENT
Start: 2018-03-01 | End: 2018-03-01

## 2018-03-01 RX ORDER — TOBRAMYCIN 3 MG/ML
1 SOLUTION/ DROPS OPHTHALMIC EVERY 5 MIN PRN
Status: DISCONTINUED | OUTPATIENT
Start: 2018-03-01 | End: 2018-03-01 | Stop reason: HOSPADM

## 2018-03-01 RX ORDER — CYCLOPENTOLATE HYDROCHLORIDE 10 MG/ML
1 SOLUTION/ DROPS OPHTHALMIC EVERY 5 MIN PRN
Status: COMPLETED | OUTPATIENT
Start: 2018-03-01 | End: 2018-03-01

## 2018-03-01 RX ORDER — BUPIVACAINE HYDROCHLORIDE 5 MG/ML
1 INJECTION, SOLUTION EPIDURAL; INTRACAUDAL SEE ADMIN INSTRUCTIONS
Status: DISCONTINUED | OUTPATIENT
Start: 2018-03-01 | End: 2018-03-01 | Stop reason: HOSPADM

## 2018-03-01 RX ORDER — SODIUM CHLORIDE, SODIUM LACTATE, POTASSIUM CHLORIDE, CALCIUM CHLORIDE 600; 310; 30; 20 MG/100ML; MG/100ML; MG/100ML; MG/100ML
INJECTION, SOLUTION INTRAVENOUS CONTINUOUS
Status: DISCONTINUED | OUTPATIENT
Start: 2018-03-01 | End: 2018-03-01 | Stop reason: HOSPADM

## 2018-03-01 RX ORDER — LORAZEPAM 1 MG/1
1 TABLET ORAL ONCE
Status: COMPLETED | OUTPATIENT
Start: 2018-03-01 | End: 2018-03-01

## 2018-03-01 RX ORDER — KETOROLAC TROMETHAMINE 5 MG/ML
1 SOLUTION OPHTHALMIC EVERY 5 MIN PRN
Status: COMPLETED | OUTPATIENT
Start: 2018-03-01 | End: 2018-03-01

## 2018-03-01 RX ADMIN — BUPIVACAINE HYDROCHLORIDE 5 MG: 5 INJECTION, SOLUTION EPIDURAL; INTRACAUDAL at 07:47

## 2018-03-01 RX ADMIN — BUPIVACAINE HYDROCHLORIDE 5 MG: 5 INJECTION, SOLUTION EPIDURAL; INTRACAUDAL at 07:42

## 2018-03-01 RX ADMIN — KETOROLAC TROMETHAMINE 1 DROP: 5 SOLUTION OPHTHALMIC at 07:42

## 2018-03-01 RX ADMIN — PHENYLEPHRINE HYDROCHLORIDE 1 DROP: 25 SOLUTION/ DROPS OPHTHALMIC at 07:42

## 2018-03-01 RX ADMIN — KETOROLAC TROMETHAMINE 1 DROP: 5 SOLUTION OPHTHALMIC at 07:47

## 2018-03-01 RX ADMIN — PHENYLEPHRINE HYDROCHLORIDE 1 DROP: 25 SOLUTION/ DROPS OPHTHALMIC at 07:47

## 2018-03-01 RX ADMIN — SODIUM CHLORIDE, POTASSIUM CHLORIDE, SODIUM LACTATE AND CALCIUM CHLORIDE: 600; 310; 30; 20 INJECTION, SOLUTION INTRAVENOUS at 07:52

## 2018-03-01 RX ADMIN — CYCLOPENTOLATE HYDROCHLORIDE 1 DROP: 10 SOLUTION/ DROPS OPHTHALMIC at 07:47

## 2018-03-01 RX ADMIN — LORAZEPAM 1 MG: 1 TABLET ORAL at 07:42

## 2018-03-01 RX ADMIN — TOBRAMYCIN 1 DROP: 3 SOLUTION OPHTHALMIC at 07:43

## 2018-03-01 RX ADMIN — CYCLOPENTOLATE HYDROCHLORIDE 1 DROP: 10 SOLUTION/ DROPS OPHTHALMIC at 07:42

## 2018-03-01 RX ADMIN — TOBRAMYCIN 1 DROP: 3 SOLUTION OPHTHALMIC at 07:48

## 2018-03-01 ASSESSMENT — PAIN SCALES - GENERAL
PAINLEVEL_OUTOF10: 0

## 2018-03-01 ASSESSMENT — PAIN - FUNCTIONAL ASSESSMENT: PAIN_FUNCTIONAL_ASSESSMENT: 0-10

## 2018-03-16 DIAGNOSIS — E03.9 ACQUIRED HYPOTHYROIDISM: ICD-10-CM

## 2018-03-16 DIAGNOSIS — D50.8 OTHER IRON DEFICIENCY ANEMIA: Primary | ICD-10-CM

## 2018-03-16 DIAGNOSIS — N40.1 BENIGN PROSTATIC HYPERPLASIA WITH LOWER URINARY TRACT SYMPTOMS, SYMPTOM DETAILS UNSPECIFIED: Primary | ICD-10-CM

## 2018-03-16 RX ORDER — LEVOTHYROXINE SODIUM 0.03 MG/1
25 TABLET ORAL DAILY
Qty: 30 TABLET | Refills: 0 | Status: SHIPPED | OUTPATIENT
Start: 2018-03-16 | End: 2018-03-16 | Stop reason: SDUPTHER

## 2018-03-16 RX ORDER — LEVOTHYROXINE SODIUM 0.03 MG/1
TABLET ORAL
Qty: 90 TABLET | Refills: 0 | OUTPATIENT
Start: 2018-03-16

## 2018-03-16 RX ORDER — TAMSULOSIN HYDROCHLORIDE 0.4 MG/1
0.4 CAPSULE ORAL DAILY
Qty: 30 CAPSULE | Refills: 5 | Status: SHIPPED | OUTPATIENT
Start: 2018-03-16 | End: 2018-10-15 | Stop reason: SDUPTHER

## 2018-03-16 NOTE — TELEPHONE ENCOUNTER
From: Libia Gastelum  Sent: 3/16/2018 1:04 PM EDT  Subject: Medication Renewal Request    Libia Gastelum would like a refill of the following medications:     levothyroxine (LEVOTHROID) 25 MCG tablet [Shilpa Delacruz MD]     tamsulosin (FLOMAX) 0.4 MG capsule Rich Bush MD]    Preferred pharmacy: [facility]    Comment:

## 2018-03-16 NOTE — TELEPHONE ENCOUNTER
Please Approve or Refuse. Next Visit Date:  Visit date not found    Hemoglobin A1C (%)   Date Value   11/15/2017 6.8   08/14/2017 7.1   05/12/2017 7.7             ( goal A1C is < 7)   Microalb/Crt.  Ratio (mcg/mg creat)   Date Value   11/15/2017 106 (H)     LDL Cholesterol (mg/dL)   Date Value   05/19/2017 76       (goal LDL is <100)   AST (U/L)   Date Value   12/22/2017 15     ALT (U/L)   Date Value   12/22/2017 13     BUN (mg/dL)   Date Value   12/22/2017 20     BP Readings from Last 3 Encounters:   03/01/18 (!) 131/47   01/19/18 135/78   01/15/18 (!) 122/49          (goal 120/80)        Patient Active Problem List:     Hypothyroidism     Type 2 diabetes mellitus with diabetic polyneuropathy, without long-term current use of insulin (HCC)     Essential hypertension     Hyperlipidemia with target LDL less than 70     Hammer toes, bilateral     Onychomycosis of toenail     Murmur, cardiac     PEREYRA (dyspnea on exertion)     Anemia, ISABELLA     Microscopic hematuria     LVH (left ventricular hypertrophy) due to hypertensive disease     Diastolic dysfunction without heart failure     Aortic stenosis, moderate     Diabetic nephropathy associated with type 2 diabetes mellitus (HCC)     Iron deficiency anemia     Benign prostatic hyperplasia with lower urinary tract symptoms     Chronic neck pain     At high risk for falls     Bilateral hearing loss     Bone disorder     Vitamin D deficiency     Pulmonary hypertension     History of anemia due to chronic kidney disease     Gastroesophageal reflux disease with esophagitis     History of Helicobacter pylori infection     Dyspepsia     Bloating     Malabsorption

## 2018-03-19 ENCOUNTER — OFFICE VISIT (OUTPATIENT)
Dept: ONCOLOGY | Age: 83
End: 2018-03-19
Payer: MEDICARE

## 2018-03-19 ENCOUNTER — HOSPITAL ENCOUNTER (OUTPATIENT)
Age: 83
Discharge: HOME | End: 2018-03-19
Payer: MEDICARE

## 2018-03-19 VITALS
RESPIRATION RATE: 16 BRPM | SYSTOLIC BLOOD PRESSURE: 148 MMHG | DIASTOLIC BLOOD PRESSURE: 63 MMHG | HEART RATE: 60 BPM | TEMPERATURE: 98.3 F | BODY MASS INDEX: 25.02 KG/M2 | WEIGHT: 174.4 LBS

## 2018-03-19 DIAGNOSIS — Z86.19 HISTORY OF HELICOBACTER PYLORI INFECTION: ICD-10-CM

## 2018-03-19 DIAGNOSIS — D50.9 IRON DEFICIENCY ANEMIA, UNSPECIFIED IRON DEFICIENCY ANEMIA TYPE: Primary | ICD-10-CM

## 2018-03-19 DIAGNOSIS — N18.9 HISTORY OF ANEMIA DUE TO CHRONIC KIDNEY DISEASE: ICD-10-CM

## 2018-03-19 DIAGNOSIS — K90.89 OTHER SPECIFIED INTESTINAL MALABSORPTION: ICD-10-CM

## 2018-03-19 DIAGNOSIS — Z86.2 HISTORY OF ANEMIA DUE TO CHRONIC KIDNEY DISEASE: ICD-10-CM

## 2018-03-19 DIAGNOSIS — D50.8 OTHER IRON DEFICIENCY ANEMIA: ICD-10-CM

## 2018-03-19 LAB
ABSOLUTE EOS #: 0.1 K/UL (ref 0–0.4)
ABSOLUTE IMMATURE GRANULOCYTE: ABNORMAL K/UL (ref 0–0.3)
ABSOLUTE LYMPH #: 1.5 K/UL (ref 1–4.8)
ABSOLUTE MONO #: 0.4 K/UL (ref 0.1–1.2)
BASOPHILS # BLD: 0 % (ref 0–2)
BASOPHILS ABSOLUTE: 0 K/UL (ref 0–0.2)
DIFFERENTIAL TYPE: ABNORMAL
EOSINOPHILS RELATIVE PERCENT: 1 % (ref 1–4)
HCT VFR BLD CALC: 33.6 % (ref 41–53)
HEMOGLOBIN: 11.1 G/DL (ref 13.5–17.5)
IMMATURE GRANULOCYTES: ABNORMAL %
LYMPHOCYTES # BLD: 29 % (ref 24–44)
MCH RBC QN AUTO: 32.1 PG (ref 26–34)
MCHC RBC AUTO-ENTMCNC: 32.9 G/DL (ref 31–37)
MCV RBC AUTO: 97.6 FL (ref 80–100)
MONOCYTES # BLD: 8 % (ref 2–11)
NRBC AUTOMATED: ABNORMAL PER 100 WBC
PDW BLD-RTO: 14.9 % (ref 12.5–15.4)
PLATELET # BLD: 175 K/UL (ref 140–450)
PLATELET ESTIMATE: ABNORMAL
PMV BLD AUTO: 9.1 FL (ref 6–12)
RBC # BLD: 3.45 M/UL (ref 4.5–5.9)
RBC # BLD: ABNORMAL 10*6/UL
SEG NEUTROPHILS: 62 % (ref 36–66)
SEGMENTED NEUTROPHILS ABSOLUTE COUNT: 3.3 K/UL (ref 1.8–7.7)
WBC # BLD: 5.4 K/UL (ref 3.5–11)
WBC # BLD: ABNORMAL 10*3/UL

## 2018-03-19 PROCEDURE — 99214 OFFICE O/P EST MOD 30 MIN: CPT | Performed by: INTERNAL MEDICINE

## 2018-03-19 PROCEDURE — 4040F PNEUMOC VAC/ADMIN/RCVD: CPT | Performed by: INTERNAL MEDICINE

## 2018-03-19 PROCEDURE — G8482 FLU IMMUNIZE ORDER/ADMIN: HCPCS | Performed by: INTERNAL MEDICINE

## 2018-03-19 PROCEDURE — G8428 CUR MEDS NOT DOCUMENT: HCPCS | Performed by: INTERNAL MEDICINE

## 2018-03-19 PROCEDURE — 36415 COLL VENOUS BLD VENIPUNCTURE: CPT

## 2018-03-19 PROCEDURE — 1123F ACP DISCUSS/DSCN MKR DOCD: CPT | Performed by: INTERNAL MEDICINE

## 2018-03-19 PROCEDURE — 1036F TOBACCO NON-USER: CPT | Performed by: INTERNAL MEDICINE

## 2018-03-19 PROCEDURE — 99211 OFF/OP EST MAY X REQ PHY/QHP: CPT

## 2018-03-19 PROCEDURE — G8417 CALC BMI ABV UP PARAM F/U: HCPCS | Performed by: INTERNAL MEDICINE

## 2018-03-19 PROCEDURE — 85025 COMPLETE CBC W/AUTO DIFF WBC: CPT

## 2018-03-19 RX ORDER — LEVOTHYROXINE SODIUM 0.03 MG/1
25 TABLET ORAL DAILY
Qty: 90 TABLET | Refills: 0 | Status: SHIPPED | OUTPATIENT
Start: 2018-03-19 | End: 2018-04-16 | Stop reason: SDUPTHER

## 2018-03-20 NOTE — PROGRESS NOTES
No pleuritic chest pain. · Cardiovascular: No chest pain, orthopnea or PND. No lower extremity edema. No palpitation. · Gastrointestinal: No problems with swallowing. No abdominal pain or bloating. No nausea or vomiting. No diarrhea or constipation. No GI bleeding. · Genitourinary: No dysuria, hematuria, frequency or urgency. · Musculoskeletal: No muscle aches or pains. No limitation of movement. No back pain. No gait disturbance, No joint complaints. · Dermatologic: No skin rashes or pruritus. No skin lesions or discolorations. · Psychiatric: No depression, anxiety, or stress or signs of schizophrenia. No change in mood or affect. · Hematologic: No history of bleeding tendency. No bruises or ecchymosis. No history of clotting problems. · Infectious disease: No fever, chills or frequent infections. · Endocrine: No problems with opacity. No polydipsia or polyuria. No temperature intolerance. · Neurologic: No headaches or dizziness. No weakness or numbness of the extremities. No changes in balance, coordination,  memory, mentation, behavior. · Allergic/Immunologic: No nasal congestion or hives. No repeated infections. PHYSICAL EXAM:  The patient is not in acute distress. Vital signs: Blood pressure (!) 148/63, pulse 60, temperature 98.3 °F (36.8 °C), temperature source Tympanic, resp. rate 16, weight 174 lb 6.4 oz (79.1 kg). HEENT:  Eyes are normal. Ears, nose and throat are normal.  Neck: Supple. No lymph node enlargement. No thyroid enlargement. Trachea is centrally located. Chest:  Clear to auscultation. No wheezes or crepitations. Heart: Regular sinus rhythm. Abdomen: Soft, nontender. No hepatosplenomegaly. No masses. Extremities:  With no edema. Lymph Nodes:  No cervical, axillary or inguinal lymph node enlargement. Neurologic:  Conscious and oriented. No focal neurological deficits. Psychosocial: No depression, anxiety or stress.  Skin: No rashes, bruises or ecchymoses. Review of Diagnostic data:   Lab Results   Component Value Date    WBC 5.4 03/19/2018    HGB 11.1 (L) 03/19/2018    HCT 33.6 (L) 03/19/2018    MCV 97.6 03/19/2018     03/19/2018       Chemistry        Component Value Date/Time     12/22/2017 1402    K 4.5 12/22/2017 1402     12/22/2017 1402    CO2 28 12/22/2017 1402    BUN 20 12/22/2017 1402    CREATININE 0.77 12/22/2017 1402        Component Value Date/Time    CALCIUM 10.0 12/22/2017 1402    ALKPHOS 61 12/22/2017 1402    AST 15 12/22/2017 1402    ALT 13 12/22/2017 1402    BILITOT 0.61 12/22/2017 1402        Lab Results   Component Value Date    IRON 73 07/17/2017    TIBC 212 (L) 07/17/2017    FERRITIN 434 (H) 07/17/2017     Vitamin B12 and folate are normal.    IMPRESSION:   Normocytic anemia with evidence of iron deficiency. Intolerable side effects to oral iron. Anemia multifactorial, Anemia of chronic renal insufficiency, iron deficiency and possible bone marrow disease. Gastritis with positive H. pylori status post treatment    PLAN:   Obviously patient does not tolerate PO IRON or VITAMIN C. He was treated with IV iron infusion. His hemoglobin and iron studies are stable. We will continue to monitor. We'll consider treatment again if we see significant drop of hemoglobin level. In addition he has evidence of chronic renal insufficiency. And considering his age likely could be having element of MDS. However the present time he has minimal symptoms and I do not see a need to do bone marrow test.  Continue monitoring would be appropriate. Patient was treated recently for H. pylori infection. I will prescribed omeprazole to be used for GERD and gastritis symptoms. We will see him again in 3 months with repeated labs. Patient's questions were answered to the best of his satisfaction and he verbalized full understanding and agreement.

## 2018-03-24 ENCOUNTER — PATIENT MESSAGE (OUTPATIENT)
Dept: FAMILY MEDICINE CLINIC | Age: 83
End: 2018-03-24

## 2018-03-24 DIAGNOSIS — K21.00 GASTROESOPHAGEAL REFLUX DISEASE WITH ESOPHAGITIS: Primary | ICD-10-CM

## 2018-03-26 RX ORDER — OMEPRAZOLE 20 MG/1
20 CAPSULE, DELAYED RELEASE ORAL DAILY
Qty: 30 CAPSULE | Refills: 3 | Status: SHIPPED | OUTPATIENT
Start: 2018-03-26 | End: 2018-10-19 | Stop reason: ALTCHOICE

## 2018-03-26 NOTE — TELEPHONE ENCOUNTER
From: Becka Harris  To: Mariela Lopez MD  Sent: 3/24/2018 2:21 PM EDT  Subject: Prescription Question    Please lower my dosage of omeprazole 20 MG delayed release capsule from twice a day to once a day. (Prilosec) My stomach is feeling better and I don't need 2 pills a day anymore.

## 2018-04-09 ENCOUNTER — OFFICE VISIT (OUTPATIENT)
Dept: FAMILY MEDICINE CLINIC | Age: 83
End: 2018-04-09
Payer: MEDICARE

## 2018-04-09 VITALS
SYSTOLIC BLOOD PRESSURE: 133 MMHG | DIASTOLIC BLOOD PRESSURE: 64 MMHG | WEIGHT: 173.6 LBS | BODY MASS INDEX: 24.85 KG/M2 | HEART RATE: 75 BPM | TEMPERATURE: 98.6 F | HEIGHT: 70 IN

## 2018-04-09 DIAGNOSIS — R09.82 PND (POST-NASAL DRIP): ICD-10-CM

## 2018-04-09 DIAGNOSIS — J40 BRONCHITIS: Primary | ICD-10-CM

## 2018-04-09 DIAGNOSIS — R09.81 NASAL CONGESTION: ICD-10-CM

## 2018-04-09 PROCEDURE — G8427 DOCREV CUR MEDS BY ELIG CLIN: HCPCS | Performed by: NURSE PRACTITIONER

## 2018-04-09 PROCEDURE — G8420 CALC BMI NORM PARAMETERS: HCPCS | Performed by: NURSE PRACTITIONER

## 2018-04-09 PROCEDURE — 99213 OFFICE O/P EST LOW 20 MIN: CPT | Performed by: NURSE PRACTITIONER

## 2018-04-09 PROCEDURE — 1036F TOBACCO NON-USER: CPT | Performed by: NURSE PRACTITIONER

## 2018-04-09 PROCEDURE — 1123F ACP DISCUSS/DSCN MKR DOCD: CPT | Performed by: NURSE PRACTITIONER

## 2018-04-09 PROCEDURE — 4040F PNEUMOC VAC/ADMIN/RCVD: CPT | Performed by: NURSE PRACTITIONER

## 2018-04-09 RX ORDER — LORATADINE 10 MG/1
10 TABLET ORAL DAILY
Qty: 14 TABLET | Refills: 0 | Status: SHIPPED | OUTPATIENT
Start: 2018-04-09 | End: 2018-07-20 | Stop reason: SDUPTHER

## 2018-04-09 RX ORDER — AZITHROMYCIN 250 MG/1
TABLET, FILM COATED ORAL
Qty: 1 PACKET | Refills: 0 | Status: SHIPPED | OUTPATIENT
Start: 2018-04-09 | End: 2018-04-19 | Stop reason: ALTCHOICE

## 2018-04-09 RX ORDER — BENZONATATE 100 MG/1
100 CAPSULE ORAL 3 TIMES DAILY PRN
Qty: 21 CAPSULE | Refills: 0 | Status: SHIPPED | OUTPATIENT
Start: 2018-04-09 | End: 2018-04-16

## 2018-04-09 ASSESSMENT — ENCOUNTER SYMPTOMS
EYE REDNESS: 1
RHINORRHEA: 1
CHEST TIGHTNESS: 1
COUGH: 1
SORE THROAT: 1
SINUS PAIN: 1
SHORTNESS OF BREATH: 1

## 2018-04-12 ENCOUNTER — HOSPITAL ENCOUNTER (OUTPATIENT)
Age: 83
Discharge: HOME | End: 2018-04-12
Payer: MEDICARE

## 2018-04-12 DIAGNOSIS — E03.9 ACQUIRED HYPOTHYROIDISM: ICD-10-CM

## 2018-04-12 DIAGNOSIS — E11.42 TYPE 2 DIABETES MELLITUS WITH DIABETIC POLYNEUROPATHY, WITHOUT LONG-TERM CURRENT USE OF INSULIN (HCC): ICD-10-CM

## 2018-04-12 DIAGNOSIS — E11.21 DIABETIC NEPHROPATHY ASSOCIATED WITH TYPE 2 DIABETES MELLITUS (HCC): ICD-10-CM

## 2018-04-12 DIAGNOSIS — I10 ESSENTIAL HYPERTENSION: ICD-10-CM

## 2018-04-12 DIAGNOSIS — E55.9 VITAMIN D DEFICIENCY: ICD-10-CM

## 2018-04-12 DIAGNOSIS — I51.89 DIASTOLIC DYSFUNCTION WITHOUT HEART FAILURE: ICD-10-CM

## 2018-04-12 DIAGNOSIS — D50.8 OTHER IRON DEFICIENCY ANEMIA: ICD-10-CM

## 2018-04-12 LAB
ALBUMIN SERPL-MCNC: 3.4 G/DL (ref 3.5–5.2)
ALBUMIN/GLOBULIN RATIO: ABNORMAL (ref 1–2.5)
ALP BLD-CCNC: 90 U/L (ref 40–129)
ALT SERPL-CCNC: 28 U/L (ref 5–41)
ANION GAP SERPL CALCULATED.3IONS-SCNC: 13 MMOL/L (ref 9–17)
AST SERPL-CCNC: 26 U/L
BILIRUB SERPL-MCNC: 0.68 MG/DL (ref 0.3–1.2)
BUN BLDV-MCNC: 21 MG/DL (ref 8–23)
BUN/CREAT BLD: ABNORMAL (ref 9–20)
CALCIUM SERPL-MCNC: 9.3 MG/DL (ref 8.6–10.4)
CHLORIDE BLD-SCNC: 98 MMOL/L (ref 98–107)
CO2: 27 MMOL/L (ref 20–31)
CREAT SERPL-MCNC: 0.98 MG/DL (ref 0.7–1.2)
ESTIMATED AVERAGE GLUCOSE: 174 MG/DL
GFR AFRICAN AMERICAN: >60 ML/MIN
GFR NON-AFRICAN AMERICAN: >60 ML/MIN
GFR SERPL CREATININE-BSD FRML MDRD: ABNORMAL ML/MIN/{1.73_M2}
GFR SERPL CREATININE-BSD FRML MDRD: ABNORMAL ML/MIN/{1.73_M2}
GLUCOSE BLD-MCNC: 178 MG/DL (ref 70–99)
HBA1C MFR BLD: 7.7 % (ref 4–6)
HCT VFR BLD CALC: 32.7 % (ref 41–53)
HEMOGLOBIN: 10.6 G/DL (ref 13.5–17.5)
MCH RBC QN AUTO: 31.4 PG (ref 26–34)
MCHC RBC AUTO-ENTMCNC: 32.4 G/DL (ref 31–37)
MCV RBC AUTO: 96.9 FL (ref 80–100)
NRBC AUTOMATED: ABNORMAL PER 100 WBC
PDW BLD-RTO: 14.2 % (ref 11.5–14.9)
PLATELET # BLD: 183 K/UL (ref 150–450)
PMV BLD AUTO: 8.9 FL (ref 6–12)
POTASSIUM SERPL-SCNC: 4.4 MMOL/L (ref 3.7–5.3)
RBC # BLD: 3.38 M/UL (ref 4.5–5.9)
SODIUM BLD-SCNC: 138 MMOL/L (ref 135–144)
TOTAL PROTEIN: 6.9 G/DL (ref 6.4–8.3)
TSH SERPL DL<=0.05 MIU/L-ACNC: 2.88 MIU/L (ref 0.3–5)
VITAMIN D 25-HYDROXY: 22.3 NG/ML (ref 30–100)
WBC # BLD: 6 K/UL (ref 3.5–11)

## 2018-04-12 PROCEDURE — 80053 COMPREHEN METABOLIC PANEL: CPT

## 2018-04-12 PROCEDURE — 36415 COLL VENOUS BLD VENIPUNCTURE: CPT

## 2018-04-12 PROCEDURE — 82306 VITAMIN D 25 HYDROXY: CPT

## 2018-04-12 PROCEDURE — 85027 COMPLETE CBC AUTOMATED: CPT

## 2018-04-12 PROCEDURE — 84443 ASSAY THYROID STIM HORMONE: CPT

## 2018-04-12 PROCEDURE — 83036 HEMOGLOBIN GLYCOSYLATED A1C: CPT

## 2018-04-16 DIAGNOSIS — E03.9 ACQUIRED HYPOTHYROIDISM: ICD-10-CM

## 2018-04-16 DIAGNOSIS — E11.42 TYPE 2 DIABETES MELLITUS WITH DIABETIC POLYNEUROPATHY, WITHOUT LONG-TERM CURRENT USE OF INSULIN (HCC): ICD-10-CM

## 2018-04-16 RX ORDER — METFORMIN HYDROCHLORIDE 500 MG/1
TABLET, EXTENDED RELEASE ORAL
Qty: 30 TABLET | Refills: 5 | Status: SHIPPED | OUTPATIENT
Start: 2018-04-16 | End: 2018-04-19 | Stop reason: SDUPTHER

## 2018-04-16 RX ORDER — LEVOTHYROXINE SODIUM 0.03 MG/1
25 TABLET ORAL DAILY
Qty: 30 TABLET | Refills: 3 | Status: SHIPPED | OUTPATIENT
Start: 2018-04-16 | End: 2018-04-16 | Stop reason: SDUPTHER

## 2018-04-17 RX ORDER — LEVOTHYROXINE SODIUM 0.03 MG/1
TABLET ORAL
Qty: 90 TABLET | Refills: 3 | Status: SHIPPED | OUTPATIENT
Start: 2018-04-17 | End: 2019-05-12 | Stop reason: SDUPTHER

## 2018-04-19 ENCOUNTER — OFFICE VISIT (OUTPATIENT)
Dept: FAMILY MEDICINE CLINIC | Age: 83
End: 2018-04-19
Payer: MEDICARE

## 2018-04-19 VITALS
OXYGEN SATURATION: 97 % | DIASTOLIC BLOOD PRESSURE: 61 MMHG | HEIGHT: 70 IN | HEART RATE: 68 BPM | TEMPERATURE: 97.9 F | BODY MASS INDEX: 23.94 KG/M2 | SYSTOLIC BLOOD PRESSURE: 139 MMHG | WEIGHT: 167.2 LBS

## 2018-04-19 DIAGNOSIS — R53.1 GENERAL WEAKNESS: ICD-10-CM

## 2018-04-19 DIAGNOSIS — E11.42 TYPE 2 DIABETES MELLITUS WITH DIABETIC POLYNEUROPATHY, WITHOUT LONG-TERM CURRENT USE OF INSULIN (HCC): Primary | ICD-10-CM

## 2018-04-19 DIAGNOSIS — R63.4 UNINTENDED WEIGHT LOSS: ICD-10-CM

## 2018-04-19 DIAGNOSIS — D64.9 ANEMIA, UNSPECIFIED TYPE: ICD-10-CM

## 2018-04-19 DIAGNOSIS — E55.9 VITAMIN D DEFICIENCY: ICD-10-CM

## 2018-04-19 DIAGNOSIS — Z23 NEED FOR PROPHYLACTIC VACCINATION AND INOCULATION AGAINST VARICELLA: ICD-10-CM

## 2018-04-19 DIAGNOSIS — I10 ESSENTIAL HYPERTENSION: ICD-10-CM

## 2018-04-19 PROCEDURE — 1036F TOBACCO NON-USER: CPT | Performed by: FAMILY MEDICINE

## 2018-04-19 PROCEDURE — 4040F PNEUMOC VAC/ADMIN/RCVD: CPT | Performed by: FAMILY MEDICINE

## 2018-04-19 PROCEDURE — 99215 OFFICE O/P EST HI 40 MIN: CPT | Performed by: FAMILY MEDICINE

## 2018-04-19 PROCEDURE — G8427 DOCREV CUR MEDS BY ELIG CLIN: HCPCS | Performed by: FAMILY MEDICINE

## 2018-04-19 PROCEDURE — G8420 CALC BMI NORM PARAMETERS: HCPCS | Performed by: FAMILY MEDICINE

## 2018-04-19 PROCEDURE — 1123F ACP DISCUSS/DSCN MKR DOCD: CPT | Performed by: FAMILY MEDICINE

## 2018-04-19 RX ORDER — TOBRAMYCIN AND DEXAMETHASONE 3; 1 MG/ML; MG/ML
SUSPENSION/ DROPS OPHTHALMIC
Refills: 0 | COMMUNITY
Start: 2018-04-10 | End: 2018-07-20 | Stop reason: ALTCHOICE

## 2018-04-19 RX ORDER — CHOLECALCIFEROL (VITAMIN D3) 50 MCG
2000 TABLET ORAL DAILY
Qty: 30 TABLET | Refills: 11 | Status: SHIPPED | OUTPATIENT
Start: 2018-04-19 | End: 2019-04-23 | Stop reason: SDUPTHER

## 2018-04-19 RX ORDER — METFORMIN HYDROCHLORIDE 500 MG/1
500 TABLET, EXTENDED RELEASE ORAL 2 TIMES DAILY
Qty: 60 TABLET | Refills: 5 | Status: SHIPPED | OUTPATIENT
Start: 2018-04-19 | End: 2018-09-19 | Stop reason: SDUPTHER

## 2018-04-19 ASSESSMENT — ENCOUNTER SYMPTOMS
ABDOMINAL DISTENTION: 0
ABDOMINAL PAIN: 0
VOMITING: 0
BLOOD IN STOOL: 0
DIARRHEA: 0
CHEST TIGHTNESS: 0
WHEEZING: 0
NAUSEA: 0
SHORTNESS OF BREATH: 1
COUGH: 1
CONSTIPATION: 0

## 2018-04-22 PROBLEM — R53.1 GENERAL WEAKNESS: Status: ACTIVE | Noted: 2018-04-22

## 2018-04-22 PROBLEM — R63.4 UNINTENDED WEIGHT LOSS: Status: ACTIVE | Noted: 2018-04-22

## 2018-04-22 PROBLEM — M89.9 BONE DISORDER: Status: RESOLVED | Noted: 2017-08-14 | Resolved: 2018-04-22

## 2018-06-03 DIAGNOSIS — E11.42 TYPE 2 DIABETES MELLITUS WITH DIABETIC POLYNEUROPATHY, WITH LONG-TERM CURRENT USE OF INSULIN (HCC): ICD-10-CM

## 2018-06-03 DIAGNOSIS — Z79.4 TYPE 2 DIABETES MELLITUS WITH DIABETIC POLYNEUROPATHY, WITH LONG-TERM CURRENT USE OF INSULIN (HCC): ICD-10-CM

## 2018-06-04 RX ORDER — LANCETS
EACH MISCELLANEOUS
Qty: 100 EACH | Refills: 0 | Status: SHIPPED | OUTPATIENT
Start: 2018-06-04 | End: 2018-08-23 | Stop reason: SDUPTHER

## 2018-06-14 DIAGNOSIS — D64.9 ANEMIA, UNSPECIFIED TYPE: Primary | ICD-10-CM

## 2018-06-18 ENCOUNTER — OFFICE VISIT (OUTPATIENT)
Dept: ONCOLOGY | Age: 83
End: 2018-06-18
Payer: MEDICARE

## 2018-06-18 ENCOUNTER — HOSPITAL ENCOUNTER (OUTPATIENT)
Age: 83
Discharge: HOME | End: 2018-06-18
Payer: MEDICARE

## 2018-06-18 VITALS
DIASTOLIC BLOOD PRESSURE: 58 MMHG | BODY MASS INDEX: 24.67 KG/M2 | TEMPERATURE: 98 F | WEIGHT: 171.9 LBS | SYSTOLIC BLOOD PRESSURE: 124 MMHG | HEART RATE: 60 BPM

## 2018-06-18 DIAGNOSIS — D64.9 ANEMIA, UNSPECIFIED TYPE: ICD-10-CM

## 2018-06-18 DIAGNOSIS — D50.9 IRON DEFICIENCY ANEMIA, UNSPECIFIED IRON DEFICIENCY ANEMIA TYPE: ICD-10-CM

## 2018-06-18 DIAGNOSIS — D64.9 ANEMIA, UNSPECIFIED TYPE: Primary | ICD-10-CM

## 2018-06-18 LAB
ABSOLUTE EOS #: 0.1 K/UL (ref 0–0.4)
ABSOLUTE IMMATURE GRANULOCYTE: ABNORMAL K/UL (ref 0–0.3)
ABSOLUTE LYMPH #: 1.7 K/UL (ref 1–4.8)
ABSOLUTE MONO #: 0.5 K/UL (ref 0.1–1.2)
BASOPHILS # BLD: 0 % (ref 0–2)
BASOPHILS ABSOLUTE: 0 K/UL (ref 0–0.2)
DIFFERENTIAL TYPE: ABNORMAL
EOSINOPHILS RELATIVE PERCENT: 2 % (ref 1–4)
HCT VFR BLD CALC: 31 % (ref 41–53)
HEMOGLOBIN: 10.4 G/DL (ref 13.5–17.5)
IMMATURE GRANULOCYTES: ABNORMAL %
LYMPHOCYTES # BLD: 28 % (ref 24–44)
MCH RBC QN AUTO: 32.5 PG (ref 26–34)
MCHC RBC AUTO-ENTMCNC: 33.4 G/DL (ref 31–37)
MCV RBC AUTO: 97.5 FL (ref 80–100)
MONOCYTES # BLD: 9 % (ref 2–11)
NRBC AUTOMATED: ABNORMAL PER 100 WBC
PDW BLD-RTO: 14.6 % (ref 12.5–15.4)
PLATELET # BLD: 159 K/UL (ref 140–450)
PLATELET ESTIMATE: ABNORMAL
PMV BLD AUTO: 9.2 FL (ref 6–12)
RBC # BLD: 3.18 M/UL (ref 4.5–5.9)
RBC # BLD: ABNORMAL 10*6/UL
SEG NEUTROPHILS: 61 % (ref 36–66)
SEGMENTED NEUTROPHILS ABSOLUTE COUNT: 3.6 K/UL (ref 1.8–7.7)
WBC # BLD: 5.9 K/UL (ref 3.5–11)
WBC # BLD: ABNORMAL 10*3/UL

## 2018-06-18 PROCEDURE — 99214 OFFICE O/P EST MOD 30 MIN: CPT | Performed by: INTERNAL MEDICINE

## 2018-06-18 PROCEDURE — 1123F ACP DISCUSS/DSCN MKR DOCD: CPT | Performed by: INTERNAL MEDICINE

## 2018-06-18 PROCEDURE — 99211 OFF/OP EST MAY X REQ PHY/QHP: CPT | Performed by: INTERNAL MEDICINE

## 2018-06-18 PROCEDURE — 4040F PNEUMOC VAC/ADMIN/RCVD: CPT | Performed by: INTERNAL MEDICINE

## 2018-06-18 PROCEDURE — 36415 COLL VENOUS BLD VENIPUNCTURE: CPT

## 2018-06-18 PROCEDURE — G8427 DOCREV CUR MEDS BY ELIG CLIN: HCPCS | Performed by: INTERNAL MEDICINE

## 2018-06-18 PROCEDURE — G8420 CALC BMI NORM PARAMETERS: HCPCS | Performed by: INTERNAL MEDICINE

## 2018-06-18 PROCEDURE — 1036F TOBACCO NON-USER: CPT | Performed by: INTERNAL MEDICINE

## 2018-06-18 PROCEDURE — 85025 COMPLETE CBC W/AUTO DIFF WBC: CPT

## 2018-06-18 RX ORDER — RANITIDINE 150 MG/1
150 TABLET ORAL 2 TIMES DAILY
Qty: 60 TABLET | Refills: 3 | Status: SHIPPED | OUTPATIENT
Start: 2018-06-18 | End: 2018-06-19 | Stop reason: SDUPTHER

## 2018-06-19 RX ORDER — RANITIDINE 150 MG/1
150 TABLET ORAL 2 TIMES DAILY
Qty: 180 TABLET | Refills: 0 | Status: SHIPPED | OUTPATIENT
Start: 2018-06-19 | End: 2018-09-25 | Stop reason: SDUPTHER

## 2018-07-12 ENCOUNTER — HOSPITAL ENCOUNTER (OUTPATIENT)
Age: 83
Discharge: HOME | End: 2018-07-12
Payer: MEDICARE

## 2018-07-12 DIAGNOSIS — E11.42 TYPE 2 DIABETES MELLITUS WITH DIABETIC POLYNEUROPATHY, WITHOUT LONG-TERM CURRENT USE OF INSULIN (HCC): ICD-10-CM

## 2018-07-12 DIAGNOSIS — I10 ESSENTIAL HYPERTENSION: ICD-10-CM

## 2018-07-12 DIAGNOSIS — D64.9 ANEMIA, UNSPECIFIED TYPE: ICD-10-CM

## 2018-07-12 LAB
ALBUMIN SERPL-MCNC: 3.9 G/DL (ref 3.5–5.2)
ALBUMIN/GLOBULIN RATIO: ABNORMAL (ref 1–2.5)
ALP BLD-CCNC: 59 U/L (ref 40–129)
ALT SERPL-CCNC: 16 U/L (ref 5–41)
ANION GAP SERPL CALCULATED.3IONS-SCNC: 11 MMOL/L (ref 9–17)
AST SERPL-CCNC: 19 U/L
BILIRUB SERPL-MCNC: 0.38 MG/DL (ref 0.3–1.2)
BUN BLDV-MCNC: 24 MG/DL (ref 8–23)
BUN/CREAT BLD: ABNORMAL (ref 9–20)
CALCIUM SERPL-MCNC: 9.1 MG/DL (ref 8.6–10.4)
CHLORIDE BLD-SCNC: 105 MMOL/L (ref 98–107)
CO2: 26 MMOL/L (ref 20–31)
CREAT SERPL-MCNC: 0.97 MG/DL (ref 0.7–1.2)
GFR AFRICAN AMERICAN: >60 ML/MIN
GFR NON-AFRICAN AMERICAN: >60 ML/MIN
GFR SERPL CREATININE-BSD FRML MDRD: ABNORMAL ML/MIN/{1.73_M2}
GFR SERPL CREATININE-BSD FRML MDRD: ABNORMAL ML/MIN/{1.73_M2}
GLUCOSE BLD-MCNC: 162 MG/DL (ref 70–99)
HCT VFR BLD CALC: 31.5 % (ref 41–53)
HEMOGLOBIN: 10.3 G/DL (ref 13.5–17.5)
MCH RBC QN AUTO: 32.2 PG (ref 26–34)
MCHC RBC AUTO-ENTMCNC: 32.7 G/DL (ref 31–37)
MCV RBC AUTO: 98.5 FL (ref 80–100)
NRBC AUTOMATED: ABNORMAL PER 100 WBC
PDW BLD-RTO: 14.4 % (ref 11.5–14.9)
PLATELET # BLD: 131 K/UL (ref 150–450)
PMV BLD AUTO: 9 FL (ref 6–12)
POTASSIUM SERPL-SCNC: 4.3 MMOL/L (ref 3.7–5.3)
RBC # BLD: 3.19 M/UL (ref 4.5–5.9)
SODIUM BLD-SCNC: 142 MMOL/L (ref 135–144)
TOTAL PROTEIN: 6.8 G/DL (ref 6.4–8.3)
WBC # BLD: 5.3 K/UL (ref 3.5–11)

## 2018-07-12 PROCEDURE — 85027 COMPLETE CBC AUTOMATED: CPT

## 2018-07-12 PROCEDURE — 36415 COLL VENOUS BLD VENIPUNCTURE: CPT

## 2018-07-12 PROCEDURE — 80053 COMPREHEN METABOLIC PANEL: CPT

## 2018-07-20 ENCOUNTER — OFFICE VISIT (OUTPATIENT)
Dept: FAMILY MEDICINE CLINIC | Age: 83
End: 2018-07-20
Payer: MEDICARE

## 2018-07-20 ENCOUNTER — HOSPITAL ENCOUNTER (OUTPATIENT)
Age: 83
Discharge: HOME | End: 2018-07-20
Payer: MEDICARE

## 2018-07-20 VITALS
WEIGHT: 175.2 LBS | SYSTOLIC BLOOD PRESSURE: 142 MMHG | HEIGHT: 70 IN | TEMPERATURE: 97.2 F | BODY MASS INDEX: 25.08 KG/M2 | HEART RATE: 56 BPM | DIASTOLIC BLOOD PRESSURE: 54 MMHG | OXYGEN SATURATION: 99 %

## 2018-07-20 DIAGNOSIS — J30.2 CHRONIC SEASONAL ALLERGIC RHINITIS DUE TO OTHER ALLERGEN: ICD-10-CM

## 2018-07-20 DIAGNOSIS — R10.2 SUPRAPUBIC PAIN: ICD-10-CM

## 2018-07-20 DIAGNOSIS — E03.9 ACQUIRED HYPOTHYROIDISM: ICD-10-CM

## 2018-07-20 DIAGNOSIS — E11.42 TYPE 2 DIABETES MELLITUS WITH DIABETIC POLYNEUROPATHY, WITHOUT LONG-TERM CURRENT USE OF INSULIN (HCC): Primary | ICD-10-CM

## 2018-07-20 DIAGNOSIS — I10 ESSENTIAL HYPERTENSION: ICD-10-CM

## 2018-07-20 DIAGNOSIS — Z86.19 HISTORY OF HELICOBACTER PYLORI INFECTION: ICD-10-CM

## 2018-07-20 DIAGNOSIS — R10.13 DYSPEPSIA: ICD-10-CM

## 2018-07-20 LAB
-: ABNORMAL
AMORPHOUS: ABNORMAL
BACTERIA: ABNORMAL
BILIRUBIN URINE: NEGATIVE
CASTS UA: ABNORMAL /LPF
COLOR: YELLOW
COMMENT UA: ABNORMAL
CRYSTALS, UA: ABNORMAL /HPF
EPITHELIAL CELLS UA: ABNORMAL /HPF
GLUCOSE URINE: NEGATIVE
HBA1C MFR BLD: 6.5 %
KETONES, URINE: NEGATIVE
LEUKOCYTE ESTERASE, URINE: NEGATIVE
MUCUS: ABNORMAL
NITRITE, URINE: NEGATIVE
OTHER OBSERVATIONS UA: ABNORMAL
PH UA: 5.5 (ref 5–8)
PROTEIN UA: NEGATIVE
RBC UA: ABNORMAL /HPF
RENAL EPITHELIAL, UA: ABNORMAL /HPF
SPECIFIC GRAVITY UA: 1.01 (ref 1–1.03)
TRICHOMONAS: ABNORMAL
TURBIDITY: CLEAR
URINE HGB: ABNORMAL
UROBILINOGEN, URINE: NORMAL
WBC UA: ABNORMAL /HPF
YEAST: ABNORMAL

## 2018-07-20 PROCEDURE — 99214 OFFICE O/P EST MOD 30 MIN: CPT | Performed by: FAMILY MEDICINE

## 2018-07-20 PROCEDURE — 81001 URINALYSIS AUTO W/SCOPE: CPT

## 2018-07-20 PROCEDURE — G8427 DOCREV CUR MEDS BY ELIG CLIN: HCPCS | Performed by: FAMILY MEDICINE

## 2018-07-20 PROCEDURE — G8417 CALC BMI ABV UP PARAM F/U: HCPCS | Performed by: FAMILY MEDICINE

## 2018-07-20 PROCEDURE — 1101F PT FALLS ASSESS-DOCD LE1/YR: CPT | Performed by: FAMILY MEDICINE

## 2018-07-20 PROCEDURE — 1123F ACP DISCUSS/DSCN MKR DOCD: CPT | Performed by: FAMILY MEDICINE

## 2018-07-20 PROCEDURE — 1036F TOBACCO NON-USER: CPT | Performed by: FAMILY MEDICINE

## 2018-07-20 PROCEDURE — 4040F PNEUMOC VAC/ADMIN/RCVD: CPT | Performed by: FAMILY MEDICINE

## 2018-07-20 PROCEDURE — 83036 HEMOGLOBIN GLYCOSYLATED A1C: CPT | Performed by: FAMILY MEDICINE

## 2018-07-20 RX ORDER — LORATADINE 10 MG/1
10 TABLET ORAL DAILY
Qty: 90 TABLET | Refills: 3 | Status: SHIPPED | OUTPATIENT
Start: 2018-07-20 | End: 2018-10-19 | Stop reason: ALTCHOICE

## 2018-07-20 RX ORDER — FLUTICASONE PROPIONATE 50 MCG
SPRAY, SUSPENSION (ML) NASAL
Qty: 3 BOTTLE | Refills: 3 | Status: SHIPPED | OUTPATIENT
Start: 2018-07-20 | End: 2020-06-03 | Stop reason: ALTCHOICE

## 2018-07-20 RX ORDER — FLUTICASONE PROPIONATE 50 MCG
2 SPRAY, SUSPENSION (ML) NASAL DAILY
Qty: 1 BOTTLE | Refills: 3 | Status: SHIPPED | OUTPATIENT
Start: 2018-07-20 | End: 2018-07-20 | Stop reason: SDUPTHER

## 2018-07-20 ASSESSMENT — PATIENT HEALTH QUESTIONNAIRE - PHQ9
SUM OF ALL RESPONSES TO PHQ QUESTIONS 1-9: 0
2. FEELING DOWN, DEPRESSED OR HOPELESS: 0
1. LITTLE INTEREST OR PLEASURE IN DOING THINGS: 0
SUM OF ALL RESPONSES TO PHQ9 QUESTIONS 1 & 2: 0

## 2018-07-20 ASSESSMENT — ENCOUNTER SYMPTOMS
SINUS PAIN: 0
DIARRHEA: 0
SHORTNESS OF BREATH: 1
CONSTIPATION: 0
NAUSEA: 0
VOMITING: 0
RHINORRHEA: 1
SINUS PRESSURE: 0
WHEEZING: 0
CHEST TIGHTNESS: 0
COUGH: 1
ABDOMINAL DISTENTION: 0
BLOOD IN STOOL: 0
ABDOMINAL PAIN: 1

## 2018-07-20 NOTE — TELEPHONE ENCOUNTER
Please Approve or Refuse.   Send to Pharmacy per Pt's Request: [facility] [address], [address] [phone] Benjamín Wesley [phone]: [phone]       Next Visit Date:  7/27/2018   Last Visit Date: 7/20/2018    Hemoglobin A1C (%)   Date Value   07/20/2018 6.5   04/12/2018 7.7 (H)   11/15/2017 6.8             ( goal A1C is < 7)   BP Readings from Last 3 Encounters:   07/20/18 (!) 142/54   06/18/18 (!) 124/58   04/19/18 139/61          (goal 120/80)

## 2018-07-20 NOTE — PROGRESS NOTES
Addressed during office visit today, A1c 6.5, greatly improved diabetes, continue treatment recommended during the office visit

## 2018-07-20 NOTE — PROGRESS NOTES
Visit Information    Have you changed or started any medications since your last visit including any over-the-counter medicines, vitamins, or herbal medicines? no   Have you stopped taking any of your medications? Is so, why? -  no  Are you having any side effects from any of your medications? - no    Have you seen any other physician or provider since your last visit? yes -    Have you had any other diagnostic tests since your last visit? yes -    Have you been seen in the emergency room and/or had an admission in a hospital since we last saw you?  no   Have you had your routine dental cleaning in the past 6 months?  no     Do you have an active MyChart account? If no, what is the barrier?   Yes    Patient Care Team:  Tez Cee MD as PCP - General (Family Medicine)  Tez Cee MD as PCP - S Attributed Provider  Martin Lazo (Optometry)  Deysi Agarwal MD as Surgeon (Cardiology)  Erica Santoro MD as Consulting Physician (Urology)  Du Barclay MD as Consulting Physician (Gastroenterology)  Dillon Pelayo MD as Surgeon (Ophthalmology)  Taya Noyola (Certified Nurse Practitioner)  Ching Dumont MD as Consulting Physician (Oncology)    Medical History Review  Past Medical, Family, and Social History reviewed and does contribute to the patient presenting condition    Health Maintenance   Topic Date Due    Shingles Vaccine (1 of 2 - 2 Dose Series) 09/20/1974    DTaP/Tdap/Td vaccine (1 - Tdap) 01/19/2019 (Originally 9/20/1943)    Flu vaccine (1) 09/01/2018    TSH testing  04/12/2019    Potassium monitoring  07/12/2019    Creatinine monitoring  07/12/2019    Pneumococcal low/med risk  Completed

## 2018-07-20 NOTE — PROGRESS NOTES
Chief Complaint   Patient presents with    Diabetes    Hypertension    Hyperlipidemia    Results    Anemia    Weight Loss       Emily Chavarria  here today for follow up on chronic medical problems, go over labs and/or diagnostic studies, and medication refills. Diabetes; Hypertension; Hyperlipidemia; Results; Anemia; and Weight Loss    Antonella Pro comes with his son, Antonella Pro. Antonella Pro first language is not [place]. He was born in [place] and worked for many years in [place], but then he returned back to [facility] can express himself and understands basic English        Diabetes Mellitus Type II, Follow-up: Patient here for follow-up of Type 2 diabetes mellitus. Current symptoms/problems include hyperglycemia, paresthesia of the feet and visual disturbances and have been stable. Symptoms have been present for several years. Known diabetic complications: nephropathy, peripheral neuropathy, cardiovascular disease and cataracts  Cardiovascular risk factors: advanced age (older than 54 for men, 72 for women), diabetes mellitus, dyslipidemia, hypertension, male gender, microalbuminuria and sedentary lifestyle  Current diabetic medications include oral agent (monotherapy): Glucophage. Januvia was too expensive . Eye exam current (within one year): yes  Weight trend: increasing steadily    Wt Readings from Last 3 Encounters:   07/20/18 175 lb 3.2 oz (79.5 kg)   06/18/18 171 lb 14.4 oz (78 kg)   04/19/18 167 lb 3.2 oz (75.8 kg)       Prior visit with dietician: yes  Current diet: in general, a \"healthy\" diet  , diabetic, low fat/ cholesterol, low salt  Current exercise: walking but limited     Current monitoring regimen: home blood tests - daily  Home blood sugar records: fasting range: 143-159, 160  Any episodes of hypoglycemia? no    Is He on ACE inhibitor or angiotensin II receptor blocker?    Yes     Improved A1c  Prior A1c was 7.7 on 4/12/18      Lab Results   Component Value Date    LABA1C 6.5 07/20/2018 .  NOT taking Aspirin due to  Anemia and prior hematuria   Doesn't smoke    Urine microabumin was high. Lab Results   Component Value Date    LABMICR 106 (H) 11/15/2017     LDL controlled    Lab Results   Component Value Date    LDLCHOLESTEROL 76 05/19/2017       Hypertension: Patient here for follow-up of elevated blood pressure. He is not exercising and is adherent to low salt diet. Blood pressure is well controlled at home. Cardiac symptoms dyspnea and fatigue. Patient denies chest pain, chest pressure/discomfort, claudication, exertional chest pressure/discomfort, irregular heart beat, lower extremity edema, near-syncope, orthopnea, palpitations, paroxysmal nocturnal dyspnea, syncope and tachypnea. Cardiovascular risk factors: advanced age (older than 54 for men, 72 for women), diabetes mellitus, dyslipidemia, hypertension, male gender, microalbuminuria and sedentary lifestyle. Use of agents associated with hypertension: thyroid hormones. History of target organ damage: angina/ prior myocardial infarction and left ventricular hypertrophy and diastolic dysfunction       SBP not at goal. High differential noted. He has moderately severe aortic stenosis. He did see cardiology in the past, but chose not to have any surgery. I advised Monica Rooney to make appointment with cardiology, but he is not interested      BP Readings from Last 3 Encounters:   07/20/18 (!) 142/54   06/18/18 (!) 124/58   04/19/18 139/61        Pulse Readings from Last 3 Encounters:   07/20/18 56   06/18/18 60   04/19/18 68     Negative depression screening.      PHQ Scores 7/20/2018 1/19/2018 1/5/2017   PHQ2 Score 0 0 1   PHQ9 Score 0 0 1     Interpretation of Total Score Depression Severity: 1-4 = Minimal depression, 5-9 = Mild depression, 10-14 = Moderate depression, 15-19 = Moderately severe depression, 20-27 = Severe depression    Corbin complains of runny nose a lot since summer started and when going to [facility] and by mouth daily 14 tablet 0    omeprazole (PRILOSEC) 20 MG delayed release capsule Take 1 capsule by mouth Daily Frequency decreased from twice a day to once a day (Patient taking differently: Take 20 mg by mouth Daily Frequency decreased from twice a day to once a day) 30 capsule 3    tamsulosin (FLOMAX) 0.4 MG capsule Take 1 capsule by mouth daily 30 capsule 5    atorvastatin (LIPITOR) 20 MG tablet TAKE 1 TABLET BY MOUTH EVERY DAY 90 tablet 3    Blood Glucose Monitoring Suppl (ACCU-CHEK JOEL PLUS) w/Device KIT USE AS DIRECTED  0    Lancet Device MISC For use with lancets for blood glucose checks,  Please dispense according to patients's lancets 1 Device 0    glucose monitoring kit (FREESTYLE) monitoring kit Please supply Patient with a glucose monitoring kit that insurance will cover. Accu check Joel plus model 1 kit 0    Glucose Blood (BLOOD GLUCOSE TEST STRIPS) STRP Test ONCE a day. Accu check Joel plus model 100 strip 3    amLODIPine (NORVASC) 5 MG tablet TAKE 1 TABLET BY MOUTH EVERY DAY 90 tablet 3    enalapril (VASOTEC) 20 MG tablet TAKE 1 TABLET BY MOUTH DAILY 90 tablet 3    fluticasone (FLONASE) 50 MCG/ACT nasal spray 1 spray by Nasal route daily 1 Bottle 0    Lancets Misc. (ACCU-CHEK MULTICLIX LANCET DEV) KIT Please dispense according to patients insurance. 1 kit 0    Alcohol Swabs PADS Please dispense according to patients insurance/device. Testing 1-2 /day 100 each 2     No current facility-administered medications for this visit. Allergies   Allergen Reactions    Aspirin      Anemia, hematuria      Sulfa Antibiotics Rash        Rest of complaints with corresponding details per ROS      The patient's past medical, surgical, social, and family history as well as his current medications and allergies were reviewed as documented in today's encounter. Social History     Social History    Marital status:       Spouse name: N/A    Number of children: N/A    Years of Constitutional: He is oriented to person, place, and time. He appears well-developed and well-nourished. No distress. HENT:   Head: Normocephalic and atraumatic. Right Ear: Decreased hearing is noted. Left Ear: Decreased hearing is noted. Nose: Mucosal edema and rhinorrhea present. Right sinus exhibits no maxillary sinus tenderness and no frontal sinus tenderness. Left sinus exhibits no maxillary sinus tenderness and no frontal sinus tenderness. Mouth/Throat: Mucous membranes are not dry. Posterior oropharyngeal erythema present. Eyes: Conjunctivae and EOM are normal. Right eye exhibits no discharge. Left eye exhibits no discharge. No scleral icterus. Neck: Normal range of motion. Neck supple. No JVD present. No thyromegaly present. Cardiovascular: Normal rate, regular rhythm and intact distal pulses. Murmur heard. Crescendo systolic murmur is present with a grade of 4/6   Pulmonary/Chest: Effort normal and breath sounds normal. No respiratory distress. He has no wheezes. He has no rales. He exhibits no tenderness. Abdominal: Soft. Bowel sounds are normal. He exhibits no distension. There is tenderness in the epigastric area and suprapubic area. There is no rigidity, no guarding and no CVA tenderness. Musculoskeletal: Normal range of motion. He exhibits no edema or tenderness. Up and go test more than 12 seconds, Increased risk of falls    Neurological: He is alert and oriented to person, place, and time. No cranial nerve deficit. He exhibits normal muscle tone. Coordination normal.   Skin: Skin is warm and dry. No rash noted. He is not diaphoretic. Psychiatric: He has a normal mood and affect. His behavior is normal. Judgment and thought content normal.   Nursing note and vitals reviewed. Discussed testing with the patient and all questions fully answered.     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 Discussed use, benefit, and side effects of prescribed medications. Barriers to medication compliance addressed. Patient given educational materials - see patient instructions  Was a self-tracking handout given in paper form or via [employer]? Yes    Requested Prescriptions     Signed Prescriptions Disp Refills    loratadine (CLARITIN) 10 MG tablet 90 tablet 3     Sig: Take 1 tablet by mouth daily    cyanocobalamin ([employer] VITAMIN B12) 1000 MCG tablet 30 tablet 0     Sig: Take 1 tablet by mouth daily       All patient questions answered. Patient voiced understanding. Quality Measures    Body mass index is 25.14 kg/m². Normal. Weight control planned discussed Healthy diet and regular exercise. BP: (!) 142/54. Blood pressure is normal high. Treatment plan consists of DASH Eating Plan, Dietary Sodium Restriction, Increased Physical Activity, Patient In-home Blood Pressure Monitoring and No treatment change needed. Fall Risk 1/19/2018 1/5/2017   2 or more falls in past year? no no   Fall with injury in past year? no no     The patient does not have a history of falls. I did , complete a risk assessment for falls. A plan of care for falls in-office gait and balance testing performed using The Timed Up and Go Test was positive for increased falls risk, home safety tips provided      LDL controlled  Lab Results   Component Value Date    LDLCHOLESTEROL 76 05/19/2017    (goal LDL reduction with dx if diabetes is 50% LDL reduction)      Negative depression screening.    PHQ Scores 7/20/2018 1/19/2018 1/5/2017   PHQ2 Score 0 0

## 2018-07-20 NOTE — PROGRESS NOTES
Yajaira comment sent to patient.   Just trace blood in the urine, could follow up with urology, Dr. Antoni Graham

## 2018-07-22 PROBLEM — J30.9 ALLERGIC RHINITIS DUE TO ALLERGEN: Status: ACTIVE | Noted: 2018-07-22

## 2018-07-22 PROBLEM — R53.1 GENERAL WEAKNESS: Status: RESOLVED | Noted: 2018-04-22 | Resolved: 2018-07-22

## 2018-07-22 PROBLEM — R10.2 SUPRAPUBIC PAIN: Status: ACTIVE | Noted: 2018-07-22

## 2018-07-23 ENCOUNTER — OFFICE VISIT (OUTPATIENT)
Dept: PODIATRY | Age: 83
End: 2018-07-23
Payer: MEDICARE

## 2018-07-23 VITALS
HEART RATE: 68 BPM | SYSTOLIC BLOOD PRESSURE: 167 MMHG | WEIGHT: 171 LBS | DIASTOLIC BLOOD PRESSURE: 79 MMHG | BODY MASS INDEX: 24.54 KG/M2

## 2018-07-23 DIAGNOSIS — B35.1 ONYCHOMYCOSIS: Primary | ICD-10-CM

## 2018-07-23 DIAGNOSIS — E11.42 TYPE 2 DIABETES MELLITUS WITH DIABETIC POLYNEUROPATHY, WITHOUT LONG-TERM CURRENT USE OF INSULIN (HCC): ICD-10-CM

## 2018-07-23 PROCEDURE — 11721 DEBRIDE NAIL 6 OR MORE: CPT | Performed by: PODIATRIST

## 2018-07-23 PROCEDURE — 99999 PR OFFICE/OUTPT VISIT,PROCEDURE ONLY: CPT | Performed by: PODIATRIST

## 2018-07-23 ASSESSMENT — ENCOUNTER SYMPTOMS
DIARRHEA: 0
VOMITING: 0
NAUSEA: 0
COLOR CHANGE: 0
CONSTIPATION: 0

## 2018-07-27 ENCOUNTER — NURSE ONLY (OUTPATIENT)
Dept: FAMILY MEDICINE CLINIC | Age: 83
End: 2018-07-27
Payer: MEDICARE

## 2018-07-27 VITALS — SYSTOLIC BLOOD PRESSURE: 138 MMHG | DIASTOLIC BLOOD PRESSURE: 55 MMHG

## 2018-07-27 DIAGNOSIS — I10 ESSENTIAL HYPERTENSION: Primary | ICD-10-CM

## 2018-07-27 PROCEDURE — 99211 OFF/OP EST MAY X REQ PHY/QHP: CPT | Performed by: FAMILY MEDICINE

## 2018-07-27 NOTE — PROGRESS NOTES
Here today for BP check brought home readings with him. With the manual cuff I got 148/58 He has not missed any medication. Home readings scanned in chart.

## 2018-08-04 ENCOUNTER — HOSPITAL ENCOUNTER (OUTPATIENT)
Age: 83
Discharge: HOME | End: 2018-08-04
Payer: MEDICARE

## 2018-08-04 PROCEDURE — 87338 HPYLORI STOOL AG IA: CPT

## 2018-08-05 LAB
DIRECT EXAM: NEGATIVE
Lab: NORMAL
SPECIMEN DESCRIPTION: NORMAL
STATUS: NORMAL

## 2018-08-17 ENCOUNTER — HOSPITAL ENCOUNTER (OUTPATIENT)
Age: 83
Discharge: HOME | End: 2018-08-17
Payer: MEDICARE

## 2018-08-17 DIAGNOSIS — E11.42 TYPE 2 DIABETES MELLITUS WITH DIABETIC POLYNEUROPATHY, WITHOUT LONG-TERM CURRENT USE OF INSULIN (HCC): ICD-10-CM

## 2018-08-17 DIAGNOSIS — I10 ESSENTIAL HYPERTENSION: ICD-10-CM

## 2018-08-17 LAB
ANION GAP SERPL CALCULATED.3IONS-SCNC: 12 MMOL/L (ref 9–17)
BUN BLDV-MCNC: 21 MG/DL (ref 8–23)
BUN/CREAT BLD: ABNORMAL (ref 9–20)
CALCIUM SERPL-MCNC: 9.4 MG/DL (ref 8.6–10.4)
CHLORIDE BLD-SCNC: 103 MMOL/L (ref 98–107)
CO2: 27 MMOL/L (ref 20–31)
CREAT SERPL-MCNC: 1.02 MG/DL (ref 0.7–1.2)
FOLATE: 13.4 NG/ML
GFR AFRICAN AMERICAN: >60 ML/MIN
GFR NON-AFRICAN AMERICAN: >60 ML/MIN
GFR SERPL CREATININE-BSD FRML MDRD: ABNORMAL ML/MIN/{1.73_M2}
GFR SERPL CREATININE-BSD FRML MDRD: ABNORMAL ML/MIN/{1.73_M2}
GLUCOSE BLD-MCNC: 161 MG/DL (ref 70–99)
HCT VFR BLD CALC: 34.4 % (ref 41–53)
HEMOGLOBIN: 11.4 G/DL (ref 13.5–17.5)
MCH RBC QN AUTO: 32 PG (ref 26–34)
MCHC RBC AUTO-ENTMCNC: 33.1 G/DL (ref 31–37)
MCV RBC AUTO: 96.7 FL (ref 80–100)
NRBC AUTOMATED: ABNORMAL PER 100 WBC
PDW BLD-RTO: 14.2 % (ref 11.5–14.9)
PLATELET # BLD: 161 K/UL (ref 150–450)
PMV BLD AUTO: 8.9 FL (ref 6–12)
POTASSIUM SERPL-SCNC: 5 MMOL/L (ref 3.7–5.3)
RBC # BLD: 3.56 M/UL (ref 4.5–5.9)
SODIUM BLD-SCNC: 142 MMOL/L (ref 135–144)
VITAMIN B-12: 944 PG/ML (ref 232–1245)
WBC # BLD: 4.8 K/UL (ref 3.5–11)

## 2018-08-17 PROCEDURE — 82746 ASSAY OF FOLIC ACID SERUM: CPT

## 2018-08-17 PROCEDURE — 36415 COLL VENOUS BLD VENIPUNCTURE: CPT

## 2018-08-17 PROCEDURE — 80048 BASIC METABOLIC PNL TOTAL CA: CPT

## 2018-08-17 PROCEDURE — 85027 COMPLETE CBC AUTOMATED: CPT

## 2018-08-17 PROCEDURE — 82607 VITAMIN B-12: CPT

## 2018-08-23 DIAGNOSIS — Z79.4 TYPE 2 DIABETES MELLITUS WITH DIABETIC POLYNEUROPATHY, WITH LONG-TERM CURRENT USE OF INSULIN (HCC): ICD-10-CM

## 2018-08-23 DIAGNOSIS — E11.42 TYPE 2 DIABETES MELLITUS WITH DIABETIC POLYNEUROPATHY, WITH LONG-TERM CURRENT USE OF INSULIN (HCC): ICD-10-CM

## 2018-08-23 RX ORDER — LANCETS
EACH MISCELLANEOUS
Qty: 102 EACH | Refills: 5 | Status: SHIPPED | OUTPATIENT
Start: 2018-08-23 | End: 2019-10-29 | Stop reason: SDUPTHER

## 2018-09-19 DIAGNOSIS — E11.42 TYPE 2 DIABETES MELLITUS WITH DIABETIC POLYNEUROPATHY, WITHOUT LONG-TERM CURRENT USE OF INSULIN (HCC): ICD-10-CM

## 2018-09-19 RX ORDER — METFORMIN HYDROCHLORIDE 500 MG/1
500 TABLET, EXTENDED RELEASE ORAL 2 TIMES DAILY
Qty: 60 TABLET | Refills: 5 | Status: SHIPPED | OUTPATIENT
Start: 2018-09-19 | End: 2018-11-19 | Stop reason: SDUPTHER

## 2018-09-25 DIAGNOSIS — K21.00 GASTROESOPHAGEAL REFLUX DISEASE WITH ESOPHAGITIS: Primary | ICD-10-CM

## 2018-09-25 RX ORDER — RANITIDINE 150 MG/1
150 TABLET ORAL 2 TIMES DAILY
Qty: 180 TABLET | Refills: 0 | Status: SHIPPED | OUTPATIENT
Start: 2018-09-25 | End: 2019-01-23 | Stop reason: SDUPTHER

## 2018-09-27 ENCOUNTER — OFFICE VISIT (OUTPATIENT)
Dept: UROLOGY | Age: 83
End: 2018-09-27
Payer: MEDICARE

## 2018-09-27 VITALS
BODY MASS INDEX: 24.62 KG/M2 | WEIGHT: 172 LBS | DIASTOLIC BLOOD PRESSURE: 56 MMHG | SYSTOLIC BLOOD PRESSURE: 138 MMHG | HEART RATE: 51 BPM | TEMPERATURE: 97.9 F | HEIGHT: 70 IN

## 2018-09-27 DIAGNOSIS — N40.1 BENIGN PROSTATIC HYPERPLASIA WITH INCOMPLETE BLADDER EMPTYING: Primary | ICD-10-CM

## 2018-09-27 DIAGNOSIS — R39.14 BENIGN PROSTATIC HYPERPLASIA WITH INCOMPLETE BLADDER EMPTYING: Primary | ICD-10-CM

## 2018-09-27 DIAGNOSIS — R35.1 NOCTURIA: ICD-10-CM

## 2018-09-27 PROCEDURE — G8420 CALC BMI NORM PARAMETERS: HCPCS | Performed by: UROLOGY

## 2018-09-27 PROCEDURE — 1123F ACP DISCUSS/DSCN MKR DOCD: CPT | Performed by: UROLOGY

## 2018-09-27 PROCEDURE — 4040F PNEUMOC VAC/ADMIN/RCVD: CPT | Performed by: UROLOGY

## 2018-09-27 PROCEDURE — 1036F TOBACCO NON-USER: CPT | Performed by: UROLOGY

## 2018-09-27 PROCEDURE — 1101F PT FALLS ASSESS-DOCD LE1/YR: CPT | Performed by: UROLOGY

## 2018-09-27 PROCEDURE — 99213 OFFICE O/P EST LOW 20 MIN: CPT | Performed by: UROLOGY

## 2018-09-27 PROCEDURE — 51798 US URINE CAPACITY MEASURE: CPT | Performed by: UROLOGY

## 2018-09-27 PROCEDURE — G8427 DOCREV CUR MEDS BY ELIG CLIN: HCPCS | Performed by: UROLOGY

## 2018-09-27 ASSESSMENT — ENCOUNTER SYMPTOMS
WHEEZING: 0
EYE PAIN: 0
NAUSEA: 0
COLOR CHANGE: 0
EYE REDNESS: 0
ABDOMINAL PAIN: 0
COUGH: 0
SHORTNESS OF BREATH: 0
BACK PAIN: 1
VOMITING: 0

## 2018-09-27 NOTE — PROGRESS NOTES
[facility]  [facility] [place]  [address]  [address]  [address] [facility] [address] [address]  Dept: [address] [facility] Urology Office Note - Established    Patient:  America Callaway  YOB: 1924  Date: 9/27/2018    The patient is a 80 y.o. male who presents today for evaluation of the following problems:   Chief Complaint   Patient presents with    Benign Prostatic Hypertrophy     yearly check        HPI  Here for follow up. He continues Flomax daily, feels he does not empty well, has frequency, nocturia 1-2 times. He has leaks if he wait stoo long. Is here with his son who helps with translation. Summary of old records: N/A    Additional History: N/A    Procedures Today: N/A    Urinalysis today:  No results found for this visit on 09/27/18. Last several PSA's:  Lab Results   Component Value Date    PSA 0.50 02/17/2017     Last total testosterone:  No results found for: TESTOSTERONE    AUA Symptom Score (9/27/2018):   INCOMPLETE EMPTYING: How often have you had the sensation of not emptying your bladder?: About Half the time  FREQUENCY: How often do you have to urinate less than every two hours?: Less than Half the time  INTERMITTENCY: How often have you found you stopped and started again several times when you urinated?: Not at all  URGENCY: How often have you found it difficult to postpone urination?: Less than Half the time  WEAK STREAM: How often have you had a weak urinary stream?: Not at all  STRAINING: How often have you had to strain to start  urination?: Not at all  NOCTURIA: How many times did you typically get up at night to uriniate?: 1 Time  TOTAL I-PSS SCORE[de-identified] 8  How would you feel if you were to spend the rest of your life with your urinary condition?: Mostly Satisfied    Last BUN and creatinine:  Lab Results   Component Value Date    BUN 21 08/17/2018     Lab Results   Component Value Date    CREATININE 1.02 08/17/2018       Additional Lab/Culture results: encounter. Orders Placed:  Orders Placed This Encounter   Procedures   Devante Doan MD    Agree with the ROS entered by the MA.

## 2018-10-15 DIAGNOSIS — N40.1 BENIGN PROSTATIC HYPERPLASIA WITH LOWER URINARY TRACT SYMPTOMS, SYMPTOM DETAILS UNSPECIFIED: ICD-10-CM

## 2018-10-15 RX ORDER — TAMSULOSIN HYDROCHLORIDE 0.4 MG/1
0.4 CAPSULE ORAL DAILY
Qty: 30 CAPSULE | Refills: 5 | Status: SHIPPED | OUTPATIENT
Start: 2018-10-15 | End: 2019-04-23 | Stop reason: SDUPTHER

## 2018-10-15 NOTE — TELEPHONE ENCOUNTER
From: Lamar Todd  Sent: 10/14/2018 6:17 PM EDT  Subject: Medication Renewal Request    Lamar Todd would like a refill of the following medications:     tamsulosin (FLOMAX) 0.4 MG capsule Avi Kaufman MD]    Preferred pharmacy: [facility] [address]

## 2018-10-19 ENCOUNTER — OFFICE VISIT (OUTPATIENT)
Dept: FAMILY MEDICINE CLINIC | Age: 83
End: 2018-10-19
Payer: MEDICARE

## 2018-10-19 VITALS
BODY MASS INDEX: 25.25 KG/M2 | HEIGHT: 70 IN | TEMPERATURE: 97.1 F | DIASTOLIC BLOOD PRESSURE: 59 MMHG | SYSTOLIC BLOOD PRESSURE: 134 MMHG | HEART RATE: 64 BPM | WEIGHT: 176.4 LBS | OXYGEN SATURATION: 99 %

## 2018-10-19 DIAGNOSIS — E03.9 ACQUIRED HYPOTHYROIDISM: ICD-10-CM

## 2018-10-19 DIAGNOSIS — Z23 NEED FOR PROPHYLACTIC VACCINATION AGAINST DIPHTHERIA-TETANUS-PERTUSSIS (DTP): ICD-10-CM

## 2018-10-19 DIAGNOSIS — R10.10 PAIN OF UPPER ABDOMEN: Primary | ICD-10-CM

## 2018-10-19 DIAGNOSIS — Z23 NEED FOR PROPHYLACTIC VACCINATION AND INOCULATION AGAINST INFLUENZA: ICD-10-CM

## 2018-10-19 DIAGNOSIS — R14.0 ABDOMINAL BLOATING: ICD-10-CM

## 2018-10-19 DIAGNOSIS — E11.42 TYPE 2 DIABETES MELLITUS WITH DIABETIC POLYNEUROPATHY, WITHOUT LONG-TERM CURRENT USE OF INSULIN (HCC): ICD-10-CM

## 2018-10-19 DIAGNOSIS — I27.20 PULMONARY HYPERTENSION (HCC): ICD-10-CM

## 2018-10-19 DIAGNOSIS — K29.70 HELICOBACTER PYLORI GASTRITIS: ICD-10-CM

## 2018-10-19 DIAGNOSIS — E55.9 VITAMIN D DEFICIENCY: ICD-10-CM

## 2018-10-19 DIAGNOSIS — E78.5 HYPERLIPIDEMIA WITH TARGET LDL LESS THAN 70: ICD-10-CM

## 2018-10-19 DIAGNOSIS — J30.89 NON-SEASONAL ALLERGIC RHINITIS, UNSPECIFIED TRIGGER: ICD-10-CM

## 2018-10-19 DIAGNOSIS — I10 ESSENTIAL HYPERTENSION: ICD-10-CM

## 2018-10-19 DIAGNOSIS — B96.81 HELICOBACTER PYLORI GASTRITIS: ICD-10-CM

## 2018-10-19 PROCEDURE — G0008 ADMIN INFLUENZA VIRUS VAC: HCPCS | Performed by: FAMILY MEDICINE

## 2018-10-19 PROCEDURE — 1036F TOBACCO NON-USER: CPT | Performed by: FAMILY MEDICINE

## 2018-10-19 PROCEDURE — G8417 CALC BMI ABV UP PARAM F/U: HCPCS | Performed by: FAMILY MEDICINE

## 2018-10-19 PROCEDURE — 90662 IIV NO PRSV INCREASED AG IM: CPT | Performed by: FAMILY MEDICINE

## 2018-10-19 PROCEDURE — 1123F ACP DISCUSS/DSCN MKR DOCD: CPT | Performed by: FAMILY MEDICINE

## 2018-10-19 PROCEDURE — 99214 OFFICE O/P EST MOD 30 MIN: CPT | Performed by: FAMILY MEDICINE

## 2018-10-19 PROCEDURE — G8427 DOCREV CUR MEDS BY ELIG CLIN: HCPCS | Performed by: FAMILY MEDICINE

## 2018-10-19 PROCEDURE — 4040F PNEUMOC VAC/ADMIN/RCVD: CPT | Performed by: FAMILY MEDICINE

## 2018-10-19 PROCEDURE — 1101F PT FALLS ASSESS-DOCD LE1/YR: CPT | Performed by: FAMILY MEDICINE

## 2018-10-19 PROCEDURE — G8482 FLU IMMUNIZE ORDER/ADMIN: HCPCS | Performed by: FAMILY MEDICINE

## 2018-10-19 RX ORDER — BLOOD-GLUCOSE METER
KIT MISCELLANEOUS
Qty: 1 KIT | Refills: 0 | Status: SHIPPED | OUTPATIENT
Start: 2018-10-19 | End: 2019-09-20 | Stop reason: SDUPTHER

## 2018-10-19 RX ORDER — CETIRIZINE HYDROCHLORIDE 5 MG/1
5 TABLET ORAL DAILY
Qty: 90 TABLET | Refills: 3 | Status: SHIPPED | OUTPATIENT
Start: 2018-10-19 | End: 2020-09-15 | Stop reason: ALTCHOICE

## 2018-10-19 RX ORDER — CLARITHROMYCIN 500 MG/1
500 TABLET, COATED ORAL 2 TIMES DAILY
Qty: 20 TABLET | Refills: 0 | Status: SHIPPED | OUTPATIENT
Start: 2018-10-19 | End: 2018-10-29

## 2018-10-19 RX ORDER — METRONIDAZOLE 500 MG/1
500 TABLET ORAL 3 TIMES DAILY
Qty: 30 TABLET | Refills: 0 | Status: SHIPPED | OUTPATIENT
Start: 2018-10-19 | End: 2018-10-29

## 2018-10-19 RX ORDER — GLUCOSAMINE HCL/CHONDROITIN SU 500-400 MG
CAPSULE ORAL
Qty: 100 STRIP | Refills: 3 | Status: SHIPPED | OUTPATIENT
Start: 2018-10-19 | End: 2019-10-07 | Stop reason: SDUPTHER

## 2018-10-19 RX ORDER — ESOMEPRAZOLE MAGNESIUM 40 MG/1
40 CAPSULE, DELAYED RELEASE ORAL
Qty: 30 CAPSULE | Refills: 1 | Status: SHIPPED | OUTPATIENT
Start: 2018-10-19 | End: 2019-11-15

## 2018-10-19 RX ORDER — AMOXICILLIN 500 MG/1
1000 CAPSULE ORAL 2 TIMES DAILY
Qty: 40 CAPSULE | Refills: 0 | Status: SHIPPED | OUTPATIENT
Start: 2018-10-19 | End: 2018-10-29

## 2018-10-19 ASSESSMENT — ENCOUNTER SYMPTOMS
VOMITING: 0
CONSTIPATION: 0
ABDOMINAL PAIN: 1
ABDOMINAL DISTENTION: 1
DIARRHEA: 0
SINUS PRESSURE: 0
BLOOD IN STOOL: 0
SHORTNESS OF BREATH: 1
SINUS PAIN: 0
NAUSEA: 0
COUGH: 0
CHEST TIGHTNESS: 0
WHEEZING: 0
RHINORRHEA: 1

## 2018-10-19 NOTE — PATIENT INSTRUCTIONS
al mes. [facility]. Use filtros de aire de alto rendimiento. No use ventiladores de ventana ni de ático, que Willis Chatterjee. · Si es alérgico a la caspa de los Qaq[place]oq, no deje que las mascotas entren a la casa o, por lo menos, no deje que entren en jacobo habitación. Las alfombras [place] y los muebles tapizados con dimitris pueden albergar mucha caspa de [place]. Kaushal vez tenga que reemplazarlos. · Busque rastros de cucarachas. Use cebos para cucarachas para eliminarlas. Luego limpie nitin toda la casa. · Si es alérgico al moho, no tenga plantas de interior porque puede crecer moho en la liliane. Deshágase de los Brunson, los tapetes y las christie que tengan olor a humedad. Revise que no haya moho en el baño. · Si es alérgico al polen, no salga cuando la concentración de polen sea vikash. · No fume ni deje que otras personas lo sudeep en jacobo hogar. No use chimeneas ni estufas de leña. Evite los vapores de la pintura, los perfumes y otros olores sanchez. ¿Cuándo debe pedir ayuda? Aplíquese calli inyección de epinefrina si:    · Piensa que está teniendo [facility] reacción alérgica grave.    Después de aplicarse calli inyección de epinefrina, llame al 911 incluso si se siente mejor.   Llame al 911 si:    · Tiene síntomas de calli reacción alérgica grave. Estos pueden incluir:  ¨ Zonas abultadas y enrojecidas (ronchas) que aparecen repentinamente por todo el cuerpo. ¨ Hinchazón de la garganta, la boca, los labios o la [place]. ¨ Dificultad para respirar. ¨ Pérdida del conocimiento (desmayo). O podría sentirse muy mareado o de repente sentirse débil, confuso o agitado.     · Le tran aplicado calli inyección de epinefrina, incluso si se siente mejor.    Llame a jacobo médico ahora mismo o busque atención médica inmediata si:    · Tiene síntomas de calli reacción alérgica, tales jayshree:  ¨ Salpullido o ronchas (zonas abultadas y enrojecidas en la piel). ¨ Comezón. ¨ Hinchazón. ¨ Dolor abdominal, náuseas o vómito.

## 2018-10-19 NOTE — PROGRESS NOTES
Chief Complaint   Patient presents with    Diabetes    Hypertension    Hyperlipidemia    Discuss Labs    GI Problem     still occuring     Other     [facility] 75. GAP: pulmonary hypertension       Sarah Ken  here today for follow up on chronic medical problems, go over labs and/or diagnostic studies, and medication refills. Diabetes; Hypertension; Hyperlipidemia; Discuss Labs; GI Problem (still occuring ); and Other ([facility] 75. GAP: pulmonary hypertension)    Patient comes with his son, his first language is not [place]. Patient was born in [place] and work for many years in [place], [place] he understands basic [place]. His son helps him from time to time. GI symptoms   Patient continues to complain of stomach upset and feeling like he has a lot of gas. Patient says his stomach issues are worse after eating. He says he is appetite is good. He drinks Coffee  2-3 times /day  Used to have 5 coffees per day before. Takes Omeprazole and Zantac, wakes up at 3 am for Tums. The son says symptoms got better after antibiotic treatment for H. pylori gastritis one year ago. Patient's son says he eats 3 large meals in 8 hours and then he sleeps a lot, about 12 hours a day. Patient denies nausea, vomiting. He reports sometimes food comes into his mouth at nighttime. He denies blood in the stool. He reports abdominal pain intensity is 6 out of 10 today. Prior EGD report reviewed with patient and his son.   He had EGD on 8/29/17    \"Esophagus: abnormal: MILD DISTAL ESOPHAGITIS WAS BIOPSIED     Stomach:    Fundus: normal    Body: normal    Antrum: abnormal: MILD GASTRITIS BIOPSIES WERE TAKEN FOR H PYLORI     Duodenum:     Descending: normal    Bulb: normal    Final Diagnosis   SPECIMEN \"A\":  STOMACH, ANTRUM, BIOPSY:         HELICOBACTER GASTRITIS     SPECIMEN \"B\":  GE JUNCTION, BIOPSY:         SQUAMOUS EPITHELIUM DEMONSTRATING NO SIGNIFICANT HISTOPATHOLOGIC   FEATURES \"    I repeated H. pylori recently and it was normal    Hyperglycemia  Anemia  hyperlipidemia controlled  Hypothyroidism controlled    Hospital Outpatient Visit on 08/17/2018   Component Date Value Ref Range Status    WBC 08/17/2018 4.8  3.5 - 11.0 k/uL Final    RBC 08/17/2018 3.56* 4.5 - 5.9 m/uL Final    Hemoglobin 08/17/2018 11.4* 13.5 - 17.5 g/dL Final    Hematocrit 08/17/2018 34.4* 41 - 53 % Final    MCV 08/17/2018 96.7  80 - 100 fL Final    MCH 08/17/2018 32.0  26 - 34 pg Final    MCHC 08/17/2018 33.1  31 - 37 g/dL Final    RDW 08/17/2018 14.2  11.5 - 14.9 % Final    Platelets 34/41/7218 161  150 - 450 k/uL Final    MPV 08/17/2018 8.9  6.0 - 12.0 fL Final    NRBC Automated 08/17/2018 NOT REPORTED  per 100 WBC Final    Glucose 08/17/2018 161* 70 - 99 mg/dL Final    BUN 08/17/2018 21  8 - 23 mg/dL Final    CREATININE 08/17/2018 1.02  0.70 - 1.20 mg/dL Final    Bun/Cre Ratio 08/17/2018 NOT REPORTED  9 - 20 Final    Calcium 08/17/2018 9.4  8.6 - 10.4 mg/dL Final    Sodium 08/17/2018 142  135 - 144 mmol/L Final    Potassium 08/17/2018 5.0  3.7 - 5.3 mmol/L Final    Chloride 08/17/2018 103  98 - 107 mmol/L Final    CO2 08/17/2018 27  20 - 31 mmol/L Final    Anion Gap 08/17/2018 12  9 - 17 mmol/L Final    GFR Non- 08/17/2018 >60  >60 mL/min Final    GFR  08/17/2018 >60  >60 mL/min Final    GFR Comment 08/17/2018        Final    Comment: Average GFR for 79or more years old:   76 mL/min/1.73sq m  Chronic Kidney Disease:   <60 mL/min/1.73sq m  Kidney failure:   <15 mL/min/1.73sq m              eGFR calculated using average adult body mass.  Additional eGFR calculator   available at:        [employer]            GFR Staging 08/17/2018 NOT REPORTED   Final    Vitamin B-12 08/17/2018 944  232 - 1245 pg/mL Final    Folate 08/17/2018 13.4  >4.8 ng/mL Final          Lab Results   Component Value Date    ALT 16 07/12/2018    AST 19 07/12/2018    ALKPHOS 59 07/12/2018    BILITOT for 10 days    esomeprazole (NEXIUM) 40 MG delayed release capsule 30 capsule 1     Sig: Take 1 capsule by mouth every morning (before breakfast) Stop omeprazole    cetirizine (ZYRTEC) 5 MG tablet 90 tablet 3     Sig: Take 1 tablet by mouth daily Stop claritin    blood glucose monitor strips 100 strip 3     Sig: Testing once a day, fasting in the morning    glucose monitoring kit (FREESTYLE) monitoring kit 1 kit 0     Sig: Please supply Patient with a glucose monitoring kit that insurance will cover. All patient questions answered. Patient voiced understanding. Quality Measures    Body mass index is 25.31 kg/m². Normal. Weight control planned discussed Healthy diet and regular exercise. BP: (!) 134/59. Blood pressure is normal. Treatment plan consists of DASH Eating Plan, Dietary Sodium Restriction, Increased Physical Activity, Patient In-home Blood Pressure Monitoring and No treatment change needed. Fall Risk 1/19/2018 1/5/2017   2 or more falls in past year? no no   Fall with injury in past year? no no     The patient does not have a history of falls. I did , complete a risk assessment for falls.  A plan of care for falls in-office gait and balance testing performed using The Timed Up and Go Test was positive for increased falls risk, home safety tips provided, patient declines any further evaluation/treatment for increased falls risk    Lab Results   Component Value Date    LDLCHOLESTEROL 76 05/19/2017    (goal LDL reduction with dx if diabetes is 50% LDL reduction)    PHQ Scores 7/20/2018 1/19/2018 1/5/2017   PHQ2 Score 0 0 1   PHQ9 Score 0 0 1     Interpretation of Total Score Depression Severity: 1-4 = Minimal depression, 5-9 = Mild depression, 10-14 = Moderate depression, 15-19 = Moderately severe depression, 20-27 = Severe depression        The patient's past medical,surgical, social, and family history as well as his   current medications and allergies were reviewed as documented in

## 2018-10-22 ENCOUNTER — OFFICE VISIT (OUTPATIENT)
Dept: PODIATRY | Age: 83
End: 2018-10-22
Payer: MEDICARE

## 2018-10-22 VITALS — WEIGHT: 171 LBS | BODY MASS INDEX: 24.54 KG/M2

## 2018-10-22 DIAGNOSIS — B35.1 ONYCHOMYCOSIS: Primary | ICD-10-CM

## 2018-10-22 DIAGNOSIS — E11.42 TYPE 2 DIABETES MELLITUS WITH DIABETIC POLYNEUROPATHY, WITHOUT LONG-TERM CURRENT USE OF INSULIN (HCC): ICD-10-CM

## 2018-10-22 PROCEDURE — 11721 DEBRIDE NAIL 6 OR MORE: CPT | Performed by: PODIATRIST

## 2018-10-22 PROCEDURE — 99999 PR OFFICE/OUTPT VISIT,PROCEDURE ONLY: CPT | Performed by: PODIATRIST

## 2018-10-22 ASSESSMENT — ENCOUNTER SYMPTOMS
CONSTIPATION: 0
VOMITING: 0
DIARRHEA: 0
COLOR CHANGE: 0
NAUSEA: 0

## 2018-10-22 NOTE — PROGRESS NOTES
LANCET DEV) KIT Please dispense according to patients insurance. 1 kit 0    Alcohol Swabs PADS Please dispense according to patients insurance/device. Testing 1-2 /day 100 each 2     No current facility-administered medications on file prior to visit. Review of Systems   Constitutional: Negative for chills, diaphoresis, fatigue, fever and unexpected weight change. Cardiovascular: Negative for leg swelling. Gastrointestinal: Negative for constipation, diarrhea, nausea and vomiting. Musculoskeletal: Negative for arthralgias, gait problem and joint swelling. Skin: Negative for color change, pallor, rash and wound. Neurological: Negative for weakness and numbness. Objective:  General: AAO x 3 in NAD. Derm  Toenail Description  Sites of Onychomycosis Involvement (Check affected area)  [x] [x] [x] [x] [x] [x] [x] [x] [x] [x]  5 4 3 2 1 1 2 3 4 5                          Right                                        Left    Thickness  [x] [x] [x] [x] [x] [x] [x] [x] [x] [x]  5 4 3 2 1 1 2 3 4 5                         Right                                        Left    Dystrophic Changes   [x] [x] [x] [x] [x] [x] [x] [x] [x] [x]  5 4 3 2 1 1 2 3 4 5                         Right                                        Left    Color   [x] [x] [x] [x] [x] [x] [x] [x] [x] [x]  5 4 3 2 1 1 2 3 4 5                          Right                                        Left    Incurvation/Ingrowin   [] [] [] [] [] [] [] [] [] []  5 4 3 2 1 1 2 3 4 5                         Right                                        Left    Inflammation/Pain   [] [] [] [] [] [] [] [] [] []  5 4 3 2 1 1 2 3 4 5                         Right                                        Left       Skin lesion/ulceration Absent . Skin No rashes or nodules noted. .      Vascular:  DP/PT pulses palpable 2/4, Bilateral.    CFT <3 seconds to digits 1-5, Bilateral .   Hair growth absent to level of digits, Bilateral.  Edema absent, Bilateral.  Erythema absent, Bilateral.     DM with PVD       []Yes    [x]No    Neurological:  Sensation absent to light touch to level of digits, Bilateral.  Protective sensation absent via 5.07/10g [place] monofilament 3/10 sites, Bilateral.  Vibratory sensation absent to 1st MPJ, Bilateral.     Bilateral forefoot deformities present. Assessment:  80 y.o. male with:  1. Onychomycosis    2. Type 2 diabetes mellitus with diabetic polyneuropathy, without long-term current use of insulin ([facility] [address]       Orders Placed This Encounter   Procedures    HM DIABETES FOOT EXAM    NM DEBRIDEMENT OF NAILS, 6 OR MORE           Q7   []Yes    []No                Q8   [x]Yes    []No                     Q9   []Yes    []No    Plan:   Pt was evaluated and examined. Patient was given personalized discharge instructions. Nails 1-10 were debrided sharply in length and thickness with a nipper and , without incident. Pt will follow up in 3 years or sooner if any problems arise. Diagnosis was discussed with the pt and all of their questions were answered in detail. Proper foot hygiene and care was discussed with the pt. Informed patient on proper diabetic foot care and importance of tight glycemic control. Patient to check feet daily and contact the office with any questions/problems/concerns. Other comorbidity noted and will be managed by PCP. Diabetic foot examination performed this visit. The exam included neurological sensory exam, a 10-g monofilament and pinprick sensation, vibration using a 128-Hz tuning fork, ankle reflexes, visual skin inspection, vascular exam including assessment of pedal pulses, orthopedic exam for deformities, and shoe inspection. Increased risk factors noted on the diabetic foot exam include decreased sensory exam and peripheral neuropathy. Shoegear inspected and found to be appropriate size and wear.     10/22/2018    Electronically signed by Gay Braden DPM on 10/22/2018 at

## 2018-10-25 ENCOUNTER — HOSPITAL ENCOUNTER (OUTPATIENT)
Age: 83
Discharge: HOME | End: 2018-10-25
Payer: MEDICARE

## 2018-10-25 DIAGNOSIS — I10 ESSENTIAL HYPERTENSION: ICD-10-CM

## 2018-10-25 DIAGNOSIS — E11.42 TYPE 2 DIABETES MELLITUS WITH DIABETIC POLYNEUROPATHY, WITHOUT LONG-TERM CURRENT USE OF INSULIN (HCC): ICD-10-CM

## 2018-10-25 DIAGNOSIS — E78.5 HYPERLIPIDEMIA WITH TARGET LDL LESS THAN 70: ICD-10-CM

## 2018-10-25 DIAGNOSIS — E55.9 VITAMIN D DEFICIENCY: ICD-10-CM

## 2018-10-25 LAB
ANION GAP SERPL CALCULATED.3IONS-SCNC: 10 MMOL/L (ref 9–17)
BUN BLDV-MCNC: 22 MG/DL (ref 8–23)
BUN/CREAT BLD: ABNORMAL (ref 9–20)
CALCIUM SERPL-MCNC: 9.5 MG/DL (ref 8.6–10.4)
CHLORIDE BLD-SCNC: 107 MMOL/L (ref 98–107)
CHOLESTEROL/HDL RATIO: 2.4
CHOLESTEROL: 123 MG/DL
CO2: 27 MMOL/L (ref 20–31)
CREAT SERPL-MCNC: 1.03 MG/DL (ref 0.7–1.2)
GFR AFRICAN AMERICAN: >60 ML/MIN
GFR NON-AFRICAN AMERICAN: >60 ML/MIN
GFR SERPL CREATININE-BSD FRML MDRD: ABNORMAL ML/MIN/{1.73_M2}
GFR SERPL CREATININE-BSD FRML MDRD: ABNORMAL ML/MIN/{1.73_M2}
GLUCOSE BLD-MCNC: 155 MG/DL (ref 70–99)
HCT VFR BLD CALC: 33.7 % (ref 41–53)
HDLC SERPL-MCNC: 51 MG/DL
HEMOGLOBIN: 10.8 G/DL (ref 13.5–17.5)
LDL CHOLESTEROL: 60 MG/DL (ref 0–130)
MCH RBC QN AUTO: 31.4 PG (ref 26–34)
MCHC RBC AUTO-ENTMCNC: 32.2 G/DL (ref 31–37)
MCV RBC AUTO: 97.5 FL (ref 80–100)
NRBC AUTOMATED: ABNORMAL PER 100 WBC
PDW BLD-RTO: 14.4 % (ref 11.5–14.9)
PLATELET # BLD: 163 K/UL (ref 150–450)
PMV BLD AUTO: 9.7 FL (ref 6–12)
POTASSIUM SERPL-SCNC: 4.7 MMOL/L (ref 3.7–5.3)
RBC # BLD: 3.45 M/UL (ref 4.5–5.9)
SODIUM BLD-SCNC: 144 MMOL/L (ref 135–144)
TRIGL SERPL-MCNC: 59 MG/DL
VITAMIN D 25-HYDROXY: 33.2 NG/ML (ref 30–100)
VLDLC SERPL CALC-MCNC: NORMAL MG/DL (ref 1–30)
WBC # BLD: 4.5 K/UL (ref 3.5–11)

## 2018-10-25 PROCEDURE — 83036 HEMOGLOBIN GLYCOSYLATED A1C: CPT

## 2018-10-25 PROCEDURE — 82306 VITAMIN D 25 HYDROXY: CPT

## 2018-10-25 PROCEDURE — 80061 LIPID PANEL: CPT

## 2018-10-25 PROCEDURE — 80048 BASIC METABOLIC PNL TOTAL CA: CPT

## 2018-10-25 PROCEDURE — 36415 COLL VENOUS BLD VENIPUNCTURE: CPT

## 2018-10-25 PROCEDURE — 85027 COMPLETE CBC AUTOMATED: CPT

## 2018-10-26 LAB
ESTIMATED AVERAGE GLUCOSE: 163 MG/DL
HBA1C MFR BLD: 7.3 % (ref 4–6)

## 2018-11-13 DIAGNOSIS — E11.42 TYPE 2 DIABETES MELLITUS WITH DIABETIC POLYNEUROPATHY, WITHOUT LONG-TERM CURRENT USE OF INSULIN (HCC): ICD-10-CM

## 2018-11-13 RX ORDER — BLOOD SUGAR DIAGNOSTIC
STRIP MISCELLANEOUS
Qty: 100 STRIP | Refills: 0 | Status: SHIPPED | OUTPATIENT
Start: 2018-11-13 | End: 2019-10-07 | Stop reason: SDUPTHER

## 2018-11-13 NOTE — TELEPHONE ENCOUNTER
Please Approve or Refuse.   Send to Pharmacy per Pt's Request:      Next Visit Date:  1/23/2019   Last Visit Date: 10/19/2018    Hemoglobin A1C (%)   Date Value   10/25/2018 7.3 (H)   07/20/2018 6.5   04/12/2018 7.7 (H)             ( goal A1C is < 7)   BP Readings from Last 3 Encounters:   10/19/18 (!) 134/59   09/27/18 (!) 138/56   07/27/18 (!) 138/55          (goal 120/80)  BUN   Date Value Ref Range Status   10/25/2018 22 8 - 23 mg/dL Final     CREATININE   Date Value Ref Range Status   10/25/2018 1.03 0.70 - 1.20 mg/dL Final     Potassium   Date Value Ref Range Status   10/25/2018 4.7 3.7 - 5.3 mmol/L Final

## 2018-11-17 DIAGNOSIS — E11.42 TYPE 2 DIABETES MELLITUS WITH DIABETIC POLYNEUROPATHY, WITHOUT LONG-TERM CURRENT USE OF INSULIN (HCC): ICD-10-CM

## 2018-11-19 RX ORDER — METFORMIN HYDROCHLORIDE 500 MG/1
TABLET, EXTENDED RELEASE ORAL
Qty: 180 TABLET | Refills: 3 | Status: SHIPPED | OUTPATIENT
Start: 2018-11-19 | End: 2019-11-21 | Stop reason: SDUPTHER

## 2018-11-20 DIAGNOSIS — I10 ESSENTIAL HYPERTENSION: ICD-10-CM

## 2018-11-20 RX ORDER — ENALAPRIL MALEATE 20 MG/1
TABLET ORAL
Qty: 90 TABLET | Refills: 3 | Status: SHIPPED | OUTPATIENT
Start: 2018-11-20 | End: 2019-11-14 | Stop reason: SDUPTHER

## 2018-12-10 ENCOUNTER — OFFICE VISIT (OUTPATIENT)
Dept: ONCOLOGY | Age: 83
End: 2018-12-10
Payer: MEDICARE

## 2018-12-10 ENCOUNTER — HOSPITAL ENCOUNTER (OUTPATIENT)
Age: 83
Discharge: HOME | End: 2018-12-10
Payer: MEDICARE

## 2018-12-10 VITALS
BODY MASS INDEX: 25.13 KG/M2 | TEMPERATURE: 97.5 F | WEIGHT: 175.13 LBS | DIASTOLIC BLOOD PRESSURE: 53 MMHG | SYSTOLIC BLOOD PRESSURE: 142 MMHG | HEART RATE: 85 BPM

## 2018-12-10 DIAGNOSIS — D64.9 ANEMIA, UNSPECIFIED TYPE: ICD-10-CM

## 2018-12-10 DIAGNOSIS — D50.9 IRON DEFICIENCY ANEMIA, UNSPECIFIED IRON DEFICIENCY ANEMIA TYPE: Primary | ICD-10-CM

## 2018-12-10 DIAGNOSIS — K21.9 GASTROESOPHAGEAL REFLUX DISEASE WITHOUT ESOPHAGITIS: ICD-10-CM

## 2018-12-10 DIAGNOSIS — K90.89 OTHER SPECIFIED INTESTINAL MALABSORPTION: ICD-10-CM

## 2018-12-10 LAB
ABSOLUTE EOS #: 0.1 K/UL (ref 0–0.4)
ABSOLUTE IMMATURE GRANULOCYTE: ABNORMAL K/UL (ref 0–0.3)
ABSOLUTE LYMPH #: 1.8 K/UL (ref 1–4.8)
ABSOLUTE MONO #: 0.5 K/UL (ref 0.1–1.2)
BASOPHILS # BLD: 0 % (ref 0–2)
BASOPHILS ABSOLUTE: 0 K/UL (ref 0–0.2)
DIFFERENTIAL TYPE: ABNORMAL
EOSINOPHILS RELATIVE PERCENT: 1 % (ref 1–4)
HCT VFR BLD CALC: 34.4 % (ref 41–53)
HEMOGLOBIN: 11.3 G/DL (ref 13.5–17.5)
IMMATURE GRANULOCYTES: ABNORMAL %
LYMPHOCYTES # BLD: 30 % (ref 24–44)
MCH RBC QN AUTO: 32.1 PG (ref 26–34)
MCHC RBC AUTO-ENTMCNC: 32.9 G/DL (ref 31–37)
MCV RBC AUTO: 97.3 FL (ref 80–100)
MONOCYTES # BLD: 8 % (ref 2–11)
NRBC AUTOMATED: ABNORMAL PER 100 WBC
PDW BLD-RTO: 14.1 % (ref 12.5–15.4)
PLATELET # BLD: 187 K/UL (ref 140–450)
PLATELET ESTIMATE: ABNORMAL
PMV BLD AUTO: 9.1 FL (ref 6–12)
RBC # BLD: 3.53 M/UL (ref 4.5–5.9)
RBC # BLD: ABNORMAL 10*6/UL
SEG NEUTROPHILS: 61 % (ref 36–66)
SEGMENTED NEUTROPHILS ABSOLUTE COUNT: 3.7 K/UL (ref 1.8–7.7)
WBC # BLD: 6.2 K/UL (ref 3.5–11)
WBC # BLD: ABNORMAL 10*3/UL

## 2018-12-10 PROCEDURE — 1101F PT FALLS ASSESS-DOCD LE1/YR: CPT | Performed by: INTERNAL MEDICINE

## 2018-12-10 PROCEDURE — 1123F ACP DISCUSS/DSCN MKR DOCD: CPT | Performed by: INTERNAL MEDICINE

## 2018-12-10 PROCEDURE — 1036F TOBACCO NON-USER: CPT | Performed by: INTERNAL MEDICINE

## 2018-12-10 PROCEDURE — G8427 DOCREV CUR MEDS BY ELIG CLIN: HCPCS | Performed by: INTERNAL MEDICINE

## 2018-12-10 PROCEDURE — 4040F PNEUMOC VAC/ADMIN/RCVD: CPT | Performed by: INTERNAL MEDICINE

## 2018-12-10 PROCEDURE — 99214 OFFICE O/P EST MOD 30 MIN: CPT | Performed by: INTERNAL MEDICINE

## 2018-12-10 PROCEDURE — G8417 CALC BMI ABV UP PARAM F/U: HCPCS | Performed by: INTERNAL MEDICINE

## 2018-12-10 PROCEDURE — 36415 COLL VENOUS BLD VENIPUNCTURE: CPT

## 2018-12-10 PROCEDURE — G8482 FLU IMMUNIZE ORDER/ADMIN: HCPCS | Performed by: INTERNAL MEDICINE

## 2018-12-10 PROCEDURE — 85025 COMPLETE CBC W/AUTO DIFF WBC: CPT

## 2018-12-10 PROCEDURE — 99211 OFF/OP EST MAY X REQ PHY/QHP: CPT | Performed by: INTERNAL MEDICINE

## 2018-12-10 NOTE — PROGRESS NOTES
inguinal lymph node enlargement. Neurologic:  Conscious and oriented. No focal neurological deficits. Psychosocial: No depression, anxiety or stress. Skin: No rashes, bruises or ecchymoses. Review of Diagnostic data:   Lab Results   Component Value Date    WBC 6.2 12/10/2018    HGB 11.3 (L) 12/10/2018    HCT 34.4 (L) 12/10/2018    MCV 97.3 12/10/2018     12/10/2018       Chemistry        Component Value Date/Time     10/25/2018 1421    K 4.7 10/25/2018 1421     10/25/2018 1421    CO2 27 10/25/2018 1421    BUN 22 10/25/2018 1421    CREATININE 1.03 10/25/2018 1421        Component Value Date/Time    CALCIUM 9.5 10/25/2018 1421    ALKPHOS 59 07/12/2018 1028    AST 19 07/12/2018 1028    ALT 16 07/12/2018 1028    BILITOT 0.38 07/12/2018 1028        Lab Results   Component Value Date    IRON 73 07/17/2017    TIBC 212 (L) 07/17/2017    FERRITIN 434 (H) 07/17/2017     Vitamin B12 and folate are normal.    IMPRESSION:   Normocytic anemia with evidence of iron deficiency. Intolerable side effects to oral iron. Anemia multifactorial, Anemia of chronic renal insufficiency, iron deficiency and possible bone marrow disease. Gastritis with positive H. pylori status post treatment    PLAN:   Obviously patient does not tolerate PO IRON or VITAMIN C. He was treated with IV iron infusion. His hemoglobin and iron studies are stable. Hemoglobin is improving. We will continue to monitor. We'll consider treatment again if we see significant drop of hemoglobin level. In addition he has evidence of chronic renal insufficiency. And considering his age likely could be having element of MDS. However the present time he has minimal symptoms and I do not see a need to do bone marrow test.  Continue monitoring would be appropriate. Patient was treated recently for H. pylori infection Last year   He is on ranitidine and omeprazole to be used for GERD and gastritis symptoms.   Recommended follow-up with his

## 2019-01-23 ENCOUNTER — HOSPITAL ENCOUNTER (OUTPATIENT)
Age: 84
Discharge: HOME | End: 2019-01-23
Payer: MEDICARE

## 2019-01-23 ENCOUNTER — OFFICE VISIT (OUTPATIENT)
Dept: FAMILY MEDICINE CLINIC | Age: 84
End: 2019-01-23
Payer: MEDICARE

## 2019-01-23 VITALS
DIASTOLIC BLOOD PRESSURE: 66 MMHG | BODY MASS INDEX: 24.88 KG/M2 | SYSTOLIC BLOOD PRESSURE: 132 MMHG | WEIGHT: 173.8 LBS | OXYGEN SATURATION: 99 % | HEART RATE: 86 BPM | HEIGHT: 70 IN

## 2019-01-23 DIAGNOSIS — D64.9 ANEMIA, UNSPECIFIED TYPE: ICD-10-CM

## 2019-01-23 DIAGNOSIS — R10.10 UPPER ABDOMINAL PAIN: ICD-10-CM

## 2019-01-23 DIAGNOSIS — K21.00 GASTROESOPHAGEAL REFLUX DISEASE WITH ESOPHAGITIS: Primary | ICD-10-CM

## 2019-01-23 DIAGNOSIS — E11.42 TYPE 2 DIABETES MELLITUS WITH DIABETIC POLYNEUROPATHY, WITHOUT LONG-TERM CURRENT USE OF INSULIN (HCC): ICD-10-CM

## 2019-01-23 DIAGNOSIS — E78.5 HYPERLIPIDEMIA WITH TARGET LDL LESS THAN 70: ICD-10-CM

## 2019-01-23 DIAGNOSIS — I10 ESSENTIAL HYPERTENSION: ICD-10-CM

## 2019-01-23 DIAGNOSIS — E03.9 ACQUIRED HYPOTHYROIDISM: ICD-10-CM

## 2019-01-23 DIAGNOSIS — R63.4 UNINTENDED WEIGHT LOSS: ICD-10-CM

## 2019-01-23 DIAGNOSIS — E11.21 DIABETIC NEPHROPATHY ASSOCIATED WITH TYPE 2 DIABETES MELLITUS (HCC): ICD-10-CM

## 2019-01-23 DIAGNOSIS — I27.20 PULMONARY HYPERTENSION (HCC): ICD-10-CM

## 2019-01-23 LAB
ABSOLUTE EOS #: 0.1 K/UL (ref 0–0.4)
ABSOLUTE IMMATURE GRANULOCYTE: ABNORMAL K/UL (ref 0–0.3)
ABSOLUTE LYMPH #: 1.3 K/UL (ref 1–4.8)
ABSOLUTE MONO #: 0.5 K/UL (ref 0.1–1.3)
ALBUMIN SERPL-MCNC: 4.3 G/DL (ref 3.5–5.2)
ALBUMIN/GLOBULIN RATIO: ABNORMAL (ref 1–2.5)
ALP BLD-CCNC: 63 U/L (ref 40–129)
ALT SERPL-CCNC: 16 U/L (ref 5–41)
ANION GAP SERPL CALCULATED.3IONS-SCNC: 11 MMOL/L (ref 9–17)
AST SERPL-CCNC: 17 U/L
BASOPHILS # BLD: 0 % (ref 0–2)
BASOPHILS ABSOLUTE: 0 K/UL (ref 0–0.2)
BILIRUB SERPL-MCNC: 0.5 MG/DL (ref 0.3–1.2)
BUN BLDV-MCNC: 23 MG/DL (ref 8–23)
BUN/CREAT BLD: ABNORMAL (ref 9–20)
CALCIUM SERPL-MCNC: 9.8 MG/DL (ref 8.6–10.4)
CHLORIDE BLD-SCNC: 99 MMOL/L (ref 98–107)
CO2: 28 MMOL/L (ref 20–31)
CREAT SERPL-MCNC: 1.03 MG/DL (ref 0.7–1.2)
DIFFERENTIAL TYPE: ABNORMAL
EOSINOPHILS RELATIVE PERCENT: 1 % (ref 0–4)
GFR AFRICAN AMERICAN: >60 ML/MIN
GFR NON-AFRICAN AMERICAN: >60 ML/MIN
GFR SERPL CREATININE-BSD FRML MDRD: ABNORMAL ML/MIN/{1.73_M2}
GFR SERPL CREATININE-BSD FRML MDRD: ABNORMAL ML/MIN/{1.73_M2}
GLUCOSE BLD-MCNC: 223 MG/DL (ref 70–99)
HBA1C MFR BLD: 6.7 %
HCT VFR BLD CALC: 34 % (ref 41–53)
HEMOGLOBIN: 11.1 G/DL (ref 13.5–17.5)
IMMATURE GRANULOCYTES: ABNORMAL %
LYMPHOCYTES # BLD: 23 % (ref 24–44)
MCH RBC QN AUTO: 31.9 PG (ref 26–34)
MCHC RBC AUTO-ENTMCNC: 32.5 G/DL (ref 31–37)
MCV RBC AUTO: 98 FL (ref 80–100)
MONOCYTES # BLD: 9 % (ref 1–7)
NRBC AUTOMATED: ABNORMAL PER 100 WBC
PDW BLD-RTO: 14.1 % (ref 11.5–14.9)
PLATELET # BLD: 151 K/UL (ref 150–450)
PLATELET ESTIMATE: ABNORMAL
PMV BLD AUTO: 9.6 FL (ref 6–12)
POTASSIUM SERPL-SCNC: 4.7 MMOL/L (ref 3.7–5.3)
RBC # BLD: 3.47 M/UL (ref 4.5–5.9)
RBC # BLD: ABNORMAL 10*6/UL
SEG NEUTROPHILS: 67 % (ref 36–66)
SEGMENTED NEUTROPHILS ABSOLUTE COUNT: 3.9 K/UL (ref 1.3–9.1)
SODIUM BLD-SCNC: 138 MMOL/L (ref 135–144)
TOTAL PROTEIN: 7.2 G/DL (ref 6.4–8.3)
WBC # BLD: 5.8 K/UL (ref 3.5–11)
WBC # BLD: ABNORMAL 10*3/UL

## 2019-01-23 PROCEDURE — 36415 COLL VENOUS BLD VENIPUNCTURE: CPT

## 2019-01-23 PROCEDURE — 1123F ACP DISCUSS/DSCN MKR DOCD: CPT | Performed by: FAMILY MEDICINE

## 2019-01-23 PROCEDURE — G8427 DOCREV CUR MEDS BY ELIG CLIN: HCPCS | Performed by: FAMILY MEDICINE

## 2019-01-23 PROCEDURE — 4040F PNEUMOC VAC/ADMIN/RCVD: CPT | Performed by: FAMILY MEDICINE

## 2019-01-23 PROCEDURE — 83036 HEMOGLOBIN GLYCOSYLATED A1C: CPT | Performed by: FAMILY MEDICINE

## 2019-01-23 PROCEDURE — 85025 COMPLETE CBC W/AUTO DIFF WBC: CPT

## 2019-01-23 PROCEDURE — G8482 FLU IMMUNIZE ORDER/ADMIN: HCPCS | Performed by: FAMILY MEDICINE

## 2019-01-23 PROCEDURE — 99215 OFFICE O/P EST HI 40 MIN: CPT | Performed by: FAMILY MEDICINE

## 2019-01-23 PROCEDURE — G8420 CALC BMI NORM PARAMETERS: HCPCS | Performed by: FAMILY MEDICINE

## 2019-01-23 PROCEDURE — 1036F TOBACCO NON-USER: CPT | Performed by: FAMILY MEDICINE

## 2019-01-23 PROCEDURE — 80053 COMPREHEN METABOLIC PANEL: CPT

## 2019-01-23 PROCEDURE — 1101F PT FALLS ASSESS-DOCD LE1/YR: CPT | Performed by: FAMILY MEDICINE

## 2019-01-23 RX ORDER — RANITIDINE 150 MG/1
150 TABLET ORAL 2 TIMES DAILY
Qty: 180 TABLET | Refills: 0 | Status: SHIPPED | OUTPATIENT
Start: 2019-01-23 | End: 2019-11-15 | Stop reason: ALTCHOICE

## 2019-01-23 ASSESSMENT — ENCOUNTER SYMPTOMS
DIARRHEA: 0
SHORTNESS OF BREATH: 0
RHINORRHEA: 1
COUGH: 0
NAUSEA: 1
ABDOMINAL DISTENTION: 0
VOMITING: 0
CONSTIPATION: 0
WHEEZING: 0
CHEST TIGHTNESS: 0
ABDOMINAL PAIN: 1
BLOOD IN STOOL: 0

## 2019-02-15 DIAGNOSIS — I10 ESSENTIAL HYPERTENSION: ICD-10-CM

## 2019-02-15 RX ORDER — AMLODIPINE BESYLATE 5 MG/1
TABLET ORAL
Qty: 90 TABLET | Refills: 3 | Status: SHIPPED | OUTPATIENT
Start: 2019-02-15 | End: 2020-02-13

## 2019-03-24 DIAGNOSIS — E78.5 HYPERLIPIDEMIA WITH TARGET LDL LESS THAN 70: ICD-10-CM

## 2019-03-25 RX ORDER — ATORVASTATIN CALCIUM 20 MG/1
TABLET, FILM COATED ORAL
Qty: 90 TABLET | Refills: 3 | Status: SHIPPED | OUTPATIENT
Start: 2019-03-25 | End: 2020-04-16

## 2019-04-09 ENCOUNTER — HOSPITAL ENCOUNTER (OUTPATIENT)
Age: 84
Setting detail: SPECIMEN
Discharge: HOME | End: 2019-04-09
Payer: MEDICARE

## 2019-04-09 DIAGNOSIS — R10.13 DYSPEPSIA: ICD-10-CM

## 2019-04-09 DIAGNOSIS — Z86.19 HISTORY OF HELICOBACTER PYLORI INFECTION: ICD-10-CM

## 2019-04-09 PROCEDURE — 87338 HPYLORI STOOL AG IA: CPT

## 2019-04-10 LAB
DIRECT EXAM: NEGATIVE
Lab: NORMAL
SPECIMEN DESCRIPTION: NORMAL

## 2019-04-23 DIAGNOSIS — N40.1 BENIGN PROSTATIC HYPERPLASIA WITH LOWER URINARY TRACT SYMPTOMS, SYMPTOM DETAILS UNSPECIFIED: ICD-10-CM

## 2019-04-23 DIAGNOSIS — E55.9 VITAMIN D DEFICIENCY: ICD-10-CM

## 2019-04-24 RX ORDER — CHOLECALCIFEROL (VITAMIN D3) 125 MCG
CAPSULE ORAL
Qty: 90 TABLET | Refills: 3 | Status: SHIPPED | OUTPATIENT
Start: 2019-04-24 | End: 2020-02-26 | Stop reason: ALTCHOICE

## 2019-04-24 RX ORDER — TAMSULOSIN HYDROCHLORIDE 0.4 MG/1
CAPSULE ORAL
Qty: 90 CAPSULE | Refills: 3 | Status: SHIPPED | OUTPATIENT
Start: 2019-04-24 | End: 2019-11-15 | Stop reason: SDUPTHER

## 2019-04-24 NOTE — TELEPHONE ENCOUNTER
Please Approve or Refuse.   Send to Pharmacy per Pt's Request:      Next Visit Date:  4/26/2019   Last Visit Date: 1/23/2019    Hemoglobin A1C (%)   Date Value   01/23/2019 6.7   10/25/2018 7.3 (H)   07/20/2018 6.5             ( goal A1C is < 7)   BP Readings from Last 3 Encounters:   01/23/19 132/66   12/10/18 (!) 142/53   10/19/18 (!) 134/59          (goal 120/80)  BUN   Date Value Ref Range Status   01/23/2019 23 8 - 23 mg/dL Final     CREATININE   Date Value Ref Range Status   01/23/2019 1.03 0.70 - 1.20 mg/dL Final     Potassium   Date Value Ref Range Status   01/23/2019 4.7 3.7 - 5.3 mmol/L Final

## 2019-04-26 ENCOUNTER — OFFICE VISIT (OUTPATIENT)
Dept: FAMILY MEDICINE CLINIC | Age: 84
End: 2019-04-26
Payer: MEDICARE

## 2019-04-26 VITALS
OXYGEN SATURATION: 99 % | SYSTOLIC BLOOD PRESSURE: 144 MMHG | WEIGHT: 173.6 LBS | HEART RATE: 79 BPM | DIASTOLIC BLOOD PRESSURE: 66 MMHG | BODY MASS INDEX: 24.85 KG/M2 | HEIGHT: 70 IN

## 2019-04-26 DIAGNOSIS — Z23 NEED FOR PROPHYLACTIC VACCINATION AGAINST DIPHTHERIA-TETANUS-PERTUSSIS (DTP): ICD-10-CM

## 2019-04-26 DIAGNOSIS — I10 ESSENTIAL HYPERTENSION: ICD-10-CM

## 2019-04-26 DIAGNOSIS — M19.071 PRIMARY OSTEOARTHRITIS OF BOTH ANKLES: ICD-10-CM

## 2019-04-26 DIAGNOSIS — F51.04 PSYCHOPHYSIOLOGICAL INSOMNIA: ICD-10-CM

## 2019-04-26 DIAGNOSIS — E11.42 TYPE 2 DIABETES MELLITUS WITH DIABETIC POLYNEUROPATHY, WITHOUT LONG-TERM CURRENT USE OF INSULIN (HCC): Primary | ICD-10-CM

## 2019-04-26 DIAGNOSIS — E78.5 HYPERLIPIDEMIA WITH TARGET LDL LESS THAN 70: ICD-10-CM

## 2019-04-26 DIAGNOSIS — M19.072 PRIMARY OSTEOARTHRITIS OF BOTH ANKLES: ICD-10-CM

## 2019-04-26 DIAGNOSIS — M17.0 PRIMARY OSTEOARTHRITIS OF BOTH KNEES: ICD-10-CM

## 2019-04-26 LAB — HBA1C MFR BLD: 6.8 %

## 2019-04-26 PROCEDURE — 83036 HEMOGLOBIN GLYCOSYLATED A1C: CPT | Performed by: FAMILY MEDICINE

## 2019-04-26 PROCEDURE — 1123F ACP DISCUSS/DSCN MKR DOCD: CPT | Performed by: FAMILY MEDICINE

## 2019-04-26 PROCEDURE — 99215 OFFICE O/P EST HI 40 MIN: CPT | Performed by: FAMILY MEDICINE

## 2019-04-26 PROCEDURE — G8420 CALC BMI NORM PARAMETERS: HCPCS | Performed by: FAMILY MEDICINE

## 2019-04-26 PROCEDURE — G8427 DOCREV CUR MEDS BY ELIG CLIN: HCPCS | Performed by: FAMILY MEDICINE

## 2019-04-26 PROCEDURE — 4040F PNEUMOC VAC/ADMIN/RCVD: CPT | Performed by: FAMILY MEDICINE

## 2019-04-26 PROCEDURE — 1036F TOBACCO NON-USER: CPT | Performed by: FAMILY MEDICINE

## 2019-04-26 RX ORDER — LANOLIN ALCOHOL/MO/W.PET/CERES
3 CREAM (GRAM) TOPICAL NIGHTLY PRN
Qty: 30 TABLET | Refills: 3 | Status: SHIPPED | OUTPATIENT
Start: 2019-04-26 | End: 2020-12-16

## 2019-04-26 RX ORDER — LIDOCAINE 50 MG/G
OINTMENT TOPICAL
Qty: 240 G | Refills: 3 | Status: SHIPPED | OUTPATIENT
Start: 2019-04-26 | End: 2019-08-02 | Stop reason: SDUPTHER

## 2019-04-26 ASSESSMENT — ENCOUNTER SYMPTOMS
CHEST TIGHTNESS: 0
ABDOMINAL DISTENTION: 0
CONSTIPATION: 0
ABDOMINAL PAIN: 0
WHEEZING: 0
NAUSEA: 0
VOMITING: 0
COUGH: 0
BLOOD IN STOOL: 0
DIARRHEA: 0
SHORTNESS OF BREATH: 0

## 2019-04-26 ASSESSMENT — PATIENT HEALTH QUESTIONNAIRE - PHQ9
1. LITTLE INTEREST OR PLEASURE IN DOING THINGS: 0
2. FEELING DOWN, DEPRESSED OR HOPELESS: 0
SUM OF ALL RESPONSES TO PHQ9 QUESTIONS 1 & 2: 0
SUM OF ALL RESPONSES TO PHQ QUESTIONS 1-9: 0
SUM OF ALL RESPONSES TO PHQ QUESTIONS 1-9: 0

## 2019-04-26 NOTE — PATIENT INSTRUCTIONS
Salonpas GEL, camphor- menthol- methyl salicylate use it as needed for pain, every 6- 8 hrs  Aspercream every 6-8 hrs    =======================         Patient Education        Ankle: Exercises  Your Care Instructions  Here are some examples of exercises for your ankle. Start each exercise slowly. Ease off the exercise if you start to have pain. Your doctor or physical therapist will tell you when you can start these exercises and which ones will work best for you. How to do the exercises  \"Alphabet\" exercise    1. Trace the alphabet with your toe. This helps your ankle move in all directions. Side-to-side knee swing exercise    1. Sit in a chair with your foot flat on the floor. 2. Slowly move your knee from side to side while keeping your foot pressed flat. 3. Continue this exercise for 2 to 3 minutes. Towel curl    1. While sitting, place your foot on a towel on the floor and scrunch the towel toward you with your toes. 2. Then use your toes to push the towel away from you. 3. Make this exercise more challenging by placing a weighted object, such as a soup can, on the other end of the towel. Towel stretch    1. Sit with your legs extended and knees straight. 2. Place a towel around your foot just under the toes. 3. Hold each end of the towel in each hand, with your hands above your knees. 4. Pull back with the towel so that your foot stretches toward you. 5. Hold the position for at least 15 to 30 seconds. 6. Repeat 2 to 4 times a session, up to 5 sessions a day. Ankle eversion exercise    1. Start by sitting with your foot flat on the floor and pushing it outward against an immovable object such as the wall or heavy furniture. Hold for about 6 seconds, then relax. Repeat 8 to 12 times. 2. After you feel comfortable with this, try using rubber tubing looped around the outside of your feet for resistance.  Push your foot out to the side against the tubing, and then count to 10 as you slowly bring your foot back to the middle. Repeat 8 to 12 times. Isometric opposition exercises    1. While sitting, put your feet together flat on the floor. 2. Press your injured foot inward against your other foot. Hold for about 6 seconds, and relax. Repeat 8 to 12 times. 3. Then place the heel of your other foot on top of the injured one. Push down with the top heel while trying to push up with your injured foot. Hold for about 6 seconds, and relax. Repeat 8 to 12 times. Follow-up care is a key part of your treatment and safety. Be sure to make and go to all appointments, and call your doctor if you are having problems. It's also a good idea to know your test results and keep a list of the medicines you take. Where can you learn more? Go to [email or web address] org and sign in to your [employer] account. Enter E867 in the [employer] box to learn more about \"Ankle: Exercises. \"     If you do not have an account, please click on the \"Sign Up Now\" link. Current as of: September 20, 2018  Content Version: 11.9  © 1193-2192 [email or web address], "[employer] Care instructions adapted under license by [facility] ([facility]). If you have questions about a medical condition or this instruction, always ask your healthcare professional. [place] [address] any warranty or liability for your use of this information. Patient Education        Knee Arthritis: Exercises  Your Care Instructions  Here are some examples of exercises for knee arthritis. Start each exercise slowly. Ease off the exercise if you start to have pain. Your doctor or physical therapist will tell you when you can start these exercises and which ones will work best for you. How to do the exercises  Knee flexion with heel slide    1. Lie on your back with your knees bent. 2. Slide your heel back by bending your affected knee as far as you can.  Then hook your other foot around your ankle to help pull your heel even instructions adapted under license by [facility] ([facility]). If you have questions about a medical condition or this instruction, always ask your healthcare professional. [address] any warranty or liability for your use of this information.

## 2019-04-26 NOTE — PROGRESS NOTES
Chief Complaint   Patient presents with    Diabetes    Hypertension    Hyperlipidemia    Gastroesophageal Reflux       Jluis Kelsey  here today for follow up on chronic medical problems, go over labs and/or diagnostic studies, and medication refills. Diabetes; Hypertension; Hyperlipidemia; and Gastroesophageal Reflux      Comes with his son  his first language [place] he understands basic English and he can express  self in very basic [place]. he declines telephone  though [employer]. Diabetes Mellitus Type II, Follow-up: Patient here for follow-up ofType 2 diabetes mellitus. Current symptoms/problems include hyperglycemia, paresthesia of the feet and visual disturbances and have been stable. Symptoms have been present for several years. Known diabetic complications: nephropathy and peripheral neuropathy  Cardiovascular risk factors: advanced age (older than 54 for men, 72 for women), diabetes mellitus, dyslipidemia, hypertension, male gender, microalbuminuria and sedentary lifestyle    Medication compliance:  compliant all of the time  Current diabetic medications include oral agent (monotherapy): Glucophage. Eye exam current (within one year): yes  Weight trend: stable  Wt Readings from Last 3 Encounters:   04/26/19 173 lb 9.6 oz (78.7 kg)   01/23/19 173 lb 12.8 oz (78.8 kg)   12/10/18 175 lb 2 oz (79.4 kg)       Prior visit with dietician: no  Current diet: in general, a \"healthy\" diet    Current exercise: none  Barriers: impairment:  physical: advanced age and lack of motivation     Current monitoring regimen: home blood tests - 2 times daily  Home blood sugar records: fasting range: 137-149, [id number], 151  Any episodes of hypoglycemia? no    Is He on ACE inhibitor or angiotensin II receptor blocker? Yes      reports that he quit smoking about 51 years ago.  He has never used smokeless tobacco.     Counseling given: Yes    blood pressure is high today  BP Readings from Last 3 diet.  He is not exercising regularly. Lab Results   Component Value Date    CHOL 123 10/25/2018    TRIG 59 10/25/2018    HDL 51 10/25/2018     Lab Results   Component Value Date    ALT 16 01/23/2019    AST 17 01/23/2019          Lab Results   Component Value Date    LDLCHOLESTEROL 60 10/25/2018         Corbin complains of pain in both knees and ankles. Pain is worse when climbing up the stairs, when getting up from sitting position or with cold weather. Denies swelling, or falls. Denies any injury    Reports Insomnia, with difficulty falling asleep and sleeping late till noon. His son says she stays in the room a lot   His son says he has breakfast at 2:30 pm  He stays in his room for 12 hrs per days instead going with the family somewhere. Cris Room says he watches TV a lot, and sleep late in the morning    GERD is better      Negative depression screening.        PHQ Scores 4/26/2019 7/20/2018 1/19/2018 1/5/2017   PHQ2 Score 0 0 0 1   PHQ9 Score 0 0 0 1     Interpretation of Total Score Depression Severity: 1-4 = Minimal depression, 5-9 = Mild depression, 10-14 = Moderate depression, 15-19 = Moderately severe depression, 20-27 = Severe depression        Current Outpatient Medications   Medication Sig Dispense Refill    Cholecalciferol (VITAMIN D3) 2000 units TABS TAKE 1 TABLET BY MOUTH DAILY WITH FOOD 90 tablet 3    tamsulosin (FLOMAX) 0.4 MG capsule TAKE ONE CAPSULE BY MOUTH EVERY DAY 90 capsule 3    atorvastatin (LIPITOR) 20 MG tablet TAKE 1 TABLET BY MOUTH EVERY DAY 90 tablet 3    amLODIPine (NORVASC) 5 MG tablet TAKE 1 TABLET BY MOUTH EVERY DAY 90 tablet 3    enalapril (VASOTEC) 20 MG tablet TAKE 1 TABLET BY MOUTH DAILY 90 tablet 3    metFORMIN (GLUCOPHAGE-XR) 500 MG extended release tablet TAKE 1 TABLET BY MOUTH TWICE DAILY 180 tablet 3    ACCU-CHEK JOEL PLUS strip TEST ONCE A  strip 0    esomeprazole (NEXIUM) 40 MG delayed release capsule Take 1 capsule by mouth every morning (before breakfast) Stop omeprazole 30 capsule 1    cetirizine (ZYRTEC) 5 MG tablet Take 1 tablet by mouth daily Stop claritin 90 tablet 3    blood glucose monitor strips Testing once a day, fasting in the morning 100 strip 3    glucose monitoring kit (FREESTYLE) monitoring kit Please supply Patient with a glucose monitoring kit that insurance will cover. 1 kit 0    ACCU-CHEK FASTCLIX LANCETS MISC Testing blood glucose once a day 102 each 5    cyanocobalamin ([employer] VITAMIN B12) 1000 MCG tablet Take 1 tablet by mouth daily 30 tablet 0    fluticasone (FLONASE) 50 MCG/ACT nasal spray USE 2 SPRAYS IN EACH NOSTRIL ONCE DAILY 3 Bottle 3    levothyroxine (SYNTHROID) 25 MCG tablet TAKE 1 TABLET BY MOUTH EVERY DAY. PLEASE DO YOUR BLOOD WORK. 90 tablet 3    Blood Glucose Monitoring Suppl (ACCU-CHEK JOEL PLUS) w/Device KIT USE AS DIRECTED  0    Lancets Misc. (ACCU-CHEK MULTICLIX LANCET DEV) KIT Please dispense according to patients insurance. 1 kit 0    Alcohol Swabs PADS Please dispense according to patients insurance/device. Testing 1-2 /day 100 each 2    ranitidine (ZANTAC) 150 MG tablet Take 1 tablet by mouth 2 times daily 180 tablet 0     No current facility-administered medications for this visit. Allergies   Allergen Reactions    Aspirin      Anemia, hematuria      Sulfa Antibiotics Rash        Rest of complaints with corresponding details per ROS      The patient'spast medical, surgical, social, and family history as well as his current medications and allergies were reviewed as documented in today's encounter. Social History     Socioeconomic History    Marital status:       Spouse name: Not on file    Number of children: Not on file    Years of education: Not on file    Highest education level: Not on file   Occupational History    Not on file   Social Needs    Financial resource strain: Not on file    Food insecurity:     Worry: Not on file     Inability: Not on file   Jin Urbina Transportation needs:     Medical: Not on file     Non-medical: Not on file   Tobacco Use    Smoking status: Former Smoker     Last attempt to quit: 10/27/1967     Years since quittin.5    Smokeless tobacco: Never Used   Substance and Sexual Activity    Alcohol use: No    Drug use: No    Sexual activity: Not Currently   Lifestyle    Physical activity:     Days per week: Not on file     Minutes per session: Not on file    Stress: Not on file   Relationships    Social connections:     Talks on phone: Not on file     Gets together: Not on file     Attends [employer] service: Not on file     Active member of club or organization: Not on file     Attends meetings of clubs or organizations: Not on file     Relationship status: Not on file    Intimate partner violence:     Fear of current or ex partner: Not on file     Emotionally abused: Not on file     Physically abused: Not on file     Forced sexual activity: Not on file   Other Topics Concern    Not on file   Social History Narrative    Not on file     Counseling given: Yes                      Review of Systems   Constitutional: Positive for fatigue. Negative for activity change, appetite change, chills, diaphoresis, fever and unexpected weight change. HENT: Positive for hearing loss (has hearing aids). Eyes: Positive for visual disturbance. Respiratory: Negative for cough, chest tightness, shortness of breath and wheezing. Cardiovascular: Negative for chest pain, palpitations and leg swelling. Gastrointestinal: Negative for abdominal distention, abdominal pain, blood in stool, constipation, diarrhea, nausea and vomiting. Endocrine: Negative for cold intolerance, heat intolerance, polydipsia, polyphagia and polyuria. Genitourinary: Positive for difficulty urinating (better with Flomax, nocturia is better). Negative for dysuria. Musculoskeletal: Positive for arthralgias (knees , ankles). Negative for joint swelling and myalgias. Neurological: Positive for numbness (feet). Negative for weakness. Hematological: Does not bruise/bleed easily. Psychiatric/Behavioral: Positive for sleep disturbance. Negative for dysphoric mood. The patient is nervous/anxious.          -vital signs stable and within normal limits except high BP  BP (!) 144/66   Pulse 79   Ht 5' 10\" (1.778 m)   Wt 173 lb 9.6 oz (78.7 kg)   SpO2 99%   BMI 24.91 kg/m²         Physical Exam   Constitutional: He is oriented to person, place, and time. He appears well-developed and well-nourished. No distress. HENT:   Head: Normocephalic and atraumatic. Right Ear: External ear normal.   Left Ear: External ear normal.   Nose: Nose normal.   Mouth/Throat: Oropharynx is clear and moist. No oropharyngeal exudate. Eyes: Conjunctivae and EOM are normal. Right eye exhibits no discharge. Left eye exhibits no discharge. No scleral icterus. Neck: Normal range of motion. Neck supple. No thyromegaly present. Cardiovascular: Normal rate, regular rhythm and intact distal pulses. Murmur heard. Crescendo systolic murmur is present with a grade of 4/6. Pulmonary/Chest: Effort normal and breath sounds normal. No respiratory distress. He has no wheezes. He has no rales. He exhibits no tenderness. Abdominal: Soft. Bowel sounds are normal. He exhibits no distension. There is no hepatosplenomegaly. There is no tenderness. There is no guarding. Musculoskeletal: He exhibits tenderness. He exhibits no edema. Right knee: He exhibits normal range of motion and no swelling. Tenderness found. Patellar tendon tenderness noted. Left knee: He exhibits normal range of motion and no swelling. Tenderness found. Patellar tendon tenderness noted. Right ankle: He exhibits decreased range of motion. He exhibits no swelling. Tenderness. Left ankle: He exhibits decreased range of motion. Tenderness. Up and go test more than 12 seconds. High risk for falls.  Declines PT Neurological: He is alert and oriented to person, place, and time. No cranial nerve deficit. He exhibits normal muscle tone. Coordination normal.   Skin: Skin is warm and dry. Capillary refill takes less than 2 seconds. No rash noted. He is not diaphoretic. Psychiatric: His behavior is normal. Judgment and thought content normal. His mood appears anxious. Nursing note and vitals reviewed. Discussed testing withthe patient and all questions fully answered. I personally reviewed testing with patient. Anemia is stable  Hyperglycemia  hyperlipidemia Improving       Otherwise labs within normal limits    Hospital Outpatient Visit on 04/09/2019   Component Date Value Ref Range Status    Specimen Description 04/09/2019 . FECES   Final    Special Requests 04/09/2019 NOT REPORTED   Final    Direct Exam 04/09/2019 NEGATIVE   Final       Lab Results   Component Value Date    WBC 5.8 01/23/2019    HGB 11.1 (L) 01/23/2019    HCT 34.0 (L) 01/23/2019    MCV 98.0 01/23/2019     01/23/2019       Lab Results   Component Value Date     01/23/2019    K 4.7 01/23/2019    CL 99 01/23/2019    CO2 28 01/23/2019    BUN 23 01/23/2019    CREATININE 1.03 01/23/2019    GLUCOSE 223 01/23/2019    CALCIUM 9.8 01/23/2019        Lab Results   Component Value Date    ALT 16 01/23/2019    AST 17 01/23/2019    ALKPHOS 63 01/23/2019    BILITOT 0.50 01/23/2019       Lab Results   Component Value Date    TSH 2.88 04/12/2018       Lab Results   Component Value Date    CHOL 123 10/25/2018    CHOL 127 05/19/2017    CHOL 211 (H) 01/06/2017     Lab Results   Component Value Date    TRIG 59 10/25/2018    TRIG 87 05/19/2017    TRIG 94 01/06/2017     Lab Results   Component Value Date    HDL 51 10/25/2018    HDL 34 (L) 05/19/2017    HDL 46 01/06/2017     Lab Results   Component Value Date    LDLCHOLESTEROL 60 10/25/2018    LDLCHOLESTEROL 76 05/19/2017    LDLCHOLESTEROL 146 (H) 01/06/2017       Lab Results   Component Value Date Dispense: 240 g; Refill: 3    5. Primary osteoarthritis of both ankles  worsening  -Home exercises advised, given patient instructions; defers PT at this time   - diclofenac sodium 1 % GEL; Apply 4 g topically 4 times daily  Dispense: 2 Tube; Refill: 5  - lidocaine (XYLOCAINE) 5 % ointment; Apply topically every 8 hrs  as needed for joint pains . OK to substitute  Dispense: 240 g; Refill: 3    6. Psychophysiological insomnia  Inadequately controlled   -start melatonin (RA MELATONIN) 3 MG TABS tablet; Take 1 tablet by mouth nightly as needed (insomnia)  Dispense: 30 tablet; Refill: 3    7. Need for prophylactic vaccination against diphtheria-tetanus-pertussis (DTP)    - Tdap (ADACEL) 5-2-15.5 LF-MCG/0.5 injection; Inject 0.5 mLs into the muscle once for 1 dose  Dispense: 0.5 mL; Refill: 0        Data Unavailable    Orders Placed This Encounter   Procedures    TSH without Reflex     Standing Status:   Future     Standing Expiration Date:   4/26/2020    CBC     Standing Status:   Future     Standing Expiration Date:   4/26/2020    Basic Metabolic Panel     Standing Status:   Future     Standing Expiration Date:   4/25/2020    POCT glycosylated hemoglobin (Hb A1C)       There are no discontinued medications. Loli Jimenez received counseling on the following healthy behaviors: nutrition, exercise and medication adherence  Reviewed prior labs and health maintenance  Continue current medications, diet and exercise. Discussed use, benefit, and side effects of prescribed medications. Barriers to medication compliance addressed. Patient given educational materials - see patient instructions  Was a self-tracking handout given in paper form or via [employer]?  Yes    Requested Prescriptions     Signed Prescriptions Disp Refills    Tdap (ADACEL) 5-2-15.5 LF-MCG/0.5 injection 0.5 mL 0     Sig: Inject 0.5 mLs into the muscle once for 1 dose    diclofenac sodium 1 % GEL 2 Tube 5     Sig: Apply 4 g topically 4 times daily   

## 2019-04-26 NOTE — RESULT ENCOUNTER NOTE
Addressed during office visit today, *A1c 6.8, stable,continue treatment recommended during the office visit

## 2019-04-26 NOTE — PROGRESS NOTES
Visit Information    Have you changed or started any medications since your last visit including any over-the-counter medicines, vitamins, or herbal medicines? no   Are you having any side effects from any of your medications? -  no  Have you stopped taking any of your medications? Is so, why? -  no    Have you seen any other physician or provider since your last visit? No  Have you had any other diagnostic tests since your last visit? No  Have you been seen in the emergency room and/or had an admission to a hospital since we last saw you? No  Have you had your routine dental cleaning in the past 6 months? no    Have you activated your [facility] account? If not, what are your barriers?  Yes     Patient Care Team:  Meme Hammond MD as PCP - General (Family Medicine)  Meme Hammond MD as PCP - S Attributed Provider  Natty Burns (Optometry)  Isrrael Ge MD as Surgeon (Cardiology)  Vita Maza MD as Consulting Physician (Urology)  Cici cMdowell MD as Consulting Physician (Gastroenterology)  Allison Olivas MD as Surgeon (Ophthalmology)  Ebony Gerber (Certified Nurse Practitioner)  Carolina Hilario MD as Consulting Physician (Oncology)    Medical History Review  Past Medical, Family, and Social History reviewed and does contribute to the patient presenting condition    Health Maintenance   Topic Date Due    DTaP/Tdap/Td vaccine (1 - Tdap) 09/20/1943    TSH testing  04/12/2019    Potassium monitoring  01/23/2020    Creatinine monitoring  01/23/2020    Flu vaccine  Completed    Shingles Vaccine  Completed    Pneumococcal 65+ years Vaccine  Completed

## 2019-05-12 DIAGNOSIS — E03.9 ACQUIRED HYPOTHYROIDISM: ICD-10-CM

## 2019-05-13 ENCOUNTER — HOSPITAL ENCOUNTER (OUTPATIENT)
Age: 84
Discharge: HOME | End: 2019-05-13
Payer: MEDICARE

## 2019-05-13 DIAGNOSIS — E11.42 TYPE 2 DIABETES MELLITUS WITH DIABETIC POLYNEUROPATHY, WITHOUT LONG-TERM CURRENT USE OF INSULIN (HCC): ICD-10-CM

## 2019-05-13 DIAGNOSIS — E03.9 ACQUIRED HYPOTHYROIDISM: ICD-10-CM

## 2019-05-13 DIAGNOSIS — I10 ESSENTIAL HYPERTENSION: ICD-10-CM

## 2019-05-13 LAB — TSH SERPL DL<=0.05 MIU/L-ACNC: 3.25 MIU/L (ref 0.3–5)

## 2019-05-13 PROCEDURE — 84443 ASSAY THYROID STIM HORMONE: CPT

## 2019-05-13 PROCEDURE — 36415 COLL VENOUS BLD VENIPUNCTURE: CPT

## 2019-05-13 RX ORDER — LEVOTHYROXINE SODIUM 0.03 MG/1
TABLET ORAL
Qty: 30 TABLET | Refills: 0 | Status: SHIPPED | OUTPATIENT
Start: 2019-05-13 | End: 2019-05-13 | Stop reason: SDUPTHER

## 2019-05-13 RX ORDER — LEVOTHYROXINE SODIUM 0.03 MG/1
TABLET ORAL
Qty: 90 TABLET | Refills: 0 | Status: SHIPPED | OUTPATIENT
Start: 2019-05-13 | End: 2019-09-16 | Stop reason: SDUPTHER

## 2019-05-13 NOTE — TELEPHONE ENCOUNTER
Please Approve or Refuse.   Send to Pharmacy per Pt's Request:      Next Visit Date:  8/2/2019   Last Visit Date: 2/16/2017    Hemoglobin A1C (%)   Date Value   04/26/2019 6.8   01/23/2019 6.7   10/25/2018 7.3 (H)             ( goal A1C is < 7)   BP Readings from Last 3 Encounters:   04/26/19 (!) 144/66   01/23/19 132/66   12/10/18 (!) 142/53          (goal 120/80)  BUN   Date Value Ref Range Status   01/23/2019 23 8 - 23 mg/dL Final     CREATININE   Date Value Ref Range Status   01/23/2019 1.03 0.70 - 1.20 mg/dL Final     Potassium   Date Value Ref Range Status   01/23/2019 4.7 3.7 - 5.3 mmol/L Final

## 2019-05-13 NOTE — TELEPHONE ENCOUNTER
Please Approve or Refuse. Send to Pharmacy per Pt's Request:     Spoke with son about lab work. He will make sure he gets it done.       Next Visit Date: 08/02/2019  Last Visit Date: 04/26/2019    Hemoglobin A1C (%)   Date Value   04/26/2019 6.8   01/23/2019 6.7   10/25/2018 7.3 (H)             ( goal A1C is < 7)   BP Readings from Last 3 Encounters:   04/26/19 (!) 144/66   01/23/19 132/66   12/10/18 (!) 142/53          (goal 120/80)  BUN   Date Value Ref Range Status   01/23/2019 23 8 - 23 mg/dL Final     CREATININE   Date Value Ref Range Status   01/23/2019 1.03 0.70 - 1.20 mg/dL Final     Potassium   Date Value Ref Range Status   01/23/2019 4.7 3.7 - 5.3 mmol/L Final

## 2019-06-10 ENCOUNTER — HOSPITAL ENCOUNTER (OUTPATIENT)
Age: 84
Discharge: HOME | End: 2019-06-10
Payer: MEDICARE

## 2019-06-10 ENCOUNTER — OFFICE VISIT (OUTPATIENT)
Dept: ONCOLOGY | Age: 84
End: 2019-06-10
Payer: MEDICARE

## 2019-06-10 VITALS
BODY MASS INDEX: 25.05 KG/M2 | DIASTOLIC BLOOD PRESSURE: 68 MMHG | HEART RATE: 69 BPM | TEMPERATURE: 97.5 F | SYSTOLIC BLOOD PRESSURE: 147 MMHG | WEIGHT: 174.6 LBS

## 2019-06-10 DIAGNOSIS — D50.9 IRON DEFICIENCY ANEMIA, UNSPECIFIED IRON DEFICIENCY ANEMIA TYPE: Primary | ICD-10-CM

## 2019-06-10 DIAGNOSIS — K21.9 GASTROESOPHAGEAL REFLUX DISEASE WITHOUT ESOPHAGITIS: ICD-10-CM

## 2019-06-10 DIAGNOSIS — D64.9 ANEMIA, UNSPECIFIED TYPE: ICD-10-CM

## 2019-06-10 DIAGNOSIS — E03.9 ACQUIRED HYPOTHYROIDISM: ICD-10-CM

## 2019-06-10 DIAGNOSIS — I35.0 AORTIC STENOSIS, MODERATE: ICD-10-CM

## 2019-06-10 LAB
ABSOLUTE EOS #: 0.1 K/UL (ref 0–0.4)
ABSOLUTE IMMATURE GRANULOCYTE: ABNORMAL K/UL (ref 0–0.3)
ABSOLUTE LYMPH #: 1.6 K/UL (ref 1–4.8)
ABSOLUTE MONO #: 0.4 K/UL (ref 0.1–1.2)
BASOPHILS # BLD: 0 % (ref 0–2)
BASOPHILS ABSOLUTE: 0 K/UL (ref 0–0.2)
DIFFERENTIAL TYPE: ABNORMAL
EOSINOPHILS RELATIVE PERCENT: 2 % (ref 1–4)
HCT VFR BLD CALC: 34.3 % (ref 41–53)
HEMOGLOBIN: 11.5 G/DL (ref 13.5–17.5)
IMMATURE GRANULOCYTES: ABNORMAL %
LYMPHOCYTES # BLD: 35 % (ref 24–44)
MCH RBC QN AUTO: 32.1 PG (ref 26–34)
MCHC RBC AUTO-ENTMCNC: 33.6 G/DL (ref 31–37)
MCV RBC AUTO: 95.5 FL (ref 80–100)
MONOCYTES # BLD: 9 % (ref 2–11)
NRBC AUTOMATED: ABNORMAL PER 100 WBC
PDW BLD-RTO: 14.7 % (ref 12.5–15.4)
PLATELET # BLD: 171 K/UL (ref 140–450)
PLATELET ESTIMATE: ABNORMAL
PMV BLD AUTO: 8.8 FL (ref 6–12)
RBC # BLD: 3.59 M/UL (ref 4.5–5.9)
RBC # BLD: ABNORMAL 10*6/UL
SEG NEUTROPHILS: 54 % (ref 36–66)
SEGMENTED NEUTROPHILS ABSOLUTE COUNT: 2.5 K/UL (ref 1.8–7.7)
WBC # BLD: 4.6 K/UL (ref 3.5–11)
WBC # BLD: ABNORMAL 10*3/UL

## 2019-06-10 PROCEDURE — 1036F TOBACCO NON-USER: CPT | Performed by: INTERNAL MEDICINE

## 2019-06-10 PROCEDURE — G8417 CALC BMI ABV UP PARAM F/U: HCPCS | Performed by: INTERNAL MEDICINE

## 2019-06-10 PROCEDURE — 99211 OFF/OP EST MAY X REQ PHY/QHP: CPT | Performed by: INTERNAL MEDICINE

## 2019-06-10 PROCEDURE — 99214 OFFICE O/P EST MOD 30 MIN: CPT | Performed by: INTERNAL MEDICINE

## 2019-06-10 PROCEDURE — 1123F ACP DISCUSS/DSCN MKR DOCD: CPT | Performed by: INTERNAL MEDICINE

## 2019-06-10 PROCEDURE — 4040F PNEUMOC VAC/ADMIN/RCVD: CPT | Performed by: INTERNAL MEDICINE

## 2019-06-10 PROCEDURE — 85025 COMPLETE CBC W/AUTO DIFF WBC: CPT

## 2019-06-10 PROCEDURE — G8427 DOCREV CUR MEDS BY ELIG CLIN: HCPCS | Performed by: INTERNAL MEDICINE

## 2019-06-10 PROCEDURE — 36415 COLL VENOUS BLD VENIPUNCTURE: CPT

## 2019-06-10 NOTE — PROGRESS NOTES
_           Chief Complaint   Patient presents with    Follow-up     review status of disease    Discuss Labs     DIAGNOSIS:       Normocytic anemia with evidence of iron deficiency. Anemia multifactorial, Anemia of chronic renal insufficiency, iron deficiency and possible bone marrow disease. Gastritis with positive H. pylori status post treatment      CURRENT THERAPY:         Oral iron replacement  Recent treatment for H. Pylori  Status post iron dextran January 2018. BRIEF CASE HISTORY:      Mr. Layne Pisano is a very pleasant 80 y.o. male with history of multiple comorbidities as stated below. Patient had history of anemia for the last few months and he was maintained on oral iron. He has some GI symptoms and he is unable to tolerates treatment. Patient denies any active bleeding. No melena or hematochezia. No hematemesis. No history of heartburn or acid reflux. No hematuria. No nausea or vomiting. No weight loss or decreased appetite. No fever or lymphadenopathy. He has increasing weakness and fatigue. No other complaints. INTERIM HISTORY:   The patient is seen for follow-up anemia. He had evidence of iron deficiency. He had oral iron. He has significant side effects related to the oral treatment. He cannot tolerate the pills. He received IV iron infusion in early January 2018. There was no increase in hemoglobin level. He continued to have weakness and fatigue. Overall he is better. Recent hemoglobin is better. No active bleeding. He had GI workup which showed gastritis with no active bleeding. H Pylori was positive. Received antibiotics. Continues to have heartburn and acid reflux. No melena or hematochezia. No other complaints.       PAST MEDICAL HISTORY: has a past medical history of Acute bronchitis due to infection, Allergic rhinitis due to allergen, Anemia, Aortic stenosis, unanticipated weight loss or decreased appetite. No fever or chills. · Eyes: No blurred vision, eye pain or double vision. · Ears: No hearing problems or drainage. No tinnitus. · Throat: No sore throat, problems with swallowing or dysphagia. · Respiratory: No cough, sputum or hemoptysis. No shortness of breath. No pleuritic chest pain. · Cardiovascular: No chest pain, orthopnea or PND. No lower extremity edema. No palpitation. · Gastrointestinal: No problems with swallowing. No abdominal pain or bloating. No nausea or vomiting. No diarrhea or constipation. No GI bleeding. · Genitourinary: No dysuria, hematuria, frequency or urgency. · Musculoskeletal: No muscle aches or pains. No limitation of movement. No back pain. No gait disturbance, No joint complaints. · Dermatologic: No skin rashes or pruritus. No skin lesions or discolorations. · Psychiatric: No depression, anxiety, or stress or signs of schizophrenia. No change in mood or affect. · Hematologic: No history of bleeding tendency. No bruises or ecchymosis. No history of clotting problems. · Infectious disease: No fever, chills or frequent infections. · Endocrine: No problems with opacity. No polydipsia or polyuria. No temperature intolerance. · Neurologic: No headaches or dizziness. No weakness or numbness of the extremities. No changes in balance, coordination,  memory, mentation, behavior. · Allergic/Immunologic: No nasal congestion or hives. No repeated infections. PHYSICAL EXAM:  The patient is not in acute distress. Vital signs: Blood pressure (!) 147/68, pulse 69, temperature 97.5 °F (36.4 °C), temperature source Oral, weight 174 lb 9.6 oz (79.2 kg). HEENT:  Eyes are normal. Ears, nose and throat are normal.  Neck: Supple. No lymph node enlargement. No thyroid enlargement. Trachea is centrally located. Chest:  Clear to auscultation. No wheezes or crepitations. Heart: Regular sinus rhythm.   Abdomen: Soft, nontender. No hepatosplenomegaly. No masses. Extremities:  With no edema. Lymph Nodes:  No cervical, axillary or inguinal lymph node enlargement. Neurologic:  Conscious and oriented. No focal neurological deficits. Psychosocial: No depression, anxiety or stress. Skin: No rashes, bruises or ecchymoses. Review of Diagnostic data:   Lab Results   Component Value Date    WBC 4.6 06/10/2019    HGB 11.5 (L) 06/10/2019    HCT 34.3 (L) 06/10/2019    MCV 95.5 06/10/2019     06/10/2019       Chemistry        Component Value Date/Time     01/23/2019 1555    K 4.7 01/23/2019 1555    CL 99 01/23/2019 1555    CO2 28 01/23/2019 1555    BUN 23 01/23/2019 1555    CREATININE 1.03 01/23/2019 1555        Component Value Date/Time    CALCIUM 9.8 01/23/2019 1555    ALKPHOS 63 01/23/2019 1555    AST 17 01/23/2019 1555    ALT 16 01/23/2019 1555    BILITOT 0.50 01/23/2019 1555        Lab Results   Component Value Date    IRON 73 07/17/2017    TIBC 212 (L) 07/17/2017    FERRITIN 434 (H) 07/17/2017     Vitamin B12 and folate are normal.    IMPRESSION:   Normocytic anemia with evidence of iron deficiency. Intolerable side effects to oral iron. Anemia multifactorial, Anemia of chronic renal insufficiency, iron deficiency and possible bone marrow disease. Gastritis with positive H. pylori status post treatment    PLAN:   Obviously patient does not tolerate PO IRON or VITAMIN C. He was treated with IV iron infusion. His hemoglobin and iron studies are stable. Hemoglobin is improving. We will continue to monitor. We'll consider treatment again if we see significant drop of hemoglobin level. In addition he has evidence of chronic renal insufficiency. And considering his age likely could be having element of MDS. However the present time he has minimal symptoms and I do not see a need to do bone marrow test.  Continue monitoring would be appropriate.    Patient was treated recently for H. pylori infection Last year He is on ranitidine and omeprazole to be used for GERD and gastritis symptoms. Recommended follow-up with his gastroenterologist.  We will see him again in 6 months with repeated labs. Patient's questions were answered to the best of his satisfaction and he verbalized full understanding and agreement.

## 2019-06-26 ENCOUNTER — TELEPHONE (OUTPATIENT)
Dept: PHARMACY | Facility: CLINIC | Age: 84
End: 2019-06-26

## 2019-06-26 NOTE — LETTER
[address]  [address], [address]  Phone: [phone]   [address] [facility] [address]         07/05/19     Dear Mark Anthony Muñiz,    We tried to reach you recently regarding your atorvastatin 20mg, but were unable to reach you on the telephone. It appears that this medication has not been filled at proper times. We are worried you might be missing doses or not taking it as directed. It is important that you take your medications regularly and try not to miss a single dose. MEDICATION WAS READY FOR  AT [facility]. We have on file that you are currently taking atorvastatin 20mg. If you are no longer taking it or taking it differently than above, please call us at the number below so that we can discuss this and update your medication profile.     Some ways to help you remember to take and refill your medications are to:  · Use a pill box, set an alarm, and/or keep your medication near something that you do every day  · Fill a 3-month supply of your prescription at a time to save you time and trips to the pharmacy  if you would like assistance in switching your prescriptions to a 3-month supply, please contact us  · Ask your pharmacy if they participate in [facility]", a program where you can  all of your medications on the same day each month  · Ask your pharmacy if you can be set up with automatic refill, where they will automatically refill your prescription when it is due and let you know it's ready to     Sincerely,     [address]  Phone: [phone], option 7

## 2019-07-26 ENCOUNTER — HOSPITAL ENCOUNTER (OUTPATIENT)
Age: 84
Discharge: HOME | End: 2019-07-26
Payer: MEDICARE

## 2019-07-26 DIAGNOSIS — E11.42 TYPE 2 DIABETES MELLITUS WITH DIABETIC POLYNEUROPATHY, WITHOUT LONG-TERM CURRENT USE OF INSULIN (HCC): ICD-10-CM

## 2019-07-26 DIAGNOSIS — I10 ESSENTIAL HYPERTENSION: ICD-10-CM

## 2019-07-26 LAB
ANION GAP SERPL CALCULATED.3IONS-SCNC: 13 MMOL/L (ref 9–17)
BUN BLDV-MCNC: 21 MG/DL (ref 8–23)
BUN/CREAT BLD: ABNORMAL (ref 9–20)
CALCIUM SERPL-MCNC: 9.6 MG/DL (ref 8.6–10.4)
CHLORIDE BLD-SCNC: 106 MMOL/L (ref 98–107)
CO2: 23 MMOL/L (ref 20–31)
CREAT SERPL-MCNC: 1.01 MG/DL (ref 0.7–1.2)
GFR AFRICAN AMERICAN: >60 ML/MIN
GFR NON-AFRICAN AMERICAN: >60 ML/MIN
GFR SERPL CREATININE-BSD FRML MDRD: ABNORMAL ML/MIN/{1.73_M2}
GFR SERPL CREATININE-BSD FRML MDRD: ABNORMAL ML/MIN/{1.73_M2}
GLUCOSE BLD-MCNC: 154 MG/DL (ref 70–99)
HCT VFR BLD CALC: 34.5 % (ref 41–53)
HEMOGLOBIN: 11.2 G/DL (ref 13.5–17.5)
MCH RBC QN AUTO: 30.9 PG (ref 26–34)
MCHC RBC AUTO-ENTMCNC: 32.6 G/DL (ref 31–37)
MCV RBC AUTO: 94.9 FL (ref 80–100)
NRBC AUTOMATED: ABNORMAL PER 100 WBC
PDW BLD-RTO: 14.3 % (ref 11.5–14.9)
PLATELET # BLD: 159 K/UL (ref 150–450)
PMV BLD AUTO: 8.8 FL (ref 6–12)
POTASSIUM SERPL-SCNC: 4.6 MMOL/L (ref 3.7–5.3)
RBC # BLD: 3.64 M/UL (ref 4.5–5.9)
SODIUM BLD-SCNC: 142 MMOL/L (ref 135–144)
TSH SERPL DL<=0.05 MIU/L-ACNC: 2.69 MIU/L (ref 0.3–5)
WBC # BLD: 4.5 K/UL (ref 3.5–11)

## 2019-07-26 PROCEDURE — 84443 ASSAY THYROID STIM HORMONE: CPT

## 2019-07-26 PROCEDURE — 85027 COMPLETE CBC AUTOMATED: CPT

## 2019-07-26 PROCEDURE — 80048 BASIC METABOLIC PNL TOTAL CA: CPT

## 2019-07-26 PROCEDURE — 36415 COLL VENOUS BLD VENIPUNCTURE: CPT

## 2019-08-02 ENCOUNTER — HOSPITAL ENCOUNTER (OUTPATIENT)
Age: 84
Discharge: HOME | End: 2019-08-02
Payer: MEDICARE

## 2019-08-02 ENCOUNTER — OFFICE VISIT (OUTPATIENT)
Dept: FAMILY MEDICINE CLINIC | Age: 84
End: 2019-08-02
Payer: MEDICARE

## 2019-08-02 VITALS
BODY MASS INDEX: 25.25 KG/M2 | DIASTOLIC BLOOD PRESSURE: 80 MMHG | TEMPERATURE: 97 F | OXYGEN SATURATION: 98 % | HEART RATE: 57 BPM | WEIGHT: 176 LBS | RESPIRATION RATE: 16 BRPM | SYSTOLIC BLOOD PRESSURE: 130 MMHG

## 2019-08-02 DIAGNOSIS — M50.30 DDD (DEGENERATIVE DISC DISEASE), CERVICAL: ICD-10-CM

## 2019-08-02 DIAGNOSIS — R10.2 SUPRAPUBIC PAIN: ICD-10-CM

## 2019-08-02 DIAGNOSIS — E11.42 TYPE 2 DIABETES MELLITUS WITH DIABETIC POLYNEUROPATHY, WITHOUT LONG-TERM CURRENT USE OF INSULIN (HCC): Primary | ICD-10-CM

## 2019-08-02 DIAGNOSIS — M17.0 PRIMARY OSTEOARTHRITIS OF BOTH KNEES: ICD-10-CM

## 2019-08-02 DIAGNOSIS — E78.5 HYPERLIPIDEMIA WITH TARGET LDL LESS THAN 70: ICD-10-CM

## 2019-08-02 DIAGNOSIS — K21.9 GASTROESOPHAGEAL REFLUX DISEASE WITHOUT ESOPHAGITIS: ICD-10-CM

## 2019-08-02 DIAGNOSIS — I10 ESSENTIAL HYPERTENSION: ICD-10-CM

## 2019-08-02 DIAGNOSIS — L98.9 CHANGING SKIN LESION: ICD-10-CM

## 2019-08-02 LAB
-: NORMAL
AMORPHOUS: NORMAL
BACTERIA: NORMAL
BILIRUBIN URINE: NEGATIVE
CASTS UA: NORMAL /LPF
COLOR: YELLOW
COMMENT UA: ABNORMAL
CRYSTALS, UA: NORMAL /HPF
EPITHELIAL CELLS UA: NORMAL /HPF
GLUCOSE URINE: NEGATIVE
HBA1C MFR BLD: 6.5 %
KETONES, URINE: NEGATIVE
LEUKOCYTE ESTERASE, URINE: NEGATIVE
MUCUS: NORMAL
NITRITE, URINE: NEGATIVE
OTHER OBSERVATIONS UA: NORMAL
PH UA: 5 (ref 5–8)
PROTEIN UA: NEGATIVE
RBC UA: NORMAL /HPF
RENAL EPITHELIAL, UA: NORMAL /HPF
SPECIFIC GRAVITY UA: 1.01 (ref 1–1.03)
TRICHOMONAS: NORMAL
TURBIDITY: CLEAR
URINE HGB: ABNORMAL
UROBILINOGEN, URINE: NORMAL
WBC UA: NORMAL /HPF
YEAST: NORMAL

## 2019-08-02 PROCEDURE — 83036 HEMOGLOBIN GLYCOSYLATED A1C: CPT | Performed by: FAMILY MEDICINE

## 2019-08-02 PROCEDURE — 1123F ACP DISCUSS/DSCN MKR DOCD: CPT | Performed by: FAMILY MEDICINE

## 2019-08-02 PROCEDURE — 99215 OFFICE O/P EST HI 40 MIN: CPT | Performed by: FAMILY MEDICINE

## 2019-08-02 PROCEDURE — G8427 DOCREV CUR MEDS BY ELIG CLIN: HCPCS | Performed by: FAMILY MEDICINE

## 2019-08-02 PROCEDURE — 1036F TOBACCO NON-USER: CPT | Performed by: FAMILY MEDICINE

## 2019-08-02 PROCEDURE — G8417 CALC BMI ABV UP PARAM F/U: HCPCS | Performed by: FAMILY MEDICINE

## 2019-08-02 PROCEDURE — 4040F PNEUMOC VAC/ADMIN/RCVD: CPT | Performed by: FAMILY MEDICINE

## 2019-08-02 PROCEDURE — 81001 URINALYSIS AUTO W/SCOPE: CPT

## 2019-08-02 RX ORDER — LIDOCAINE 50 MG/G
OINTMENT TOPICAL
Qty: 240 G | Refills: 3 | Status: SHIPPED | OUTPATIENT
Start: 2019-08-02 | End: 2020-12-16

## 2019-08-02 ASSESSMENT — ENCOUNTER SYMPTOMS
DIARRHEA: 0
BLOOD IN STOOL: 0
COUGH: 0
ABDOMINAL DISTENTION: 0
CONSTIPATION: 0
SHORTNESS OF BREATH: 0
CHEST TIGHTNESS: 0
ABDOMINAL PAIN: 1
VOMITING: 0
WHEEZING: 0
NAUSEA: 0

## 2019-08-02 NOTE — PROGRESS NOTES
He says sometimes has difficulty walking. Patient has noticed the pain is worse when he tries to get up from sitting position. He also has difficulty climbing up the stairs so he has not been doing that. He denies swelling. He denies knees giving out. I advised him he can use a cane but he declines using a cane. He says he does not need it. He denies falls. We discussed that most likely he has a lot of osteoarthritis. He declines PT     He also has neck pain sometimes, worse in cold weather. We discussed possible x-rays or referral.  He does not want that. We discussed creams and Tylenol Extra Strength, and he would like to try them. Again, I advised him to use the cane. 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  Multilevel degenerative abnormalities, especially   facet arthrosis.           GI symptoms  Patient reports he continues to have stomach upset sometimes especially after drinking coffee. He drinks 3 coffees a day. Patient's son says he drinks strong coffee and he always eats something when drinking the coffee. He cannot give up the coffee. His hematologist oncologist, started him on Nexium and he says it is better. Currently, he denies nausea, vomiting, diarrhea , constipation or abdominal pain. Esvin Agarwal reports some suprapubic pain and going to the bathroom at night twice, before was only once. Denies pain when urinating. Symptoms are present for at least 1 week, not getting better  His son says he does not drink enough fluids. Corbin complains of sternal skin tag changing, big, prominent , Itching, brown and black. His son says it has been growing and changing color.   Discussed referral with dermatologist, which the patient reluctantly accepts        Current Outpatient Medications   Medication Sig Dispense Refill    levothyroxine (SYNTHROID) 25 MCG tablet TAKE 1 TABLET BY MOUTH EVERY DAY, PLEASE DO YOUR BLOOD WORK 90 tablet 0  diclofenac sodium 1 % GEL Apply 4 g topically 4 times daily 2 Tube 5    lidocaine (XYLOCAINE) 5 % ointment Apply topically every 8 hrs  as needed for joint pains . OK to substitute 240 g 3    melatonin (RA MELATONIN) 3 MG TABS tablet Take 1 tablet by mouth nightly as needed (insomnia) 30 tablet 3    Cholecalciferol (VITAMIN D3) 2000 units TABS TAKE 1 TABLET BY MOUTH DAILY WITH FOOD 90 tablet 3    tamsulosin (FLOMAX) 0.4 MG capsule TAKE ONE CAPSULE BY MOUTH EVERY DAY 90 capsule 3    atorvastatin (LIPITOR) 20 MG tablet TAKE 1 TABLET BY MOUTH EVERY DAY 90 tablet 3    amLODIPine (NORVASC) 5 MG tablet TAKE 1 TABLET BY MOUTH EVERY DAY 90 tablet 3    enalapril (VASOTEC) 20 MG tablet TAKE 1 TABLET BY MOUTH DAILY 90 tablet 3    metFORMIN (GLUCOPHAGE-XR) 500 MG extended release tablet TAKE 1 TABLET BY MOUTH TWICE DAILY 180 tablet 3    ACCU-CHEK JOEL PLUS strip TEST ONCE A  strip 0    esomeprazole (NEXIUM) 40 MG delayed release capsule Take 1 capsule by mouth every morning (before breakfast) Stop omeprazole 30 capsule 1    cetirizine (ZYRTEC) 5 MG tablet Take 1 tablet by mouth daily Stop claritin 90 tablet 3    blood glucose monitor strips Testing once a day, fasting in the morning 100 strip 3    glucose monitoring kit (FREESTYLE) monitoring kit Please supply Patient with a glucose monitoring kit that insurance will cover. 1 kit 0    ACCU-CHEK FASTCLIX LANCETS MISC Testing blood glucose once a day 102 each 5    cyanocobalamin ([employer] VITAMIN B12) 1000 MCG tablet Take 1 tablet by mouth daily 30 tablet 0    fluticasone (FLONASE) 50 MCG/ACT nasal spray USE 2 SPRAYS IN EACH NOSTRIL ONCE DAILY 3 Bottle 3    Blood Glucose Monitoring Suppl (ACCU-CHEK JOEL PLUS) w/Device KIT USE AS DIRECTED  0    Lancets Misc. (ACCU-CHEK MULTICLIX LANCET DEV) KIT Please dispense according to patients insurance. 1 kit 0    Alcohol Swabs PADS Please dispense according to patients insurance/device.  Testing 1-2 /day 100 each 2 patient is not nervous/anxious.          -vital signs stable and within normal limits except mild bradycardia   /80   Pulse 57   Temp 97 °F (36.1 °C) (Oral)   Resp 16   Wt 176 lb (79.8 kg)   SpO2 98%   BMI 25.25 kg/m²         Physical Exam   Constitutional: He is oriented to person, place, and time. He appears well-developed and well-nourished. No distress. HENT:   Head: Normocephalic and atraumatic. Right Ear: External ear normal.   Left Ear: External ear normal.   Nose: Nose normal.   Mouth/Throat: Oropharynx is clear and moist. No oropharyngeal exudate. Eyes: Conjunctivae and EOM are normal. Right eye exhibits no discharge. Left eye exhibits no discharge. No scleral icterus. Neck: Normal range of motion. Neck supple. No thyromegaly present. Cardiovascular: Normal rate, regular rhythm and intact distal pulses. Murmur heard. Crescendo systolic murmur is present with a grade of 4/6. Pulmonary/Chest: Effort normal and breath sounds normal. No respiratory distress. He has no wheezes. He has no rales. He exhibits no tenderness. Abdominal: Soft. Bowel sounds are normal. He exhibits no distension. There is no hepatosplenomegaly. There is tenderness in the suprapubic area. There is no rigidity and no guarding. Musculoskeletal: He exhibits tenderness. He exhibits no edema. Right hip: He exhibits decreased range of motion. Left hip: He exhibits decreased range of motion. Right knee: He exhibits normal range of motion and no swelling. Tenderness found. Patellar tendon tenderness noted. Left knee: He exhibits normal range of motion and no swelling. Tenderness found. Patellar tendon tenderness noted. Right ankle: He exhibits decreased range of motion. He exhibits no swelling. Tenderness. Left ankle: He exhibits decreased range of motion. Tenderness. Cervical back: He exhibits decreased range of motion, tenderness and bony tenderness.    Up and go [employer]            GFR Staging 07/26/2019 NOT REPORTED   Final    WBC 07/26/2019 4.5  3.5 - 11.0 k/uL Final    RBC 07/26/2019 3.64* 4.5 - 5.9 m/uL Final    Hemoglobin 07/26/2019 11.2* 13.5 - 17.5 g/dL Final    Hematocrit 07/26/2019 34.5* 41 - 53 % Final    MCV 07/26/2019 94.9  80 - 100 fL Final    MCH 07/26/2019 30.9  26 - 34 pg Final    MCHC 07/26/2019 32.6  31 - 37 g/dL Final    RDW 07/26/2019 14.3  11.5 - 14.9 % Final    Platelets 17/60/9455 159  150 - 450 k/uL Final    MPV 07/26/2019 8.8  6.0 - 12.0 fL Final    NRBC Automated 07/26/2019 NOT REPORTED  per 100 WBC Final    TSH 07/26/2019 2.69  0.30 - 5.00 mIU/L Final                Lab Results   Component Value Date    ALT 16 01/23/2019    AST 17 01/23/2019    ALKPHOS 63 01/23/2019    BILITOT 0.50 01/23/2019           Lab Results   Component Value Date    CHOL 123 10/25/2018    CHOL 127 05/19/2017    CHOL 211 (H) 01/06/2017     Lab Results   Component Value Date    TRIG 59 10/25/2018    TRIG 87 05/19/2017    TRIG 94 01/06/2017     Lab Results   Component Value Date    HDL 51 10/25/2018    HDL 34 (L) 05/19/2017    HDL 46 01/06/2017     Lab Results   Component Value Date    LDLCHOLESTEROL 60 10/25/2018    LDLCHOLESTEROL 76 05/19/2017    LDLCHOLESTEROL 146 (H) 01/06/2017     Lab Results   Component Value Date    CHOLHDLRATIO 2.4 10/25/2018    CHOLHDLRATIO 3.7 05/19/2017    CHOLHDLRATIO 4.6 01/06/2017         Lab Results   Component Value Date    KJDMDGAP87 156 08/17/2018     Lab Results   Component Value Date    FOLATE 13.4 08/17/2018     Lab Results   Component Value Date    VITD25 33.2 10/25/2018           ASSESSMENT AND PLAN  1.  Type 2 diabetes mellitus with diabetic polyneuropathy, without long-term current use of insulin (HCC)  Improving   - POCT glycosylated hemoglobin (Hb A1C)  Lab Results   Component Value Date    LABA1C 6.5 08/02/2019    LABA1C 6.8 04/26/2019    LABA1C 6.7 01/23/2019       - lidocaine via SNAPCARD? Yes    Requested Prescriptions     Signed Prescriptions Disp Refills    Camphor-Menthol-Methyl Sal 3.1-16-10 % GEL 1 Tube 1     Sig: Apply every 8 hours as needed for pain. OK to substitute    diclofenac sodium (VOLTAREN) 1 % GEL 1 Tube 3     Sig: Apply 2 g topically 4 times daily as needed for Pain    lidocaine (XYLOCAINE) 5 % ointment 240 g 3     Sig: Apply topically as needed every 8 hrs prn for pain. All patient questions answered. Patient voiced understanding. Quality Measures    Body mass index is 25.25 kg/m². Normal. Weight control planned discussed Healthy diet and regular exercise. BP: 130/80. Blood pressure is normal. Treatment plan consists of DASH Eating Plan, Dietary Sodium Restriction, Increased Physical Activity, Patient In-home Blood Pressure Monitoring and No treatment change needed. Fall Risk 4/26/2019 1/23/2019 1/19/2018 1/5/2017   2 or more falls in past year? no no no no   Fall with injury in past year? no no no no     The patient does not have a history of falls. I did , complete a risk assessment for falls. A plan of care for falls in-office gait and balance testing performed using The Timed Up and Go Test was positive for increased falls risk, home safety tips provided, patient declines any further evaluation/treatment for increased falls risk      LDL controlled   Lab Results   Component Value Date    LDLCHOLESTEROL 60 10/25/2018    (goal LDL reduction with dx if diabetes is 50% LDL reduction)      Negative depression screening.    PHQ Scores 4/26/2019 7/20/2018 1/19/2018 1/5/2017   PHQ2 Score 0 0 0 1   PHQ9 Score 0 0 0 1     Interpretation of Total Score Depression Severity: 1-4 = Minimal depression, 5-9 = Mild depression, 10-14 = Moderate depression, 15-19 = Moderately severe depression, 20-27 = Severe depression        The patient's past medical,surgical, social, and family history as well as his   current medications and allergies were reviewed as

## 2019-08-02 NOTE — PATIENT INSTRUCTIONS
problem at home. Home remedies or treatments that you can buy without a prescription (such as corn removers) can be harmful. · Always get early treatment for foot problems. A minor irritation can lead to a major problem if not properly cared for early. When should you call for help? Call your doctor now or seek immediate medical care if:    · You have a foot sore, an ulcer or break in the skin that is not healing after 4 days, bleeding corns or calluses, or an ingrown toenail.     · You have blue or black areas, which can mean bruising or blood flow problems.     · You have peeling skin or tiny blisters between your toes or cracking or oozing of the skin.     · You have a fever for more than 24 hours and a foot sore.     · You have new numbness or tingling in your feet that does not go away after you move your feet or change positions.     · You have unexplained or unusual swelling of the foot or ankle.    Watch closely for changes in your health, and be sure to contact your doctor if:    · You cannot do proper foot care. Where can you learn more? Go to [email or web address] org and sign in to your [employer] account. Enter A739 in the [facility] box to learn more about \"Diabetes Foot Health: Care Instructions. \"     If you do not have an account, please click on the \"Sign Up Now\" link. Current as of: July 25, 2018  Content Version: 12.0  © 7056-4790 Healthwise, Incorporated. Care instructions adapted under license by [facility] ([facility]). If you have questions about a medical condition or this instruction, always ask your healthcare professional. [place] [address] any warranty or liability for your use of this information.

## 2019-08-02 NOTE — PROGRESS NOTES
Visit Information    Have you changed or started any medications since your last visit including any over-the-counter medicines, vitamins, or herbal medicines? no   Are you having any side effects from any of your medications? -  no  Have you stopped taking any of your medications? Is so, why? -  no    Have you seen any other physician or provider since your last visit? Yes - Records Obtained  Have you had any other diagnostic tests since your last visit? No  Have you been seen in the emergency room and/or had an admission to a hospital since we last saw you? No  Have you had your routine dental cleaning in the past 6 months? no    Have you activated your [employer] account? If not, what are your barriers?  Yes     Patient Care Team:  Myla Ramos MD as PCP - General (Family Medicine)  Myla Ramos MD as PCP - [facility]  Janak Angelo (Optometry)  Milena Carr MD as Surgeon (Cardiology)  Marion Nguyen MD as Consulting Physician (Urology)  Brock Paz MD as Consulting Physician (Gastroenterology)  Bobby Powell MD as Surgeon (Ophthalmology)  David Slade (Certified Nurse Practitioner)  Latoya Cedillo MD as Consulting Physician (Oncology)    Medical History Review  Past Medical, Family, and Social History reviewed and does contribute to the patient presenting condition    Health Maintenance   Topic Date Due    DTaP/Tdap/Td vaccine (1 - Tdap) 09/20/1943    Annual Wellness Visit (AWV)  09/20/1987    Flu vaccine (1) 09/01/2019    TSH testing  07/26/2020    Potassium monitoring  07/26/2020    Creatinine monitoring  07/26/2020    Shingles Vaccine  Completed    Pneumococcal 65+ years Vaccine  Completed

## 2019-08-04 PROBLEM — R10.13 DYSPEPSIA: Status: RESOLVED | Noted: 2017-11-15 | Resolved: 2019-08-04

## 2019-08-04 PROBLEM — K21.00 GASTROESOPHAGEAL REFLUX DISEASE WITH ESOPHAGITIS: Status: RESOLVED | Noted: 2017-09-25 | Resolved: 2019-08-04

## 2019-08-04 PROBLEM — L98.9 CHANGING SKIN LESION: Status: ACTIVE | Noted: 2019-08-04

## 2019-08-04 PROBLEM — M50.30 DDD (DEGENERATIVE DISC DISEASE), CERVICAL: Status: ACTIVE | Noted: 2019-08-04

## 2019-08-04 PROBLEM — R63.4 UNINTENDED WEIGHT LOSS: Status: RESOLVED | Noted: 2018-04-22 | Resolved: 2019-08-04

## 2019-08-04 PROBLEM — M17.0 PRIMARY OSTEOARTHRITIS OF BOTH KNEES: Status: ACTIVE | Noted: 2019-08-04

## 2019-08-04 PROBLEM — K90.9 MALABSORPTION: Status: RESOLVED | Noted: 2018-01-08 | Resolved: 2019-08-04

## 2019-08-04 PROBLEM — R14.0 BLOATING: Status: RESOLVED | Noted: 2017-11-15 | Resolved: 2019-08-04

## 2019-08-04 ASSESSMENT — ENCOUNTER SYMPTOMS: BACK PAIN: 0

## 2019-08-05 ENCOUNTER — TELEPHONE (OUTPATIENT)
Dept: FAMILY MEDICINE CLINIC | Age: 84
End: 2019-08-05

## 2019-08-05 DIAGNOSIS — M17.0 PRIMARY OSTEOARTHRITIS OF BOTH KNEES: ICD-10-CM

## 2019-08-05 DIAGNOSIS — M50.30 DDD (DEGENERATIVE DISC DISEASE), CERVICAL: Primary | ICD-10-CM

## 2019-08-06 NOTE — TELEPHONE ENCOUNTER
Please let the patient know to  prescription from pharmacy. Requested Prescriptions     Signed Prescriptions Disp Refills    camphor-menthol-methyl salicylate (BENGAY ULTRA STRENGTH) 4-10-30 % CREA cream 1 Tube 0     Sig: Apply topically 3 times daily as needed for Pain OK to substitute to [employer] Provider: [facility] [address] [place] [address], [address] [address]  [address] [facility]  Phone: [phone] Fax: [phone]      Thank you!         FYI    Future Appointments   Date Time Provider Toy Watts   8/21/2019  3:00 PM [facility]   9/17/2019  2:50 PM Susie Rodriguez MD [address] [place] [address]   11/15/2019  3:00 PM [facility]   12/9/2019  2:40 PM Markie Uriarte MD [address] [place] [address]       Controlled Substances Monitoring:

## 2019-08-21 ENCOUNTER — HOSPITAL ENCOUNTER (OUTPATIENT)
Age: 84
Setting detail: SPECIMEN
Discharge: HOME | End: 2019-08-21
Payer: MEDICARE

## 2019-08-21 ENCOUNTER — OFFICE VISIT (OUTPATIENT)
Dept: DERMATOLOGY | Age: 84
End: 2019-08-21
Payer: MEDICARE

## 2019-08-21 VITALS
OXYGEN SATURATION: 97 % | BODY MASS INDEX: 24.82 KG/M2 | WEIGHT: 173.4 LBS | HEART RATE: 74 BPM | HEIGHT: 70 IN | SYSTOLIC BLOOD PRESSURE: 164 MMHG | DIASTOLIC BLOOD PRESSURE: 69 MMHG

## 2019-08-21 DIAGNOSIS — D48.5 NEOPLASM OF UNCERTAIN BEHAVIOR OF SKIN: ICD-10-CM

## 2019-08-21 DIAGNOSIS — L82.1 SEBORRHEIC KERATOSES: Primary | ICD-10-CM

## 2019-08-21 PROCEDURE — 4040F PNEUMOC VAC/ADMIN/RCVD: CPT | Performed by: DERMATOLOGY

## 2019-08-21 PROCEDURE — 1036F TOBACCO NON-USER: CPT | Performed by: DERMATOLOGY

## 2019-08-21 PROCEDURE — G8420 CALC BMI NORM PARAMETERS: HCPCS | Performed by: DERMATOLOGY

## 2019-08-21 PROCEDURE — 99202 OFFICE O/P NEW SF 15 MIN: CPT | Performed by: DERMATOLOGY

## 2019-08-21 PROCEDURE — G8427 DOCREV CUR MEDS BY ELIG CLIN: HCPCS | Performed by: DERMATOLOGY

## 2019-08-21 PROCEDURE — 1123F ACP DISCUSS/DSCN MKR DOCD: CPT | Performed by: DERMATOLOGY

## 2019-08-21 PROCEDURE — 11102 TANGNTL BX SKIN SINGLE LES: CPT | Performed by: DERMATOLOGY

## 2019-08-21 RX ORDER — LIDOCAINE HYDROCHLORIDE AND EPINEPHRINE 10; 10 MG/ML; UG/ML
0.2 INJECTION, SOLUTION INFILTRATION; PERINEURAL ONCE
Status: COMPLETED | OUTPATIENT
Start: 2019-08-21 | End: 2019-08-21

## 2019-08-21 RX ADMIN — LIDOCAINE HYDROCHLORIDE AND EPINEPHRINE 0.2 ML: 10; 10 INJECTION, SOLUTION INFILTRATION; PERINEURAL at 15:25

## 2019-08-21 NOTE — PATIENT INSTRUCTIONS

## 2019-08-21 NOTE — PROGRESS NOTES
the wound air dry, then apply Vaseline ointment and cover with a Band-Aid       unless otherwise instructed by your provider. D. If there is slight discomfort, you may give acetaminophen or ibuprofen. When To Call the Doctor    Call the Dermatology Clinic or your doctor if any of the following occur:    A. Redness and swelling  B. Tenderness and warm to touch  C.  Drainage from wound  D. Fever    Biopsy Results    Biopsy results are usually available in 1-2 weeks. We provide biopsy results in letters for begin results or we will call for any concerning results. If you have not heard from our staff please call the office within 2 weeks. Please call our office with any concerns at [phone]. Follow-up: No follow-ups on file. This note was created with the assistance of a speech-recognition program.  Although the intention is to generate a document that actually reflects the content of the visit, no guarantees can be provided that every mistake has been identified and corrected byediting.     Electronically signed by Ramon Barros MD on 8/21/19 at 2:53 PM

## 2019-08-28 LAB — DERMATOLOGY PATHOLOGY REPORT: NORMAL

## 2019-09-12 ENCOUNTER — TELEPHONE (OUTPATIENT)
Dept: PHARMACY | Facility: CLINIC | Age: 84
End: 2019-09-12

## 2019-09-13 NOTE — TELEPHONE ENCOUNTER
Reviewed and agree with PharmD candidate. Atorvastatin:  - per [facility] report has met goal of 80% PDC for 2019 (due for next fill 9/24/19)  · however appears index date 3/25/19  · Filled 3/25/19, 6/26/19  · Needs 226 covered days, has filled 180    Is also on:  - enalapril  · Appears index date 2/19/19  · Filled 2/19, 5/20, 8/18 x 90 ds  · Needs 253 covered days, has filled 270  - metformin  · Appears index date 2/22/19  · Filled 2/22, 5/23, 8/23 x 90 ds  · Needs 251 covered days, filled 270    Will continue to monitor. Will send letter per pt request.    Lai Le PharmD, [facility] Pharmacist  Direct: [phone]  Dept: [phone] (toll free [phone], option 7)     For Pharmacy Admin Tracking Only    PHSO: Yes  Total # of Interventions Recommended: 1  - New Order #: 0 New Medication Order Reason(s): Adherence  - Refills Provided #: 0  - Updated Order #: 0 Updated Order Reason(s):  Other  - Maintenance Safety Lab Monitoring #: 0  - New Therapy Lab Monitoring #: 1  Recommended intervention potential cost savings: 1  Total Interventions Accepted: 1  Time Spent (min): 15

## 2019-09-16 DIAGNOSIS — E03.9 ACQUIRED HYPOTHYROIDISM: ICD-10-CM

## 2019-09-16 RX ORDER — LEVOTHYROXINE SODIUM 0.03 MG/1
25 TABLET ORAL
Qty: 90 TABLET | Refills: 3 | Status: SHIPPED | OUTPATIENT
Start: 2019-09-16 | End: 2019-12-16 | Stop reason: SDUPTHER

## 2019-09-18 ENCOUNTER — PATIENT MESSAGE (OUTPATIENT)
Dept: FAMILY MEDICINE CLINIC | Age: 84
End: 2019-09-18

## 2019-09-18 DIAGNOSIS — M50.30 DDD (DEGENERATIVE DISC DISEASE), CERVICAL: ICD-10-CM

## 2019-09-18 DIAGNOSIS — M54.2 CHRONIC NECK PAIN: Primary | ICD-10-CM

## 2019-09-18 DIAGNOSIS — Z91.81 AT HIGH RISK FOR FALLS: ICD-10-CM

## 2019-09-18 DIAGNOSIS — G89.29 CHRONIC NECK PAIN: Primary | ICD-10-CM

## 2019-09-18 DIAGNOSIS — M17.0 PRIMARY OSTEOARTHRITIS OF BOTH KNEES: ICD-10-CM

## 2019-09-20 DIAGNOSIS — E11.42 TYPE 2 DIABETES MELLITUS WITH DIABETIC POLYNEUROPATHY, WITHOUT LONG-TERM CURRENT USE OF INSULIN (HCC): ICD-10-CM

## 2019-09-20 RX ORDER — BLOOD-GLUCOSE METER
EACH MISCELLANEOUS
Qty: 1 KIT | Refills: 0 | Status: SHIPPED | OUTPATIENT
Start: 2019-09-20 | End: 2020-02-17 | Stop reason: SDUPTHER

## 2019-10-07 DIAGNOSIS — E11.42 TYPE 2 DIABETES MELLITUS WITH DIABETIC POLYNEUROPATHY, WITHOUT LONG-TERM CURRENT USE OF INSULIN (HCC): ICD-10-CM

## 2019-10-15 ENCOUNTER — OFFICE VISIT (OUTPATIENT)
Dept: UROLOGY | Age: 84
End: 2019-10-15
Payer: MEDICARE

## 2019-10-15 VITALS
HEART RATE: 76 BPM | SYSTOLIC BLOOD PRESSURE: 151 MMHG | HEIGHT: 70 IN | WEIGHT: 176 LBS | DIASTOLIC BLOOD PRESSURE: 77 MMHG | BODY MASS INDEX: 25.2 KG/M2 | TEMPERATURE: 98.2 F

## 2019-10-15 DIAGNOSIS — R35.1 NOCTURIA: ICD-10-CM

## 2019-10-15 DIAGNOSIS — N40.1 BENIGN PROSTATIC HYPERPLASIA WITH URINARY HESITANCY: Primary | ICD-10-CM

## 2019-10-15 DIAGNOSIS — R39.11 BENIGN PROSTATIC HYPERPLASIA WITH URINARY HESITANCY: Primary | ICD-10-CM

## 2019-10-15 PROCEDURE — G8484 FLU IMMUNIZE NO ADMIN: HCPCS | Performed by: UROLOGY

## 2019-10-15 PROCEDURE — 1123F ACP DISCUSS/DSCN MKR DOCD: CPT | Performed by: UROLOGY

## 2019-10-15 PROCEDURE — G8417 CALC BMI ABV UP PARAM F/U: HCPCS | Performed by: UROLOGY

## 2019-10-15 PROCEDURE — G8428 CUR MEDS NOT DOCUMENT: HCPCS | Performed by: UROLOGY

## 2019-10-15 PROCEDURE — 99213 OFFICE O/P EST LOW 20 MIN: CPT | Performed by: UROLOGY

## 2019-10-15 PROCEDURE — 4040F PNEUMOC VAC/ADMIN/RCVD: CPT | Performed by: UROLOGY

## 2019-10-15 PROCEDURE — 1036F TOBACCO NON-USER: CPT | Performed by: UROLOGY

## 2019-10-15 RX ORDER — TAMSULOSIN HYDROCHLORIDE 0.4 MG/1
0.4 CAPSULE ORAL DAILY
Qty: 90 CAPSULE | Refills: 3 | Status: SHIPPED | OUTPATIENT
Start: 2019-10-15 | End: 2020-06-13 | Stop reason: SDUPTHER

## 2019-10-15 ASSESSMENT — ENCOUNTER SYMPTOMS
EYE PAIN: 0
COUGH: 0
SHORTNESS OF BREATH: 0
ABDOMINAL PAIN: 0
NAUSEA: 0
EYE REDNESS: 0
DIARRHEA: 0
CONSTIPATION: 0
VOMITING: 0
BACK PAIN: 0
WHEEZING: 0

## 2019-10-29 DIAGNOSIS — Z79.4 TYPE 2 DIABETES MELLITUS WITH DIABETIC POLYNEUROPATHY, WITH LONG-TERM CURRENT USE OF INSULIN (HCC): ICD-10-CM

## 2019-10-29 DIAGNOSIS — E11.42 TYPE 2 DIABETES MELLITUS WITH DIABETIC POLYNEUROPATHY, WITH LONG-TERM CURRENT USE OF INSULIN (HCC): ICD-10-CM

## 2019-10-29 RX ORDER — LANCETS
EACH MISCELLANEOUS
Qty: 100 EACH | Refills: 3 | Status: SHIPPED | OUTPATIENT
Start: 2019-10-29 | End: 2020-02-17 | Stop reason: SDUPTHER

## 2019-11-08 ENCOUNTER — HOSPITAL ENCOUNTER (OUTPATIENT)
Age: 84
Discharge: HOME | End: 2019-11-08
Payer: MEDICARE

## 2019-11-08 DIAGNOSIS — E78.5 HYPERLIPIDEMIA WITH TARGET LDL LESS THAN 70: ICD-10-CM

## 2019-11-08 DIAGNOSIS — I10 ESSENTIAL HYPERTENSION: ICD-10-CM

## 2019-11-08 DIAGNOSIS — E11.42 TYPE 2 DIABETES MELLITUS WITH DIABETIC POLYNEUROPATHY, WITHOUT LONG-TERM CURRENT USE OF INSULIN (HCC): ICD-10-CM

## 2019-11-08 LAB
ALBUMIN SERPL-MCNC: 4.3 G/DL (ref 3.5–5.2)
ALBUMIN/GLOBULIN RATIO: ABNORMAL (ref 1–2.5)
ALP BLD-CCNC: 61 U/L (ref 40–129)
ALT SERPL-CCNC: 12 U/L (ref 5–41)
ANION GAP SERPL CALCULATED.3IONS-SCNC: 13 MMOL/L (ref 9–17)
AST SERPL-CCNC: 17 U/L
BILIRUB SERPL-MCNC: 0.56 MG/DL (ref 0.3–1.2)
BUN BLDV-MCNC: 24 MG/DL (ref 8–23)
BUN/CREAT BLD: ABNORMAL (ref 9–20)
CALCIUM SERPL-MCNC: 10.2 MG/DL (ref 8.6–10.4)
CHLORIDE BLD-SCNC: 103 MMOL/L (ref 98–107)
CHOLESTEROL/HDL RATIO: 2.7
CHOLESTEROL: 134 MG/DL
CO2: 24 MMOL/L (ref 20–31)
CREAT SERPL-MCNC: 0.95 MG/DL (ref 0.7–1.2)
FOLATE: >20 NG/ML
GFR AFRICAN AMERICAN: >60 ML/MIN
GFR NON-AFRICAN AMERICAN: >60 ML/MIN
GFR SERPL CREATININE-BSD FRML MDRD: ABNORMAL ML/MIN/{1.73_M2}
GFR SERPL CREATININE-BSD FRML MDRD: ABNORMAL ML/MIN/{1.73_M2}
GLUCOSE BLD-MCNC: 162 MG/DL (ref 70–99)
HDLC SERPL-MCNC: 49 MG/DL
LDL CHOLESTEROL: 69 MG/DL (ref 0–130)
POTASSIUM SERPL-SCNC: 4.5 MMOL/L (ref 3.7–5.3)
SODIUM BLD-SCNC: 140 MMOL/L (ref 135–144)
TOTAL PROTEIN: 7.4 G/DL (ref 6.4–8.3)
TRIGL SERPL-MCNC: 78 MG/DL
VITAMIN B-12: 430 PG/ML (ref 232–1245)
VLDLC SERPL CALC-MCNC: NORMAL MG/DL (ref 1–30)

## 2019-11-08 PROCEDURE — 80061 LIPID PANEL: CPT

## 2019-11-08 PROCEDURE — 36415 COLL VENOUS BLD VENIPUNCTURE: CPT

## 2019-11-08 PROCEDURE — 82607 VITAMIN B-12: CPT

## 2019-11-08 PROCEDURE — 80053 COMPREHEN METABOLIC PANEL: CPT

## 2019-11-08 PROCEDURE — 82746 ASSAY OF FOLIC ACID SERUM: CPT

## 2019-11-14 DIAGNOSIS — I10 ESSENTIAL HYPERTENSION: ICD-10-CM

## 2019-11-14 RX ORDER — ENALAPRIL MALEATE 20 MG/1
TABLET ORAL
Qty: 90 TABLET | Refills: 3 | Status: SHIPPED | OUTPATIENT
Start: 2019-11-14 | End: 2020-02-17 | Stop reason: SDUPTHER

## 2019-11-15 ENCOUNTER — OFFICE VISIT (OUTPATIENT)
Dept: FAMILY MEDICINE CLINIC | Age: 84
End: 2019-11-15
Payer: MEDICARE

## 2019-11-15 VITALS
DIASTOLIC BLOOD PRESSURE: 80 MMHG | WEIGHT: 174 LBS | HEIGHT: 70 IN | OXYGEN SATURATION: 99 % | SYSTOLIC BLOOD PRESSURE: 120 MMHG | HEART RATE: 70 BPM | BODY MASS INDEX: 24.91 KG/M2

## 2019-11-15 DIAGNOSIS — G89.29 CHRONIC RIGHT SHOULDER PAIN: ICD-10-CM

## 2019-11-15 DIAGNOSIS — G89.29 CHRONIC NECK PAIN: ICD-10-CM

## 2019-11-15 DIAGNOSIS — K21.9 GASTROESOPHAGEAL REFLUX DISEASE WITHOUT ESOPHAGITIS: ICD-10-CM

## 2019-11-15 DIAGNOSIS — I10 ESSENTIAL HYPERTENSION: ICD-10-CM

## 2019-11-15 DIAGNOSIS — Z23 NEED FOR IMMUNIZATION AGAINST INFLUENZA: ICD-10-CM

## 2019-11-15 DIAGNOSIS — E11.42 TYPE 2 DIABETES MELLITUS WITH DIABETIC POLYNEUROPATHY, WITHOUT LONG-TERM CURRENT USE OF INSULIN (HCC): Primary | ICD-10-CM

## 2019-11-15 DIAGNOSIS — M25.511 CHRONIC RIGHT SHOULDER PAIN: ICD-10-CM

## 2019-11-15 DIAGNOSIS — Z23 NEED FOR DIPHTHERIA-TETANUS-PERTUSSIS (TDAP) VACCINE: ICD-10-CM

## 2019-11-15 DIAGNOSIS — E78.5 HYPERLIPIDEMIA WITH TARGET LDL LESS THAN 70: ICD-10-CM

## 2019-11-15 DIAGNOSIS — M54.2 CHRONIC NECK PAIN: ICD-10-CM

## 2019-11-15 LAB
CREATININE URINE POCT: 50
HBA1C MFR BLD: 6.8 %
MICROALBUMIN/CREAT 24H UR: 80 MG/G{CREAT}
MICROALBUMIN/CREAT UR-RTO: ABNORMAL

## 2019-11-15 PROCEDURE — 1036F TOBACCO NON-USER: CPT | Performed by: FAMILY MEDICINE

## 2019-11-15 PROCEDURE — 4040F PNEUMOC VAC/ADMIN/RCVD: CPT | Performed by: FAMILY MEDICINE

## 2019-11-15 PROCEDURE — 1123F ACP DISCUSS/DSCN MKR DOCD: CPT | Performed by: FAMILY MEDICINE

## 2019-11-15 PROCEDURE — G8420 CALC BMI NORM PARAMETERS: HCPCS | Performed by: FAMILY MEDICINE

## 2019-11-15 PROCEDURE — 82044 UR ALBUMIN SEMIQUANTITATIVE: CPT | Performed by: FAMILY MEDICINE

## 2019-11-15 PROCEDURE — G0008 ADMIN INFLUENZA VIRUS VAC: HCPCS | Performed by: FAMILY MEDICINE

## 2019-11-15 PROCEDURE — G8482 FLU IMMUNIZE ORDER/ADMIN: HCPCS | Performed by: FAMILY MEDICINE

## 2019-11-15 PROCEDURE — 99214 OFFICE O/P EST MOD 30 MIN: CPT | Performed by: FAMILY MEDICINE

## 2019-11-15 PROCEDURE — 90686 IIV4 VACC NO PRSV 0.5 ML IM: CPT | Performed by: FAMILY MEDICINE

## 2019-11-15 PROCEDURE — G8427 DOCREV CUR MEDS BY ELIG CLIN: HCPCS | Performed by: FAMILY MEDICINE

## 2019-11-15 PROCEDURE — 83036 HEMOGLOBIN GLYCOSYLATED A1C: CPT | Performed by: FAMILY MEDICINE

## 2019-11-15 RX ORDER — LIDOCAINE 50 MG/G
1 PATCH TOPICAL DAILY
Qty: 10 PATCH | Refills: 0 | Status: SHIPPED | OUTPATIENT
Start: 2019-11-15 | End: 2020-02-26 | Stop reason: SDUPTHER

## 2019-11-15 RX ORDER — ESOMEPRAZOLE MAGNESIUM 40 MG/1
40 CAPSULE, DELAYED RELEASE ORAL
Qty: 90 CAPSULE | Refills: 0 | Status: SHIPPED | OUTPATIENT
Start: 2019-11-15 | End: 2019-12-16 | Stop reason: SDUPTHER

## 2019-11-15 ASSESSMENT — ENCOUNTER SYMPTOMS
DIARRHEA: 0
ABDOMINAL DISTENTION: 0
BLOOD IN STOOL: 0
SHORTNESS OF BREATH: 0
ABDOMINAL PAIN: 0
WHEEZING: 0
COUGH: 0
VOMITING: 0
CONSTIPATION: 0
CHEST TIGHTNESS: 0
NAUSEA: 0

## 2019-11-17 PROBLEM — M25.511 CHRONIC RIGHT SHOULDER PAIN: Status: ACTIVE | Noted: 2019-11-17

## 2019-11-17 PROBLEM — R10.2 SUPRAPUBIC PAIN: Status: RESOLVED | Noted: 2018-07-22 | Resolved: 2019-11-17

## 2019-11-17 PROBLEM — G89.29 CHRONIC RIGHT SHOULDER PAIN: Status: ACTIVE | Noted: 2019-11-17

## 2019-11-17 PROBLEM — D64.9 ANEMIA: Status: RESOLVED | Noted: 2017-01-06 | Resolved: 2019-11-17

## 2019-11-17 PROBLEM — L98.9 CHANGING SKIN LESION: Status: RESOLVED | Noted: 2019-08-04 | Resolved: 2019-11-17

## 2019-11-21 DIAGNOSIS — E11.42 TYPE 2 DIABETES MELLITUS WITH DIABETIC POLYNEUROPATHY, WITHOUT LONG-TERM CURRENT USE OF INSULIN (HCC): ICD-10-CM

## 2019-11-21 RX ORDER — METFORMIN HYDROCHLORIDE 500 MG/1
TABLET, EXTENDED RELEASE ORAL
Qty: 180 TABLET | Refills: 0 | Status: SHIPPED | OUTPATIENT
Start: 2019-11-21 | End: 2020-02-26 | Stop reason: SDUPTHER

## 2019-12-06 DIAGNOSIS — D50.9 IRON DEFICIENCY ANEMIA, UNSPECIFIED IRON DEFICIENCY ANEMIA TYPE: Primary | ICD-10-CM

## 2019-12-09 ENCOUNTER — HOSPITAL ENCOUNTER (OUTPATIENT)
Age: 84
Discharge: HOME | End: 2019-12-09
Payer: MEDICARE

## 2019-12-12 ENCOUNTER — TELEPHONE (OUTPATIENT)
Dept: PHARMACY | Facility: CLINIC | Age: 84
End: 2019-12-12

## 2019-12-16 DIAGNOSIS — K21.9 GASTROESOPHAGEAL REFLUX DISEASE WITHOUT ESOPHAGITIS: ICD-10-CM

## 2019-12-16 DIAGNOSIS — E03.9 ACQUIRED HYPOTHYROIDISM: ICD-10-CM

## 2019-12-16 RX ORDER — LEVOTHYROXINE SODIUM 0.03 MG/1
25 TABLET ORAL
Qty: 90 TABLET | Refills: 3 | Status: SHIPPED | OUTPATIENT
Start: 2019-12-16 | End: 2020-03-13 | Stop reason: SDUPTHER

## 2019-12-16 RX ORDER — ESOMEPRAZOLE MAGNESIUM 40 MG/1
40 CAPSULE, DELAYED RELEASE ORAL
Qty: 90 CAPSULE | Refills: 3 | Status: SHIPPED | OUTPATIENT
Start: 2019-12-16 | End: 2020-03-24 | Stop reason: SDUPTHER

## 2019-12-18 RX ORDER — TAMSULOSIN HYDROCHLORIDE 0.4 MG/1
0.4 CAPSULE ORAL DAILY
Qty: 90 CAPSULE | Refills: 3 | OUTPATIENT
Start: 2019-12-18

## 2020-02-13 RX ORDER — AMLODIPINE BESYLATE 5 MG/1
TABLET ORAL
Qty: 90 TABLET | Refills: 3 | Status: SHIPPED | OUTPATIENT
Start: 2020-02-13 | End: 2020-02-17 | Stop reason: SDUPTHER

## 2020-02-18 RX ORDER — AMLODIPINE BESYLATE 5 MG/1
5 TABLET ORAL DAILY
Qty: 90 TABLET | Refills: 3 | Status: SHIPPED | OUTPATIENT
Start: 2020-02-18 | End: 2020-05-16 | Stop reason: SDUPTHER

## 2020-02-18 RX ORDER — BLOOD-GLUCOSE METER
EACH MISCELLANEOUS
Qty: 1 KIT | Refills: 0 | Status: SHIPPED | OUTPATIENT
Start: 2020-02-18

## 2020-02-18 RX ORDER — LANCETS
EACH MISCELLANEOUS
Qty: 100 EACH | Refills: 3 | Status: SHIPPED | OUTPATIENT
Start: 2020-02-18

## 2020-02-18 RX ORDER — ENALAPRIL MALEATE 20 MG/1
20 TABLET ORAL DAILY
Qty: 90 TABLET | Refills: 3 | Status: SHIPPED | OUTPATIENT
Start: 2020-02-18 | End: 2020-05-16 | Stop reason: SDUPTHER

## 2020-02-19 ENCOUNTER — HOSPITAL ENCOUNTER (OUTPATIENT)
Age: 85
Discharge: HOME | End: 2020-02-19
Payer: MEDICARE

## 2020-02-19 LAB
ALBUMIN SERPL-MCNC: 3.9 G/DL (ref 3.5–5.2)
ALBUMIN/GLOBULIN RATIO: ABNORMAL (ref 1–2.5)
ALP BLD-CCNC: 69 U/L (ref 40–129)
ALT SERPL-CCNC: 17 U/L (ref 5–41)
ANION GAP SERPL CALCULATED.3IONS-SCNC: 13 MMOL/L (ref 9–17)
AST SERPL-CCNC: 17 U/L
BILIRUB SERPL-MCNC: 0.6 MG/DL (ref 0.3–1.2)
BUN BLDV-MCNC: 24 MG/DL (ref 8–23)
BUN/CREAT BLD: ABNORMAL (ref 9–20)
CALCIUM SERPL-MCNC: 9.6 MG/DL (ref 8.6–10.4)
CHLORIDE BLD-SCNC: 103 MMOL/L (ref 98–107)
CO2: 26 MMOL/L (ref 20–31)
CREAT SERPL-MCNC: 1 MG/DL (ref 0.7–1.2)
GFR AFRICAN AMERICAN: >60 ML/MIN
GFR NON-AFRICAN AMERICAN: >60 ML/MIN
GFR SERPL CREATININE-BSD FRML MDRD: ABNORMAL ML/MIN/{1.73_M2}
GFR SERPL CREATININE-BSD FRML MDRD: ABNORMAL ML/MIN/{1.73_M2}
GLUCOSE BLD-MCNC: 158 MG/DL (ref 70–99)
HCT VFR BLD CALC: 33.7 % (ref 41–53)
HEMOGLOBIN: 11.1 G/DL (ref 13.5–17.5)
MCH RBC QN AUTO: 31.6 PG (ref 26–34)
MCHC RBC AUTO-ENTMCNC: 32.9 G/DL (ref 31–37)
MCV RBC AUTO: 95.8 FL (ref 80–100)
NRBC AUTOMATED: ABNORMAL PER 100 WBC
PDW BLD-RTO: 14.7 % (ref 11.5–14.9)
PLATELET # BLD: 156 K/UL (ref 150–450)
PMV BLD AUTO: 8.8 FL (ref 6–12)
POTASSIUM SERPL-SCNC: 4.1 MMOL/L (ref 3.7–5.3)
RBC # BLD: 3.52 M/UL (ref 4.5–5.9)
SODIUM BLD-SCNC: 142 MMOL/L (ref 135–144)
TOTAL PROTEIN: 7 G/DL (ref 6.4–8.3)
WBC # BLD: 5.3 K/UL (ref 3.5–11)

## 2020-02-19 PROCEDURE — 85027 COMPLETE CBC AUTOMATED: CPT

## 2020-02-19 PROCEDURE — 80053 COMPREHEN METABOLIC PANEL: CPT

## 2020-02-19 PROCEDURE — 36415 COLL VENOUS BLD VENIPUNCTURE: CPT

## 2020-02-20 ENCOUNTER — OFFICE VISIT (OUTPATIENT)
Dept: PODIATRY | Age: 85
End: 2020-02-20
Payer: MEDICARE

## 2020-02-20 VITALS — BODY MASS INDEX: 24.91 KG/M2 | HEIGHT: 70 IN | WEIGHT: 174 LBS

## 2020-02-20 PROCEDURE — 99999 PR OFFICE/OUTPT VISIT,PROCEDURE ONLY: CPT | Performed by: PODIATRIST

## 2020-02-20 PROCEDURE — 11721 DEBRIDE NAIL 6 OR MORE: CPT | Performed by: PODIATRIST

## 2020-02-20 ASSESSMENT — ENCOUNTER SYMPTOMS
DIARRHEA: 0
COLOR CHANGE: 0
VOMITING: 0
CONSTIPATION: 0
NAUSEA: 0

## 2020-02-20 NOTE — PROGRESS NOTES
[facility]  Return Patient    Chief Complaint   Patient presents with    Nail Problem     b/l nail trim/ last seen Dr. Odilia Palomino 11/15/19    Other     last blood sugar 194       Subjective: Kathya Taylor comes to clinic for Nail Problem (b/l nail trim/ last seen Dr. Odilia Palomino 11/15/19) and Other (last blood sugar 194)    he is a diabetic and states that he has been doing well. I saw him over a year ago. Pt currently has complaint of thickened, elongated nails that they cannot manage by themselves. Pt's primary care physician is Odilia Palomino MD   Pt's last blood sugar was 194. Lab Results   Component Value Date    LABA1C 6.8 11/15/2019      Complains of numbness in the feet bilat.   Past Medical History:   Diagnosis Date    Acute bronchitis due to infection 9/1/2017    Allergic rhinitis due to allergen 7/22/2018    Anemia 1/6/2017    Aortic stenosis, moderate-severe 2/16/2017    Chronic kidney disease     Diabetes ([address]     Diabetic nephropathy associated with type 2 diabetes mellitus ([address] 5/17/5361    Diastolic dysfunction without heart failure 2/16/2017    Gastroesophageal reflux disease without esophagitis 12/10/2018    H. pylori infection     Hearing loss     Helicobacter pylori gastritis 8/31/2017    Hyperlipidemia     Hypertension     Hypothyroidism     Iron deficiency anemia 2/20/2017    LVH (left ventricular hypertrophy) due to hypertensive disease 2/16/2017    Osteoarthritis     Pulmonary hypertension ([address] 9/19/2017    RVSP 51 mmHg on 9/13/17       PVD [facility]peripheral vascular disease) ([facility])     Type 2 diabetes mellitus with diabetic polyneuropathy, with long-term current use of insulin ([facility]) 10/27/2016       Allergies   Allergen Reactions    Aspirin      Anemia, hematuria      Sulfa Antibiotics Rash     Current Outpatient Medications on File Prior to Visit   Medication Sig Dispense Refill    Blood Glucose Monitoring Suppl (ACCU-CHEK JOEL PLUS) w/Device KIT USE AS DIRECTED 1 kit 0    Accu-Chek FastClix Lancets MISC USE TO TEST BLOOD GLUCOSE ONCE A  each 3    enalapril (VASOTEC) 20 MG tablet Take 1 tablet by mouth daily 90 tablet 3    amLODIPine (NORVASC) 5 MG tablet Take 1 tablet by mouth daily 90 tablet 3    Lancets 30G MISC Testing once a day. Please dispense according to patients insurance:  accu check Bailee plus 50 each 3    blood glucose test strips (ACCU-CHEK BAILEE PLUS) strip TEST ONCE A DAY, FASTING IN THE MORNING 100 strip 3    levothyroxine (SYNTHROID) 25 MCG tablet Take 1 tablet by mouth every morning (before breakfast) 90 tablet 3    esomeprazole (NEXIUM) 40 MG delayed release capsule Take 1 capsule by mouth every morning (before breakfast) Stop omeprazole 90 capsule 3    metFORMIN (GLUCOPHAGE-XR) 500 MG extended release tablet TAKE 1 TABLET BY MOUTH TWICE DAILY 180 tablet 0    camphor-menthol-methyl salicylate (BENGAY ULTRA STRENGTH) 4-10-30 % CREA cream Apply topically 3 times daily as needed for Pain 113 g 0    tamsulosin (FLOMAX) 0.4 MG capsule Take 1 capsule by mouth daily 90 capsule 3    diclofenac sodium (VOLTAREN) 1 % GEL Apply 2 g topically 4 times daily as needed for Pain 1 Tube 3    lidocaine (XYLOCAINE) 5 % ointment Apply topically as needed every 8 hrs prn for pain.  240 g 3    melatonin (RA MELATONIN) 3 MG TABS tablet Take 1 tablet by mouth nightly as needed (insomnia) 30 tablet 3    Cholecalciferol (VITAMIN D3) 2000 units TABS TAKE 1 TABLET BY MOUTH DAILY WITH FOOD 90 tablet 3    atorvastatin (LIPITOR) 20 MG tablet TAKE 1 TABLET BY MOUTH EVERY DAY 90 tablet 3    cetirizine (ZYRTEC) 5 MG tablet Take 1 tablet by mouth daily Stop claritin 90 tablet 3    cyanocobalamin ([employer] VITAMIN B12) 1000 MCG tablet Take 1 tablet by mouth daily 30 tablet 0    fluticasone (FLONASE) 50 MCG/ACT nasal spray USE 2 SPRAYS IN EACH NOSTRIL ONCE DAILY 3 Bottle 3    Lancets Misc. (ACCU-CHEK MULTICLIX LANCET DEV) KIT Please dispense according to patients insurance. 1 kit 0    Alcohol Swabs PADS Please dispense according to patients insurance/device. Testing 1-2 /day 100 each 2     No current facility-administered medications on file prior to visit. Review of Systems   Constitutional: Negative for chills, diaphoresis, fatigue, fever and unexpected weight change. Cardiovascular: Negative for leg swelling. Gastrointestinal: Negative for constipation, diarrhea, nausea and vomiting. Musculoskeletal: Negative for arthralgias, gait problem and joint swelling. Skin: Negative for color change, pallor, rash and wound. Neurological: Negative for weakness and numbness. Objective:  General: AAO x 3 in NAD. Derm  Toenail Description  Sites of Onychomycosis Involvement (Check affected area)  [x] [x] [x] [x] [x] [x] [x] [x] [x] [x]  5 4 3 2 1 1 2 3 4 5                          Right                                        Left    Thickness  [x] [x] [x] [x] [x] [x] [x] [x] [x] [x]  5 4 3 2 1 1 2 3 4 5                         Right                                        Left    Dystrophic Changes   [x] [x] [x] [x] [x] [x] [x] [x] [x] [x]  5 4 3 2 1 1 2 3 4 5                         Right                                        Left    Color   [x] [x] [x] [x] [x] [x] [x] [x] [x] [x]  5 4 3 2 1 1 2 3 4 5                          Right                                        Left    Incurvation/Ingrowin   [] [] [] [] [] [] [] [] [] []  5 4 3 2 1 1 2 3 4 5                         Right                                        Left    Inflammation/Pain   [] [] [] [] [] [] [] [] [] []  5 4 3 2 1 1 2 3 4 5                         Right                                        Left       Skin lesion/ulceration Absent . Skin No rashes or nodules noted. .      Vascular:  DP/PT pulses palpable 2/4, Bilateral.    CFT <3 seconds to digits 1-5, Bilateral .   Hair growth absent to level of digits, Bilateral.  Edema absent, Bilateral.  Erythema absent, Bilateral.     DM with PVD       []Yes    [x]No    Neurological:  Sensation absent to light touch to level of digits, Bilateral.  Protective sensation absent via 5.07/10g [place] monofilament 3/10 sites, Bilateral.  Vibratory sensation absent to 1st MPJ, Bilateral.     Bilateral forefoot deformities present. Class A Findings (1 needed)   [] Non-traumatic amputation of foot or integral skeleton portion thereof. [] Q7.      Class B Findings (2 needed)   1. [] Absent posterior tibial pulse   2. [] Absent dorsalis pedis pulse   3. [x] Advanced trophic changes; three of the following are required:   ·         [x] hair growth (decrease or absence)   ·         [x] nail changes (thickening)   ·         [x] pigmentary changes (discoloration)   ·         [x] skin texture (thin, shiny)   ·         [x] skin color (rubor or redness)   [] Q8.      Class C Findings (1 Class B, 2 Class C needed)   1. [] Claudication   2. [] Temperature changes   3. [x] Edema   4. [x] Paresthesia   5. [] Burning   [x] Q9. Assessment:  80 y.o. male with:  1. Onychomycosis of toenail    2. Type 2 diabetes mellitus with diabetic polyneuropathy, without long-term current use of insulin ([facility]       Orders Placed This Encounter   Procedures    HM DIABETES FOOT EXAM    CT DEBRIDEMENT OF NAILS, 6 OR MORE           Q7   []Yes    []No                Q8   []Yes    []No                     Q9   [x]Yes    []No    Plan:   Pt was evaluated and examined. Patient was given personalized discharge instructions. Nails 1-10 were debrided sharply in length and thickness with a nipper and , without incident. Pt will follow up in 3 years or sooner if any problems arise. Diagnosis was discussed with the pt and all of their questions were answered in detail. Proper foot hygiene and care was discussed with the pt. Informed patient on proper diabetic foot care and importance of tight glycemic control.   Patient to check feet daily and

## 2020-02-26 ENCOUNTER — OFFICE VISIT (OUTPATIENT)
Dept: FAMILY MEDICINE CLINIC | Age: 85
End: 2020-02-26
Payer: MEDICARE

## 2020-02-26 VITALS
SYSTOLIC BLOOD PRESSURE: 120 MMHG | WEIGHT: 174 LBS | HEIGHT: 70 IN | BODY MASS INDEX: 24.91 KG/M2 | HEART RATE: 83 BPM | OXYGEN SATURATION: 98 % | DIASTOLIC BLOOD PRESSURE: 60 MMHG

## 2020-02-26 LAB — HBA1C MFR BLD: 7 %

## 2020-02-26 PROCEDURE — 99214 OFFICE O/P EST MOD 30 MIN: CPT | Performed by: FAMILY MEDICINE

## 2020-02-26 PROCEDURE — G8427 DOCREV CUR MEDS BY ELIG CLIN: HCPCS | Performed by: FAMILY MEDICINE

## 2020-02-26 PROCEDURE — 3051F HG A1C>EQUAL 7.0%<8.0%: CPT | Performed by: FAMILY MEDICINE

## 2020-02-26 PROCEDURE — G8420 CALC BMI NORM PARAMETERS: HCPCS | Performed by: FAMILY MEDICINE

## 2020-02-26 PROCEDURE — 1036F TOBACCO NON-USER: CPT | Performed by: FAMILY MEDICINE

## 2020-02-26 PROCEDURE — G8482 FLU IMMUNIZE ORDER/ADMIN: HCPCS | Performed by: FAMILY MEDICINE

## 2020-02-26 PROCEDURE — 1123F ACP DISCUSS/DSCN MKR DOCD: CPT | Performed by: FAMILY MEDICINE

## 2020-02-26 PROCEDURE — 4040F PNEUMOC VAC/ADMIN/RCVD: CPT | Performed by: FAMILY MEDICINE

## 2020-02-26 PROCEDURE — 83036 HEMOGLOBIN GLYCOSYLATED A1C: CPT | Performed by: FAMILY MEDICINE

## 2020-02-26 RX ORDER — METFORMIN HYDROCHLORIDE 500 MG/1
500 TABLET, EXTENDED RELEASE ORAL
Qty: 90 TABLET | Refills: 0
Start: 2020-02-26 | End: 2020-07-13 | Stop reason: SDUPTHER

## 2020-02-26 RX ORDER — LIDOCAINE 50 MG/G
1 PATCH TOPICAL DAILY
Qty: 30 PATCH | Refills: 3 | Status: SHIPPED | OUTPATIENT
Start: 2020-02-26

## 2020-02-26 ASSESSMENT — ENCOUNTER SYMPTOMS
ABDOMINAL DISTENTION: 0
VOMITING: 0
BACK PAIN: 1
DIARRHEA: 0
NAUSEA: 0
ABDOMINAL PAIN: 0
COUGH: 0
WHEEZING: 0
BLOOD IN STOOL: 0
CHEST TIGHTNESS: 0
SHORTNESS OF BREATH: 0
CONSTIPATION: 0

## 2020-02-26 ASSESSMENT — PATIENT HEALTH QUESTIONNAIRE - PHQ9
SUM OF ALL RESPONSES TO PHQ QUESTIONS 1-9: 0
2. FEELING DOWN, DEPRESSED OR HOPELESS: 0
SUM OF ALL RESPONSES TO PHQ9 QUESTIONS 1 & 2: 0
SUM OF ALL RESPONSES TO PHQ QUESTIONS 1-9: 0
1. LITTLE INTEREST OR PLEASURE IN DOING THINGS: 0

## 2020-02-26 NOTE — PATIENT INSTRUCTIONS
Patient Education        Learning About Diabetes Food Guidelines  Your Care Instructions    Meal planning is important to manage diabetes. It helps keep your blood sugar at a target level (which you set with your doctor). You don't have to eat special foods. You can eat what your family eats, including sweets once in a while. But you do have to pay attention to how often you eat and how much you eat of certain foods. You may want to work with a dietitian or a certified diabetes educator (CDE) to help you plan meals and snacks. A dietitian or CDE can also help you lose weight if that is one of your goals. What should you know about eating carbs? Managing the amount of carbohydrate (carbs) you eat is an important part of healthy meals when you have diabetes. Carbohydrate is found in many foods. · Learn which foods have carbs. And learn the amounts of carbs in different foods. ? Bread, cereal, pasta, and rice have about 15 grams of carbs in a serving. A serving is 1 slice of bread (1 ounce), ½ cup of cooked cereal, or 1/3 cup of cooked pasta or rice. ? Fruits have 15 grams of carbs in a serving. A serving is 1 small fresh fruit, such as an apple or orange; ½ of a banana; ½ cup of cooked or canned fruit; ½ cup of fruit juice; 1 cup of melon or raspberries; or 2 tablespoons of dried fruit. ? Milk and no-sugar-added yogurt have 15 grams of carbs in a serving. A serving is 1 cup of milk or 2/3 cup of no-sugar-added yogurt. ? Starchy vegetables have 15 grams of carbs in a serving. A serving is ½ cup of mashed potatoes or sweet potato; 1 cup winter squash; ½ of a small baked potato; ½ cup of cooked beans; or ½ cup cooked corn or green peas. · Learn how much carbs to eat each day and at each meal. A dietitian or CDE can teach you how to keep track of the amount of carbs you eat. This is called carbohydrate counting. · If you are not sure how to count carbohydrate grams, use the Plate Method to plan meals.  It is a when cooking. · Don't skip meals. Your blood sugar may drop too low if you skip meals and take insulin or certain medicines for diabetes. · Check with your doctor before you drink alcohol. Alcohol can cause your blood sugar to drop too low. Alcohol can also cause a bad reaction if you take certain diabetes medicines. Follow-up care is a key part of your treatment and safety. Be sure to make and go to all appointments, and call your doctor if you are having problems. It's also a good idea to know your test results and keep a list of the medicines you take. Where can you learn more? Go to [email or web address] org and sign in to your [employer] account. Enter Y215 in the alike box to learn more about \"Learning About Diabetes Food Guidelines. \"     If you do not have an account, please click on the \"Sign Up Now\" link. Current as of: April 16, 2019  Content Version: 12.3  © 1287-6196 Healthwise, Incorporated. Care instructions adapted under license by [facility] ([facility]). If you have questions about a medical condition or this instruction, always ask your healthcare professional. [address] any warranty or liability for your use of this information.

## 2020-03-04 PROBLEM — M54.41 CHRONIC RIGHT-SIDED LOW BACK PAIN WITH RIGHT-SIDED SCIATICA: Status: ACTIVE | Noted: 2020-03-04

## 2020-03-04 PROBLEM — G89.29 CHRONIC RIGHT-SIDED LOW BACK PAIN WITH RIGHT-SIDED SCIATICA: Status: ACTIVE | Noted: 2020-03-04

## 2020-03-04 PROBLEM — M17.11 PRIMARY OSTEOARTHRITIS OF RIGHT KNEE: Status: ACTIVE | Noted: 2020-03-04

## 2020-03-13 RX ORDER — LEVOTHYROXINE SODIUM 0.03 MG/1
25 TABLET ORAL
Qty: 90 TABLET | Refills: 3 | Status: SHIPPED | OUTPATIENT
Start: 2020-03-13 | End: 2020-06-13 | Stop reason: SDUPTHER

## 2020-03-25 RX ORDER — ESOMEPRAZOLE MAGNESIUM 40 MG/1
40 CAPSULE, DELAYED RELEASE ORAL
Qty: 90 CAPSULE | Refills: 3 | Status: SHIPPED | OUTPATIENT
Start: 2020-03-25 | End: 2020-03-29 | Stop reason: SDUPTHER

## 2020-03-30 ENCOUNTER — PATIENT MESSAGE (OUTPATIENT)
Dept: FAMILY MEDICINE CLINIC | Age: 85
End: 2020-03-30

## 2020-03-30 RX ORDER — PANTOPRAZOLE SODIUM 40 MG/1
40 TABLET, DELAYED RELEASE ORAL
Qty: 90 TABLET | Refills: 1 | Status: SHIPPED | OUTPATIENT
Start: 2020-03-30 | End: 2020-07-24 | Stop reason: SDUPTHER

## 2020-03-30 RX ORDER — ESOMEPRAZOLE MAGNESIUM 40 MG/1
40 CAPSULE, DELAYED RELEASE ORAL
Qty: 90 CAPSULE | Refills: 3 | Status: SHIPPED | OUTPATIENT
Start: 2020-03-30 | End: 2020-03-30

## 2020-04-16 RX ORDER — CHOLECALCIFEROL (VITAMIN D3) 125 MCG
CAPSULE ORAL
Qty: 90 TABLET | Refills: 3 | Status: SHIPPED | OUTPATIENT
Start: 2020-04-16 | End: 2020-09-29 | Stop reason: SDUPTHER

## 2020-04-16 RX ORDER — ATORVASTATIN CALCIUM 20 MG/1
TABLET, FILM COATED ORAL
Qty: 90 TABLET | Refills: 3 | Status: SHIPPED | OUTPATIENT
Start: 2020-04-16 | End: 2020-07-16 | Stop reason: SDUPTHER

## 2020-04-16 NOTE — TELEPHONE ENCOUNTER
Please Approve or Refuse.   Send to Pharmacy per Pt's Request:     RX: [id number]     Next Visit Date:  6/2/2020  Last Visit Date: 2/26/2020    Hemoglobin A1C (%)   Date Value   02/26/2020 7.0   11/15/2019 6.8   08/02/2019 6.5             ( goal A1C is < 7)   BP Readings from Last 3 Encounters:   02/26/20 120/60   11/15/19 120/80   10/15/19 (!) 151/77          (goal 120/80)  BUN   Date Value Ref Range Status   02/19/2020 24 (H) 8 - 23 mg/dL Final     CREATININE   Date Value Ref Range Status   02/19/2020 1.00 0.70 - 1.20 mg/dL Final     Potassium   Date Value Ref Range Status   02/19/2020 4.1 3.7 - 5.3 mmol/L Final

## 2020-05-18 RX ORDER — ENALAPRIL MALEATE 20 MG/1
20 TABLET ORAL DAILY
Qty: 90 TABLET | Refills: 3 | Status: SHIPPED | OUTPATIENT
Start: 2020-05-18 | End: 2020-08-13 | Stop reason: SDUPTHER

## 2020-05-18 RX ORDER — AMLODIPINE BESYLATE 5 MG/1
5 TABLET ORAL DAILY
Qty: 90 TABLET | Refills: 3 | Status: SHIPPED | OUTPATIENT
Start: 2020-05-18 | End: 2020-08-13 | Stop reason: SDUPTHER

## 2020-05-26 ENCOUNTER — HOSPITAL ENCOUNTER (OUTPATIENT)
Age: 85
Discharge: HOME | End: 2020-05-26
Payer: MEDICARE

## 2020-05-26 LAB
ABSOLUTE EOS #: 0.1 K/UL (ref 0–0.4)
ABSOLUTE IMMATURE GRANULOCYTE: ABNORMAL K/UL (ref 0–0.3)
ABSOLUTE LYMPH #: 1.7 K/UL (ref 1–4.8)
ABSOLUTE MONO #: 0.5 K/UL (ref 0.1–1.3)
ALBUMIN SERPL-MCNC: 3.8 G/DL (ref 3.5–5.2)
ALBUMIN/GLOBULIN RATIO: ABNORMAL (ref 1–2.5)
ALP BLD-CCNC: 69 U/L (ref 40–129)
ALT SERPL-CCNC: 12 U/L (ref 5–41)
ANION GAP SERPL CALCULATED.3IONS-SCNC: 12 MMOL/L (ref 9–17)
AST SERPL-CCNC: 18 U/L
BASOPHILS # BLD: 0 % (ref 0–2)
BASOPHILS ABSOLUTE: 0 K/UL (ref 0–0.2)
BILIRUB SERPL-MCNC: 0.51 MG/DL (ref 0.3–1.2)
BUN BLDV-MCNC: 26 MG/DL (ref 8–23)
BUN/CREAT BLD: ABNORMAL (ref 9–20)
CALCIUM SERPL-MCNC: 9.2 MG/DL (ref 8.6–10.4)
CHLORIDE BLD-SCNC: 104 MMOL/L (ref 98–107)
CO2: 25 MMOL/L (ref 20–31)
CREAT SERPL-MCNC: 1.07 MG/DL (ref 0.7–1.2)
DIFFERENTIAL TYPE: ABNORMAL
EOSINOPHILS RELATIVE PERCENT: 2 % (ref 0–4)
FOLATE: >20 NG/ML
GFR AFRICAN AMERICAN: >60 ML/MIN
GFR NON-AFRICAN AMERICAN: >60 ML/MIN
GFR SERPL CREATININE-BSD FRML MDRD: ABNORMAL ML/MIN/{1.73_M2}
GFR SERPL CREATININE-BSD FRML MDRD: ABNORMAL ML/MIN/{1.73_M2}
GLUCOSE BLD-MCNC: 177 MG/DL (ref 70–99)
HBV SURFACE AB TITR SER: 28.22 MIU/ML
HCT VFR BLD CALC: 32.7 % (ref 41–53)
HEMOGLOBIN: 10.4 G/DL (ref 13.5–17.5)
IMMATURE GRANULOCYTES: ABNORMAL %
LYMPHOCYTES # BLD: 37 % (ref 24–44)
MCH RBC QN AUTO: 30.5 PG (ref 26–34)
MCHC RBC AUTO-ENTMCNC: 31.7 G/DL (ref 31–37)
MCV RBC AUTO: 96.3 FL (ref 80–100)
MONOCYTES # BLD: 10 % (ref 1–7)
NRBC AUTOMATED: ABNORMAL PER 100 WBC
PDW BLD-RTO: 15.1 % (ref 11.5–14.9)
PLATELET # BLD: 151 K/UL (ref 150–450)
PLATELET ESTIMATE: ABNORMAL
PMV BLD AUTO: 9 FL (ref 6–12)
POTASSIUM SERPL-SCNC: 4.2 MMOL/L (ref 3.7–5.3)
RBC # BLD: 3.4 M/UL (ref 4.5–5.9)
RBC # BLD: ABNORMAL 10*6/UL
SEG NEUTROPHILS: 51 % (ref 36–66)
SEGMENTED NEUTROPHILS ABSOLUTE COUNT: 2.4 K/UL (ref 1.3–9.1)
SODIUM BLD-SCNC: 141 MMOL/L (ref 135–144)
TOTAL PROTEIN: 7 G/DL (ref 6.4–8.3)
TSH SERPL DL<=0.05 MIU/L-ACNC: 3.18 MIU/L (ref 0.3–5)
VITAMIN B-12: 499 PG/ML (ref 232–1245)
WBC # BLD: 4.7 K/UL (ref 3.5–11)
WBC # BLD: ABNORMAL 10*3/UL

## 2020-05-26 PROCEDURE — 82746 ASSAY OF FOLIC ACID SERUM: CPT

## 2020-05-26 PROCEDURE — 82607 VITAMIN B-12: CPT

## 2020-05-26 PROCEDURE — 85025 COMPLETE CBC W/AUTO DIFF WBC: CPT

## 2020-05-26 PROCEDURE — 86317 IMMUNOASSAY INFECTIOUS AGENT: CPT

## 2020-05-26 PROCEDURE — 80053 COMPREHEN METABOLIC PANEL: CPT

## 2020-05-26 PROCEDURE — 36415 COLL VENOUS BLD VENIPUNCTURE: CPT

## 2020-05-26 PROCEDURE — 84443 ASSAY THYROID STIM HORMONE: CPT

## 2020-06-02 ENCOUNTER — OFFICE VISIT (OUTPATIENT)
Dept: FAMILY MEDICINE CLINIC | Age: 85
End: 2020-06-02
Payer: MEDICARE

## 2020-06-02 VITALS
HEIGHT: 70 IN | HEART RATE: 84 BPM | SYSTOLIC BLOOD PRESSURE: 132 MMHG | DIASTOLIC BLOOD PRESSURE: 70 MMHG | WEIGHT: 175 LBS | BODY MASS INDEX: 25.05 KG/M2 | OXYGEN SATURATION: 97 %

## 2020-06-02 LAB — HBA1C MFR BLD: 6.7 %

## 2020-06-02 PROCEDURE — 1123F ACP DISCUSS/DSCN MKR DOCD: CPT | Performed by: FAMILY MEDICINE

## 2020-06-02 PROCEDURE — G0438 PPPS, INITIAL VISIT: HCPCS | Performed by: FAMILY MEDICINE

## 2020-06-02 PROCEDURE — 83036 HEMOGLOBIN GLYCOSYLATED A1C: CPT | Performed by: FAMILY MEDICINE

## 2020-06-02 PROCEDURE — 4040F PNEUMOC VAC/ADMIN/RCVD: CPT | Performed by: FAMILY MEDICINE

## 2020-06-02 RX ORDER — IPRATROPIUM BROMIDE 21 UG/1
2 SPRAY, METERED NASAL 3 TIMES DAILY
Qty: 1 BOTTLE | Refills: 0 | Status: SHIPPED | OUTPATIENT
Start: 2020-06-02 | End: 2020-07-06

## 2020-06-02 ASSESSMENT — PATIENT HEALTH QUESTIONNAIRE - PHQ9
SUM OF ALL RESPONSES TO PHQ QUESTIONS 1-9: 0
SUM OF ALL RESPONSES TO PHQ QUESTIONS 1-9: 0

## 2020-06-02 ASSESSMENT — LIFESTYLE VARIABLES: HOW OFTEN DO YOU HAVE A DRINK CONTAINING ALCOHOL: 0

## 2020-06-02 NOTE — RESULT ENCOUNTER NOTE
Addressed during office visit today,A1c improved, continue treatment recommended during the office visit

## 2020-06-02 NOTE — PROGRESS NOTES
Visit Information    Have you changed or started any medications since your last visit including any over-the-counter medicines, vitamins, or herbal medicines? no   Are you having any side effects from any of your medications? -  no  Have you stopped taking any of your medications? Is so, why? -  no    Have you seen any other physician or provider since your last visit? No  Have you had any other diagnostic tests since your last visit? Yes - Records Obtained  Have you been seen in the emergency room and/or had an admission to a hospital since we last saw you? No  Have you had your routine dental cleaning in the past 6 months? no    Have you activated your [email or web address] account? If not, what are your barriers?  Yes     Patient Care Team:  Herman Dee MD as PCP - General (Family Medicine)  Herman Dee MD as PCP - [facility]  Deepthi Penny (Optometry)  Cindy Acuna MD as Surgeon (Cardiology)  Sin Guillermo MD as Consulting Physician (Urology)  Ann Shah MD as Consulting Physician (Gastroenterology)  Carmelo Burris MD as Surgeon (Ophthalmology)  Daniela Barber (Certified Nurse Practitioner)  Chris Rodriguez MD as Consulting Physician (Oncology)    Medical History Review  Past Medical, Family, and Social History reviewed and does contribute to the patient presenting condition    Health Maintenance   Topic Date Due    DTaP/Tdap/Td vaccine (1 - Tdap) 09/20/1943    Annual Wellness Visit (AWV)  05/29/2019    Lipid screen  11/08/2020    TSH testing  05/26/2021    Potassium monitoring  05/26/2021    Creatinine monitoring  05/26/2021    Flu vaccine  Completed    Shingles Vaccine  Completed    Pneumococcal 65+ years Vaccine  Completed    Hepatitis A vaccine  Aged Out    Hib vaccine  Aged Out    Meningococcal (ACWY) vaccine  Aged Out

## 2020-06-02 NOTE — PROGRESS NOTES
Medicare Annual Wellness Visit face-to-face  Name: Abbey Hidalgo Date: 2020   MRN: [id number] Sex: Male   Age: 80 y.o. Ethnicity: /   : 1924 Race: Other      Keith Doll is here for Medicare AWV; Diabetes; and Discuss Labs    Screenings for behavioral, psychosocial and functional/safety risks, and cognitive dysfunction are all negative except as indicated below. These results, as well as other patient data from the [address] form, are documented in Flowsheets linked to this Encounter. Allergies   Allergen Reactions    Aspirin      Anemia, hematuria      Sulfa Antibiotics Rash       Prior to Visit Medications    Medication Sig Taking? Authorizing Provider   enalapril (VASOTEC) 20 MG tablet Take 1 tablet by mouth daily Yes Roxann García MD   amLODIPine (NORVASC) 5 MG tablet Take 1 tablet by mouth daily Yes Roxann García MD   atorvastatin (LIPITOR) 20 MG tablet TAKE 1 TABLET BY MOUTH EVERY DAY Yes Roxann García MD   Cholecalciferol (VITAMIN D3) 50 MCG ( UT) TABS TAKE 1 TABLET BY MOUTH DAILY WITH FOOD Yes Roxann García MD   pantoprazole (PROTONIX) 40 MG tablet Take 1 tablet by mouth every morning (before breakfast) Yes Roxann García MD   levothyroxine (SYNTHROID) 25 MCG tablet Take 1 tablet by mouth every morning (before breakfast) Yes Roxann García MD   camphor-menthol-methyl salicylate (BENGAY ULTRA STRENGTH) 4-10-30 % CREA cream Apply topically 3 times daily as needed for Pain Yes Roxann García MD   lidocaine (LIDODERM) 5 % Place 1 patch onto the skin daily 12 hours on, 12 hours off.  Yes Roxann García MD   metFORMIN (GLUCOPHAGE-XR) 500 MG extended release tablet Take 1 tablet by mouth daily (with breakfast) Yes Roxann García MD   Blood Glucose Monitoring Suppl (ACCU-CHEK JOEL PLUS) w/Device KIT USE AS DIRECTED Yes Roxann García MD   Accu-Chek FastClix Lancets MISC USE TO TEST BLOOD GLUCOSE ONCE A DAY Yes Evaristo Randolph MD   Lancets 30G MISC Testing once a day. Please dispense according to patients insurance:  accu check Joel plus Yes Evaristo Randolph MD   blood glucose test strips (ACCU-CHEK JOEL PLUS) strip TEST ONCE A DAY, FASTING IN THE MORNING Yes Evaristo Randolph MD   tamsulosin (FLOMAX) 0.4 MG capsule Take 1 capsule by mouth daily Yes Horacio Domínguez MD   diclofenac sodium (VOLTAREN) 1 % GEL Apply 2 g topically 4 times daily as needed for Pain Yes Evaristo Randolph MD   lidocaine (XYLOCAINE) 5 % ointment Apply topically as needed every 8 hrs prn for pain. Yes Evaristo Randolph MD   melatonin (RA MELATONIN) 3 MG TABS tablet Take 1 tablet by mouth nightly as needed (insomnia) Yes Shilpa Delacruz MD   cetirizine (ZYRTEC) 5 MG tablet Take 1 tablet by mouth daily Stop claritin Yes Evaristo Randolph MD   fluticasone (FLONASE) 50 MCG/ACT nasal spray USE 2 SPRAYS IN EACH NOSTRIL ONCE DAILY Yes Evaristo Randolph MD   Lancets Misc. (ACCU-CHEK MULTICLIX LANCET DEV) KIT Please dispense according to patients insurance. Yes Evaristo Randolph MD   Alcohol Swabs PADS Please dispense according to patients insurance/device.  Testing 1-2 /day Yes Evaristo Randolph MD         Past Medical History:   Diagnosis Date    Acute bronchitis due to infection 9/1/2017    Allergic rhinitis due to allergen 7/22/2018    Anemia 1/6/2017    Aortic stenosis, moderate-severe 2/16/2017    Chronic kidney disease     Chronic right-sided low back pain with right-sided sciatica 3/4/2020    Diabetes ([address]     Diabetic nephropathy associated with type 2 diabetes mellitus ([address] 4/56/8102    Diastolic dysfunction without heart failure 2/16/2017    Gastroesophageal reflux disease without esophagitis 12/10/2018    H. pylori infection     Hearing loss     Helicobacter pylori gastritis 8/31/2017    Hyperlipidemia     Hypertension     Hypothyroidism     Iron deficiency anemia 2/20/2017    LVH (left ventricular blood glucose every day  ACP (advance care planning)  -     [facility] Referral to ACP Clinical Specialist  -     Full code    Vasomotor rhinitis  Failed to improve with Zyrtec and Flonase.   Stop Flonase and start Atrovent nasal spray instead  -     ipratropium (ATROVENT) 0.03 % nasal spray; 2 sprays by Nasal route 3 times daily        Future Appointments   Date Time Provider Toy Stefanie   9/15/2020  3:00 PM Dhruv Martin MD [facility]   6/2/2021  3:00 PM Dhruv Martin MD [facility]

## 2020-06-02 NOTE — PATIENT INSTRUCTIONS
Learning About Medical Power of   What is a medical power of ? A medical power of , also called a durable power of  for health care, is one type of the legal forms called advance directives. It lets you name the person you want to make treatment decisions for you if you can't speak or decide for yourself. The person you choose is called your health care agent. This person is also called a health care proxy or health care surrogate. A medical power of  may be called something else in your state. How do you choose a health care agent? Choose your health care agent carefully. This person may or may not be a family member. Talk to the person before you make your final decision. Make sure he or she is comfortable with this responsibility. It's a good idea to choose someone who:  · Is at least 25years old. · Knows you well and understands what makes life meaningful for you. · Understands your Latter-day and moral values. · Will do what you want, not what he or she wants. · Will be able to make difficult choices at a stressful time. · Will be able to refuse or stop treatment, if that is what you would want, even if you could die. · Will be firm and confident with health professionals if needed. · Will ask questions to get needed information. · Lives near you or agrees to travel to you if needed. Your family may help you make medical decisions while you can still be part of that process. But it's important to choose one person to be your health care agent in case you aren't able to make decisions for yourself. If you don't fill out the legal form and name a health care agent, the decisions your family can make may be limited. A health care agent may be called something else in your state. Who will make decisions for you if you don't have a health care agent? If you don't have a health care agent or a living will, you may not get the care you want. will respect your wishes even if you have a form from a different state. · You might use a universal form that has been approved by many states. This kind of form can sometimes be filled out and stored online. Your digital copy will then be available wherever you have a connection to the internet. The doctors and nurses who need to treat you can find it right away. · Your state may offer an online registry. This is another place where you can store your living will online. · It's a good idea to get your living will notarized. This means using a person called a [employer] to watch two people sign, or witness, your living will. What should you know when you create a living will? Here are some questions to ask yourself as you make your living will:  · Do you know enough about life support methods that might be used? If not, talk to your doctor so you know what might be done if you can't breathe on your own, your heart stops, or you can't swallow. · What things would you still want to be able to do after you receive life-support methods? Would you want to be able to walk? To speak? To eat on your own? To live without the help of machines? · Do you want certain Hinduism practices performed if you become very ill? · If you have a choice, where do you want to be cared for? In your home? At a hospital or nursing home? · If you have a choice at the end of your life, where would you prefer to die? At home? In a hospital or nursing home? Somewhere else? · Would you prefer to be buried or cremated? · Do you want your organs to be donated after you die? What should you do with your living will? · Make sure that your family members and your health care agent have copies of your living will (also called a declaration). · Give your doctor a copy of your living will. Ask him or her to keep it as part of your medical record. If you have more than one doctor, make sure that each one has a copy.   · Put a copy of your Low-sodium and salt-free vegetable juices Canned vegetables if unsalted or rinsed   Regular canned vegetables and juices, including sauerkraut and pickled vegetables Frozen vegetables with sauces Commercially prepared potato and vegetable mixes   Fruits   Most fresh, frozen, and canned fruits All fruit juices   Fruits processed with salt or sodium   Milk   Nonfat or low-fat (1%) milk Nonfat or low-fat yogurt Cottage cheese, low-fat ricotta, cheeses labeled as low-fat and low-sodium   Whole milk Reduced-fat (2%) milk Malted and chocolate milk Full fat yogurt Most cheeses (unless low-fat and low salt) Buttermilk (no more than 1 cup per week)   Meats and Beans   Lean cuts of fresh or frozen beef, veal, lamb, or pork (look for the word loin) Fresh or frozen poultry without the skin Fresh or frozen fish and some shellfish Egg whites and egg substitutes (Limit whole eggs to three per week) Tofu Nuts or seeds (unsalted, dry-roasted), low-sodium peanut butter Dried peas, beans, and lentils   Any smoked, cured, salted, or canned meat, fish, or poultry (including newman, chipped beef, cold cuts, hot dogs, sausages, sardines, and anchovies) Poultry skins Breaded and/or fried fish or meats Canned peas, beans, and lentils Salted nuts   Fats and Oils   Olive oil and canola oil Low-sodium, low-fat salad dressings and mayonnaise   Butter, margarine, coconut and palm oils, newman fat   Snacks, Sweets, and Condiments   Low-sodium or unsalted versions of broths, soups, soy sauce, and condiments Pepper, herbs, and spices; vinegar, lemon, or lime juice Low-fat frozen desserts (yogurt, sherbet, fruit bars) Sugar, cocoa powder, honey, syrup, jam, and preserves Low-fat, trans-fat free cookies, cakes, and pies Chele and animal crackers, fig bars, ruben snaps   High-fat desserts Broth, soups, gravies, and sauces, made from instant mixes or other high-sodium ingredients Salted snack foods Canned olives Meat tenderizers, seasoning salt, and most flavored vinegars   Beverages   Low-sodium carbonated beverages Tea and coffee in moderation Soy milk   Commercially softened water   Suggestions   Make whole grains, fruits, and vegetables the base of your diet. Choose heart-healthy fats such as canola, olive, and flaxseed oil, and foods high in heart-healthy fats, such as nuts, seeds, soybeans, tofu, and fish. Eat fish at least twice per week; the fish highest in omega-3 fatty acids and lowest in mercury include salmon, herring, mackerel, sardines, and canned chunk light tuna. If you eat fish less than twice per week or have high triglycerides, talk to your doctor about taking fish oil supplements. Read food labels. For products low in fat and cholesterol, look for fat free, low-fat, cholesterol free, saturated fat free, and trans fat freeAlso scan the Nutrition Facts Label, which lists saturated fat, trans fat, and cholesterol amounts. For products low in sodium, look for sodium free, very low sodium, low sodium, no added salt, and unsalted   Skip the salt when cooking or at the table; if food needs more flavor, get creative and try out different herbs and spices. Garlic and onion also add substantial flavor to foods. Trim any visible fat off meat and poultry before cooking, and drain the fat off after price. Use cooking methods that require little or no added fat, such as grilling, boiling, baking, poaching, broiling, roasting, steaming, stir-frying, and sauting. Avoid fast food and convenience food. They tend to be high in saturated and trans fat and have a lot of added salt. Talk to a registered dietitian for individualized diet advice. Last Reviewed: March 2011 Sobia Donovan MS, MPH, RD   Updated: 3/29/2011   ·     Heart-Healthy Diet   Sodium, Fat, and Cholesterol Controlled Diet       What Is a Heart Healthy Diet? A heart-healthy diet is one that limits sodium , certain types of fat , and cholesterol .  This type of diet is recommended for:   People with any form of cardiovascular disease (eg, coronary heart disease , peripheral vascular disease , previous heart attack , previous stroke )   People with risk factors for cardiovascular disease, such as high blood pressure , high cholesterol , or diabetes   Anyone who wants to lower their risk of developing cardiovascular disease   Sodium    Sodium is a mineral found in many foods. In general, most people consume much more sodium than they need. Diets high in sodium can increase blood pressure and lead to edema (water retention). On a heart-healthy diet, you should consume no more than 2,300 mg (milligrams) of sodium per dayabout the amount in one teaspoon of table salt. The foods highest in sodium include table salt (about 50% sodium), processed foods, convenience foods, and preserved foods. Cholesterol    Cholesterol is a fat-like, waxy substance in your blood. Our bodies make some cholesterol. It is also found in animal products, with the highest amounts in fatty meat, egg yolks, whole milk, cheese, shellfish, and organ meats. On a heart-healthy diet, you should limit your cholesterol intake to less than 200 mg per day. It is normal and important to have some cholesterol in your bloodstream. But too much cholesterol can cause plaque to build up within your arteries, which can eventually lead to a heart attack or stroke. The two types of cholesterol that are most commonly referred to are:   Low-density lipoprotein (LDL) cholesterol  Also known as bad cholesterol, this is the cholesterol that tends to build up along your arteries. Bad cholesterol levels are increased by eating fats that are saturated or hydrogenated. Optimal level of this cholesterol is less than 100. Over 130 starts to get risky for heart disease.    High-density lipoprotein (HDL) cholesterol  Also known as good cholesterol, this type of cholesterol actually carries cholesterol away from your arteries and may, highest amounts in fatty meat, egg yolks, whole milk, cheese, shellfish, and organ meats. On a heart-healthy diet, you should limit your cholesterol intake to less than 200 mg per day. It is normal and important to have some cholesterol in your bloodstream. But too much cholesterol can cause plaque to build up within your arteries, which can eventually lead to a heart attack or stroke. The two types of cholesterol that are most commonly referred to are:   Low-density lipoprotein (LDL) cholesterol  Also known as bad cholesterol, this is the cholesterol that tends to build up along your arteries. Bad cholesterol levels are increased by eating fats that are saturated or hydrogenated. Optimal level of this cholesterol is less than 100. Over 130 starts to get risky for heart disease. High-density lipoprotein (HDL) cholesterol  Also known as good cholesterol, this type of cholesterol actually carries cholesterol away from your arteries and may, therefore, help lower your risk of having a heart attack. You want this level to be high (ideally greater than 60). It is a risk to have a level less than 40. You can raise this good cholesterol by eating olive oil, canola oil, avocados, or nuts. Exercise raises this level, too. Fat    Fat is calorie dense and packs a lot of calories into a small amount of food. Even though fats should be limited due to their high calorie content, not all fats are bad. In fact, some fats are quite healthful. Fat can be broken down into four main types.    The good-for-you fats are:   Monounsaturated fat  found in oils such as olive and canola, avocados, and nuts and natural nut butters; can decrease cholesterol levels, while keeping levels of HDL cholesterol high   Polyunsaturated fat  found in oils such as safflower, sunflower, soybean, corn, and sesame; can decrease total cholesterol and LDL cholesterol   Omega-3 fatty acids  particularly those found in fatty fish (such as salmon, trout, plus vitamin D supplement. Ask about what type of calcium is right for you, and how much to take at a time. Adults ages 23 to 48 should not get more than 2,500 mg of calcium and 4,000 IU of vitamin D each day, whether it is from supplements and/or food. Adults ages 46 and older should not get more than 2,000 mg of calcium and 4,000 IU of vitamin D each day from supplements and/or food. Get regular bone-building exercise. Weight-bearing and resistance exercises keep bones healthy by working the muscles and bones against gravity. Start out at an exercise level that feels right for you. Add a little at a time until you can do the following:  Do 30 minutes of weight-bearing exercise on most days of the week. Walking, jogging, stair climbing, and dancing are good choices. Do resistance exercises with weights or elastic bands 2 to 3 days a week. Limit alcohol. Drink no more than 1 alcohol drink a day if you are a woman. Drink no more than 2 alcohol drinks a day if you are a man. Do not smoke. Smoking can make bones thin faster. If you need help quitting, talk to your doctor about stop-smoking programs and medicines. These can increase your chances of quitting for good. When should you call for help? Watch closely for changes in your health, and be sure to contact your doctor if:  You need help with a healthy eating plan. You need help with an exercise plan    © 1203-1462 [employer]. Care instructions adapted under license by [facility]. This care instruction is for use with your licensed healthcare professional. If you have questions about a medical condition or this instruction, always ask your healthcare professional. [address] any warranty or liability for your use of this information. Content Version: 9.4.48751;  Last Revised: June 20, 2011              ·     DASH Diet: After Your Visit  Your Care Instructions  The DASH diet is an eating plan that can help

## 2020-06-03 PROBLEM — J30.0 VASOMOTOR RHINITIS: Status: ACTIVE | Noted: 2020-06-03

## 2020-06-15 RX ORDER — LEVOTHYROXINE SODIUM 0.03 MG/1
25 TABLET ORAL
Qty: 90 TABLET | Refills: 3 | Status: SHIPPED | OUTPATIENT
Start: 2020-06-15 | End: 2021-06-13 | Stop reason: SDUPTHER

## 2020-06-15 RX ORDER — BLOOD SUGAR DIAGNOSTIC
STRIP MISCELLANEOUS
Qty: 100 STRIP | Refills: 3 | Status: SHIPPED | OUTPATIENT
Start: 2020-06-15

## 2020-06-15 RX ORDER — TAMSULOSIN HYDROCHLORIDE 0.4 MG/1
0.4 CAPSULE ORAL DAILY
Qty: 90 CAPSULE | Refills: 0 | Status: SHIPPED | OUTPATIENT
Start: 2020-06-15 | End: 2021-03-16 | Stop reason: SDUPTHER

## 2020-07-06 RX ORDER — IPRATROPIUM BROMIDE 21 UG/1
SPRAY, METERED NASAL
Qty: 30 ML | Refills: 5 | Status: SHIPPED | OUTPATIENT
Start: 2020-07-06 | End: 2022-02-24 | Stop reason: SDUPTHER

## 2020-07-06 NOTE — TELEPHONE ENCOUNTER
Last visit: 6/2/20    Does patient have enough medication for 72 hours: Yes    Next Visit Date:  Future Appointments   Date Time Provider Toy Stefanie   7/10/2020 11:30 [address], MD [address] [place] [address]   9/15/2020  3:00 PM Abhijit Boyce MD [facility]   6/2/2021  3:00 PM Abhijit Boyce MD [facility] [address] Maintenance   Topic Date Due    DTaP/Tdap/Td vaccine (1 - Tdap) 09/20/1943    Flu vaccine (1) 09/01/2020    Lipid screen  11/08/2020    TSH testing  05/26/2021    Potassium monitoring  05/26/2021    Creatinine monitoring  05/26/2021    Annual Wellness Visit (AWV)  06/03/2021    Shingles Vaccine  Completed    Pneumococcal 65+ years Vaccine  Completed    Hepatitis A vaccine  Aged Out    Hib vaccine  Aged Out    Meningococcal (ACWY) vaccine  Aged Out       Hemoglobin A1C (%)   Date Value   06/02/2020 6.7   02/26/2020 7.0   11/15/2019 6.8             ( goal A1C is < 7)   Microalb/Crt.  Ratio (mcg/mg creat)   Date Value   11/15/2017 106 (H)     LDL Cholesterol (mg/dL)   Date Value   11/08/2019 69   10/25/2018 60       (goal LDL is <100)   AST (U/L)   Date Value   05/26/2020 18     ALT (U/L)   Date Value   05/26/2020 12     BUN (mg/dL)   Date Value   05/26/2020 26 (H)     BP Readings from Last 3 Encounters:   06/02/20 132/70   02/26/20 120/60   11/15/19 120/80          (goal 120/80)    All Future Testing planned in [facility]  Lab Frequency Next Occurrence   CBC Once 09/02/2020   Comprehensive Metabolic Panel Once 39/34/8049   CBC With Auto Differential     Comprehensive Metabolic Panel                 Patient Active Problem List:     Acquired hypothyroidism     Type 2 diabetes mellitus with diabetic polyneuropathy, without long-term current use of insulin (HCC)     Essential hypertension     Hyperlipidemia with target LDL less than 70     Hammer toes, bilateral     Onychomycosis of toenail     Murmur, cardiac     PEREYRA (dyspnea on exertion)     Microscopic hematuria LVH (left ventricular hypertrophy) due to hypertensive disease     Diastolic dysfunction without heart failure     Aortic stenosis, moderate     Diabetic nephropathy associated with type 2 diabetes mellitus (HCC)     Iron deficiency anemia     Benign prostatic hyperplasia with lower urinary tract symptoms     Chronic neck pain     At high risk for falls     Bilateral hearing loss     Vitamin D deficiency     Pulmonary hypertension (HCC)     History of anemia due to chronic kidney disease     History of Helicobacter pylori infection     Allergic rhinitis due to allergen     Gastroesophageal reflux disease without esophagitis     DDD (degenerative disc disease), cervical     Primary osteoarthritis of both knees     Chronic right shoulder pain     Chronic right-sided low back pain with right-sided sciatica     Primary osteoarthritis of right knee     Vasomotor rhinitis

## 2020-07-10 ENCOUNTER — TELEPHONE (OUTPATIENT)
Dept: ONCOLOGY | Age: 85
End: 2020-07-10

## 2020-07-10 ENCOUNTER — OFFICE VISIT (OUTPATIENT)
Dept: ONCOLOGY | Age: 85
End: 2020-07-10
Payer: MEDICARE

## 2020-07-10 VITALS
HEART RATE: 63 BPM | BODY MASS INDEX: 25.17 KG/M2 | SYSTOLIC BLOOD PRESSURE: 139 MMHG | RESPIRATION RATE: 18 BRPM | TEMPERATURE: 97.9 F | WEIGHT: 175.4 LBS | DIASTOLIC BLOOD PRESSURE: 56 MMHG

## 2020-07-10 PROBLEM — K90.9 IRON MALABSORPTION: Status: ACTIVE | Noted: 2020-07-10

## 2020-07-10 PROBLEM — D50.8 IRON DEFICIENCY ANEMIA SECONDARY TO INADEQUATE DIETARY IRON INTAKE: Status: ACTIVE | Noted: 2020-07-10

## 2020-07-10 PROCEDURE — 4040F PNEUMOC VAC/ADMIN/RCVD: CPT | Performed by: INTERNAL MEDICINE

## 2020-07-10 PROCEDURE — G8417 CALC BMI ABV UP PARAM F/U: HCPCS | Performed by: INTERNAL MEDICINE

## 2020-07-10 PROCEDURE — 99211 OFF/OP EST MAY X REQ PHY/QHP: CPT | Performed by: INTERNAL MEDICINE

## 2020-07-10 PROCEDURE — 1123F ACP DISCUSS/DSCN MKR DOCD: CPT | Performed by: INTERNAL MEDICINE

## 2020-07-10 PROCEDURE — 1036F TOBACCO NON-USER: CPT | Performed by: INTERNAL MEDICINE

## 2020-07-10 PROCEDURE — G8427 DOCREV CUR MEDS BY ELIG CLIN: HCPCS | Performed by: INTERNAL MEDICINE

## 2020-07-10 PROCEDURE — 99214 OFFICE O/P EST MOD 30 MIN: CPT | Performed by: INTERNAL MEDICINE

## 2020-07-10 RX ORDER — SODIUM CHLORIDE 0.9 % (FLUSH) 0.9 %
10 SYRINGE (ML) INJECTION PRN
Status: CANCELLED | OUTPATIENT
Start: 2020-07-17

## 2020-07-10 RX ORDER — SODIUM CHLORIDE 0.9 % (FLUSH) 0.9 %
5 SYRINGE (ML) INJECTION PRN
Status: CANCELLED | OUTPATIENT
Start: 2020-07-17

## 2020-07-10 RX ORDER — METHYLPREDNISOLONE SODIUM SUCCINATE 125 MG/2ML
125 INJECTION, POWDER, LYOPHILIZED, FOR SOLUTION INTRAMUSCULAR; INTRAVENOUS ONCE
Status: CANCELLED | OUTPATIENT
Start: 2020-07-17

## 2020-07-10 RX ORDER — SODIUM CHLORIDE 9 MG/ML
INJECTION, SOLUTION INTRAVENOUS CONTINUOUS
Status: CANCELLED | OUTPATIENT
Start: 2020-07-17

## 2020-07-10 RX ORDER — DIPHENHYDRAMINE HYDROCHLORIDE 50 MG/ML
50 INJECTION INTRAMUSCULAR; INTRAVENOUS ONCE
Status: CANCELLED | OUTPATIENT
Start: 2020-07-17

## 2020-07-10 RX ORDER — HEPARIN SODIUM (PORCINE) LOCK FLUSH IV SOLN 100 UNIT/ML 100 UNIT/ML
500 SOLUTION INTRAVENOUS PRN
Status: CANCELLED | OUTPATIENT
Start: 2020-07-17

## 2020-07-10 RX ORDER — EPINEPHRINE 1 MG/ML
0.3 INJECTION, SOLUTION, CONCENTRATE INTRAVENOUS PRN
Status: CANCELLED | OUTPATIENT
Start: 2020-07-17

## 2020-07-10 NOTE — TELEPHONE ENCOUNTER
AVS from 7/10/20     IV iron infusion soon  RV one year with labs before RV    IV iron is going through precert and pt will be called once that comes back to schedule            *RV scheduled for 7/9/21 @ 1pm.              Pt will have labs (cbc,iron           studies,folate,vit b12)) drawn one week  prior to appt    Pt was given AVS and appt schedule

## 2020-07-13 RX ORDER — METFORMIN HYDROCHLORIDE 500 MG/1
500 TABLET, EXTENDED RELEASE ORAL
Qty: 90 TABLET | Refills: 3 | Status: SHIPPED | OUTPATIENT
Start: 2020-07-13 | End: 2020-10-08 | Stop reason: SDUPTHER

## 2020-07-13 NOTE — TELEPHONE ENCOUNTER
Last seen 6/2/20    Next Visit Date:  Future Appointments   Date Time Provider Toy Watts   9/15/2020  3:00 PM Mahendra Ding MD [facility]   6/2/2021  3:00 PM Mahendra Ding MD [facility]   7/9/2021  1:00 PM Juvenal Gilmore MD [facility]   Topic Date Due    DTaP/Tdap/Td vaccine (1 - Tdap) 09/20/1943    Flu vaccine (1) 09/01/2020    Lipid screen  11/08/2020    TSH testing  05/26/2021    Potassium monitoring  05/26/2021    Creatinine monitoring  05/26/2021    Annual Wellness Visit (AWV)  06/03/2021    Shingles Vaccine  Completed    Pneumococcal 65+ years Vaccine  Completed    Hepatitis A vaccine  Aged Out    Hib vaccine  Aged Out    Meningococcal (ACWY) vaccine  Aged Out       Hemoglobin A1C (%)   Date Value   06/02/2020 6.7   02/26/2020 7.0   11/15/2019 6.8             ( goal A1C is < 7)   Microalb/Crt.  Ratio (mcg/mg creat)   Date Value   11/15/2017 106 (H)     LDL Cholesterol (mg/dL)   Date Value   11/08/2019 69       (goal LDL is <100)   AST (U/L)   Date Value   05/26/2020 18     ALT (U/L)   Date Value   05/26/2020 12     BUN (mg/dL)   Date Value   05/26/2020 26 (H)     BP Readings from Last 3 Encounters:   07/10/20 (!) 139/56   06/02/20 132/70   02/26/20 120/60          (goal 120/80)    All Future Testing planned in [facility]  Lab Frequency Next Occurrence   CBC Once 09/02/2020   Comprehensive Metabolic Panel Once 75/17/9437   CBC With Auto Differential     Comprehensive Metabolic Panel     CBC Auto Differential q 6 months    Vitamin B12 & Folate q 6 months    Iron and TIBC q 6 months    Ferritin q 6 months                Patient Active Problem List:     Acquired hypothyroidism     Type 2 diabetes mellitus with diabetic polyneuropathy, without long-term current use of insulin (HCC)     Essential hypertension     Hyperlipidemia with target LDL less than 70     Hammer toes, bilateral     Onychomycosis of toenail     Murmur, cardiac     PEREYRA (dyspnea on exertion)     Microscopic hematuria     LVH (left ventricular hypertrophy) due to hypertensive disease     Diastolic dysfunction without heart failure     Aortic stenosis, moderate     Diabetic nephropathy associated with type 2 diabetes mellitus (HCC)     Iron deficiency anemia     Benign prostatic hyperplasia with lower urinary tract symptoms     Chronic neck pain     At high risk for falls     Bilateral hearing loss     Vitamin D deficiency     Pulmonary hypertension (HCC)     History of anemia due to chronic kidney disease     History of Helicobacter pylori infection     Allergic rhinitis due to allergen     Gastroesophageal reflux disease without esophagitis     DDD (degenerative disc disease), cervical     Primary osteoarthritis of both knees     Chronic right shoulder pain     Chronic right-sided low back pain with right-sided sciatica     Primary osteoarthritis of right knee     Vasomotor rhinitis     Iron malabsorption     Iron deficiency anemia secondary to inadequate dietary iron intake

## 2020-07-16 RX ORDER — ATORVASTATIN CALCIUM 20 MG/1
20 TABLET, FILM COATED ORAL NIGHTLY
Qty: 90 TABLET | Refills: 3 | Status: SHIPPED | OUTPATIENT
Start: 2020-07-16 | End: 2020-10-16 | Stop reason: SDUPTHER

## 2020-07-23 ENCOUNTER — HOSPITAL ENCOUNTER (OUTPATIENT)
Dept: INFUSION THERAPY | Age: 85
Discharge: HOME | End: 2020-07-23
Payer: MEDICARE

## 2020-07-23 VITALS — DIASTOLIC BLOOD PRESSURE: 58 MMHG | HEART RATE: 59 BPM | RESPIRATION RATE: 16 BRPM | SYSTOLIC BLOOD PRESSURE: 124 MMHG

## 2020-07-23 DIAGNOSIS — K90.9 IRON MALABSORPTION: Primary | ICD-10-CM

## 2020-07-23 DIAGNOSIS — D50.8 IRON DEFICIENCY ANEMIA SECONDARY TO INADEQUATE DIETARY IRON INTAKE: ICD-10-CM

## 2020-07-23 PROCEDURE — 96365 THER/PROPH/DIAG IV INF INIT: CPT

## 2020-07-23 PROCEDURE — 6360000002 HC RX W HCPCS: Performed by: INTERNAL MEDICINE

## 2020-07-23 PROCEDURE — 2580000003 HC RX 258: Performed by: INTERNAL MEDICINE

## 2020-07-23 RX ORDER — HEPARIN SODIUM (PORCINE) LOCK FLUSH IV SOLN 100 UNIT/ML 100 UNIT/ML
500 SOLUTION INTRAVENOUS PRN
Status: CANCELLED | OUTPATIENT
Start: 2020-07-30

## 2020-07-23 RX ORDER — SODIUM CHLORIDE 0.9 % (FLUSH) 0.9 %
10 SYRINGE (ML) INJECTION PRN
Status: CANCELLED | OUTPATIENT
Start: 2020-07-30

## 2020-07-23 RX ORDER — METHYLPREDNISOLONE SODIUM SUCCINATE 125 MG/2ML
125 INJECTION, POWDER, LYOPHILIZED, FOR SOLUTION INTRAMUSCULAR; INTRAVENOUS ONCE
Status: CANCELLED | OUTPATIENT
Start: 2020-07-30

## 2020-07-23 RX ORDER — DIPHENHYDRAMINE HYDROCHLORIDE 50 MG/ML
50 INJECTION INTRAMUSCULAR; INTRAVENOUS ONCE
Status: CANCELLED | OUTPATIENT
Start: 2020-07-30

## 2020-07-23 RX ORDER — SODIUM CHLORIDE 0.9 % (FLUSH) 0.9 %
5 SYRINGE (ML) INJECTION PRN
Status: CANCELLED | OUTPATIENT
Start: 2020-07-30

## 2020-07-23 RX ORDER — SODIUM CHLORIDE 9 MG/ML
INJECTION, SOLUTION INTRAVENOUS CONTINUOUS
Status: CANCELLED | OUTPATIENT
Start: 2020-07-30

## 2020-07-23 RX ORDER — SODIUM CHLORIDE 9 MG/ML
INJECTION, SOLUTION INTRAVENOUS CONTINUOUS
Status: DISCONTINUED | OUTPATIENT
Start: 2020-07-23 | End: 2020-07-24 | Stop reason: HOSPADM

## 2020-07-23 RX ORDER — EPINEPHRINE 1 MG/ML
0.3 INJECTION, SOLUTION, CONCENTRATE INTRAVENOUS PRN
Status: CANCELLED | OUTPATIENT
Start: 2020-07-30

## 2020-07-23 RX ADMIN — SODIUM CHLORIDE: 9 INJECTION, SOLUTION INTRAVENOUS at 13:31

## 2020-07-23 RX ADMIN — FERRIC CARBOXYMALTOSE INJECTION 750 MG: 50 INJECTION, SOLUTION INTRAVENOUS at 13:31

## 2020-07-24 RX ORDER — PANTOPRAZOLE SODIUM 40 MG/1
40 TABLET, DELAYED RELEASE ORAL
Qty: 90 TABLET | Refills: 1 | Status: SHIPPED | OUTPATIENT
Start: 2020-07-24 | End: 2020-12-16

## 2020-07-24 NOTE — TELEPHONE ENCOUNTER
Please Approve or Refuse.   Send to Pharmacy per Pt's Request:      Next Visit Date:  9/15/2020   Last Visit Date: 6/2/2020    Hemoglobin A1C (%)   Date Value   06/02/2020 6.7   02/26/2020 7.0   11/15/2019 6.8             ( goal A1C is < 7)   BP Readings from Last 3 Encounters:   07/23/20 (!) 124/58   07/10/20 (!) 139/56   06/02/20 132/70          (goal 120/80)  BUN   Date Value Ref Range Status   05/26/2020 26 (H) 8 - 23 mg/dL Final     CREATININE   Date Value Ref Range Status   05/26/2020 1.07 0.70 - 1.20 mg/dL Final     Potassium   Date Value Ref Range Status   05/26/2020 4.2 3.7 - 5.3 mmol/L Final

## 2020-07-31 ENCOUNTER — HOSPITAL ENCOUNTER (OUTPATIENT)
Dept: INFUSION THERAPY | Age: 85
Discharge: HOME | End: 2020-07-31
Payer: MEDICARE

## 2020-07-31 VITALS
HEART RATE: 66 BPM | DIASTOLIC BLOOD PRESSURE: 59 MMHG | SYSTOLIC BLOOD PRESSURE: 114 MMHG | RESPIRATION RATE: 16 BRPM | TEMPERATURE: 98 F

## 2020-07-31 DIAGNOSIS — K90.9 IRON MALABSORPTION: Primary | ICD-10-CM

## 2020-07-31 DIAGNOSIS — D50.8 IRON DEFICIENCY ANEMIA SECONDARY TO INADEQUATE DIETARY IRON INTAKE: ICD-10-CM

## 2020-07-31 PROCEDURE — 2580000003 HC RX 258: Performed by: INTERNAL MEDICINE

## 2020-07-31 PROCEDURE — 96365 THER/PROPH/DIAG IV INF INIT: CPT | Performed by: NURSE PRACTITIONER

## 2020-07-31 PROCEDURE — 6360000002 HC RX W HCPCS: Performed by: INTERNAL MEDICINE

## 2020-07-31 RX ORDER — SODIUM CHLORIDE 9 MG/ML
INJECTION, SOLUTION INTRAVENOUS CONTINUOUS
Status: CANCELLED | OUTPATIENT
Start: 2020-08-06

## 2020-07-31 RX ORDER — EPINEPHRINE 1 MG/ML
0.3 INJECTION, SOLUTION, CONCENTRATE INTRAVENOUS PRN
Status: CANCELLED | OUTPATIENT
Start: 2020-08-06

## 2020-07-31 RX ORDER — SODIUM CHLORIDE 0.9 % (FLUSH) 0.9 %
5 SYRINGE (ML) INJECTION PRN
Status: CANCELLED | OUTPATIENT
Start: 2020-08-06

## 2020-07-31 RX ORDER — SODIUM CHLORIDE 0.9 % (FLUSH) 0.9 %
10 SYRINGE (ML) INJECTION PRN
Status: CANCELLED | OUTPATIENT
Start: 2020-08-06

## 2020-07-31 RX ORDER — DIPHENHYDRAMINE HYDROCHLORIDE 50 MG/ML
50 INJECTION INTRAMUSCULAR; INTRAVENOUS ONCE
Status: CANCELLED | OUTPATIENT
Start: 2020-08-06

## 2020-07-31 RX ORDER — SODIUM CHLORIDE 9 MG/ML
INJECTION, SOLUTION INTRAVENOUS CONTINUOUS
Status: DISCONTINUED | OUTPATIENT
Start: 2020-07-31 | End: 2020-08-01 | Stop reason: HOSPADM

## 2020-07-31 RX ORDER — HEPARIN SODIUM (PORCINE) LOCK FLUSH IV SOLN 100 UNIT/ML 100 UNIT/ML
500 SOLUTION INTRAVENOUS PRN
Status: CANCELLED | OUTPATIENT
Start: 2020-08-06

## 2020-07-31 RX ORDER — METHYLPREDNISOLONE SODIUM SUCCINATE 125 MG/2ML
125 INJECTION, POWDER, LYOPHILIZED, FOR SOLUTION INTRAMUSCULAR; INTRAVENOUS ONCE
Status: CANCELLED | OUTPATIENT
Start: 2020-08-06

## 2020-07-31 RX ADMIN — SODIUM CHLORIDE: 9 INJECTION, SOLUTION INTRAVENOUS at 13:20

## 2020-07-31 RX ADMIN — FERRIC CARBOXYMALTOSE INJECTION 750 MG: 50 INJECTION, SOLUTION INTRAVENOUS at 13:31

## 2020-08-13 RX ORDER — ENALAPRIL MALEATE 20 MG/1
20 TABLET ORAL DAILY
Qty: 90 TABLET | Refills: 3 | Status: SHIPPED | OUTPATIENT
Start: 2020-08-13 | End: 2020-11-12 | Stop reason: SDUPTHER

## 2020-08-13 RX ORDER — AMLODIPINE BESYLATE 5 MG/1
5 TABLET ORAL DAILY
Qty: 90 TABLET | Refills: 3 | Status: SHIPPED | OUTPATIENT
Start: 2020-08-13 | End: 2020-11-12 | Stop reason: SDUPTHER

## 2020-08-13 NOTE — TELEPHONE ENCOUNTER
Please Approve or Refuse.   Send to Pharmacy per Pt's Request:     Next Visit Date:  9/15/2020   Last Visit Date: 6/2/2020    Hemoglobin A1C (%)   Date Value   06/02/2020 6.7   02/26/2020 7.0   11/15/2019 6.8             ( goal A1C is < 7)   BP Readings from Last 3 Encounters:   07/31/20 (!) 114/59   07/23/20 (!) 124/58   07/10/20 (!) 139/56          (goal 120/80)  BUN   Date Value Ref Range Status   05/26/2020 26 (H) 8 - 23 mg/dL Final     CREATININE   Date Value Ref Range Status   05/26/2020 1.07 0.70 - 1.20 mg/dL Final     Potassium   Date Value Ref Range Status   05/26/2020 4.2 3.7 - 5.3 mmol/L Final

## 2020-08-22 ENCOUNTER — TELEPHONE (OUTPATIENT)
Dept: SPIRITUAL SERVICES | Age: 85
End: 2020-08-22

## 2020-08-25 ENCOUNTER — TELEPHONE (OUTPATIENT)
Dept: SPIRITUAL SERVICES | Age: 85
End: 2020-08-25

## 2020-08-25 NOTE — TELEPHONE ENCOUNTER
Patient's son Lori Heard. returned call to [facility] Clinical Specialist and family is aware of ACP referral. Writer discussed the importance of having ACP conversation with patient and completing AD documents. Writer offered to schedule face to face meeting with patient and family. Lea Gee will discuss with his father and contact writer next week.

## 2020-08-25 NOTE — TELEPHONE ENCOUNTER
.ACP invitation extended by PCP. An outreach call was made to patient by an [facility] Clinical Specialist and patient is aware of ACP referral. A virtual visit was scheduled for next week and the AD documents were emailed to the patient.

## 2020-09-09 ENCOUNTER — HOSPITAL ENCOUNTER (OUTPATIENT)
Age: 85
Discharge: HOME | End: 2020-09-09
Payer: MEDICARE

## 2020-09-09 LAB
ALBUMIN SERPL-MCNC: 4.2 G/DL (ref 3.5–5.2)
ALBUMIN/GLOBULIN RATIO: ABNORMAL (ref 1–2.5)
ALP BLD-CCNC: 76 U/L (ref 40–129)
ALT SERPL-CCNC: 18 U/L (ref 5–41)
ANION GAP SERPL CALCULATED.3IONS-SCNC: 12 MMOL/L (ref 9–17)
AST SERPL-CCNC: 21 U/L
BILIRUB SERPL-MCNC: 0.59 MG/DL (ref 0.3–1.2)
BUN BLDV-MCNC: 24 MG/DL (ref 8–23)
BUN/CREAT BLD: ABNORMAL (ref 9–20)
CALCIUM SERPL-MCNC: 9.4 MG/DL (ref 8.6–10.4)
CHLORIDE BLD-SCNC: 103 MMOL/L (ref 98–107)
CO2: 24 MMOL/L (ref 20–31)
CREAT SERPL-MCNC: 0.99 MG/DL (ref 0.7–1.2)
GFR AFRICAN AMERICAN: >60 ML/MIN
GFR NON-AFRICAN AMERICAN: >60 ML/MIN
GFR SERPL CREATININE-BSD FRML MDRD: ABNORMAL ML/MIN/{1.73_M2}
GFR SERPL CREATININE-BSD FRML MDRD: ABNORMAL ML/MIN/{1.73_M2}
GLUCOSE BLD-MCNC: 140 MG/DL (ref 70–99)
HCT VFR BLD CALC: 35.5 % (ref 41–53)
HEMOGLOBIN: 11.8 G/DL (ref 13.5–17.5)
MCH RBC QN AUTO: 32.5 PG (ref 26–34)
MCHC RBC AUTO-ENTMCNC: 33.2 G/DL (ref 31–37)
MCV RBC AUTO: 97.8 FL (ref 80–100)
NRBC AUTOMATED: ABNORMAL PER 100 WBC
PDW BLD-RTO: 15.2 % (ref 11.5–14.9)
PLATELET # BLD: 158 K/UL (ref 150–450)
PMV BLD AUTO: 9 FL (ref 6–12)
POTASSIUM SERPL-SCNC: 4.4 MMOL/L (ref 3.7–5.3)
RBC # BLD: 3.62 M/UL (ref 4.5–5.9)
SODIUM BLD-SCNC: 139 MMOL/L (ref 135–144)
TOTAL PROTEIN: 7 G/DL (ref 6.4–8.3)
WBC # BLD: 5.2 K/UL (ref 3.5–11)

## 2020-09-09 PROCEDURE — 80053 COMPREHEN METABOLIC PANEL: CPT

## 2020-09-09 PROCEDURE — 85027 COMPLETE CBC AUTOMATED: CPT

## 2020-09-09 PROCEDURE — 36415 COLL VENOUS BLD VENIPUNCTURE: CPT

## 2020-09-09 NOTE — RESULT ENCOUNTER NOTE
Yajaira comment sent to patient.   Blood glucose well controlled 140  Mild anemia improving  Kidney function stable  All other labs within normal limits continue current treatment    Future Appointments  9/15/2020  3:00 PM    Deedee Oneal MD     [facility]  6/2/2021   3:00 PM    Deedee Oneal MD     [facility]  7/9/2021   1:00 PM    Schuyler Harada, MD     [address] [place] [address]

## 2020-09-15 ENCOUNTER — OFFICE VISIT (OUTPATIENT)
Dept: FAMILY MEDICINE CLINIC | Age: 85
End: 2020-09-15
Payer: MEDICARE

## 2020-09-15 VITALS
HEART RATE: 68 BPM | TEMPERATURE: 96.9 F | SYSTOLIC BLOOD PRESSURE: 132 MMHG | OXYGEN SATURATION: 98 % | BODY MASS INDEX: 24.71 KG/M2 | HEIGHT: 70 IN | DIASTOLIC BLOOD PRESSURE: 70 MMHG | WEIGHT: 172.6 LBS

## 2020-09-15 LAB — HBA1C MFR BLD: 6.3 %

## 2020-09-15 PROCEDURE — 1123F ACP DISCUSS/DSCN MKR DOCD: CPT | Performed by: FAMILY MEDICINE

## 2020-09-15 PROCEDURE — G8427 DOCREV CUR MEDS BY ELIG CLIN: HCPCS | Performed by: FAMILY MEDICINE

## 2020-09-15 PROCEDURE — 1036F TOBACCO NON-USER: CPT | Performed by: FAMILY MEDICINE

## 2020-09-15 PROCEDURE — 99214 OFFICE O/P EST MOD 30 MIN: CPT | Performed by: FAMILY MEDICINE

## 2020-09-15 PROCEDURE — 90694 VACC AIIV4 NO PRSRV 0.5ML IM: CPT | Performed by: FAMILY MEDICINE

## 2020-09-15 PROCEDURE — 83036 HEMOGLOBIN GLYCOSYLATED A1C: CPT | Performed by: FAMILY MEDICINE

## 2020-09-15 PROCEDURE — 4040F PNEUMOC VAC/ADMIN/RCVD: CPT | Performed by: FAMILY MEDICINE

## 2020-09-15 PROCEDURE — G8420 CALC BMI NORM PARAMETERS: HCPCS | Performed by: FAMILY MEDICINE

## 2020-09-15 PROCEDURE — G0008 ADMIN INFLUENZA VIRUS VAC: HCPCS | Performed by: FAMILY MEDICINE

## 2020-09-15 RX ORDER — MONTELUKAST SODIUM 10 MG/1
10 TABLET ORAL NIGHTLY
Qty: 90 TABLET | Refills: 3 | Status: SHIPPED | OUTPATIENT
Start: 2020-09-15 | End: 2020-12-16

## 2020-09-15 ASSESSMENT — ENCOUNTER SYMPTOMS
CONSTIPATION: 0
ABDOMINAL PAIN: 0
ABDOMINAL DISTENTION: 0
VOMITING: 0
SHORTNESS OF BREATH: 0
NAUSEA: 0
BLOOD IN STOOL: 0
DIARRHEA: 0
COUGH: 1
RHINORRHEA: 1
CHEST TIGHTNESS: 0
WHEEZING: 0

## 2020-09-15 NOTE — PROGRESS NOTES
Visit Information    Have you changed or started any medications since your last visit including any over-the-counter medicines, vitamins, or herbal medicines? no   Are you having any side effects from any of your medications? -  no  Have you stopped taking any of your medications? Is so, why? -  no    Have you seen any other physician or provider since your last visit? Yes - Records Obtained  Have you had any other diagnostic tests since your last visit? Yes - Records Obtained  Have you been seen in the emergency room and/or had an admission to a hospital since we last saw you? No  Have you had your routine dental cleaning in the past 6 months? no    Have you activated your [employer] account? If not, what are your barriers?  Yes     Patient Care Team:  Jay Mac MD as PCP - General (Family Medicine)  Jay Mac MD as PCP - [facility]  Ramirez Giang (Optometry)  Altaf Toro MD as Surgeon (Cardiology)  Danika Fleming MD as Consulting Physician (Urology)  Terell Tyler MD as Consulting Physician (Gastroenterology)  Trae Clemente MD as Surgeon (Ophthalmology)  Oseas Hayes (Certified Nurse Practitioner)  Johanna Ibrahim MD as Consulting Physician (Oncology)    Medical History Review  Past Medical, Family, and Social History reviewed and does contribute to the patient presenting condition    Health Maintenance   Topic Date Due    DTaP/Tdap/Td vaccine (1 - Tdap) 09/20/1943    Flu vaccine (1) 09/01/2020    Lipid screen  11/08/2020    TSH testing  05/26/2021    Annual Wellness Visit (AWV)  06/03/2021    Potassium monitoring  09/09/2021    Creatinine monitoring  09/09/2021    Shingles Vaccine  Completed    Pneumococcal 65+ years Vaccine  Completed    Hepatitis A vaccine  Aged Out    Hib vaccine  Aged Out    Meningococcal (ACWY) vaccine  Aged Out

## 2020-09-15 NOTE — PROGRESS NOTES
Chief Complaint   Patient presents with    Diabetes    Hypertension    Allergies    Hyperlipidemia       Patient is here to follow-up on chronic  medical problems. Comes with his son, Anuradha Alaniz's  first language is [place], he understands basic English and he can express  self in very basic English. he declines telephone  through [employer]. Patient continues to complain of allergies not getting better. He reports \"itchy nose and eyes, and runny nose when eating\"   Taking Loratadine, eye drops, nasal spray, cough drops but do not help. Patient says \"always my nose it's runny and eyes itchy\"  Patient son says when starts to eat, he starts coughing, sneezing, and is happening with any type of food  When doing other stuff in the house he is fine. Patient son also says when going to restaurant to eat also gets the symptoms. It seems like everything happens when eating and has a lot of runny nose, and itchy eyes and itchy nose. His son says he puts a lot of perfume  His son says Mario Mohan must be allergic to clean air\" as he states all day in his room and does not open the windows and seems to be mostly in his bedroom. Diabetes Mellitus Type II, Follow-up:    Current symptoms/problems include hyperglycemia, paresthesia of the feet and visual disturbances. Symptoms have been present for several years. Known diabetic complications: nephropathy and peripheral neuropathy    Cardiovascular risk factors: advanced age (older than 54 for men, 72 for women), diabetes mellitus, dyslipidemia, hypertension, male gender, microalbuminuria and sedentary lifestyle    Medication compliance:  compliant all of the time  Current diabetic medications include oral agent (monotherapy): Glucophage.        Eye exam current (within one year): yes  Weight trend: decreasing steadily, he has lost 3 pounds since the last appointment      Wt Readings from Last 3 Encounters:   09/15/20 172 lb 9.6 oz (78.3 kg) 07/10/20 175 lb 6.4 oz (79.6 kg)   06/02/20 175 lb (79.4 kg)       Prior visit with dietician: no  Current diet: in general, a \"healthy\" diet  , diabetic, low fat/ cholesterol, low salt  Current exercise: none    Barriers: impairment:  physical: Osteoarthritis, advanced age, and lack of motivation    Current monitoring regimen: home blood tests - daily  Home blood sugar records: fasting range: 134  Any episodes of hypoglycemia? no    Is He on ACE inhibitor or angiotensin II receptor blocker? Yes      reports that he quit smoking about 52 years ago. He has never used smokeless tobacco.     Counseling given: Yes      Daily Aspirin? No: Due to anemia and risks are higher than the benefits      A1c is improving. Lab Results   Component Value Date    LABA1C 6.3 09/15/2020    LABA1C 6.7 06/02/2020    LABA1C 7.0 02/26/2020       Urine microabumin is Elevated. Lab Results   Component Value Date    LABMICR 106 (H) 11/15/2017          Hypertension:   Brittny Coelho  is not  exercising and is adherent to low salt diet. Blood pressure is well controlled at home. Cardiac symptoms  fatigue. Had an iron infusion and now he has a lot of energy    Patient denies  chest pain, chest pressure/discomfort, claudication, dyspnea, exertional chest pressure/discomfort, irregular heart beat, lower extremity edema, near-syncope, orthopnea, palpitations, paroxysmal nocturnal dyspnea, syncope and tachypnea. Use of agents associated with hypertension: thyroid hormones. History of target organ damage: left ventricular hypertrophy. Patient has moderate aortic stenosis, last echo 2D was done 1/17/2017, showing mild LVH, EF 55%  Also has pulmonary hypertension, RVSP 51 mmHg. Patient was evaluated by cardiologist, he declined surgery and his cardiologist also recommended not to have any surgery due to his advanced age      BP controlled. Brittny Coelho reports compliance with BP medications, and tolerates them well, denies side effects.       BP Readings from Last 3 Encounters:   09/15/20 132/70   07/31/20 (!) 114/59   07/23/20 (!) 124/58        Pulse is Normal.    Pulse Readings from Last 3 Encounters:   09/15/20 68   07/31/20 66   07/23/20 59         Hyperlipidemia:  No new myalgias or GI upset on atorvastatin (Lipitor). Medication compliance: compliant all of the time. Patient is  following a low fat, low cholesterol diet. LDL is Normal.    Lab Results   Component Value Date    LDLCHOLESTEROL 71 11/08/2019     Miriam Challenger is due for Influenza vaccine. Denies side effects from prior flu shots. Denies allergy to eggs. Denies history of Guillain-Barré syndrome. Current Outpatient Medications   Medication Sig Dispense Refill    enalapril (VASOTEC) 20 MG tablet Take 1 tablet by mouth daily 90 tablet 3    amLODIPine (NORVASC) 5 MG tablet Take 1 tablet by mouth daily 90 tablet 3    pantoprazole (PROTONIX) 40 MG tablet Take 1 tablet by mouth every morning (before breakfast) 90 tablet 1    atorvastatin (LIPITOR) 20 MG tablet Take 1 tablet by mouth nightly 90 tablet 3    metFORMIN (GLUCOPHAGE-XR) 500 MG extended release tablet Take 1 tablet by mouth daily (with breakfast) If not recalled 90 tablet 3    ipratropium (ATROVENT) 0.03 % nasal spray USE 2 SPRAYS INTO EACH NOSTRIL THREE TIMES DAILY 30 mL 5    tamsulosin (FLOMAX) 0.4 MG capsule Take 1 capsule by mouth daily 90 capsule 0    blood glucose test strips (ACCU-CHEK JOEL PLUS) strip TEST ONCE A DAY, FASTING IN THE MORNING 100 strip 3    levothyroxine (SYNTHROID) 25 MCG tablet Take 1 tablet by mouth every morning (before breakfast) 90 tablet 3    Cholecalciferol (VITAMIN D3) 50 MCG (2000 UT) TABS TAKE 1 TABLET BY MOUTH DAILY WITH FOOD 90 tablet 3    camphor-menthol-methyl salicylate (BENGAY ULTRA STRENGTH) 4-10-30 % CREA cream Apply topically 3 times daily as needed for Pain 113 g 0    lidocaine (LIDODERM) 5 % Place 1 patch onto the skin daily 12 hours on, 12 hours off.  30 patch 3    Blood Glucose Monitoring Suppl (ACCU-CHEK JOEL PLUS) w/Device KIT USE AS DIRECTED 1 kit 0    Accu-Chek FastClix Lancets MISC USE TO TEST BLOOD GLUCOSE ONCE A  each 3    Lancets 30G MISC Testing once a day. Please dispense according to patients insurance:  accu check Joel plus 50 each 3    diclofenac sodium (VOLTAREN) 1 % GEL Apply 2 g topically 4 times daily as needed for Pain 1 Tube 3    lidocaine (XYLOCAINE) 5 % ointment Apply topically as needed every 8 hrs prn for pain. 240 g 3    melatonin (RA MELATONIN) 3 MG TABS tablet Take 1 tablet by mouth nightly as needed (insomnia) 30 tablet 3    cetirizine (ZYRTEC) 5 MG tablet Take 1 tablet by mouth daily Stop claritin 90 tablet 3    Lancets Misc. (ACCU-CHEK MULTICLIX LANCET DEV) KIT Please dispense according to patients insurance. 1 kit 0    Alcohol Swabs PADS Please dispense according to patients insurance/device. Testing 1-2 /day 100 each 2     No current facility-administered medications for this visit. Rest of complaints with corresponding details per ROS      The patient'spast medical, surgical, social, and family history as well as his current medications and allergies were reviewed as documented in today's encounter. Social History     Tobacco Use    Smoking status: Former Smoker     Last attempt to quit: 10/27/1967     Years since quittin.9    Smokeless tobacco: Never Used   Substance Use Topics    Alcohol use: No    Drug use: No       Counseling given: Yes                  Review of Systems   Constitutional: Positive for fatigue. Negative for activity change, appetite change, chills, diaphoresis, fever and unexpected weight change. HENT: Positive for congestion, hearing loss (has hearing aids), postnasal drip, rhinorrhea and sneezing. Symptoms happen only when eating   Eyes: Positive for itching and visual disturbance (chronic, stable, wears glasses). Respiratory: Positive for cough (clearing throat).  Negative for chest tightness, shortness of breath and wheezing. Cardiovascular: Negative for chest pain, palpitations and leg swelling. Gastrointestinal: Negative for abdominal distention, abdominal pain, blood in stool, constipation, diarrhea, nausea and vomiting. Endocrine: Negative for cold intolerance, heat intolerance, polydipsia, polyphagia and polyuria. Genitourinary: Negative for difficulty urinating. Nocturia twice per night, taking Flomax   Musculoskeletal: Positive for arthralgias. Allergic/Immunologic: Positive for environmental allergies. Neurological: Positive for numbness (feet). Negative for weakness. Hematological: Does not bruise/bleed easily. Psychiatric/Behavioral: Positive for sleep disturbance. Negative for dysphoric mood. The patient is not nervous/anxious.          -vital signs stable and within normal limits     /70   Pulse 68   Temp 96.9 °F (36.1 °C) (Temporal)   Ht 5' 10\" (1.778 m)   Wt 172 lb 9.6 oz (78.3 kg)   SpO2 98%   BMI 24.77 kg/m²         Physical Exam  Vitals signs and nursing note reviewed. Constitutional:       General: He is not in acute distress. Appearance: Normal appearance. He is well-developed and normal weight. He is not diaphoretic. HENT:      Head: Normocephalic and atraumatic. Right Ear: External ear normal.      Left Ear: External ear normal.      Ears:      Comments: Wears hearing aids     Nose: Congestion present. Mouth/Throat:      Mouth: Mucous membranes are moist.   Eyes:      General: Lids are normal. No scleral icterus. Right eye: No discharge. Left eye: No discharge. Conjunctiva/sclera: Conjunctivae normal.   Neck:      Musculoskeletal: Normal range of motion and neck supple. Thyroid: No thyromegaly. Cardiovascular:      Rate and Rhythm: Normal rate and regular rhythm. Heart sounds: Murmur present. Crescendo  systolic murmur present with a grade of 4/6.    Pulmonary:      Effort: Pulmonary effort is normal. No respiratory distress. Breath sounds: Normal breath sounds. No wheezing or rales. Chest:      Chest wall: No tenderness. Abdominal:      General: Bowel sounds are normal. There is no distension. Palpations: Abdomen is soft. There is no hepatomegaly or splenomegaly. Tenderness: There is no abdominal tenderness. Musculoskeletal: Normal range of motion. General: No tenderness. Right lower leg: No edema. Left lower leg: No edema. Lymphadenopathy:      Cervical: No cervical adenopathy. Skin:     General: Skin is warm and dry. Capillary Refill: Capillary refill takes less than 2 seconds. Findings: No rash. Neurological:      Mental Status: He is alert and oriented to person, place, and time. Deep Tendon Reflexes: Reflexes are normal and symmetric. Psychiatric:         Mood and Affect: Mood normal.         Behavior: Behavior normal.         Thought Content: Thought content normal.         Judgment: Judgment normal.           Discussed testing withthe patient and all questions fully answered. I personally reviewed testing with patient.   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    Otherwise labs within normal limits        Lab Results   Component Value Date    WBC 5.2 09/09/2020    HGB 11.8 (L) 09/09/2020    HCT 35.5 (L) 09/09/2020    MCV 97.8 09/09/2020     09/09/2020       Lab Results   Component Value Date     09/09/2020    K 4.4 09/09/2020     09/09/2020    CO2 24 09/09/2020    BUN 24 09/09/2020    CREATININE 0.99 09/09/2020    GLUCOSE 140 09/09/2020    CALCIUM 9.4 09/09/2020        Lab Results   Component Value Date    ALT 18 09/09/2020    AST 21 09/09/2020    ALKPHOS 76 09/09/2020    BILITOT 0.59 09/09/2020       Lab Results   Component Value Date    TSH 3.18 05/26/2020       Lab Results   Component Value Date    CHOL 134 11/08/2019    CHOL 123 10/25/2018    CHOL 127 05/19/2017     Lab Results   Component Value Date    TRIG 78 11/08/2019    TRIG 59 10/25/2018    TRIG 87 05/19/2017     Lab Results   Component Value Date    HDL 49 11/08/2019    HDL 51 10/25/2018    HDL 34 (L) 05/19/2017     Lab Results   Component Value Date    LDLCHOLESTEROL 69 11/08/2019    LDLCHOLESTEROL 60 10/25/2018    LDLCHOLESTEROL 76 05/19/2017     Lab Results   Component Value Date    CHOLHDLRATIO 2.7 11/08/2019    CHOLHDLRATIO 2.4 10/25/2018    CHOLHDLRATIO 3.7 05/19/2017         Lab Results   Component Value Date    TNKDDVGA22 847 05/26/2020     Lab Results   Component Value Date    FOLATE >20.0 05/26/2020     Lab Results   Component Value Date    VITD25 33.2 10/25/2018           ASSESSMENT AND PLAN    1. Non-seasonal allergic rhinitis due to other allergic trigger  Failing to change as expected. Okay to stop Zyrtec, continue nasal sprays  -start  montelukast (SINGULAIR) 10 MG tablet; Take 1 tablet by mouth nightly For allergies  Dispense: 90 tablet; Refill: 3    2. Type 2 diabetes mellitus with diabetic polyneuropathy, without long-term current use of insulin (HCC)  Improving  - POCT glycosylated hemoglobin (Hb A1C)  Lab Results   Component Value Date    LABA1C 6.3 09/15/2020    LABA1C 6.7 06/02/2020    LABA1C 7.0 02/26/2020       - CBC Auto Differential; Future  - Comprehensive Metabolic Panel; Future  - Vitamin B12 & Folate; Future  -advised home blood glucose testing  daily  -daily feet exam, Foot care: advised to wash feet daily, pat dry and apply lotion at night, avoiding between toes. Need to look at feet daily and report to a physician any signs of inflammation or skin damage  -annual dilated eye exam  -Low carb, low fat diet, increase fruits and vegetables, and exercise 4-5 times a day 30-40 minutes a day discussed  -continue current treatment    -continue ACEI and statin      3. Essential hypertension  Well controlled. Continue current treatment. Will recheck labs. - CBC Auto Differential; Future  - Comprehensive Metabolic Panel; Future  Discussed low salt diet and BP and pulse monitoring daily    4. Hyperlipidemia with target LDL less than 70  Well-controlled  Continue Lipitor 20 mg daily  - Lipid Panel; Future    5. Encounter for immunization    - INFLUENZA, QUADV, ADJUVANTED, 72 YRS =, IM, PF, PREFILL SYR, 0.5ML (FLUAD)        Data Unavailable    Orders Placed This Encounter   Procedures    INFLUENZA, QUADV, ADJUVANTED, 65 YRS =, IM, PF, PREFILL SYR, 0.5ML (FLUAD)    CBC Auto Differential     Standing Status:   Future     Standing Expiration Date:   9/16/2021    Comprehensive Metabolic Panel     Standing Status:   Future     Standing Expiration Date:   9/15/2021    Vitamin B12 & Folate     Standing Status:   Future     Standing Expiration Date:   9/15/2021    Lipid Panel     Standing Status:   Future     Standing Expiration Date:   9/15/2021     Order Specific Question:   Is Patient Fasting?/# of Hours     Answer:   yes, 8-10 hours    POCT glycosylated hemoglobin (Hb A1C)       Medications Discontinued During This Encounter   Medication Reason    cetirizine (ZYRTEC) 5 MG tablet Alternate therapy         Corbin received counseling on the following healthy behaviors: nutrition, exercise and medication adherence  Reviewed prior labs and health maintenance  Continue current medications, diet and exercise. Discussed use, benefit, and side effects of prescribed medications. Barriers to medication compliance addressed. Patient given educational materials - see patient instructions  Was a self-tracking handout given in paper form or via LOGIC [employer]? Yes    Requested Prescriptions     Signed Prescriptions Disp Refills    montelukast (SINGULAIR) 10 MG tablet 90 tablet 3     Sig: Take 1 tablet by mouth nightly For allergies       All patient questions answered. Patient voiced understanding. Quality Measures    Body mass index is 24.77 kg/m².  Normal. Weight control planned discussed Healthy diet and regular exercise. BP: 132/70 Blood pressure is normal. Treatment plan consists of DASH Eating Plan, Dietary Sodium Restriction, Increased Physical Activity, Patient In-home Blood Pressure Monitoring and No treatment change needed. The patient's past medical,surgical, social, and family history as well as his   current medications and allergies were reviewed as documented in today's encounter. Medications, labs, diagnostic studies, consultations and follow-up asdocumented in this encounter. Return in about 3 months (around 12/15/2020) for ALWAYS NEEDS 30 MIN. APPT, 1st language not English, HTN,HLP, LABS F/U. Patient was seen with total face to face time of  25 minutes. More than 50% of this visit was counseling and education. Future Appointments   Date Time Provider Toy Watts   12/16/2020  4:00 PM Myna Frankel, MD [facility]   6/2/2021  3:00 PM Myna Frankel, MD [facility]   7/9/2021  1:00 PM Buddy Gutierrez MD [address] [place] [address]       This note was completed by using the assistance of a speech-recognition program. However, inadvertent computerized transcription errors may be present. Although every effort was made to ensure accuracy, no guarantees can be provided that every mistake has been identified and corrected by editing .     Electronically signed by Myna Frankel, MD on 9/21/2020 at 8:42 PM

## 2020-09-15 NOTE — PATIENT INSTRUCTIONS
Patient Education        Learning About Diabetes Food Guidelines  Your Care Instructions     Meal planning is important to manage diabetes. It helps keep your blood sugar at a target level (which you set with your doctor). You don't have to eat special foods. You can eat what your family eats, including sweets once in a while. But you do have to pay attention to how often you eat and how much you eat of certain foods. You may want to work with a dietitian or a certified diabetes educator (CDE) to help you plan meals and snacks. A dietitian or CDE can also help you lose weight if that is one of your goals. What should you know about eating carbs? Managing the amount of carbohydrate (carbs) you eat is an important part of healthy meals when you have diabetes. Carbohydrate is found in many foods. · Learn which foods have carbs. And learn the amounts of carbs in different foods. ? Bread, cereal, pasta, and rice have about 15 grams of carbs in a serving. A serving is 1 slice of bread (1 ounce), ½ cup of cooked cereal, or 1/3 cup of cooked pasta or rice. ? Fruits have 15 grams of carbs in a serving. A serving is 1 small fresh fruit, such as an apple or orange; ½ of a banana; ½ cup of cooked or canned fruit; ½ cup of fruit juice; 1 cup of melon or raspberries; or 2 tablespoons of dried fruit. ? Milk and no-sugar-added yogurt have 15 grams of carbs in a serving. A serving is 1 cup of milk or 2/3 cup of no-sugar-added yogurt. ? Starchy vegetables have 15 grams of carbs in a serving. A serving is ½ cup of mashed potatoes or sweet potato; 1 cup winter squash; ½ of a small baked potato; ½ cup of cooked beans; or ½ cup cooked corn or green peas. · Learn how much carbs to eat each day and at each meal. A dietitian or CDE can teach you how to keep track of the amount of carbs you eat. This is called carbohydrate counting. · If you are not sure how to count carbohydrate grams, use the Plate Method to plan meals.  It is a good, quick way to make sure that you have a balanced meal. It also helps you spread carbs throughout the day. ? Divide your plate by types of foods. Put non-starchy vegetables on half the plate, meat or other protein food on one-quarter of the plate, and a grain or starchy vegetable in the final quarter of the plate. To this you can add a small piece of fruit and 1 cup of milk or yogurt, depending on how many carbs you are supposed to eat at a meal.  · Try to eat about the same amount of carbs at each meal. Do not \"save up\" your daily allowance of carbs to eat at one meal.  · Proteins have very little or no carbs per serving. Examples of proteins are beef, chicken, turkey, fish, eggs, tofu, cheese, cottage cheese, and peanut butter. A serving size of meat is 3 ounces, which is about the size of a deck of cards. Examples of meat substitute serving sizes (equal to 1 ounce of meat) are 1/4 cup of cottage cheese, 1 egg, 1 tablespoon of peanut butter, and ½ cup of tofu. How can you eat out and still eat healthy? · Learn to estimate the serving sizes of foods that have carbohydrate. If you measure food at home, it will be easier to estimate the amount in a serving of restaurant food. · If the meal you order has too much carbohydrate (such as potatoes, corn, or baked beans), ask to have a low-carbohydrate food instead. Ask for a salad or green vegetables. · If you use insulin, check your blood sugar before and after eating out to help you plan how much to eat in the future. · If you eat more carbohydrate at a meal than you had planned, take a walk or do other exercise. This will help lower your blood sugar. What else should you know? · Limit saturated fat, such as the fat from meat and dairy products. This is a healthy choice because people who have diabetes are at higher risk of heart disease. So choose lean cuts of meat and nonfat or low-fat dairy products.  Use olive or canola oil instead of butter or shortening when cooking. · Don't skip meals. Your blood sugar may drop too low if you skip meals and take insulin or certain medicines for diabetes. · Check with your doctor before you drink alcohol. Alcohol can cause your blood sugar to drop too low. Alcohol can also cause a bad reaction if you take certain diabetes medicines. Follow-up care is a key part of your treatment and safety. Be sure to make and go to all appointments, and call your doctor if you are having problems. It's also a good idea to know your test results and keep a list of the medicines you take. Where can you learn more? Go to [email or web address] org and sign in to your Si[employer] account. Enter E270 in the Vo[employer] box to learn more about \"Learning About Diabetes Food Guidelines. \"     If you do not have an account, please click on the \"Sign Up Now\" link. Current as of: December 20, 2019               Content Version: 12.5  © 3747-4165 [employer]. Care instructions adapted under license by [facility] ([facility]). If you have questions about a medical condition or this instruction, always ask your healthcare professional. [address] any warranty or liability for your use of this information. Patient Education        Learning About Low-Carbohydrate Diets  What is a low-carbohydrate diet? A low-carbohydrate (or \"low-carb\") diet limits foods and drinks that have carbohydrates. This includes grains, fruits, milk and yogurt, and starchy vegetables like potatoes, beans, and corn. It also avoids foods and drinks that have added sugar. Instead, low-carb diets include foods that are high in protein and fat. Why might you follow a low-carb diet? Low-carb diets may be used for a variety of reasons, such as for weight loss. People who have diabetes may use a low-carb diet to help manage their blood sugar levels. What should you do before you start the diet?   Talk to your doctor before you try any diet. This is even more important if you have health problems like kidney disease, heart disease, or diabetes. Your doctor may suggest that you meet with a registered dietitian. A dietitian can help you make an eating plan that works for you. What foods do you eat on a low-carb diet? On a low-carb diet, you choose foods that are high in protein and fat. Examples of these are:  · Meat, poultry, and fish. · Eggs. · Nuts, such as walnuts, pecans, almonds, and peanuts. · Peanut butter and other nut butters. · Tofu. · Avocado. · Gwendlyn Dries. · Non-starchy vegetables like broccoli, cauliflower, green beans, mushrooms, peppers, lettuce, and spinach. · Unsweetened non-dairy milks like almond milk and coconut milk. · Cheese, cottage cheese, and cream cheese. Current as of: August 22, 2019               Content Version: 12.5  © 6948-8795 [employer]. Care instructions adapted under license by [facility] ([facility]). If you have questions about a medical condition or this instruction, always ask your healthcare professional. [place] [address] any warranty or liability for your use of this information.

## 2020-09-19 ENCOUNTER — TELEPHONE (OUTPATIENT)
Dept: SPIRITUAL SERVICES | Age: 85
End: 2020-09-19

## 2020-09-21 ASSESSMENT — ENCOUNTER SYMPTOMS: EYE ITCHING: 1

## 2020-09-24 ENCOUNTER — TELEPHONE (OUTPATIENT)
Dept: CARE COORDINATION | Age: 85
End: 2020-09-24

## 2020-09-24 NOTE — TELEPHONE ENCOUNTER
Outreach call to continue the ACP conversation with patient's son. After much discussion, patient's son stated that he would like to receive a call from the [address] [place] [address] Team with an  on 10/06/20 after 4p, to have a discussion with the patient. Patient's son called back and stated that patient would like to have a Medical POA and a Living Will.

## 2020-09-25 ENCOUNTER — OFFICE VISIT (OUTPATIENT)
Dept: FAMILY MEDICINE CLINIC | Age: 85
End: 2020-09-25
Payer: MEDICARE

## 2020-09-25 VITALS
DIASTOLIC BLOOD PRESSURE: 76 MMHG | BODY MASS INDEX: 27.15 KG/M2 | TEMPERATURE: 97.1 F | WEIGHT: 173 LBS | OXYGEN SATURATION: 95 % | SYSTOLIC BLOOD PRESSURE: 120 MMHG | HEART RATE: 90 BPM | HEIGHT: 67 IN

## 2020-09-25 PROBLEM — R06.09 DOE (DYSPNEA ON EXERTION): Status: RESOLVED | Noted: 2017-01-05 | Resolved: 2020-09-25

## 2020-09-25 PROCEDURE — 90471 IMMUNIZATION ADMIN: CPT | Performed by: FAMILY MEDICINE

## 2020-09-25 PROCEDURE — G8427 DOCREV CUR MEDS BY ELIG CLIN: HCPCS | Performed by: FAMILY MEDICINE

## 2020-09-25 PROCEDURE — 1036F TOBACCO NON-USER: CPT | Performed by: FAMILY MEDICINE

## 2020-09-25 PROCEDURE — 1123F ACP DISCUSS/DSCN MKR DOCD: CPT | Performed by: FAMILY MEDICINE

## 2020-09-25 PROCEDURE — 99214 OFFICE O/P EST MOD 30 MIN: CPT | Performed by: FAMILY MEDICINE

## 2020-09-25 PROCEDURE — 4040F PNEUMOC VAC/ADMIN/RCVD: CPT | Performed by: FAMILY MEDICINE

## 2020-09-25 PROCEDURE — 90715 TDAP VACCINE 7 YRS/> IM: CPT | Performed by: FAMILY MEDICINE

## 2020-09-25 PROCEDURE — G8417 CALC BMI ABV UP PARAM F/U: HCPCS | Performed by: FAMILY MEDICINE

## 2020-09-25 RX ORDER — CEPHALEXIN 500 MG/1
500 CAPSULE ORAL 2 TIMES DAILY
Qty: 20 CAPSULE | Refills: 0 | Status: SHIPPED | OUTPATIENT
Start: 2020-09-25 | End: 2020-12-16 | Stop reason: ALTCHOICE

## 2020-09-25 RX ORDER — METHYLPREDNISOLONE 4 MG/1
TABLET ORAL
Qty: 1 KIT | Refills: 0 | Status: SHIPPED | OUTPATIENT
Start: 2020-09-25 | End: 2020-12-16 | Stop reason: ALTCHOICE

## 2020-09-25 ASSESSMENT — ENCOUNTER SYMPTOMS
NAUSEA: 0
BLOOD IN STOOL: 0
SHORTNESS OF BREATH: 0
DIARRHEA: 0
CHEST TIGHTNESS: 0
ABDOMINAL DISTENTION: 0
ABDOMINAL PAIN: 0
VOMITING: 0
CONSTIPATION: 0
WHEEZING: 0
COUGH: 0

## 2020-09-25 NOTE — PROGRESS NOTES
Visit Information    Have you changed or started any medications since your last visit including any over-the-counter medicines, vitamins, or herbal medicines? no   Are you having any side effects from any of your medications? -  no  Have you stopped taking any of your medications? Is so, why? -  no    Have you seen any other physician or provider since your last visit? No  Have you had any other diagnostic tests since your last visit? No  Have you been seen in the emergency room and/or had an admission to a hospital since we last saw you? No  Have you had your routine dental cleaning in the past 6 months? no    Have you activated your [facility] account? If not, what are your barriers?  Yes     Patient Care Team:  Morgan Rodriguez MD as PCP - General (Family Medicine)  Morgan Rodriguez MD as PCP - [facility]  Ivan Stanley (Optometry)  Becka Gifford MD as Surgeon (Cardiology)  Tiago Nuñez MD as Consulting Physician (Urology)  Mey Armendariz MD as Consulting Physician (Gastroenterology)  Almaz Delgado MD as Surgeon (Ophthalmology)  Zenia Daniels (Certified Nurse Practitioner)  Celina Hairston MD as Consulting Physician (Oncology)    Medical History Review  Past Medical, Family, and Social History reviewed and does contribute to the patient presenting condition    Health Maintenance   Topic Date Due    DTaP/Tdap/Td vaccine (1 - Tdap) 09/20/1943    Lipid screen  11/08/2020    TSH testing  05/26/2021    Annual Wellness Visit (AWV)  06/03/2021    Potassium monitoring  09/09/2021    Creatinine monitoring  09/09/2021    Flu vaccine  Completed    Shingles Vaccine  Completed    Pneumococcal 65+ years Vaccine  Completed    Hepatitis A vaccine  Aged Out    Hib vaccine  Aged Out    Meningococcal (ACWY) vaccine  Aged Out

## 2020-09-25 NOTE — PROGRESS NOTES
Chief Complaint   Patient presents with   [facility] Fall     down the stairs wednesday night         HPI    Patient comes today due to acute illness:    Comes with his son, America Alaniz's  first language is  Comoran, he understands basic English and he can express  self in very basic English. he declines telephone  through [facility]. He says he fell on Wednesday night at 10 pm, on September 23rd, unwitnessed. He says he slided on the stairs from the second floor to the first floor    His son says he was wearing the new shoes, and was carrying stuff in both hands, and didn't hold onto anything. His son is also upset that he did not turn on the light. His son says he found him immediately and he was doing fine, he did not pass out. The patient denies any premonitory symptoms before falling, he denies any chest pain, shortness of breath, palpitations or lightheadedness before that. Patient says most likely it was due to his new shoes. Corbin complains of multiple bruises and excoriations. Patient says he has a large bruise on the scalp, left arm, right arm, upper back, and buttocks. Patient reports extensive bruising over the left arm, which is all the way around, not hurting much, with multiple excoriations    Patient complains of left shoulder pain, which is new, he has been unable to lift up the arm above the head or to put it on his back, also since the fall 3 days ago. Patient admits he also has a small bruise on the buttocks, his son was not aware. His son says he also has some excoriations on upper back and right elbow scratches    Patient currently denies dizziness, chest pain, shortness of breath, lightheadedness. He describes the pain in the shoulder, worse with certain movements, up to 8 out of 10 with movement. He denies any headache unless he touches the hematoma.   He is not on aspirin    The son says otherwise he has been doing well, eating okay and sleeping okay and having humor      Current Outpatient Medications on File Prior to Visit   Medication Sig Dispense Refill    montelukast (SINGULAIR) 10 MG tablet Take 1 tablet by mouth nightly For allergies 90 tablet 3    enalapril (VASOTEC) 20 MG tablet Take 1 tablet by mouth daily 90 tablet 3    amLODIPine (NORVASC) 5 MG tablet Take 1 tablet by mouth daily 90 tablet 3    pantoprazole (PROTONIX) 40 MG tablet Take 1 tablet by mouth every morning (before breakfast) 90 tablet 1    atorvastatin (LIPITOR) 20 MG tablet Take 1 tablet by mouth nightly 90 tablet 3    metFORMIN (GLUCOPHAGE-XR) 500 MG extended release tablet Take 1 tablet by mouth daily (with breakfast) If not recalled 90 tablet 3    ipratropium (ATROVENT) 0.03 % nasal spray USE 2 SPRAYS INTO EACH NOSTRIL THREE TIMES DAILY 30 mL 5    tamsulosin (FLOMAX) 0.4 MG capsule Take 1 capsule by mouth daily 90 capsule 0    blood glucose test strips (ACCU-CHEK JOEL PLUS) strip TEST ONCE A DAY, FASTING IN THE MORNING 100 strip 3    levothyroxine (SYNTHROID) 25 MCG tablet Take 1 tablet by mouth every morning (before breakfast) 90 tablet 3    Cholecalciferol (VITAMIN D3) 50 MCG (2000 UT) TABS TAKE 1 TABLET BY MOUTH DAILY WITH FOOD 90 tablet 3    camphor-menthol-methyl salicylate (BENGAY ULTRA STRENGTH) 4-10-30 % CREA cream Apply topically 3 times daily as needed for Pain 113 g 0    lidocaine (LIDODERM) 5 % Place 1 patch onto the skin daily 12 hours on, 12 hours off. 30 patch 3    Blood Glucose Monitoring Suppl (ACCU-CHEK JOEL PLUS) w/Device KIT USE AS DIRECTED 1 kit 0    Accu-Chek FastClix Lancets MISC USE TO TEST BLOOD GLUCOSE ONCE A  each 3    Lancets 30G MISC Testing once a day. Please dispense according to patients insurance:  accu check Joel plus 50 each 3    diclofenac sodium (VOLTAREN) 1 % GEL Apply 2 g topically 4 times daily as needed for Pain 1 Tube 3    lidocaine (XYLOCAINE) 5 % ointment Apply topically as needed every 8 hrs prn for pain.  240 g 3    melatonin (RA MELATONIN) 3 MG TABS tablet Take 1 tablet by mouth nightly as needed (insomnia) 30 tablet 3    Lancets Misc. (ACCU-CHEK MULTICLIX LANCET DEV) KIT Please dispense according to patients insurance. 1 kit 0    Alcohol Swabs PADS Please dispense according to patients insurance/device. Testing 1-2 /day 100 each 2     No current facility-administered medications on file prior to visit. Social History     Tobacco Use    Smoking status: Former Smoker     Last attempt to quit: 10/27/1967     Years since quittin.9    Smokeless tobacco: Never Used   Substance Use Topics    Alcohol use: No    Drug use: No       Counseling given: Yes                    The patient's past medical, surgical, social, and family history as well as his current medications and allergies were reviewed as documented in today's encounter. Rest of complaints with corresponding details per ROS. Review of Systems   Constitutional: Positive for fatigue. Negative for activity change, appetite change, chills, diaphoresis, fever and unexpected weight change. HENT: Positive for hearing loss (has hearing aids). Eyes: Positive for visual disturbance (chronic, stable, wears glasses). Respiratory: Negative for cough, chest tightness, shortness of breath and wheezing. Cardiovascular: Negative for chest pain, palpitations and leg swelling. Gastrointestinal: Negative for abdominal distention, abdominal pain, blood in stool, constipation, diarrhea, nausea and vomiting. Endocrine: Negative for cold intolerance, heat intolerance, polydipsia, polyphagia and polyuria. Genitourinary: Negative for difficulty urinating. Nocturia twice per night, taking Flomax   Musculoskeletal: Positive for arthralgias. Skin:        Bruising and excoriations   Neurological: Positive for numbness (feet). Negative for dizziness, weakness, light-headedness and headaches.    Hematological: Bruises/bleeds easily.         -vital signs stable and within normal limits except overweight per BMI    /76   Pulse 90   Temp 97.1 °F (36.2 °C) (Temporal)   Ht 5' 7\" (1.702 m)   Wt 173 lb (78.5 kg)   SpO2 95%   BMI 27.10 kg/m²        Physical Exam  Vitals signs and nursing note reviewed. Constitutional:       General: He is not in acute distress. Appearance: Normal appearance. He is well-developed and normal weight. He is not diaphoretic. HENT:      Head: Normocephalic. Right Ear: External ear normal.      Left Ear: External ear normal.      Ears:      Comments: Wears hearing aids     Mouth/Throat:      Mouth: Mucous membranes are moist.   Eyes:      General: Lids are normal. No scleral icterus. Right eye: No discharge. Left eye: No discharge. Conjunctiva/sclera: Conjunctivae normal.   Neck:      Musculoskeletal: Normal range of motion and neck supple. Thyroid: No thyromegaly. Cardiovascular:      Rate and Rhythm: Normal rate and regular rhythm. Heart sounds: Murmur present. Crescendo  systolic murmur present with a grade of 4/6. Pulmonary:      Effort: Pulmonary effort is normal. No respiratory distress. Breath sounds: Normal breath sounds. No wheezing or rales. Chest:      Chest wall: No tenderness. Abdominal:      General: Bowel sounds are normal. There is no distension. Palpations: Abdomen is soft. There is no hepatomegaly or splenomegaly. Tenderness: There is no abdominal tenderness. Musculoskeletal:         General: Tenderness present. Left shoulder: He exhibits decreased range of motion, tenderness, pain and decreased strength. Cervical back: He exhibits normal range of motion, no tenderness, no bony tenderness, no pain and no spasm. Thoracic back: He exhibits normal range of motion, no tenderness, no bony tenderness, no pain and no spasm. Lumbar back: He exhibits normal range of motion, no tenderness, no bony tenderness, no pain and no spasm. Right lower leg: No edema. Left lower leg: No edema. Comments: Up and go test more than 12 seconds. High risk for falls. He declines physical therapy   Lymphadenopathy:      Cervical: No cervical adenopathy. Skin:     General: Skin is warm and dry. Capillary Refill: Capillary refill takes less than 2 seconds. Findings: Bruising present. Comments: Large bluish bruise on the occipital and parietal area 8 cm x 6 cm, slightly prominent, with excoriation on top, size 3 cm x 2 cm x 2 mm depth, with irregular margins, with early crusting, very tender to touch all over and slightly warmer. There is extensive bluish red bruising over the left arm, which is all the way around, not hurting, affecting the whole arm from the shoulder all the way down to the elbow, with multiple excoriations the  largest 4 cm x 2 cm x 2 mm depth on the posterior side of the the arm. There is a large bruise feels warmer. There is a small bluish bruise on the upper buttocks, 2 cm x 1 cm, nontender. There are multiple excoriations on upper back, on the shoulder blades, 3 cm x 2 cm, and depth is 2 mm,  And multiple smaller right elbow scratches 1-2 cm long. Neurological:      Mental Status: He is alert and oriented to person, place, and time. Deep Tendon Reflexes: Reflexes are normal and symmetric. Psychiatric:         Mood and Affect: Mood normal.         Behavior: Behavior normal.         Thought Content: Thought content normal.         Judgment: Judgment normal.      Comments: Smiling, trying to make jokes with his son who is very upset. patient promises he will turn on the light and will not carry everything at once or  Ask for someone to bring his things upstairs     ASSESSMENT AND PLAN      1.  Fall, initial encounter  - Tdap (age 6y and older) IM ([address])  Discussed for patient to be moved downstairs, the son says they will try, they do not have a room downstairs but they are considering it  The son says they have lots of grab bars  We also discussed to buy a sensor night lamp as the patient usually forgets to turn on the light  Patient declines physical therapy at this time    2. Contusion of left upper extremity, initial encounter  Extensive with excoriation  Patient is not up-to-date with tetanus shot  Will update his immunization and treat empirically for likely early cellulitis as it feels warmer  - Tdap (age 6y and older) IM (BOOSTRIX)  - cephALEXin (KEFLEX) 500 MG capsule; Take 1 capsule by mouth 2 times daily  Dispense: 20 capsule; Refill: 0  - mupirocin (BACTROBAN) 2 % ointment; Apply topically 2 times daily on the affected area for 7-10 days. OK to substitute to cream  Dispense: 30 g; Refill: 2    3. Excoriation of left upper arm, initial encounter  - Tdap (age 6y and older) IM (BOOSTRIX)  - cephALEXin (KEFLEX) 500 MG capsule; Take 1 capsule by mouth 2 times daily  Dispense: 20 capsule; Refill: 0  - mupirocin (BACTROBAN) 2 % ointment; Apply topically 2 times daily on the affected area for 7-10 days. OK to substitute to cream  Dispense: 30 g; Refill: 2    4. Contusion of scalp, initial encounter  Also appears warmer, likely early cellulitis or infected hematoma  - Tdap (age 6y and older) IM (BOOSTRIX)  - cephALEXin (KEFLEX) 500 MG capsule; Take 1 capsule by mouth 2 times daily  Dispense: 20 capsule; Refill: 0  - mupirocin (BACTROBAN) 2 % ointment; Apply topically 2 times daily on the affected area for 7-10 days. OK to substitute to cream  Dispense: 30 g; Refill: 2    5. Acute pain of left shoulder  Likely rotator cuff strain I do not suspect fracture, will postpone getting an x-ray he will call me back if the pain does not get better  - methylPREDNISolone (MEDROL, SHAMAR,) 4 MG tablet; Take by mouth, with food. Keep low carb, low salt diet while taking it  Dispense: 1 kit;  Refill: 0  We discussed that steroid will increase the blood glucose and to be very compliant with a low-carb diet  The patient assistance of a speech-recognition program. However, inadvertent computerized transcription errors may be present. Although every effort was made to ensure accuracy, no guarantees can be provided that every mistake has been identified and corrected by editing.     Electronically signed by Be Polk MD on 9/25/2020 at 6:09 PM

## 2020-09-29 RX ORDER — CHOLECALCIFEROL (VITAMIN D3) 125 MCG
2000 CAPSULE ORAL DAILY
Qty: 90 TABLET | Refills: 3 | Status: SHIPPED | OUTPATIENT
Start: 2020-09-29 | End: 2020-12-16

## 2020-10-08 ENCOUNTER — HOSPITAL ENCOUNTER (OUTPATIENT)
Age: 85
Discharge: HOME | End: 2020-10-08
Payer: MEDICARE

## 2020-10-08 LAB
ABSOLUTE EOS #: 0.1 K/UL (ref 0–0.4)
ABSOLUTE IMMATURE GRANULOCYTE: ABNORMAL K/UL (ref 0–0.3)
ABSOLUTE LYMPH #: 1.6 K/UL (ref 1–4.8)
ABSOLUTE MONO #: 0.4 K/UL (ref 0.1–1.3)
ALBUMIN SERPL-MCNC: 3.8 G/DL (ref 3.5–5.2)
ALBUMIN/GLOBULIN RATIO: ABNORMAL (ref 1–2.5)
ALP BLD-CCNC: 89 U/L (ref 40–129)
ALT SERPL-CCNC: 23 U/L (ref 5–41)
ANION GAP SERPL CALCULATED.3IONS-SCNC: 11 MMOL/L (ref 9–17)
AST SERPL-CCNC: 21 U/L
BASOPHILS # BLD: 0 % (ref 0–2)
BASOPHILS ABSOLUTE: 0 K/UL (ref 0–0.2)
BILIRUB SERPL-MCNC: 0.82 MG/DL (ref 0.3–1.2)
BUN BLDV-MCNC: 21 MG/DL (ref 8–23)
BUN/CREAT BLD: ABNORMAL (ref 9–20)
CALCIUM SERPL-MCNC: 9.3 MG/DL (ref 8.6–10.4)
CHLORIDE BLD-SCNC: 103 MMOL/L (ref 98–107)
CO2: 26 MMOL/L (ref 20–31)
CREAT SERPL-MCNC: 0.97 MG/DL (ref 0.7–1.2)
DIFFERENTIAL TYPE: ABNORMAL
EOSINOPHILS RELATIVE PERCENT: 2 % (ref 0–4)
FOLATE: >20 NG/ML
GFR AFRICAN AMERICAN: >60 ML/MIN
GFR NON-AFRICAN AMERICAN: >60 ML/MIN
GFR SERPL CREATININE-BSD FRML MDRD: ABNORMAL ML/MIN/{1.73_M2}
GFR SERPL CREATININE-BSD FRML MDRD: ABNORMAL ML/MIN/{1.73_M2}
GLUCOSE BLD-MCNC: 188 MG/DL (ref 70–99)
HCT VFR BLD CALC: 34 % (ref 41–53)
HEMOGLOBIN: 11.5 G/DL (ref 13.5–17.5)
IMMATURE GRANULOCYTES: ABNORMAL %
LYMPHOCYTES # BLD: 33 % (ref 24–44)
MCH RBC QN AUTO: 32.9 PG (ref 26–34)
MCHC RBC AUTO-ENTMCNC: 33.7 G/DL (ref 31–37)
MCV RBC AUTO: 97.4 FL (ref 80–100)
MONOCYTES # BLD: 8 % (ref 1–7)
NRBC AUTOMATED: ABNORMAL PER 100 WBC
PDW BLD-RTO: 15 % (ref 11.5–14.9)
PLATELET # BLD: 171 K/UL (ref 150–450)
PLATELET ESTIMATE: ABNORMAL
PMV BLD AUTO: 8.6 FL (ref 6–12)
POTASSIUM SERPL-SCNC: 4.7 MMOL/L (ref 3.7–5.3)
RBC # BLD: 3.49 M/UL (ref 4.5–5.9)
RBC # BLD: ABNORMAL 10*6/UL
SEG NEUTROPHILS: 57 % (ref 36–66)
SEGMENTED NEUTROPHILS ABSOLUTE COUNT: 2.8 K/UL (ref 1.3–9.1)
SODIUM BLD-SCNC: 140 MMOL/L (ref 135–144)
TOTAL PROTEIN: 6.7 G/DL (ref 6.4–8.3)
VITAMIN B-12: 783 PG/ML (ref 232–1245)
WBC # BLD: 4.9 K/UL (ref 3.5–11)
WBC # BLD: ABNORMAL 10*3/UL

## 2020-10-08 PROCEDURE — 36415 COLL VENOUS BLD VENIPUNCTURE: CPT

## 2020-10-08 PROCEDURE — 85025 COMPLETE CBC W/AUTO DIFF WBC: CPT

## 2020-10-08 PROCEDURE — 82746 ASSAY OF FOLIC ACID SERUM: CPT

## 2020-10-08 PROCEDURE — 80053 COMPREHEN METABOLIC PANEL: CPT

## 2020-10-08 PROCEDURE — 82607 VITAMIN B-12: CPT

## 2020-10-08 RX ORDER — METFORMIN HYDROCHLORIDE 500 MG/1
500 TABLET, EXTENDED RELEASE ORAL
Qty: 90 TABLET | Refills: 3 | Status: SHIPPED | OUTPATIENT
Start: 2020-10-08 | End: 2021-01-07 | Stop reason: SDUPTHER

## 2020-10-17 RX ORDER — ATORVASTATIN CALCIUM 20 MG/1
20 TABLET, FILM COATED ORAL NIGHTLY
Qty: 90 TABLET | Refills: 3 | Status: SHIPPED | OUTPATIENT
Start: 2020-10-17 | End: 2021-01-17 | Stop reason: SDUPTHER

## 2020-10-17 NOTE — TELEPHONE ENCOUNTER
Please Approve or Refuse.   Send to Pharmacy per Pt's Request:      Next Visit Date:  Visit date not found   Last Visit Date: Visit date not found    Hemoglobin A1C (%)   Date Value   09/15/2020 6.3   06/02/2020 6.7   02/26/2020 7.0             ( goal A1C is < 7)   BP Readings from Last 3 Encounters:   09/25/20 120/76   09/15/20 132/70   07/31/20 (!) 114/59          (goal 120/80)  BUN   Date Value Ref Range Status   10/08/2020 21 8 - 23 mg/dL Final     CREATININE   Date Value Ref Range Status   10/08/2020 0.97 0.70 - 1.20 mg/dL Final     Potassium   Date Value Ref Range Status   10/08/2020 4.7 3.7 - 5.3 mmol/L Final

## 2020-11-12 RX ORDER — ENALAPRIL MALEATE 20 MG/1
20 TABLET ORAL DAILY
Qty: 90 TABLET | Refills: 3 | Status: SHIPPED | OUTPATIENT
Start: 2020-11-12 | End: 2021-02-10 | Stop reason: SDUPTHER

## 2020-11-12 RX ORDER — AMLODIPINE BESYLATE 5 MG/1
5 TABLET ORAL DAILY
Qty: 90 TABLET | Refills: 3 | Status: SHIPPED | OUTPATIENT
Start: 2020-11-12 | End: 2021-02-10 | Stop reason: SDUPTHER

## 2020-12-04 ENCOUNTER — HOSPITAL ENCOUNTER (OUTPATIENT)
Age: 85
Discharge: HOME | End: 2020-12-04
Payer: MEDICARE

## 2020-12-04 LAB
CHOLESTEROL/HDL RATIO: 2.9
CHOLESTEROL: 150 MG/DL
HDLC SERPL-MCNC: 51 MG/DL
LDL CHOLESTEROL: 79 MG/DL (ref 0–130)
TRIGL SERPL-MCNC: 100 MG/DL
VLDLC SERPL CALC-MCNC: NORMAL MG/DL (ref 1–30)

## 2020-12-04 PROCEDURE — 36415 COLL VENOUS BLD VENIPUNCTURE: CPT

## 2020-12-04 PROCEDURE — 80061 LIPID PANEL: CPT

## 2020-12-04 NOTE — RESULT ENCOUNTER NOTE
Yajaira comment sent to patient.   Normal cholesterol test, continue current treatment  Future Appointments  12/16/2020 4:00 PM    Ambrosio Sloan MD     [facility]  6/2/2021   3:00 PM    Ambrosio Sloan MD     [facility]  7/9/2021   1:00 PM    Rubio Sanchez MD     [address] [place] [address]

## 2020-12-16 ENCOUNTER — OFFICE VISIT (OUTPATIENT)
Dept: FAMILY MEDICINE CLINIC | Age: 85
End: 2020-12-16
Payer: MEDICARE

## 2020-12-16 VITALS
DIASTOLIC BLOOD PRESSURE: 80 MMHG | BODY MASS INDEX: 27.31 KG/M2 | SYSTOLIC BLOOD PRESSURE: 120 MMHG | HEART RATE: 84 BPM | OXYGEN SATURATION: 98 % | WEIGHT: 174 LBS | TEMPERATURE: 97.9 F | HEIGHT: 67 IN

## 2020-12-16 PROBLEM — K59.01 SLOW TRANSIT CONSTIPATION: Status: ACTIVE | Noted: 2020-12-16

## 2020-12-16 LAB — HBA1C MFR BLD: 6.7 %

## 2020-12-16 PROCEDURE — G8484 FLU IMMUNIZE NO ADMIN: HCPCS | Performed by: FAMILY MEDICINE

## 2020-12-16 PROCEDURE — G8427 DOCREV CUR MEDS BY ELIG CLIN: HCPCS | Performed by: FAMILY MEDICINE

## 2020-12-16 PROCEDURE — 83036 HEMOGLOBIN GLYCOSYLATED A1C: CPT | Performed by: FAMILY MEDICINE

## 2020-12-16 PROCEDURE — 1036F TOBACCO NON-USER: CPT | Performed by: FAMILY MEDICINE

## 2020-12-16 PROCEDURE — G8417 CALC BMI ABV UP PARAM F/U: HCPCS | Performed by: FAMILY MEDICINE

## 2020-12-16 PROCEDURE — 99214 OFFICE O/P EST MOD 30 MIN: CPT | Performed by: FAMILY MEDICINE

## 2020-12-16 PROCEDURE — 4040F PNEUMOC VAC/ADMIN/RCVD: CPT | Performed by: FAMILY MEDICINE

## 2020-12-16 PROCEDURE — 1123F ACP DISCUSS/DSCN MKR DOCD: CPT | Performed by: FAMILY MEDICINE

## 2020-12-16 RX ORDER — DOCUSATE SODIUM 100 MG/1
100 CAPSULE, LIQUID FILLED ORAL 2 TIMES DAILY
Qty: 180 CAPSULE | Refills: 3 | Status: SHIPPED | OUTPATIENT
Start: 2020-12-16

## 2020-12-16 ASSESSMENT — ENCOUNTER SYMPTOMS
BLOOD IN STOOL: 0
VOMITING: 0
WHEEZING: 0
ABDOMINAL PAIN: 0
COUGH: 0
DIARRHEA: 0
ABDOMINAL DISTENTION: 0
CHEST TIGHTNESS: 0
SHORTNESS OF BREATH: 0
NAUSEA: 0

## 2020-12-16 NOTE — PROGRESS NOTES
Visit Information    Have you changed or started any medications since your last visit including any over-the-counter medicines, vitamins, or herbal medicines? no   Are you having any side effects from any of your medications? -  no  Have you stopped taking any of your medications? Is so, why? -  no    Have you seen any other physician or provider since your last visit? No  Have you had any other diagnostic tests since your last visit? No  Have you been seen in the emergency room and/or had an admission to a hospital since we last saw you? No  Have you had your routine dental cleaning in the past 6 months? no    Have you activated your [employer] account? If not, what are your barriers?  Yes     Patient Care Team:  Sarah Bellamy MD as PCP - General (Family Medicine)  Sarah Bellamy MD as PCP - [facility]  Antonia Prasad (Optometry)  Kathryn Gaytan MD as Surgeon (Cardiology)  Jcarlos Fields MD as Consulting Physician (Urology)  Gary Pots MD as Consulting Physician (Gastroenterology)  Terrance Vo MD as Surgeon (Ophthalmology)  Kassi Walker (Certified Nurse Practitioner)  Franklin Bailey MD as Consulting Physician (Oncology)    Medical History Review  Past Medical, Family, and Social History reviewed and does contribute to the patient presenting condition    Health Maintenance   Topic Date Due    TSH testing  05/26/2021    Annual Wellness Visit (AWV)  06/03/2021    Potassium monitoring  10/08/2021    Creatinine monitoring  10/08/2021    Lipid screen  12/04/2021    DTaP/Tdap/Td vaccine (2 - Td) 09/25/2030    Flu vaccine  Completed    Shingles Vaccine  Completed    Pneumococcal 65+ years Vaccine  Completed    Hepatitis A vaccine  Aged Out    Hib vaccine  Aged Out    Meningococcal (ACWY) vaccine  Aged Out

## 2020-12-16 NOTE — PROGRESS NOTES
Chief Complaint   Patient presents with    Hypertension    Hyperlipidemia    Diabetes       Patient is here to follow-up on chronic  medical problems. Comes with his son, Jadyon Hidalgo. Corbin's  first language is  [place] he understands basic [place] and he can express  self in very basic [place]. he declines telephone  through [employer]. Diabetes Mellitus Type II, Follow-up:    Current symptoms/problems include hyperglycemia, paresthesia of the feet and visual disturbances. Symptoms have been present for several years. Known diabetic complications: nephropathy and peripheral neuropathy    Cardiovascular risk factors: advanced age (older than 54 for men, 72 for women), diabetes mellitus, dyslipidemia, hypertension, male gender, microalbuminuria and sedentary lifestyle    Medication compliance:  compliant all of the time  Current diabetic medications include oral agent (monotherapy): Glucophage. Eye exam current (within one year): yes  Weight trend:  Slightly increased    Wt Readings from Last 3 Encounters:   12/16/20 174 lb (78.9 kg)   09/25/20 173 lb (78.5 kg)   09/15/20 172 lb 9.6 oz (78.3 kg)       Prior visit with dietician: no  Current diet: in general, a \"healthy\" diet    Current exercise: none   His son says he still stays a lot in his room and sleeps a lot. Jaydon Hidalgo says he watches TV till late and that is why he wakes up late. 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 Current monitoring regimen: home blood tests - daily  Home blood sugar records: fasting range: 130. 150, 160's  Any episodes of hypoglycemia? no    Is He on ACE inhibitor or angiotensin II receptor blocker? Yes      reports that he quit smoking about 53 years ago. He has never used smokeless tobacco.     Counseling given: Yes      Daily Aspirin? No, due to anemia, and risks outweight the benefits at his age      A1c is worsening but still very well controlled. with calcium  Denies nausea and vomiting and abdominal pain . Denies abdominal pain   Denies blood in the stool    Corbin has Vitamin D deficiency. Kendall Messer  is  taking Vitamin D supplementation   he feels tired. Lab Results   Component Value Date    VITD25 33.2 10/25/2018         Current Outpatient Medications   Medication Sig Dispense Refill    enalapril (VASOTEC) 20 MG tablet Take 1 tablet by mouth daily 90 tablet 3    amLODIPine (NORVASC) 5 MG tablet Take 1 tablet by mouth daily 90 tablet 3    atorvastatin (LIPITOR) 20 MG tablet Take 1 tablet by mouth nightly 90 tablet 3    metFORMIN (GLUCOPHAGE-XR) 500 MG extended release tablet Take 1 tablet by mouth daily (with breakfast) If not recalled 90 tablet 3    Cholecalciferol (VITAMIN D3) 50 MCG (2000 UT) TABS Take 2,000 Units by mouth daily 90 tablet 3    cephALEXin (KEFLEX) 500 MG capsule Take 1 capsule by mouth 2 times daily 20 capsule 0    mupirocin (BACTROBAN) 2 % ointment Apply topically 2 times daily on the affected area for 7-10 days. OK to substitute to cream 30 g 2    methylPREDNISolone (MEDROL, SHAMAR,) 4 MG tablet Take by mouth, with food.  Keep low carb, low salt diet while taking it 1 kit 0    montelukast (SINGULAIR) 10 MG tablet Take 1 tablet by mouth nightly For allergies 90 tablet 3    pantoprazole (PROTONIX) 40 MG tablet Take 1 tablet by mouth every morning (before breakfast) 90 tablet 1    ipratropium (ATROVENT) 0.03 % nasal spray USE 2 SPRAYS INTO EACH NOSTRIL THREE TIMES DAILY 30 mL 5    tamsulosin (FLOMAX) 0.4 MG capsule Take 1 capsule by mouth daily 90 capsule 0    blood glucose test strips (ACCU-CHEK JOEL PLUS) strip TEST ONCE A DAY, FASTING IN THE MORNING 100 strip 3    levothyroxine (SYNTHROID) 25 MCG tablet Take 1 tablet by mouth every morning (before breakfast) 90 tablet 3    camphor-menthol-methyl salicylate (BENGAY ULTRA STRENGTH) 4-10-30 % CREA cream Apply topically 3 times daily as needed for Pain 113 g 0    lidocaine (LIDODERM) 5 % Place 1 patch onto the skin daily 12 hours on, 12 hours off. 30 patch 3    Blood Glucose Monitoring Suppl (ACCU-CHEK JOEL PLUS) w/Device KIT USE AS DIRECTED 1 kit 0    Accu-Chek FastClix Lancets MISC USE TO TEST BLOOD GLUCOSE ONCE A  each 3    Lancets 30G MISC Testing once a day. Please dispense according to patients insurance:  accu check Joel plus 50 each 3    diclofenac sodium (VOLTAREN) 1 % GEL Apply 2 g topically 4 times daily as needed for Pain 1 Tube 3    lidocaine (XYLOCAINE) 5 % ointment Apply topically as needed every 8 hrs prn for pain. 240 g 3    melatonin (RA MELATONIN) 3 MG TABS tablet Take 1 tablet by mouth nightly as needed (insomnia) 30 tablet 3    Lancets Misc. (ACCU-CHEK MULTICLIX LANCET DEV) KIT Please dispense according to patients insurance. 1 kit 0    Alcohol Swabs PADS Please dispense according to patients insurance/device. Testing 1-2 /day 100 each 2     No current facility-administered medications for this visit. Rest of complaints with corresponding details per ROS      The patient'spast medical, surgical, social, and family history as well as his current medications and allergies were reviewed as documented in today's encounter. Social History     Tobacco Use    Smoking status: Former Smoker     Quit date: 10/27/1967     Years since quittin.1    Smokeless tobacco: Never Used   Substance Use Topics    Alcohol use: No    Drug use: No       Counseling given: Yes                  Review of Systems   Constitutional: Positive for fatigue and unexpected weight change. Negative for activity change, appetite change, chills, diaphoresis and fever. HENT: Positive for hearing loss (has hearing aids). Eyes: Positive for visual disturbance (stable, chronic, has glasses). Respiratory: Negative for cough, chest tightness, shortness of breath and wheezing. Cardiovascular: Negative for chest pain, palpitations and leg swelling. Gastrointestinal: Positive for constipation. Negative for abdominal distention, abdominal pain, blood in stool, diarrhea, nausea and vomiting. Endocrine: Negative for cold intolerance, heat intolerance, polydipsia, polyphagia and polyuria. Genitourinary: Negative for difficulty urinating. Nocturia twice per night, taking Flomax   Musculoskeletal: Positive for arthralgias. Neurological: Positive for numbness (feet). Negative for weakness. Hematological: Bruises/bleeds easily.         -vital signs stable and within normal limits except Overweight per BMI. /80   Pulse 84   Temp 97.9 °F (36.6 °C) (Temporal)   Ht 5' 7\" (1.702 m)   Wt 174 lb (78.9 kg)   SpO2 98%   BMI 27.25 kg/m²         Physical Exam  Vitals signs and nursing note reviewed. Constitutional:       General: He is not in acute distress. Appearance: Normal appearance. He is well-developed and normal weight. He is not diaphoretic. HENT:      Head: Normocephalic. Right Ear: External ear normal.      Left Ear: External ear normal.      Ears:      Comments: Wears hearing aids     Mouth/Throat:      Comments: I did not examine the mouth due to coronavirus pandemic and wearing masks    Eyes:      General: Lids are normal. No scleral icterus. Right eye: No discharge. Left eye: No discharge. Conjunctiva/sclera: Conjunctivae normal.   Neck:      Musculoskeletal: Normal range of motion and neck supple. Thyroid: No thyromegaly. Cardiovascular:      Rate and Rhythm: Normal rate and regular rhythm. Heart sounds: Murmur present. Crescendo  systolic murmur present with a grade of 4/6. Pulmonary:      Effort: Pulmonary effort is normal. No respiratory distress. Breath sounds: Normal breath sounds. No wheezing or rales. Chest:      Chest wall: No tenderness. Abdominal:      General: Bowel sounds are normal. There is no distension. Palpations: Abdomen is soft.  There is no hepatomegaly or splenomegaly. Tenderness: There is no abdominal tenderness. Musculoskeletal:      Right lower leg: No edema. Left lower leg: No edema. Comments: Up and go test more than 12 seconds. High risk for falls. He declines physical therapy. Lymphadenopathy:      Cervical: No cervical adenopathy. Skin:     General: Skin is warm and dry. Capillary Refill: Capillary refill takes less than 2 seconds. Neurological:      Mental Status: He is alert and oriented to person, place, and time. Deep Tendon Reflexes: Reflexes are normal and symmetric. Psychiatric:         Mood and Affect: Mood normal.         Behavior: Behavior normal.         Thought Content: Thought content normal.         Judgment: Judgment normal.           Discussed testing withthe patient and all questions fully answered. I personally reviewed testing with patient.   Hyperlipidemia   Hyperglycemia  Anemia stable  Otherwise labs within normal limits        Lab Results   Component Value Date    WBC 4.9 10/08/2020    HGB 11.5 (L) 10/08/2020    HCT 34.0 (L) 10/08/2020    MCV 97.4 10/08/2020     10/08/2020       Lab Results   Component Value Date     10/08/2020    K 4.7 10/08/2020     10/08/2020    CO2 26 10/08/2020    BUN 21 10/08/2020    CREATININE 0.97 10/08/2020    GLUCOSE 188 10/08/2020    CALCIUM 9.3 10/08/2020        Lab Results   Component Value Date    ALT 23 10/08/2020    AST 21 10/08/2020    ALKPHOS 89 10/08/2020    BILITOT 0.82 10/08/2020       Lab Results   Component Value Date    TSH 3.18 05/26/2020       Lab Results   Component Value Date    CHOL 150 12/04/2020    CHOL 134 11/08/2019    CHOL 123 10/25/2018     Lab Results   Component Value Date    TRIG 100 12/04/2020    TRIG 78 11/08/2019    TRIG 59 10/25/2018     Lab Results   Component Value Date    HDL 51 12/04/2020    HDL 49 11/08/2019    HDL 51 10/25/2018     Lab Results   Component Value Date    LDLCHOLESTEROL 79 12/04/2020    LDLCHOLESTEROL 3  Increase fiber in diet     6. Vitamin D deficiency  Unsure if improving or not. Will recheck level  Continue supplementation with 1000 to 2000 units daily    - Vitamin D 25 Hydroxy; Future        Data Unavailable    Orders Placed This Encounter   Procedures    CBC     Standing Status:   Future     Standing Expiration Date:   12/16/2021    Comprehensive Metabolic Panel     Standing Status:   Future     Standing Expiration Date:   12/16/2021    TSH without Reflex     Standing Status:   Future     Standing Expiration Date:   12/16/2021    T4, Free     Standing Status:   Future     Standing Expiration Date:   12/16/2021    Vitamin D 25 Hydroxy     Standing Status:   Future     Standing Expiration Date:   12/16/2021    POCT glycosylated hemoglobin (Hb A1C)       Medications Discontinued During This Encounter   Medication Reason    cephALEXin (KEFLEX) 500 MG capsule Therapy completed    mupirocin (BACTROBAN) 2 % ointment Therapy completed    methylPREDNISolone (MEDROL, SHAMAR,) 4 MG tablet Therapy completed    montelukast (SINGULAIR) 10 MG tablet Patient Choice    pantoprazole (PROTONIX) 40 MG tablet Patient Choice    diclofenac sodium (VOLTAREN) 1 % GEL Patient Choice    lidocaine (XYLOCAINE) 5 % ointment Patient Choice    melatonin (RA MELATONIN) 3 MG TABS tablet Patient Choice    Cholecalciferol (VITAMIN D3) 50 MCG (2000 UT) TABS Cost of medication         Corbin received counseling on the following healthy behaviors: nutrition, exercise and medication adherence  Reviewed prior labs and health maintenance  Continue current medications, diet and exercise. Discussed use, benefit, and side effects of prescribed medications. Barriers to medication compliance addressed. Patient given educational materials - see patient instructions  Was a self-tracking handout given in paper form or via [employer]?  Yes    Requested Prescriptions     Signed Prescriptions Disp Refills    docusate sodium (COLACE) 100 MG capsule 180 capsule 3     Sig: Take 1 capsule by mouth 2 times daily For constipation       All patient questions answered. Patient voiced understanding. Quality Measures    Body mass index is 27.25 kg/m². Elevated. Weight control planned discussed conventional weight loss and Healthy diet and regular exercise. BP: 120/80 Blood pressure is normal. Treatment plan consists of No treatment change needed. The patient's past medical,surgical, social, and family history as well as his   current medications and allergies were reviewed as documented in today's encounter. Medications, labs, diagnostic studies, consultations and follow-up asdocumented in this encounter. Return in about 3 months (around 3/16/2021) for ALWAYS NEEDS 30 MIN. APPT, 1st language not English, **POC A1C, DM2, HTN, HLP, LABS F/U. Patient was seen with total face to face time of  30 minutes. More than 50% of this visit was counseling and education. Future Appointments   Date Time Provider [place]   4/29/2021  3:15 PM [facility]   6/2/2021  3:00 PM [facility]   7/9/2021  1:00 PM Tae Farmer MD [address] [place] [address]       This note was completed by using the assistance of a speech-recognition program. However, inadvertent computerized transcription errors may be present. Although every effort was made to ensure accuracy, no guarantees can be provided that every mistake has been identified and corrected by editing .     Electronically signed by Shae Briggs MD on 12/16/2020 at 5:42 PM

## 2020-12-16 NOTE — PATIENT INSTRUCTIONS
Patient Education        Learning About Diabetes Food Guidelines  Your Care Instructions     Meal planning is important to manage diabetes. It helps keep your blood sugar at a target level (which you set with your doctor). You don't have to eat special foods. You can eat what your family eats, including sweets once in a while. But you do have to pay attention to how often you eat and how much you eat of certain foods. You may want to work with a dietitian or a certified diabetes educator (CDE) to help you plan meals and snacks. A dietitian or CDE can also help you lose weight if that is one of your goals. What should you know about eating carbs? Managing the amount of carbohydrate (carbs) you eat is an important part of healthy meals when you have diabetes. Carbohydrate is found in many foods. · Learn which foods have carbs. And learn the amounts of carbs in different foods. ? Bread, cereal, pasta, and rice have about 15 grams of carbs in a serving. A serving is 1 slice of bread (1 ounce), ½ cup of cooked cereal, or 1/3 cup of cooked pasta or rice. ? Fruits have 15 grams of carbs in a serving. A serving is 1 small fresh fruit, such as an apple or orange; ½ of a banana; ½ cup of cooked or canned fruit; ½ cup of fruit juice; 1 cup of melon or raspberries; or 2 tablespoons of dried fruit. ? Milk and no-sugar-added yogurt have 15 grams of carbs in a serving. A serving is 1 cup of milk or 2/3 cup of no-sugar-added yogurt. ? Starchy vegetables have 15 grams of carbs in a serving. A serving is ½ cup of mashed potatoes or sweet potato; 1 cup winter squash; ½ of a small baked potato; ½ cup of cooked beans; or ½ cup cooked corn or green peas. · Learn how much carbs to eat each day and at each meal. A dietitian or CDE can teach you how to keep track of the amount of carbs you eat. This is called carbohydrate counting. · If you are not sure how to count carbohydrate grams, use the Plate Method to plan meals. It is a good, quick way to make sure that you have a balanced meal. It also helps you spread carbs throughout the day. ? Divide your plate by types of foods. Put non-starchy vegetables on half the plate, meat or other protein food on one-quarter of the plate, and a grain or starchy vegetable in the final quarter of the plate. To this you can add a small piece of fruit and 1 cup of milk or yogurt, depending on how many carbs you are supposed to eat at a meal.  · Try to eat about the same amount of carbs at each meal. Do not \"save up\" your daily allowance of carbs to eat at one meal.  · Proteins have very little or no carbs per serving. Examples of proteins are beef, chicken, turkey, fish, eggs, tofu, cheese, cottage cheese, and peanut butter. A serving size of meat is 3 ounces, which is about the size of a deck of cards. Examples of meat substitute serving sizes (equal to 1 ounce of meat) are 1/4 cup of cottage cheese, 1 egg, 1 tablespoon of peanut butter, and ½ cup of tofu. How can you eat out and still eat healthy? · Learn to estimate the serving sizes of foods that have carbohydrate. If you measure food at home, it will be easier to estimate the amount in a serving of restaurant food. · If the meal you order has too much carbohydrate (such as potatoes, corn, or baked beans), ask to have a low-carbohydrate food instead. Ask for a salad or green vegetables. · If you use insulin, check your blood sugar before and after eating out to help you plan how much to eat in the future. · If you eat more carbohydrate at a meal than you had planned, take a walk or do other exercise. This will help lower your blood sugar. What else should you know?

## 2020-12-21 ASSESSMENT — ENCOUNTER SYMPTOMS: CONSTIPATION: 1

## 2021-01-07 DIAGNOSIS — E11.42 TYPE 2 DIABETES MELLITUS WITH DIABETIC POLYNEUROPATHY, WITHOUT LONG-TERM CURRENT USE OF INSULIN (HCC): ICD-10-CM

## 2021-01-08 RX ORDER — METFORMIN HYDROCHLORIDE 500 MG/1
500 TABLET, EXTENDED RELEASE ORAL
Qty: 90 TABLET | Refills: 3 | Status: SHIPPED | OUTPATIENT
Start: 2021-01-08 | End: 2022-01-04

## 2021-01-17 DIAGNOSIS — E78.5 HYPERLIPIDEMIA WITH TARGET LDL LESS THAN 70: ICD-10-CM

## 2021-01-18 RX ORDER — ATORVASTATIN CALCIUM 20 MG/1
20 TABLET, FILM COATED ORAL NIGHTLY
Qty: 90 TABLET | Refills: 3 | Status: SHIPPED | OUTPATIENT
Start: 2021-01-18 | End: 2021-04-19 | Stop reason: SDUPTHER

## 2021-02-10 DIAGNOSIS — I10 ESSENTIAL HYPERTENSION: ICD-10-CM

## 2021-02-10 RX ORDER — AMLODIPINE BESYLATE 5 MG/1
5 TABLET ORAL DAILY
Qty: 90 TABLET | Refills: 3 | Status: SHIPPED | OUTPATIENT
Start: 2021-02-10 | End: 2021-11-08 | Stop reason: SDUPTHER

## 2021-02-10 RX ORDER — ENALAPRIL MALEATE 20 MG/1
20 TABLET ORAL DAILY
Qty: 90 TABLET | Refills: 3 | Status: SHIPPED | OUTPATIENT
Start: 2021-02-10 | End: 2021-10-14 | Stop reason: ALTCHOICE

## 2021-03-16 ENCOUNTER — PATIENT MESSAGE (OUTPATIENT)
Dept: FAMILY MEDICINE CLINIC | Age: 86
End: 2021-03-16

## 2021-03-16 DIAGNOSIS — N40.1 BENIGN PROSTATIC HYPERPLASIA WITH LOWER URINARY TRACT SYMPTOMS, SYMPTOM DETAILS UNSPECIFIED: Primary | ICD-10-CM

## 2021-03-16 RX ORDER — TAMSULOSIN HYDROCHLORIDE 0.4 MG/1
0.4 CAPSULE ORAL DAILY
Qty: 90 CAPSULE | Refills: 0 | Status: SHIPPED | OUTPATIENT
Start: 2021-03-16 | End: 2021-06-13 | Stop reason: SDUPTHER

## 2021-04-19 DIAGNOSIS — E78.5 HYPERLIPIDEMIA WITH TARGET LDL LESS THAN 70: ICD-10-CM

## 2021-04-19 DIAGNOSIS — Z12.5 SPECIAL SCREENING FOR MALIGNANT NEOPLASM OF PROSTATE: Primary | ICD-10-CM

## 2021-04-20 RX ORDER — ATORVASTATIN CALCIUM 20 MG/1
20 TABLET, FILM COATED ORAL NIGHTLY
Qty: 90 TABLET | Refills: 3 | Status: SHIPPED | OUTPATIENT
Start: 2021-04-20 | End: 2021-10-19

## 2021-04-22 ENCOUNTER — HOSPITAL ENCOUNTER (OUTPATIENT)
Age: 86
Discharge: HOME | End: 2021-04-22
Payer: MEDICARE

## 2021-04-22 DIAGNOSIS — E03.9 ACQUIRED HYPOTHYROIDISM: ICD-10-CM

## 2021-04-22 DIAGNOSIS — I10 ESSENTIAL HYPERTENSION: ICD-10-CM

## 2021-04-22 DIAGNOSIS — E11.42 TYPE 2 DIABETES MELLITUS WITH DIABETIC POLYNEUROPATHY, WITHOUT LONG-TERM CURRENT USE OF INSULIN (HCC): ICD-10-CM

## 2021-04-22 DIAGNOSIS — E55.9 VITAMIN D DEFICIENCY: ICD-10-CM

## 2021-04-22 LAB
ALBUMIN SERPL-MCNC: 4.2 G/DL (ref 3.5–5.2)
ALBUMIN/GLOBULIN RATIO: ABNORMAL (ref 1–2.5)
ALP BLD-CCNC: 64 U/L (ref 40–129)
ALT SERPL-CCNC: 15 U/L (ref 5–41)
ANION GAP SERPL CALCULATED.3IONS-SCNC: 9 MMOL/L (ref 9–17)
AST SERPL-CCNC: 18 U/L
BILIRUB SERPL-MCNC: 0.51 MG/DL (ref 0.3–1.2)
BUN BLDV-MCNC: 17 MG/DL (ref 8–23)
BUN/CREAT BLD: ABNORMAL (ref 9–20)
CALCIUM SERPL-MCNC: 9.5 MG/DL (ref 8.6–10.4)
CHLORIDE BLD-SCNC: 104 MMOL/L (ref 98–107)
CO2: 28 MMOL/L (ref 20–31)
CREAT SERPL-MCNC: 0.98 MG/DL (ref 0.7–1.2)
GFR AFRICAN AMERICAN: >60 ML/MIN
GFR NON-AFRICAN AMERICAN: >60 ML/MIN
GFR SERPL CREATININE-BSD FRML MDRD: ABNORMAL ML/MIN/{1.73_M2}
GFR SERPL CREATININE-BSD FRML MDRD: ABNORMAL ML/MIN/{1.73_M2}
GLUCOSE BLD-MCNC: 167 MG/DL (ref 70–99)
HCT VFR BLD CALC: 32.5 % (ref 41–53)
HEMOGLOBIN: 10.4 G/DL (ref 13.5–17.5)
MCH RBC QN AUTO: 31.2 PG (ref 26–34)
MCHC RBC AUTO-ENTMCNC: 31.9 G/DL (ref 31–37)
MCV RBC AUTO: 97.8 FL (ref 80–100)
NRBC AUTOMATED: ABNORMAL PER 100 WBC
PDW BLD-RTO: 14.6 % (ref 11.5–14.9)
PLATELET # BLD: 150 K/UL (ref 150–450)
PMV BLD AUTO: 9.1 FL (ref 6–12)
POTASSIUM SERPL-SCNC: 4.6 MMOL/L (ref 3.7–5.3)
RBC # BLD: 3.32 M/UL (ref 4.5–5.9)
SODIUM BLD-SCNC: 141 MMOL/L (ref 135–144)
THYROXINE, FREE: 1.23 NG/DL (ref 0.93–1.7)
TOTAL PROTEIN: 7.1 G/DL (ref 6.4–8.3)
TSH SERPL DL<=0.05 MIU/L-ACNC: 4.21 MIU/L (ref 0.3–5)
VITAMIN D 25-HYDROXY: 34.9 NG/ML (ref 30–100)
WBC # BLD: 4.6 K/UL (ref 3.5–11)

## 2021-04-22 PROCEDURE — 80053 COMPREHEN METABOLIC PANEL: CPT

## 2021-04-22 PROCEDURE — 84443 ASSAY THYROID STIM HORMONE: CPT

## 2021-04-22 PROCEDURE — 85027 COMPLETE CBC AUTOMATED: CPT

## 2021-04-22 PROCEDURE — 36415 COLL VENOUS BLD VENIPUNCTURE: CPT

## 2021-04-22 PROCEDURE — 82306 VITAMIN D 25 HYDROXY: CPT

## 2021-04-22 PROCEDURE — 84439 ASSAY OF FREE THYROXINE: CPT

## 2021-04-23 NOTE — RESULT ENCOUNTER NOTE
Yajaira comment sent to patient.   Blood glucose 167, well controlled  Anemia is worsening, you can call hematologist oncologist  you might need an iron infusion  Otherwise labs within normal limits continue current treatment    Future Appointments  4/29/2021  3:15 PM    Kendra Fleming MD     [facility]  5/18/2021  3:40 PM    Roxy Marinelli MD           [address] 12  6/2/2021   3:00 PM    Kendra Fleming MD     [facility]  7/9/2021   1:00 PM    Armani Hanson MD     [address] [place] [address]

## 2021-04-29 ENCOUNTER — OFFICE VISIT (OUTPATIENT)
Dept: FAMILY MEDICINE CLINIC | Age: 86
End: 2021-04-29
Payer: MEDICARE

## 2021-04-29 VITALS
TEMPERATURE: 97.2 F | SYSTOLIC BLOOD PRESSURE: 122 MMHG | DIASTOLIC BLOOD PRESSURE: 68 MMHG | WEIGHT: 168 LBS | HEIGHT: 67 IN | RESPIRATION RATE: 16 BRPM | HEART RATE: 60 BPM | BODY MASS INDEX: 26.37 KG/M2

## 2021-04-29 DIAGNOSIS — I27.20 PULMONARY HYPERTENSION (HCC): ICD-10-CM

## 2021-04-29 DIAGNOSIS — E11.42 TYPE 2 DIABETES MELLITUS WITH DIABETIC POLYNEUROPATHY, WITHOUT LONG-TERM CURRENT USE OF INSULIN (HCC): Primary | ICD-10-CM

## 2021-04-29 DIAGNOSIS — H10.13 ALLERGIC CONJUNCTIVITIS OF BOTH EYES: ICD-10-CM

## 2021-04-29 DIAGNOSIS — D50.9 IRON DEFICIENCY ANEMIA, UNSPECIFIED IRON DEFICIENCY ANEMIA TYPE: ICD-10-CM

## 2021-04-29 DIAGNOSIS — J20.9 ACUTE BRONCHITIS DUE TO INFECTION: ICD-10-CM

## 2021-04-29 DIAGNOSIS — E78.5 HYPERLIPIDEMIA WITH TARGET LDL LESS THAN 70: ICD-10-CM

## 2021-04-29 DIAGNOSIS — I10 ESSENTIAL HYPERTENSION: ICD-10-CM

## 2021-04-29 LAB — HBA1C MFR BLD: 6.4 %

## 2021-04-29 PROCEDURE — G8427 DOCREV CUR MEDS BY ELIG CLIN: HCPCS | Performed by: FAMILY MEDICINE

## 2021-04-29 PROCEDURE — 99214 OFFICE O/P EST MOD 30 MIN: CPT | Performed by: FAMILY MEDICINE

## 2021-04-29 PROCEDURE — 3288F FALL RISK ASSESSMENT DOCD: CPT | Performed by: FAMILY MEDICINE

## 2021-04-29 PROCEDURE — 4040F PNEUMOC VAC/ADMIN/RCVD: CPT | Performed by: FAMILY MEDICINE

## 2021-04-29 PROCEDURE — 1123F ACP DISCUSS/DSCN MKR DOCD: CPT | Performed by: FAMILY MEDICINE

## 2021-04-29 PROCEDURE — G8417 CALC BMI ABV UP PARAM F/U: HCPCS | Performed by: FAMILY MEDICINE

## 2021-04-29 PROCEDURE — 1036F TOBACCO NON-USER: CPT | Performed by: FAMILY MEDICINE

## 2021-04-29 PROCEDURE — 83036 HEMOGLOBIN GLYCOSYLATED A1C: CPT | Performed by: FAMILY MEDICINE

## 2021-04-29 RX ORDER — AZITHROMYCIN 250 MG/1
TABLET, FILM COATED ORAL
Qty: 6 TABLET | Refills: 0 | Status: SHIPPED | OUTPATIENT
Start: 2021-04-29 | End: 2021-05-04

## 2021-04-29 RX ORDER — ALBUTEROL SULFATE 90 UG/1
2 AEROSOL, METERED RESPIRATORY (INHALATION) EVERY 6 HOURS PRN
Qty: 1 INHALER | Refills: 0 | Status: SHIPPED | OUTPATIENT
Start: 2021-04-29 | End: 2022-02-24 | Stop reason: SDUPTHER

## 2021-04-29 RX ORDER — BENZONATATE 100 MG/1
100 CAPSULE ORAL 3 TIMES DAILY PRN
Qty: 21 CAPSULE | Refills: 0 | Status: SHIPPED | OUTPATIENT
Start: 2021-04-29 | End: 2021-05-06

## 2021-04-29 ASSESSMENT — ENCOUNTER SYMPTOMS
NAUSEA: 0
EYE DISCHARGE: 1
CHEST TIGHTNESS: 0
ABDOMINAL DISTENTION: 0
CONSTIPATION: 1
DIARRHEA: 0
VOMITING: 0
SHORTNESS OF BREATH: 0
RHINORRHEA: 1
ABDOMINAL PAIN: 0
COUGH: 1
EYE ITCHING: 1
WHEEZING: 1

## 2021-04-29 ASSESSMENT — PATIENT HEALTH QUESTIONNAIRE - PHQ9
2. FEELING DOWN, DEPRESSED OR HOPELESS: 0
SUM OF ALL RESPONSES TO PHQ9 QUESTIONS 1 & 2: 0
SUM OF ALL RESPONSES TO PHQ QUESTIONS 1-9: 0

## 2021-04-29 NOTE — PROGRESS NOTES
Visit Information    Have you changed or started any medications since your last visit including any over-the-counter medicines, vitamins, or herbal medicines? no   Are you having any side effects from any of your medications? -  no  Have you stopped taking any of your medications? Is so, why? -  no    Have you seen any other physician or provider since your last visit? No  Have you had any other diagnostic tests since your last visit? No  Have you been seen in the emergency room and/or had an admission to a hospital since we last saw you? No  Have you had your routine dental cleaning in the past 6 months? yes     Have you activated your [employer] account? If not, what are your barriers?  Yes     Patient Care Team:  Angie Mcfadden MD as PCP - General (Family Medicine)  Angie Mcfadden MD as PCP - [facility]  Jose Juan Wheeler (Optometry)  Obdulia Cai MD as Surgeon (Cardiology)  Carroll Mendoza MD as Consulting Physician (Urology)  Kamla Gilmore MD as Consulting Physician (Gastroenterology)  Ronan Borrero MD as Surgeon (Ophthalmology)  Anthony Caro (Certified Nurse Practitioner)  Marly Willis MD as Consulting Physician (Oncology)    Medical History Review  Past Medical, Family, and Social History reviewed and does contribute to the patient presenting condition    Health Maintenance   Topic Date Due    Annual Wellness Visit (AWV)  06/03/2021    Lipid screen  12/04/2021    TSH testing  04/22/2022    Potassium monitoring  04/22/2022    Creatinine monitoring  04/22/2022    DTaP/Tdap/Td vaccine (2 - Td) 09/25/2030    Flu vaccine  Completed    Shingles Vaccine  Completed    Pneumococcal 65+ years Vaccine  Completed    COVID-19 Vaccine  Completed    Hepatitis A vaccine  Aged Out    Hib vaccine  Aged Out    Meningococcal (ACWY) vaccine  Aged Out     Chief Complaint   Patient presents with   [address]

## 2021-04-29 NOTE — PROGRESS NOTES
Joseluis Gomez (:  1924) is a 80 y.o. male,Established patient, here for evaluation of the following chief complaint(s): Diabetes, Hypertension, Hyperlipidemia, Cough, Results, and Allergies      ASSESSMENT/PLAN:    1. Type 2 diabetes mellitus with diabetic polyneuropathy, without long-term current use of insulin (HCC)  Improving  -     POCT glycosylated hemoglobin (Hb A1C)    Lab Results   Component Value Date    LABA1C 6.4 2021    LABA1C 6.7 2020    LABA1C 6.3 09/15/2020       -     CBC Auto Differential; Future  -     [facility] - Rika Lopez MD, Ophthalmology, [place]  -advised home blood glucose testing  daily  -daily feet exam, Foot care: advised to wash feet daily, pat dry and apply lotion at night, avoiding between toes. Need to look at feet daily and report to a physician any signs of inflammation or skin damage  -annual dilated eye exam  -Low carb, low fat diet, increase fruits and vegetables, and exercise 4-5 times a day 30-40 minutes a day discussed  -continue current treatment  -continue Aspirin  -continue ACEI/ARB and statin      2. Essential hypertension  Well controlled. Continue current treatment. Will recheck labs. -     CBC Auto Differential; Future  Discussed low salt diet and BP and pulse monitoring daily    3. Acute bronchitis due to infection  Worsening  -     albuterol sulfate HFA (PROVENTIL HFA) 108 (90 Base) MCG/ACT inhaler; Inhale 2 puffs into the lungs every 6 hours as needed for Wheezing or Shortness of Breath (cough), Disp-1 Inhaler, R-0Normal  -     azithromycin (ZITHROMAX) 250 MG tablet; 500 mg orally on day one followed by 250 mg daily on days two through five, Disp-6 tablet, R-0Normal  -     benzonatate (TESSALON PERLES) 100 MG capsule; Take 1 capsule by mouth 3 times daily as needed for Cough, Disp-21 capsule, R-0Normal  4.  Allergic conjunctivitis of both eyes  Failed to improve  Referral to ophthalmologist placed  -     Rito Malloy MD, Ophthalmology, [place]    He was on Singulair before but apparently he self discontinued because it did not work  He also tried loratadine Claritin but did not work so self discontinued a while ago    5. Hyperlipidemia with target LDL less than 70  Improved  Continue Lipitor 20 mg daily  Lab Results   Component Value Date    LDLCHOLESTEROL 79 12/04/2020         6. Pulmonary hypertension (HCC)  Stable  high RVSP 51 mmHg, moderate to severe pulmonary hypertension in 2017  As the patient does not try to have any intervention, would not recheck echo 2D at this time, which is the most cost effective attitude at this time    7. Iron deficiency anemia, unspecified iron deficiency anemia type  Worsening  We discussed to get iron infusion, which he declines due to cost and \" did not help\"  We discussed possible referral to the GI specialist for EGD colonoscopy, he declines due to his age  He does have appointment with his hematologist oncologist  I discussed with him to repeat his CBC in 1 month  I discussed with him options for iron supplementation including children's iron which might be tolerated easier by his stomach, and to take it with orange juice to get observe  -     CBC Auto Differential; Future  Patient and her son were agreeable to start over-the-counter iron and recheck CBC in 1 week    Corbin received counseling on the following healthy behaviors: nutrition, exercise and medication adherence  Reviewed prior labs and health maintenance, falls precautions  Discussed use, benefit, and side effects of prescribed medications. Barriers to medication compliance addressed. Patient given educational materials - see patient instructions  Was a self-tracking handout given in paper form or via [employer]? Yes  All patient questions answered. Patient voiced understanding.   The patient's past medical,surgical, social, and family history as well as his current medications and allergies were reviewed as documented in today's encounter. Medications, labs, diagnostic studies, consultations and follow-up as documented in this encounter. Return in about 5 months (around 9/29/2021) for Needs 30 mins ONLY FOR F/U chronic pb., DM2, HTN, HLP. Data Unavailable    Future Appointments   Date Time Provider Toy Watts   5/18/2021  3:40 PM Vinicius Woodard MD [facility]   6/2/2021  3:00 PM Tiffaine Christianson MD [facility]   7/9/2021  1:00 PM Gabrielle Peng MD [address] [place] [address]   10/14/2021  3:00 PM [facility]         SUBJECTIVE/OBJECTIVE:      Comes with his son, Momo Alaniz's  first language is Jamaican, he understands basic English and he can express  self in very basic English. he declines telephone  through [employer]. Diabetes Mellitus Type II, Follow-up:    Current symptoms/problems include hyperglycemia, paresthesia of the feet and visual disturbances. Symptoms have been present for several years. Known diabetic complications: nephropathy and peripheral neuropathy  Cardiovascular risk factors: advanced age (older than 54 for men, 72 for women), diabetes mellitus, dyslipidemia, hypertension, male gender, microalbuminuria and sedentary lifestyle    Medication compliance:  compliant all of the time  Current diabetic medications include oral agent (monotherapy): Glucophage. 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 no    Is He on ACE inhibitor or angiotensin II receptor blocker? Yes      reports that he quit smoking about 53 years ago. He has never used smokeless tobacco.     Counseling given: Yes      Daily Aspirin?   No due to anemia and risks outweight the benefits at his age    A1c is improving  Lab Results   Component Value Date    LABA1C 6.4 04/29/2021    LABA1C 6.7 12/16/2020    LABA1C 6.3 09/15/2020       Urine microabumin is Elevated. Lab Results   Component Value Date    LABMICR 106 (H) 11/15/2017          Hypertension:   Rey Reece  is not  exercising and is adherent to low salt diet. Blood pressure is well controlled at home. Cardiac symptoms  fatigue. Patient denies  chest pain, chest pressure/discomfort, claudication, dyspnea, exertional chest pressure/discomfort, irregular heart beat, lower extremity edema, near-syncope, orthopnea, palpitations, paroxysmal nocturnal dyspnea, syncope and tachypnea. Use of agents associated with hypertension: thyroid hormones. History of target organ damage: left ventricular hypertrophy. Patient has moderate aortic stenosis, high RVSP 51 mmHg, moderate to severe pulmonary hypertension, EF 55%, on 1/17/2017, he was evaluated by cardiologist but due to his age no intervention was recommended. He declines any additional work-up for his heart at this time. BP controlled. Rey Reece reports compliance with BP medications, and tolerates them well, denies side effects. BP Readings from Last 3 Encounters:   04/29/21 122/68   12/16/20 120/80   09/25/20 120/76        Pulse is Normal.    Pulse Readings from Last 3 Encounters:   04/29/21 60   12/16/20 84   09/25/20 90       Patient reports worsening cough productive of white and sometimes yellow mucus, he does admit he has been having more phlegm and \" allergies are worsening\".   Has been having a lot of tearing eyes, sneezing and itchy eyes, and worsening blurry vision which she also reports is related to his allergies  She does admit to intermittent wheezing  taking cough syrup but does not help much  Denies chest pain or shortness of breath but does not exert himself he only stays in his room per his son  Denies fever or chills    Patient reports watery eyes all the time, runny nose and stuffy nose bothering his eyes, worsening vision, he does wear glasses. He would like to see an eye doctor        Hyperlipidemia:  No new myalgias or GI upset on atorvastatin (Lipitor). Medication compliance: compliant all of the time. Patient is  following a low fat, low cholesterol diet. LDL is Improved. Lab Results   Component Value Date    LDLCHOLESTEROL 79 12/04/2020     Anemia is worsening  Patient denies any black stools or bloody stools. In the past he received iron infusions because she could not tolerate the iron   He is currently Refusing infusion, costs $400 per infusion which is done usually in 2 days, his son says he was charged twice for that and it cost a lot of money\"    Patient reports previously \"iron affects the stomach\". He declines GI work-up as well, previously seen by the GI specialist, he did have EGD and colonoscopy in 2017 and found to have H. pylori. Taking centrum silver advised against taking any other vitamin D supplement on top of the Centrum Silver. We discussed options for iron and he is finally agreeable to try children's iron from over-the-counter      Lab Results   Component Value Date    WBC 4.6 04/22/2021    HGB 10.4 (L) 04/22/2021    HCT 32.5 (L) 04/22/2021    MCV 97.8 04/22/2021     04/22/2021   Apparently she has lost 6 pounds since the last appointment  Wt Readings from Last 3 Encounters:   04/29/21 168 lb (76.2 kg)   12/16/20 174 lb (78.9 kg)   09/25/20 173 lb (78.5 kg)              [x]Negative depression screening. PHQ Scores 4/29/2021 6/2/2020 2/26/2020 4/26/2019 7/20/2018 1/19/2018 1/5/2017   PHQ2 Score 0 0 0 0 0 0 1   PHQ9 Score 0 0 0 0 0 0 1       Prior to Visit Medications    Medication Sig Taking?  Authorizing Provider   atorvastatin (LIPITOR) 20 MG tablet Take 1 tablet by mouth nightly Yes Mel Fernandez MD   tamsulosin (FLOMAX) 0.4 MG capsule Take 1 capsule by mouth daily Yes Mel Fernandez MD   enalapril (VASOTEC) 20 MG tablet Take 1 tablet by mouth daily Yes Toi Rivera MD   amLODIPine (NORVASC) 5 MG tablet Take 1 tablet by mouth daily Yes Toi Rivera MD   metFORMIN (GLUCOPHAGE-XR) 500 MG extended release tablet Take 1 tablet by mouth daily (with breakfast) If not recalled Yes Toi Rivera MD   docusate sodium (COLACE) 100 MG capsule Take 1 capsule by mouth 2 times daily For constipation Yes Toi Rivera MD   ipratropium (ATROVENT) 0.03 % nasal spray USE 2 SPRAYS INTO EACH NOSTRIL THREE TIMES DAILY Yes Toi Rivera MD   blood glucose test strips (ACCU-CHEK BAILEE PLUS) strip TEST ONCE A DAY, FASTING IN THE MORNING Yes Toi Rivera MD   levothyroxine (SYNTHROID) 25 MCG tablet Take 1 tablet by mouth every morning (before breakfast) Yes Toi Rivera MD   camphor-menthol-methyl salicylate (BENGAY ULTRA STRENGTH) 4-10-30 % CREA cream Apply topically 3 times daily as needed for Pain Yes Toi Rivera MD   lidocaine (LIDODERM) 5 % Place 1 patch onto the skin daily 12 hours on, 12 hours off. Yes Toi Rivera MD   Blood Glucose Monitoring Suppl (ACCU-CHEK BAILEE PLUS) w/Device KIT USE AS DIRECTED Yes Toi Rivera MD   Accu-Chek FastClix Lancets MISC USE TO TEST BLOOD GLUCOSE ONCE A DAY Yes Toi Rivera MD   Lancets 30G MISC Testing once a day. Please dispense according to patients insurance:  accu check Bailee plus Yes Toi Rivera MD   Lancets Misc. (ACCU-CHEK MULTICLIX LANCET DEV) KIT Please dispense according to patients insurance. Yes Toi Rivera MD   Alcohol Swabs PADS Please dispense according to patients insurance/device.  Testing 1-2 /day Yes Toi Rivera MD            Social History     Tobacco Use    Smoking status: Former Smoker     Quit date: 10/27/1967     Years since quittin.5    Smokeless tobacco: Never Used   Substance Use Topics    Alcohol use: No    Drug use: No         Review of Systems   Constitutional: Positive for fatigue and unexpected weight change. Negative for activity change, appetite change, chills, diaphoresis and fever. HENT: Positive for congestion, hearing loss (has hearing aids), postnasal drip and rhinorrhea. Eyes: Positive for discharge, itching and visual disturbance (stable, chronic, has glasses). Respiratory: Positive for cough and wheezing. Negative for chest tightness and shortness of breath. Cardiovascular: Negative for chest pain, palpitations and leg swelling. Gastrointestinal: Positive for constipation (on and off, taking stool softener helps ). Negative for abdominal distention, abdominal pain, diarrhea, nausea and vomiting. Endocrine: Negative for cold intolerance, heat intolerance, polydipsia, polyphagia and polyuria. Genitourinary: Negative for difficulty urinating. Nocturia twice per night, taking Flomax   Musculoskeletal: Positive for arthralgias. Allergic/Immunologic: Positive for environmental allergies. Neurological: Positive for numbness (feet). Negative for weakness. Psychiatric/Behavioral: Negative for dysphoric mood. -vital signs stable and within normal limits except mildly overweight per BMI  /68 (Site: Right Upper Arm, Position: Sitting, Cuff Size: Small Adult)   Pulse 60   Temp 97.2 °F (36.2 °C)   Resp 16   Ht 5' 7\" (1.702 m)   Wt 168 lb (76.2 kg)   BMI 26.31 kg/m²         Physical Exam  Vitals signs and nursing note reviewed. Constitutional:       General: He is not in acute distress. Appearance: Normal appearance. He is well-developed and normal weight. He is not diaphoretic. HENT:      Head: Normocephalic. Right Ear: External ear normal.      Left Ear: External ear normal.      Ears:      Comments: Wears hearing aids     Mouth/Throat:      Comments: I did not examine the mouth due to coronavirus pandemic and wearing masks    Eyes:      General: Lids are normal. No scleral icterus. Right eye: No discharge.          Left eye: No discharge. Comments: Conjunctival pallor   Neck:      Musculoskeletal: Normal range of motion and neck supple. Thyroid: No thyromegaly. Cardiovascular:      Rate and Rhythm: Normal rate and regular rhythm. Heart sounds: Murmur present. Crescendo  systolic murmur present with a grade of 4/6. Pulmonary:      Effort: Pulmonary effort is normal. No respiratory distress. Breath sounds: Normal breath sounds. No wheezing or rales. Chest:      Chest wall: No tenderness. Abdominal:      General: Bowel sounds are normal. There is distension. Palpations: Abdomen is soft. There is no hepatomegaly or splenomegaly. Tenderness: There is no abdominal tenderness. Comments: Seems more bloated today no abdominal tenderness noted   Musculoskeletal:      Right lower leg: No edema. Left lower leg: No edema. Comments: Up and go test more than 12 seconds. High risk for falls. He declines physical therapy. Lymphadenopathy:      Cervical: No cervical adenopathy. Skin:     General: Skin is warm and dry. Capillary Refill: Capillary refill takes less than 2 seconds. Coloration: Skin is pale (mildly pale). Neurological:      Mental Status: He is alert and oriented to person, place, and time. Deep Tendon Reflexes: Reflexes are normal and symmetric. Psychiatric:         Mood and Affect: Mood normal.         Behavior: Behavior normal.         Thought Content: Thought content normal.         Judgment: Judgment normal.             I personally reviewed testing with patient.   Anemia worsening  Hyperglycemia    Otherwise labs within normal limits    Hospital Outpatient Visit on 04/22/2021   Component Date Value Ref Range Status    WBC 04/22/2021 4.6  3.5 - 11.0 k/uL Final    RBC 04/22/2021 3.32* 4.5 - 5.9 m/uL Final    Hemoglobin 04/22/2021 10.4* 13.5 - 17.5 g/dL Final    Hematocrit 04/22/2021 32.5* 41 - 53 % Final    MCV 04/22/2021 97.8  80 - 100 fL Final   [facility] 04/22/2021 31.2  26 - 34 pg Final    MCHC 04/22/2021 31.9  31 - 37 g/dL Final    RDW 04/22/2021 14.6  11.5 - 14.9 % Final    Platelets 04/46/6033 150  150 - 450 k/uL Final    MPV 04/22/2021 9.1  6.0 - 12.0 fL Final    NRBC Automated 04/22/2021 NOT REPORTED  per 100 WBC Final    Glucose 04/22/2021 167* 70 - 99 mg/dL Final    BUN 04/22/2021 17  8 - 23 mg/dL Final    CREATININE 04/22/2021 0.98  0.70 - 1.20 mg/dL Final    Bun/Cre Ratio 04/22/2021 NOT REPORTED  9 - 20 Final    Calcium 04/22/2021 9.5  8.6 - 10.4 mg/dL Final    Sodium 04/22/2021 141  135 - 144 mmol/L Final    Potassium 04/22/2021 4.6  3.7 - 5.3 mmol/L Final    Chloride 04/22/2021 104  98 - 107 mmol/L Final    CO2 04/22/2021 28  20 - 31 mmol/L Final    Anion Gap 04/22/2021 9  9 - 17 mmol/L Final    Alkaline Phosphatase 04/22/2021 64  40 - 129 U/L Final    ALT 04/22/2021 15  5 - 41 U/L Final    AST 04/22/2021 18  <40 U/L Final    Total Bilirubin 04/22/2021 0.51  0.3 - 1.2 mg/dL Final    Total Protein 04/22/2021 7.1  6.4 - 8.3 g/dL Final    Albumin 04/22/2021 4.2  3.5 - 5.2 g/dL Final    Albumin/Globulin Ratio 04/22/2021 NOT REPORTED  1.0 - 2.5 Final    GFR Non- 04/22/2021 >60  >60 mL/min Final    GFR  04/22/2021 >60  >60 mL/min Final    GFR Comment 04/22/2021        Final    Comment: Average GFR for 79or more years old:   76 mL/min/1.73sq m  Chronic Kidney Disease:   <60 mL/min/1.73sq m  Kidney failure:   <15 mL/min/1.73sq m              eGFR calculated using average adult body mass.  Additional eGFR calculator available at:        [email or web address]            GFR Staging 04/22/2021 NOT REPORTED   Final    TSH 04/22/2021 4.21  0.30 - 5.00 mIU/L Final    Thyroxine, Free 04/22/2021 1.23  0.93 - 1.70 ng/dL Final    Vit D, 25-Hydroxy 04/22/2021 34.9  30.0 - 100.0 ng/mL Final    Comment:    Reference Range:  Vitamin D status         Range   Deficiency              <20 ng/mL   Mild Deficiency       20-30 ng/mL   Sufficiency           ng/mL   Toxicity               >100 ng/mL         Lab Results   Component Value Date    CHOL 150 2020    CHOL 134 2019    CHOL 123 10/25/2018     Lab Results   Component Value Date    TRIG 100 2020    TRIG 78 2019    TRIG 59 10/25/2018     Lab Results   Component Value Date    HDL 51 2020    HDL 49 2019    HDL 51 10/25/2018     Lab Results   Component Value Date    LDLCHOLESTEROL 79 2020    LDLCHOLESTEROL 69 2019    LDLCHOLESTEROL 60 10/25/2018     Lab Results   Component Value Date    CHOLHDLRATIO 2.9 2020    CHOLHDLRATIO 2.7 2019    CHOLHDLRATIO 2.4 10/25/2018         Lab Results   Component Value Date    UKSIRFNP76 783 10/08/2020     Lab Results   Component Value Date    FOLATE >20.0 10/08/2020         Orders Placed This Encounter   Medications    albuterol sulfate HFA (PROVENTIL HFA) 108 (90 Base) MCG/ACT inhaler     Sig: Inhale 2 puffs into the lungs every 6 hours as needed for Wheezing or Shortness of Breath (cough)     Dispense:  1 Inhaler     Refill:  0    azithromycin (ZITHROMAX) 250 MG tablet     Si mg orally on day one followed by 250 mg daily on days two through five     Dispense:  6 tablet     Refill:  0    benzonatate (TESSALON PERLES) 100 MG capsule     Sig: Take 1 capsule by mouth 3 times daily as needed for Cough     Dispense:  21 capsule     Refill:  0       Orders Placed This Encounter   Procedures    CBC Auto Differential     Standing Status:   Future     Standing Expiration Date:   2022   Morris Jacobo MD, Ophthalmology, [place]     Referral Priority:   Routine     Referral Type:   Eval and Treat     Referral Reason:   Specialty Services Required     Referred to Provider:   Suleiman Landaverde MD     Requested Specialty:   Ophthalmology     Number of Visits Requested:   1    POCT glycosylated hemoglobin (Hb A1C)         On this date 2021 I

## 2021-04-29 NOTE — PATIENT INSTRUCTIONS
Patient Education        Bronchitis: Care Instructions  Your Care Instructions     Bronchitis is inflammation of the bronchial tubes, which carry air to the lungs. The tubes swell and produce mucus, or phlegm. The mucus and inflamed bronchial tubes make you cough. You may have trouble breathing. Most cases of bronchitis are caused by viruses like those that cause colds. Antibiotics usually do not help and they may be harmful. Bronchitis usually develops rapidly and lasts about 2 to 3 weeks in otherwise healthy people. Follow-up care is a key part of your treatment and safety. Be sure to make and go to all appointments, and call your doctor if you are having problems. It's also a good idea to know your test results and keep a list of the medicines you take. How can you care for yourself at home? · Take all medicines exactly as prescribed. Call your doctor if you think you are having a problem with your medicine. · Get some extra rest.  · Take an over-the-counter pain medicine, such as acetaminophen (Tylenol), ibuprofen (Advil, Motrin), or naproxen (Aleve) to reduce fever and relieve body aches. Read and follow all instructions on the label. · Do not take two or more pain medicines at the same time unless the doctor told you to. Many pain medicines have acetaminophen, which is Tylenol. Too much acetaminophen (Tylenol) can be harmful. · Take an over-the-counter cough medicine that contains dextromethorphan to help quiet a dry, hacking cough so that you can sleep. Avoid cough medicines that have more than one active ingredient. Read and follow all instructions on the label. · Breathe moist air from a humidifier, hot shower, or sink filled with hot water. The heat and moisture will thin mucus so you can cough it out. · Do not smoke. Smoking can make bronchitis worse. If you need help quitting, talk to your doctor about stop-smoking programs and medicines. These can increase your chances of quitting for good. When should you call for help? Call 911 anytime you think you may need emergency care. For example, call if:    · You have severe trouble breathing. Call your doctor now or seek immediate medical care if:    · You have new or worse trouble breathing.     · You cough up dark brown or bloody mucus (sputum).     · You have a new or higher fever.     · You have a new rash. Watch closely for changes in your health, and be sure to contact your doctor if:    · You cough more deeply or more often, especially if you notice more mucus or a change in the color of your mucus.     · You are not getting better as expected. Where can you learn more? Go to [email or web address] and sign in to your [employer] account. Enter H333 in the [employer] box to learn more about \"Bronchitis: Care Instructions. \"     If you do not have an account, please click on the \"Sign Up Now\" link. Current as of: October 26, 2020               Content Version: 12.8  © 2006-2021 [employer]. Care instructions adapted under license by [facility] ([facility]). If you have questions about a medical condition or this instruction, always ask your healthcare professional. [place] [address] any warranty or liability for your use of this information. Patient Education        Learning About Low-Carbohydrate Diets  What is a low-carbohydrate diet? A low-carbohydrate (or \"low-carb\") diet limits foods and drinks that have carbohydrates. This includes grains, fruits, milk and yogurt, and starchy vegetables like potatoes, beans, and corn. It also avoids foods and drinks that have added sugar. Instead, low-carb diets include foods that are high in protein and fat. Why might you follow a low-carb diet? Low-carb diets may be used for a variety of reasons, such as for weight loss. People who have diabetes may use a low-carb diet to help manage their blood sugar levels.   What should you do before you

## 2021-04-29 NOTE — RESULT ENCOUNTER NOTE
Addressed during office visit today, hemoglobin A1c 6.4, abnormal, improved diabetes, continue treatment recommended during the office visit

## 2021-05-11 ENCOUNTER — HOSPITAL ENCOUNTER (OUTPATIENT)
Age: 86
Discharge: HOME | End: 2021-05-11
Payer: MEDICARE

## 2021-05-11 DIAGNOSIS — E11.42 TYPE 2 DIABETES MELLITUS WITH DIABETIC POLYNEUROPATHY, WITHOUT LONG-TERM CURRENT USE OF INSULIN (HCC): ICD-10-CM

## 2021-05-11 DIAGNOSIS — I10 ESSENTIAL HYPERTENSION: ICD-10-CM

## 2021-05-11 DIAGNOSIS — D50.9 IRON DEFICIENCY ANEMIA, UNSPECIFIED IRON DEFICIENCY ANEMIA TYPE: ICD-10-CM

## 2021-05-11 LAB
ABSOLUTE EOS #: 0.1 K/UL (ref 0–0.4)
ABSOLUTE IMMATURE GRANULOCYTE: ABNORMAL K/UL (ref 0–0.3)
ABSOLUTE LYMPH #: 2.1 K/UL (ref 1–4.8)
ABSOLUTE MONO #: 0.4 K/UL (ref 0.1–1.3)
BASOPHILS # BLD: 0 % (ref 0–2)
BASOPHILS ABSOLUTE: 0 K/UL (ref 0–0.2)
DIFFERENTIAL TYPE: ABNORMAL
EOSINOPHILS RELATIVE PERCENT: 2 % (ref 0–4)
HCT VFR BLD CALC: 31.9 % (ref 41–53)
HEMOGLOBIN: 10.6 G/DL (ref 13.5–17.5)
IMMATURE GRANULOCYTES: ABNORMAL %
LYMPHOCYTES # BLD: 43 % (ref 24–44)
MCH RBC QN AUTO: 32.4 PG (ref 26–34)
MCHC RBC AUTO-ENTMCNC: 33.3 G/DL (ref 31–37)
MCV RBC AUTO: 97.3 FL (ref 80–100)
MONOCYTES # BLD: 8 % (ref 1–7)
NRBC AUTOMATED: ABNORMAL PER 100 WBC
PDW BLD-RTO: 14.4 % (ref 11.5–14.9)
PLATELET # BLD: 139 K/UL (ref 150–450)
PLATELET ESTIMATE: ABNORMAL
PMV BLD AUTO: 9.5 FL (ref 6–12)
RBC # BLD: 3.27 M/UL (ref 4.5–5.9)
RBC # BLD: ABNORMAL 10*6/UL
SEG NEUTROPHILS: 47 % (ref 36–66)
SEGMENTED NEUTROPHILS ABSOLUTE COUNT: 2.3 K/UL (ref 1.3–9.1)
WBC # BLD: 5 K/UL (ref 3.5–11)
WBC # BLD: ABNORMAL 10*3/UL

## 2021-05-11 PROCEDURE — 85025 COMPLETE CBC W/AUTO DIFF WBC: CPT

## 2021-05-11 PROCEDURE — 36415 COLL VENOUS BLD VENIPUNCTURE: CPT

## 2021-05-18 DIAGNOSIS — N40.1 BENIGN PROSTATIC HYPERPLASIA WITH NOCTURIA: Primary | ICD-10-CM

## 2021-05-18 DIAGNOSIS — R35.1 BENIGN PROSTATIC HYPERPLASIA WITH NOCTURIA: Primary | ICD-10-CM

## 2021-06-02 ENCOUNTER — OFFICE VISIT (OUTPATIENT)
Dept: FAMILY MEDICINE CLINIC | Age: 86
End: 2021-06-02
Payer: MEDICARE

## 2021-06-02 VITALS
OXYGEN SATURATION: 98 % | SYSTOLIC BLOOD PRESSURE: 118 MMHG | WEIGHT: 167 LBS | HEART RATE: 77 BPM | TEMPERATURE: 96.6 F | RESPIRATION RATE: 16 BRPM | HEIGHT: 67 IN | DIASTOLIC BLOOD PRESSURE: 68 MMHG | BODY MASS INDEX: 26.21 KG/M2

## 2021-06-02 DIAGNOSIS — Z00.00 ROUTINE GENERAL MEDICAL EXAMINATION AT A HEALTH CARE FACILITY: Primary | ICD-10-CM

## 2021-06-02 PROCEDURE — 4040F PNEUMOC VAC/ADMIN/RCVD: CPT | Performed by: FAMILY MEDICINE

## 2021-06-02 PROCEDURE — 1123F ACP DISCUSS/DSCN MKR DOCD: CPT | Performed by: FAMILY MEDICINE

## 2021-06-02 PROCEDURE — G0439 PPPS, SUBSEQ VISIT: HCPCS | Performed by: FAMILY MEDICINE

## 2021-06-02 ASSESSMENT — PATIENT HEALTH QUESTIONNAIRE - PHQ9
SUM OF ALL RESPONSES TO PHQ9 QUESTIONS 1 & 2: 0
1. LITTLE INTEREST OR PLEASURE IN DOING THINGS: 0
SUM OF ALL RESPONSES TO PHQ QUESTIONS 1-9: 0
2. FEELING DOWN, DEPRESSED OR HOPELESS: 0
SUM OF ALL RESPONSES TO PHQ QUESTIONS 1-9: 0
SUM OF ALL RESPONSES TO PHQ QUESTIONS 1-9: 0

## 2021-06-02 ASSESSMENT — VISUAL ACUITY
OD_CC: 20/20
OS_CC: 20/40

## 2021-06-02 ASSESSMENT — LIFESTYLE VARIABLES: HOW OFTEN DO YOU HAVE A DRINK CONTAINING ALCOHOL: 0

## 2021-06-02 NOTE — PROGRESS NOTES
Medicare Annual Wellness Visit  Name: Nabor Durant Date: 2021   MRN: [id number] Sex: Male   Age: 80 y.o. Ethnicity: /   : 1924 Race: Other      Jad Garcia is here for Medicare AWV    Screenings for behavioral, psychosocial and functional/safety risks, and cognitive dysfunction are all negative except as indicated below. These results, as well as other patient data from the [address] form, are documented in Flowsheets linked to this Encounter. Allergies   Allergen Reactions    Aspirin      Anemia, hematuria      Sulfa Antibiotics Rash         Prior to Visit Medications    Medication Sig Taking?  Authorizing Provider   albuterol sulfate HFA (PROVENTIL HFA) 108 (90 Base) MCG/ACT inhaler Inhale 2 puffs into the lungs every 6 hours as needed for Wheezing or Shortness of Breath (cough) Yes Melodie Nelson MD   atorvastatin (LIPITOR) 20 MG tablet Take 1 tablet by mouth nightly Yes Melodie Nelson MD   tamsulosin (FLOMAX) 0.4 MG capsule Take 1 capsule by mouth daily Yes Melodie Nelson MD   enalapril (VASOTEC) 20 MG tablet Take 1 tablet by mouth daily Yes Melodie Nelson MD   amLODIPine (NORVASC) 5 MG tablet Take 1 tablet by mouth daily Yes Melodie Nelson MD   metFORMIN (GLUCOPHAGE-XR) 500 MG extended release tablet Take 1 tablet by mouth daily (with breakfast) If not recalled Yes Melodie Nelson MD   docusate sodium (COLACE) 100 MG capsule Take 1 capsule by mouth 2 times daily For constipation Yes Melodie Nelson MD   ipratropium (ATROVENT) 0.03 % nasal spray USE 2 SPRAYS INTO EACH NOSTRIL THREE TIMES DAILY Yes Melodie Nelson MD   blood glucose test strips (ACCU-CHEK JOEL PLUS) strip TEST ONCE A DAY, FASTING IN THE MORNING Yes Melodie Nelson MD   levothyroxine (SYNTHROID) 25 MCG tablet Take 1 tablet by mouth every morning (before breakfast) Yes Melodie Nelson MD   camphor-menthol-methyl salicylate (BENGAY ULTRA STRENGTH) 4-10-30 % CREA cream Apply topically 3 times daily as needed for Pain Yes Go Brown MD   lidocaine (LIDODERM) 5 % Place 1 patch onto the skin daily 12 hours on, 12 hours off. Yes Go Brown MD   Blood Glucose Monitoring Suppl (ACCU-CHEK BAILEE PLUS) w/Device KIT USE AS DIRECTED Yes Go Brown MD   Accu-Chek FastClix Lancets MISC USE TO TEST BLOOD GLUCOSE ONCE A DAY Yes Go Brown MD   Lancets 30G MISC Testing once a day. Please dispense according to patients insurance:  accu check Bailee plus Yes Go Brown MD   Lancets Misc. (ACCU-CHEK MULTICLIX LANCET DEV) KIT Please dispense according to patients insurance. Yes Go Brown MD   Alcohol Swabs PADS Please dispense according to patients insurance/device.  Testing 1-2 /day Yes Go Brown MD         Past Medical History:   Diagnosis Date    Acute bronchitis due to infection 9/1/2017    Allergic rhinitis due to allergen 7/22/2018    Anemia 1/6/2017    Aortic stenosis, moderate-severe 2/16/2017    Chronic kidney disease     Chronic right-sided low back pain with right-sided sciatica 3/4/2020    Diabetes ([address]     Diabetic nephropathy associated with type 2 diabetes mellitus ([address] 4/33/2959    Diastolic dysfunction without heart failure 2/16/2017    Gastroesophageal reflux disease without esophagitis 12/10/2018    H. pylori infection     Hearing loss     Helicobacter pylori gastritis 8/31/2017    Hyperlipidemia     Hypertension     Hypothyroidism     Iron deficiency anemia 2/20/2017    LVH (left ventricular hypertrophy) due to hypertensive disease 2/16/2017    Osteoarthritis     Pulmonary hypertension ([address] 9/19/2017    RVSP 51 mmHg on 9/13/17       PVD (peripheral vascular disease) (HCC)     Type 2 diabetes mellitus with diabetic polyneuropathy, with long-term current use of insulin ([address] 10/27/2016       Past Surgical History:   Procedure Laterality Date    CATARACT REMOVAL WITH IMPLANT Left 10/03/2017    [facility]/Complex with iris stretching    CATARACT REMOVAL WITH IMPLANT Right 03/01/2018    [facility]/Complex with iris stretching    COLONOSCOPY  08/29/2017    retained stools    HERNIA REPAIR      MA COLON CA SCRN NOT HI RSK IND N/A 8/29/2017     DIAGNOSTIC COLONOSCOPY performed by Lou Heaton MD at [facility] EGD TRANSORAL BIOPSY SINGLE/MULTIPLE N/A 8/29/2017    EGD BIOPSY performed by Lou Heaton MD at [facility] 60 RMVL INSJ IO LENS PROSTH W/O ECP Left 10/3/2017    EYE CATARACT EMULSIFICATION IOL IMPLANT performed by Joy Cormier MD at [facility] 60 RMVL INSJ IO LENS PROSTH W/O ECP Right 3/1/2018    EYE CATARACT EMUSIFICATION WITH IOL RIGHT performed by Joy Cormier MD at [facility]  08/30/2017    H Pylori         Family History   Problem Relation Age of Onset    High Blood Pressure Mother     Diabetes Mother     High Blood Pressure Father     Thyroid Disease Father     Thyroid Disease Brother        CareTeam (Including outside providers/suppliers regularly involved in providing care):   Patient Care Team:  Edwin Villagomez MD as PCP - General (Family Medicine)  Edwin Villagomez MD as PCP - [facility]  Gumaro Hernández (Optometry)  Alma Crawley MD as Surgeon (Cardiology)  Derik Angelo MD as Consulting Physician (Urology)  Lou Heaton MD as Consulting Physician (Gastroenterology)  Joy Cormier MD as Surgeon (Ophthalmology)  Cody Rodriguez (Certified Nurse Practitioner)  Lynda James MD as Consulting Physician (Oncology)      Vital signs within normal limits except mildly overweight, his weight has been stable since the last appointment  Wt Readings from Last 3 Encounters:   06/02/21 167 lb (75.8 kg)   04/29/21 168 lb (76.2 kg)   12/16/20 174 lb (78.9 kg)     Vitals:    06/02/21 1525   BP: 118/68   Site: Right Upper Arm   Position: Sitting   Cuff around 3-4 PM, goes to sleep late at night, watching TV   · patient declines any further evaluation/treatment for this issue, labs ordered recently-showed anemia is stable with a hemoglobin at 10.6, hematocrit 31.9 on 5/11/2021. Previously his hemoglobin was 11.5 on 10/8/2020. We discussed oral iron  · Including baby iron supplement which he declines because it gives him  a lot of stomach upset. · We discussed to get an iron infusion, he would like to wait to see hematologist oncologist, he will see him in 1 MONTH, he had double bills for the last infusion and he had to pay out of pocket a lot  ·   · No Living Will: Patient declines ACP discussion/assistance . Son with him all the time, declines discussion, his son and the patient say it is not necessary.   Patient lives in the same house with his son and his son's family  ·   ·     Health Habits/Nutrition:  Health Habits/Nutrition  Do you exercise for at least 20 minutes 2-3 times per week?: (!) No  Have you lost any weight without trying in the past 3 months?: No  Do you eat only one meal per day?: No (eats breakfast and dinner, sometimes lunch)  Have you seen the dentist within the past year?: Yes  Body mass index: (!) 26.15  Health Habits/Nutrition Interventions:  · Inadequate physical activity:  patient agrees to increase physical activity as follows: As tolerated to walk more but to walk slow and not to fall    Hearing/Vision:   Hearing Screening    125Hz 250Hz 500Hz 1000Hz 2000Hz 3000Hz 4000Hz 6000Hz 8000Hz   Right ear:            Left ear:               Visual Acuity Screening    Right eye Left eye Both eyes   Without correction:      With correction: 20/20 20/40 20/20     Hearing/Vision  Do you or your family notice any trouble with your hearing that hasn't been managed with hearing aids?: No (has rugs)  Do you have difficulty driving, watching TV, or doing any of your daily activities because of your eyesight?: No  Have you had an eye exam within the past year?: Yes  Hearing/Vision Interventions:  · Vision concerns:  patient declines any further evaluation/treatment for this issue, He is up-to-date with eye exam   Safety Interventions:  · Home safety tips provided, has no throw rugs, advised to be very careful with stairs and walk slow       Personalized Preventive Plan   Current Health Maintenance Status  Immunization History   Administered Date(s) Administered    COVID-19, Genaro Yip, PF, 30mcg/0.3mL 02/10/2021, 03/07/2021    Influenza, High Dose (Fluzone 65 yrs and older) 02/16/2017, 11/15/2017, 10/19/2018    Influenza, Quadv, IM, PF (6 mo and older Fluzone, Flulaval, Fluarix, and 3 yrs and older Afluria) 11/15/2019    Influenza, Quadv, adjuvanted, 65 yrs +, IM, PF (Fluad) 09/15/2020    Pneumococcal Conjugate 13-valent ([id number]) 11/12/2015    Pneumococcal Polysaccharide ([id number]) 02/16/2017    Tdap (Boostrix, Adacel) 09/25/2020    Zoster Live (Zostavax) 08/20/2015    Zoster Recombinant (Shingrix) 07/31/2018, 10/02/2018        Health Maintenance   Topic Date Due    Annual Wellness Visit (AWV)  06/03/2021    Lipid screen  12/04/2021    TSH testing  04/22/2022    Potassium monitoring  04/22/2022    Creatinine monitoring  04/22/2022    DTaP/Tdap/Td vaccine (2 - Td or Tdap) 09/25/2030    Flu vaccine  Completed    Shingles Vaccine  Completed    Pneumococcal 65+ years Vaccine  Completed    COVID-19 Vaccine  Completed    Hepatitis A vaccine  Aged Out    Hib vaccine  Aged Out    Meningococcal (ACWY) vaccine  Aged Out     Recommendations for Head Held High Due: see orders and patient instructions/AVS.  . Recommended screening schedule for the next 5-10 years is provided to the patient in written form: see Patient Phyllis Suggs was seen today for medicare awv.     Diagnoses and all orders for this visit:    Routine general medical examination at a health care facility             Anemia stable moderate wants to wait for hematologist oncologist to discuss infusion if necessary  Diabetes improved      Lab Results   Component Value Date    LABA1C 6.4 04/29/2021    LABA1C 6.7 12/16/2020    LABA1C 6.3 09/15/2020     Future Appointments   Date Time Provider Toy Watts   6/29/2021  3:10 PM Savannah Nelson MD [address] [place] [address]   7/9/2021  1:00 PM Steven Isaacs MD [address] [place] [address]   10/14/2021  3:00 PM Neris Mendez MD [facility]   6/2/2022  3:30 PM [facility]

## 2021-06-02 NOTE — PATIENT INSTRUCTIONS
Personalized Preventive Plan for Abelino Sosa - 6/2/2021  Medicare offers a range of preventive health benefits. Some of the tests and screenings are paid in full while other may be subject to a deductible, co-insurance, and/or copay. Some of these benefits include a comprehensive review of your medical history including lifestyle, illnesses that may run in your family, and various assessments and screenings as appropriate. After reviewing your medical record and screening and assessments performed today your provider may have ordered immunizations, labs, imaging, and/or referrals for you. A list of these orders (if applicable) as well as your Preventive Care list are included within your After Visit Summary for your review. Other Preventive Recommendations:    · A preventive eye exam performed by an eye specialist is recommended every 1-2 years to screen for glaucoma; cataracts, macular degeneration, and other eye disorders. · A preventive dental visit is recommended every 6 months. · Try to get at least 150 minutes of exercise per week or 10,000 steps per day on a pedometer . · Order or download the FREE \"Exercise & Physical Activity: Your Everyday Guide\" from [employer]. Call 1-911.716.4333 or search [employer] online. · You need 3948-0730 mg of calcium and 9504-0792 IU of vitamin D per day. It is possible to meet your calcium requirement with diet alone, but a vitamin D supplement is usually necessary to meet this goal.  · When exposed to the sun, use a sunscreen that protects against both UVA and UVB radiation with an SPF of 30 or greater. Reapply every 2 to 3 hours or after sweating, drying off with a towel, or swimming. · Always wear a seat belt when traveling in a car. Always wear a helmet when riding a bicycle or motorcycle. Heart-Healthy Diet   Sodium, Fat, and Cholesterol Controlled Diet       What Is a Heart Healthy Diet?    A heart-healthy cholesterol, this type of cholesterol actually carries cholesterol away from your arteries and may, therefore, help lower your risk of having a heart attack. You want this level to be high (ideally greater than 60). It is a risk to have a level less than 40. You can raise this good cholesterol by eating olive oil, canola oil, avocados, or nuts. Exercise raises this level, too. Fat    Fat is calorie dense and packs a lot of calories into a small amount of food. Even though fats should be limited due to their high calorie content, not all fats are bad. In fact, some fats are quite healthful. Fat can be broken down into four main types. The good-for-you fats are:   Monounsaturated fat  found in oils such as olive and canola, avocados, and nuts and natural nut butters; can decrease cholesterol levels, while keeping levels of HDL cholesterol high   Polyunsaturated fat  found in oils such as safflower, sunflower, soybean, corn, and sesame; can decrease total cholesterol and LDL cholesterol   Omega-3 fatty acids  particularly those found in fatty fish (such as salmon, trout, tuna, mackerel, herring, and sardines); can decrease risk of arrhythmias, decrease triglyceride levels, and slightly lower blood pressure   The fats that you want to limit are:   Saturated fat  found in animal products, many fast foods, and a few vegetables; increases total blood cholesterol, including LDL levels   Animal fats that are saturated include: butter, lard, whole-milk dairy products, meat fat, and poultry skin   Vegetable fats that are saturated include: hydrogenated shortening, palm oil, coconut oil, cocoa butter   Hydrogenated or trans fat  found in margarine and vegetable shortening, most shelf stable snack foods, and fried foods; increases LDL and decreases HDL     It is generally recommended that you limit your total fat for the day to less than 30% of your total calories.  If you follow an 1800-calorie heart healthy diet, for example, this would mean 60 grams of fat or less per day. Saturated fat and trans fat in your diet raises your blood cholesterol the most, much more than dietary cholesterol does. For this reason, on a heart-healthy diet, less than 7% of your calories should come from saturated fat and ideally 0% from trans fat. On an 1800-calorie diet, this translates into less than 14 grams of saturated fat per day, leaving 46 grams of fat to come from mono- and polyunsaturated fats.    Food Choices on a Heart Healthy Diet   Food Category   Foods Recommended   Foods to Avoid   Grains   Breads and rolls without salted tops Most dry and cooked cereals Unsalted crackers and breadsticks Low-sodium or homemade breadcrumbs or stuffing All rice and pastas   Breads, rolls, and crackers with salted tops High-fat baked goods (eg, muffins, donuts, pastries) Quick breads, self-rising flour, and biscuit mixes Regular bread crumbs Instant hot cereals Commercially prepared rice, pasta, or stuffing mixes   Vegetables   Most fresh, frozen, and low-sodium canned vegetables Low-sodium and salt-free vegetable juices Canned vegetables if unsalted or rinsed   Regular canned vegetables and juices, including sauerkraut and pickled vegetables Frozen vegetables with sauces Commercially prepared potato and vegetable mixes   Fruits   Most fresh, frozen, and canned fruits All fruit juices   Fruits processed with salt or sodium   Milk   Nonfat or low-fat (1%) milk Nonfat or low-fat yogurt Cottage cheese, low-fat ricotta, cheeses labeled as low-fat and low-sodium   Whole milk Reduced-fat (2%) milk Malted and chocolate milk Full fat yogurt Most cheeses (unless low-fat and low salt) Buttermilk (no more than 1 cup per week)   Meats and Beans   Lean cuts of fresh or frozen beef, veal, lamb, or pork (look for the word loin) Fresh or frozen poultry without the skin Fresh or frozen fish and some shellfish Egg whites and egg substitutes (Limit whole eggs to three per week) Tofu Nuts or seeds (unsalted, dry-roasted), low-sodium peanut butter Dried peas, beans, and lentils   Any smoked, cured, salted, or canned meat, fish, or poultry (including newman, chipped beef, cold cuts, hot dogs, sausages, sardines, and anchovies) Poultry skins Breaded and/or fried fish or meats Canned peas, beans, and lentils Salted nuts   Fats and Oils   Olive oil and canola oil Low-sodium, low-fat salad dressings and mayonnaise   Butter, margarine, coconut and palm oils, newman fat   Snacks, Sweets, and Condiments   Low-sodium or unsalted versions of broths, soups, soy sauce, and condiments Pepper, herbs, and spices; vinegar, lemon, or lime juice Low-fat frozen desserts (yogurt, sherbet, fruit bars) Sugar, cocoa powder, honey, syrup, jam, and preserves Low-fat, trans-fat free cookies, cakes, and pies Chele and animal crackers, fig bars, ruben snaps   High-fat desserts Broth, soups, gravies, and sauces, made from instant mixes or other high-sodium ingredients Salted snack foods Canned olives Meat tenderizers, seasoning salt, and most flavored vinegars   Beverages   Low-sodium carbonated beverages Tea and coffee in moderation Soy milk   Commercially softened water   Suggestions   Make whole grains, fruits, and vegetables the base of your diet. Choose heart-healthy fats such as canola, olive, and flaxseed oil, and foods high in heart-healthy fats, such as nuts, seeds, soybeans, tofu, and fish. Eat fish at least twice per week; the fish highest in omega-3 fatty acids and lowest in mercury include salmon, herring, mackerel, sardines, and canned chunk light tuna. If you eat fish less than twice per week or have high triglycerides, talk to your doctor about taking fish oil supplements. Read food labels.    For products low in fat and cholesterol, look for fat free, low-fat, cholesterol free, saturated fat free, and trans fat freeAlso scan the Nutrition Facts Label, which lists saturated fat, trans fat, and cholesterol amounts. For products low in sodium, look for sodium free, very low sodium, low sodium, no added salt, and unsalted   Skip the salt when cooking or at the table; if food needs more flavor, get creative and try out different herbs and spices. Garlic and onion also add substantial flavor to foods. Trim any visible fat off meat and poultry before cooking, and drain the fat off after price. Use cooking methods that require little or no added fat, such as grilling, boiling, baking, poaching, broiling, roasting, steaming, stir-frying, and sauting. Avoid fast food and convenience food. They tend to be high in saturated and trans fat and have a lot of added salt. Talk to a registered dietitian for individualized diet advice. Last Reviewed: March 2011 Narinder Zafar MS, MPH, RD   Updated: 3/29/2011   ·     Heart-Healthy Diet   Sodium, Fat, and Cholesterol Controlled Diet       What Is a Heart Healthy Diet? A heart-healthy diet is one that limits sodium , certain types of fat , and cholesterol . This type of diet is recommended for:   People with any form of cardiovascular disease (eg, coronary heart disease , peripheral vascular disease , previous heart attack , previous stroke )   People with risk factors for cardiovascular disease, such as high blood pressure , high cholesterol , or diabetes   Anyone who wants to lower their risk of developing cardiovascular disease   Sodium    Sodium is a mineral found in many foods. In general, most people consume much more sodium than they need. Diets high in sodium can increase blood pressure and lead to edema (water retention). On a heart-healthy diet, you should consume no more than 2,300 mg (milligrams) of sodium per dayabout the amount in one teaspoon of table salt. The foods highest in sodium include table salt (about 50% sodium), processed foods, convenience foods, and preserved foods.    Cholesterol    Cholesterol is a fat-like, waxy substance in your blood. Our bodies make some cholesterol. It is also found in animal products, with the highest amounts in fatty meat, egg yolks, whole milk, cheese, shellfish, and organ meats. On a heart-healthy diet, you should limit your cholesterol intake to less than 200 mg per day. It is normal and important to have some cholesterol in your bloodstream. But too much cholesterol can cause plaque to build up within your arteries, which can eventually lead to a heart attack or stroke. The two types of cholesterol that are most commonly referred to are:   Low-density lipoprotein (LDL) cholesterol  Also known as bad cholesterol, this is the cholesterol that tends to build up along your arteries. Bad cholesterol levels are increased by eating fats that are saturated or hydrogenated. Optimal level of this cholesterol is less than 100. Over 130 starts to get risky for heart disease. High-density lipoprotein (HDL) cholesterol  Also known as good cholesterol, this type of cholesterol actually carries cholesterol away from your arteries and may, therefore, help lower your risk of having a heart attack. You want this level to be high (ideally greater than 60). It is a risk to have a level less than 40. You can raise this good cholesterol by eating olive oil, canola oil, avocados, or nuts. Exercise raises this level, too. Fat    Fat is calorie dense and packs a lot of calories into a small amount of food. Even though fats should be limited due to their high calorie content, not all fats are bad. In fact, some fats are quite healthful. Fat can be broken down into four main types.    The good-for-you fats are:   Monounsaturated fat  found in oils such as olive and canola, avocados, and nuts and natural nut butters; can decrease cholesterol levels, while keeping levels of HDL cholesterol high   Polyunsaturated fat  found in oils such as safflower, sunflower, soybean, corn, and sesame; can decrease total cholesterol and LDL cholesterol   Omega-3 fatty acids  particularly those found in fatty fish (such as salmon, trout, tuna, mackerel, herring, and sardines); can decrease risk of arrhythmias, decrease triglyceride levels, and slightly lower blood pressure   The fats that you want to limit are:   Saturated fat  found in animal products, many fast foods, and a few vegetables; increases total blood cholesterol, including LDL levels   Animal fats that are saturated include: butter, lard, whole-milk dairy products, meat fat, and poultry skin   Vegetable fats that are saturated include: hydrogenated shortening, palm oil, coconut oil, cocoa butter   Hydrogenated or trans fat  found in margarine and vegetable shortening, most shelf stable snack foods, and fried foods; increases LDL and decreases HDL     It is generally recommended that you limit your total fat for the day to less than 30% of your total calories. If you follow an 1800-calorie heart healthy diet, for example, this would mean 60 grams of fat or less per day. Saturated fat and trans fat in your diet raises your blood cholesterol the most, much more than dietary cholesterol does. For this reason, on a heart-healthy diet, less than 7% of your calories should come from saturated fat and ideally 0% from trans fat. On an 1800-calorie diet, this translates into less than 14 grams of saturated fat per day, leaving 46 grams of fat to come from mono- and polyunsaturated fats.    Food Choices on a Heart Healthy Diet   Food Category   Foods Recommended   Foods to Avoid   Grains   Breads and rolls without salted tops Most dry and cooked cereals Unsalted crackers and breadsticks Low-sodium or homemade breadcrumbs or stuffing All rice and pastas   Breads, rolls, and crackers with salted tops High-fat baked goods (eg, muffins, donuts, pastries) Quick breads, self-rising flour, and biscuit mixes Regular bread crumbs Instant hot cereals Commercially prepared rice, pasta, or stuffing mixes Vegetables   Most fresh, frozen, and low-sodium canned vegetables Low-sodium and salt-free vegetable juices Canned vegetables if unsalted or rinsed   Regular canned vegetables and juices, including sauerkraut and pickled vegetables Frozen vegetables with sauces Commercially prepared potato and vegetable mixes   Fruits   Most fresh, frozen, and canned fruits All fruit juices   Fruits processed with salt or sodium   Milk   Nonfat or low-fat (1%) milk Nonfat or low-fat yogurt Cottage cheese, low-fat ricotta, cheeses labeled as low-fat and low-sodium   Whole milk Reduced-fat (2%) milk Malted and chocolate milk Full fat yogurt Most cheeses (unless low-fat and low salt) Buttermilk (no more than 1 cup per week)   Meats and Beans   Lean cuts of fresh or frozen beef, veal, lamb, or pork (look for the word loin) Fresh or frozen poultry without the skin Fresh or frozen fish and some shellfish Egg whites and egg substitutes (Limit whole eggs to three per week) Tofu Nuts or seeds (unsalted, dry-roasted), low-sodium peanut butter Dried peas, beans, and lentils   Any smoked, cured, salted, or canned meat, fish, or poultry (including newman, chipped beef, cold cuts, hot dogs, sausages, sardines, and anchovies) Poultry skins Breaded and/or fried fish or meats Canned peas, beans, and lentils Salted nuts   Fats and Oils   Olive oil and canola oil Low-sodium, low-fat salad dressings and mayonnaise   Butter, margarine, coconut and palm oils, newman fat   Snacks, Sweets, and Condiments   Low-sodium or unsalted versions of broths, soups, soy sauce, and condiments Pepper, herbs, and spices; vinegar, lemon, or lime juice Low-fat frozen desserts (yogurt, sherbet, fruit bars) Sugar, cocoa powder, honey, syrup, jam, and preserves Low-fat, trans-fat free cookies, cakes, and pies Chele and animal crackers, fig bars, ruben snaps   High-fat desserts Broth, soups, gravies, and sauces, made from instant mixes or other high-sodium ingredients Salted snack foods Canned olives Meat tenderizers, seasoning salt, and most flavored vinegars   Beverages   Low-sodium carbonated beverages Tea and coffee in moderation Soy milk   Commercially softened water   Suggestions   Make whole grains, fruits, and vegetables the base of your diet. Choose heart-healthy fats such as canola, olive, and flaxseed oil, and foods high in heart-healthy fats, such as nuts, seeds, soybeans, tofu, and fish. Eat fish at least twice per week; the fish highest in omega-3 fatty acids and lowest in mercury include salmon, herring, mackerel, sardines, and canned chunk light tuna. If you eat fish less than twice per week or have high triglycerides, talk to your doctor about taking fish oil supplements. Read food labels. For products low in fat and cholesterol, look for fat free, low-fat, cholesterol free, saturated fat free, and trans fat freeAlso scan the Nutrition Facts Label, which lists saturated fat, trans fat, and cholesterol amounts. For products low in sodium, look for sodium free, very low sodium, low sodium, no added salt, and unsalted   Skip the salt when cooking or at the table; if food needs more flavor, get creative and try out different herbs and spices. Garlic and onion also add substantial flavor to foods. Trim any visible fat off meat and poultry before cooking, and drain the fat off after price. Use cooking methods that require little or no added fat, such as grilling, boiling, baking, poaching, broiling, roasting, steaming, stir-frying, and sauting. Avoid fast food and convenience food. They tend to be high in saturated and trans fat and have a lot of added salt. Talk to a registered dietitian for individualized diet advice. Last Reviewed: March 2011 Deyanira Cheung MS, MPH, RD   Updated: 3/29/2011   ·     Heart-Healthy Diet   Sodium, Fat, and Cholesterol Controlled Diet       What Is a Heart Healthy Diet?    A heart-healthy diet is one that cholesterol actually carries cholesterol away from your arteries and may, therefore, help lower your risk of having a heart attack. You want this level to be high (ideally greater than 60). It is a risk to have a level less than 40. You can raise this good cholesterol by eating olive oil, canola oil, avocados, or nuts. Exercise raises this level, too. Fat    Fat is calorie dense and packs a lot of calories into a small amount of food. Even though fats should be limited due to their high calorie content, not all fats are bad. In fact, some fats are quite healthful. Fat can be broken down into four main types. The good-for-you fats are:   Monounsaturated fat  found in oils such as olive and canola, avocados, and nuts and natural nut butters; can decrease cholesterol levels, while keeping levels of HDL cholesterol high   Polyunsaturated fat  found in oils such as safflower, sunflower, soybean, corn, and sesame; can decrease total cholesterol and LDL cholesterol   Omega-3 fatty acids  particularly those found in fatty fish (such as salmon, trout, tuna, mackerel, herring, and sardines); can decrease risk of arrhythmias, decrease triglyceride levels, and slightly lower blood pressure   The fats that you want to limit are:   Saturated fat  found in animal products, many fast foods, and a few vegetables; increases total blood cholesterol, including LDL levels   Animal fats that are saturated include: butter, lard, whole-milk dairy products, meat fat, and poultry skin   Vegetable fats that are saturated include: hydrogenated shortening, palm oil, coconut oil, cocoa butter   Hydrogenated or trans fat  found in margarine and vegetable shortening, most shelf stable snack foods, and fried foods; increases LDL and decreases HDL     It is generally recommended that you limit your total fat for the day to less than 30% of your total calories.  If you follow an 1800-calorie heart healthy diet, for example, this would mean 60 (unsalted, dry-roasted), low-sodium peanut butter Dried peas, beans, and lentils   Any smoked, cured, salted, or canned meat, fish, or poultry (including newman, chipped beef, cold cuts, hot dogs, sausages, sardines, and anchovies) Poultry skins Breaded and/or fried fish or meats Canned peas, beans, and lentils Salted nuts   Fats and Oils   Olive oil and canola oil Low-sodium, low-fat salad dressings and mayonnaise   Butter, margarine, coconut and palm oils, newman fat   Snacks, Sweets, and Condiments   Low-sodium or unsalted versions of broths, soups, soy sauce, and condiments Pepper, herbs, and spices; vinegar, lemon, or lime juice Low-fat frozen desserts (yogurt, sherbet, fruit bars) Sugar, cocoa powder, honey, syrup, jam, and preserves Low-fat, trans-fat free cookies, cakes, and pies Chele and animal crackers, fig bars, ruben snaps   High-fat desserts Broth, soups, gravies, and sauces, made from instant mixes or other high-sodium ingredients Salted snack foods Canned olives Meat tenderizers, seasoning salt, and most flavored vinegars   Beverages   Low-sodium carbonated beverages Tea and coffee in moderation Soy milk   Commercially softened water   Suggestions   Make whole grains, fruits, and vegetables the base of your diet. Choose heart-healthy fats such as canola, olive, and flaxseed oil, and foods high in heart-healthy fats, such as nuts, seeds, soybeans, tofu, and fish. Eat fish at least twice per week; the fish highest in omega-3 fatty acids and lowest in mercury include salmon, herring, mackerel, sardines, and canned chunk light tuna. If you eat fish less than twice per week or have high triglycerides, talk to your doctor about taking fish oil supplements. Read food labels. For products low in fat and cholesterol, look for fat free, low-fat, cholesterol free, saturated fat free, and trans fat freeAlso scan the Nutrition Facts Label, which lists saturated fat, trans fat, and cholesterol amounts. your treatment and safety. Be sure to make and go to all appointments, and call your doctor if you are having problems. It's also a good idea to know your test results and keep a list of the medicines you take. How can you care for yourself at home? Get enough calcium and vitamin D. The [place] [employer] recommends adults younger than age 46 need 1,000 mg of calcium and 600 IU of vitamin D each day. Women ages 46 to 79 need 1,200 mg of calcium and 600 IU of vitamin D each day. Men ages 46 to 79 need 1,000 mg of calcium and 600 IU of vitamin D each day. Adults 71 and older need 1,200 mg of calcium and 800 IU of vitamin D each day. Eat foods rich in calcium, like yogurt, cheese, milk, and dark green vegetables. Eat foods rich in vitamin D, like eggs, fatty fish, cereal, and fortified milk. Get some sunshine. Your body uses sunshine to make its own vitamin D. The safest time to be out in the sun is before 10 a.m. or after 3 p.m. Avoid getting sunburned. Sunburn can increase your risk of skin cancer. Talk to your doctor about taking a calcium plus vitamin D supplement. Ask about what type of calcium is right for you, and how much to take at a time. Adults ages 23 to 48 should not get more than 2,500 mg of calcium and 4,000 IU of vitamin D each day, whether it is from supplements and/or food. Adults ages 46 and older should not get more than 2,000 mg of calcium and 4,000 IU of vitamin D each day from supplements and/or food. Get regular bone-building exercise. Weight-bearing and resistance exercises keep bones healthy by working the muscles and bones against gravity. Start out at an exercise level that feels right for you. Add a little at a time until you can do the following:  Do 30 minutes of weight-bearing exercise on most days of the week. Walking, jogging, stair climbing, and dancing are good choices. Do resistance exercises with weights or elastic bands 2 to 3 days a week. Limit alcohol.  Drink no more risk of type 2 diabetes , and lower your weight. For people with type 1 or 2 diabetes, a high-fiber diet can also help stabilize blood sugar levels. How Much Fiber Should I Eat? A high-fiber diet should contain  20-35 grams  of fiber a day. This is actually the amount recommended for the general adult population; however, most Americans eat only 15 grams of fiber per day. Digestion of Fiber   Eating a higher fiber diet than usual can take some getting used to by your body's digestive system. To avoid the side effects of sudden increases in dietary fiber (eg, gas, cramping, bloating, and diarrhea), increase fiber gradually and be sure to drink plenty of fluids every day. Tips for Increasing Fiber Intake   Whenever possible, choose whole grains over refined grains (eg, brown rice instead of white rice, whole-wheat bread instead of white bread). Include a variety of grains in your diet, such as wheat, rye, barley, oats, quinoa, and bulgur. Eat more vegetarian-based meals. Here are some ideas: black bean burgers, eggplant lasagna, and veggie tofu stir-santoro. Choose high-fiber snacks, such as fruits, popcorn, whole-grain crackers, and nuts. Make whole-grain cereal or whole-grain toast part of your daily breakfast regime. When eating out, whether ordering a sandwich or dinner, ask for extra vegetables. When baking, replace part of the white flour with whole-wheat flour. Whole-wheat flour is particularly easy to incorporate into a recipe. High-Fiber Diet Eating Guide   Food Category   Foods Recommended   Notes   Grains   Whole-grain breads, muffins, bagels, or lida bread Rye bread Whole-wheat crackers or crisp breads Whole-grain or bran cereals Oatmeal, oat bran, or grits Wheat germ Whole-wheat pasta and brown rice   Read the ingredients list on food labels. Look for products that list \"whole\" as the first ingredient (eg, whole-wheat, whole oats).  Choose cereals with at least 2 grams of fiber per serving. Vegetables   All vegetables, especially asparagus, bean sprouts, broccoli, [place] sprouts, cabbage, carrots, cauliflower, celery, corn, greens, green beans, green pepper, onions, peas, potatoes (with skin), snow peas, spinach, squash, sweet potatoes, tomatoes, zucchini   For maximum fiber intake, eat the peels of fruits and vegetablesjust be sure to wash them well first.   Fruits   All fruits, especially apples, berries, grapefruits, mangoes, nectarines, oranges, peaches, pears, dried fruits (figs, dates, prunes, raisins)   Choose raw fruits and vegetables over juice, cooked, or cannedraw fruit has more fiber. Dried fruit is also a good source of fiber. Milk   With the exception of yogurt containing inulin (a type of fiber), dairy foods provide little fiber. Add more fiber by topping your yogurt or cottage cheese with fresh fruit, whole grain or bran cereals, nuts, or seeds. Meats and Beans   All beans and peas, especially Garbanzo beans, kidney beans, lentils, lima beans, split peas, and chacon beans All nuts and seeds, especially almonds, peanuts, [place] nuts, cashews, peanut butter, walnuts, sesame and sunflower seeds All meat, poultry, fish, and eggs   Increase fiber in meat dishes by adding chacon beans, kidney beans, black-eyed peas, bran, or oatmeal. If you are following a low-fat diet, use nuts and seeds only in moderation. Fats and Oils   All in moderation   Fats and oils do not provide fiber   Snacks, Sweets, and Condiments   Fruit Nuts Popcorn, whole-wheat pretzels, or trail mix made with dried fruits, nuts, and seeds Cakes, breads, and cookies made with oatmeal or whole-wheat flour   Most snack foods do not provide much fiber. Choose snacks with at least 2 grams of fiber per serving.      Last Reviewed: March 2011 Roxy Ventura MS, MPH, RD   Updated: 3/29/2011   ·     Keep Your Memory Filiberto Smith       Many factors can affect your ability to remembera hectic lifestyle, aging, stress, chronic disease, and certain medicines. But, there are steps you can take to sharpen your mind and help preserve your memory. Challenge Your Brain   Regularly challenging your mind may help keeps it in top shape. Good mental exercises include:   Crossword puzzlesUse a dictionary if you need it; you will learn more that way. Brainteasers Try some! Crafts, such as wood working and sewing   Hobbies, such as gardening and building model airplanes   SocializingVisit old friends or join groups to meet new ones. Reading   Learning a new language   Taking a class, whether it be art history or isaac chi   TravelingExperience the food, history, and culture of your destination   Learning to use a computer   Going to museums, the theater, or thought-provoking movies   Changing things in your daily life, such as reversing your pattern in the grocery store or brushing your teeth using your nondominant hand   Use Memory Aids   There is no need to remember every detail on your own. These memory aids can help:   Calendars and day planners   Electronic organizers to store all sorts of helpful informationThese devices can \"beep\" to remind you of appointments. A book of days to record birthdays, anniversaries, and other occasions that occur on the same date every year   Detailed \"to-do\" lists and strategically placed sticky notes   Quick \"study\" sessionsBefore a gathering, review who will be there so their names will be fresh in your mind. Establish routinesFor example, keep your keys, wallet, and umbrella in the same place all the time or take medicine with your 8:00 AM glass of juice   Live a Healthy Life   Many actions that will keep your body strong will do the same for your mind. For example:   Talk to Your Doctor About Herbs and Supplements    Malnutrition and vitamin deficiencies can impair your mental function. For example, vitamin B12 deficiency can cause a range of symptoms, including confusion.  But, what if your nutritional needs are being met? Can herbs and supplements still offer a benefit? Researchers have investigated a range of natural remedies, such as ginkgo , ginseng , and the supplement phosphatidylserine (PS). So far, though, the evidence is inconsistent as to whether these products can improve memory or thinking. If you are interested in taking herbs and supplements, talk to your doctor first because they may interact with other medicines that you are taking. Exercise Regularly    Among the many benefits of regular exercise are increased blood flow to the brain and decreased risk of certain diseases that can interfere with memory function. One study found that even moderate exercise has a beneficial effect. Examples of \"moderate\" exercise include:   Playing 18 holes of golf once a week, without a cart   Playing tennis twice a week   Walking one mile per day   Manage Stress    It can be tough to remember what is important when your mind is cluttered. Make time for relaxation. Choose activities that calm you down, and make it routine. Manage Chronic Conditions    Side effects of high blood pressure , diabetes, and heart disease can interfere with mental function. Many of the lifestyle steps discussed here can help manage these conditions. Strive to eat a healthy diet, exercise regularly, get stress under control, and follow your doctor's advice for your condition. Minimize Medications    Talk to your doctor about the medicines that you take. Some may be unnecessary. Also, healthy lifestyle habits may lower the need for certain drugs. Last Reviewed: April 2010 Quinn Lane MD   Updated: 4/13/2010   ·     [address]       As we get older, changes in balance, gait, strength, vision, hearing, and cognition make even the most youthful senior more prone to accidents. Falls are one of the leading health risks for older people.  This increased risk of falling is related to:   Aging process (eg, cannot see clearly, you are more likely to fall. Important questions to ask yourself include:   Are lamp, electric, extension, and telephone cords placed out of the flow of traffic and maintained in good condition? Have frayed cords been replaced? Are all small rugs and runners slip resistant? If not, you can secure them to the floor with a special double-sided carpet tape. Are smoke detectors properly locatedone on every floor of your home and one outside of every sleeping area? Are they in good working order? Are batteries replaced at least once a year? Do you have a well-maintained carbon monoxide detector outside every sleeping are in your home? Does your furniture layout leave plenty of space to maneuver between and around chairs, tables, beds, and sofas? Are hallways, stairs and passages between rooms well lit? Can you reach a lamp without getting out of bed? Are floor surfaces well maintained? Shag rugs, high-pile carpeting, tile floors, and polished wood floors can be particularly slippery. Stairs should always have handrails and be carpeted or fitted with a non-skid tread. Is your telephone easily reachable. Is the cord safely tucked away? Room by Room   According to the Association of Aging, bathrooms and glendy are the two most potentially hazardous rooms in your home. In the Kitchen    Be sure your stove is in proper working order and always make sure burners and the oven are off before you go out or go to sleep. Keep pots on the back burners, turn handles away from the front of the stove, and keep stove clean and free of grease build-up. Kitchen ventilation systems and range exhausts should be working properly. Keep flammable objects such as towels and pot holders away from the cooking area except when in use. Make sure kitchen curtains are tied back. Move cords and appliances away from the sink and hot surfaces.  If extension cords are needed, install wiring guides so they do not hang over the sink, range, or working areas. Look for coffee pots, kettles and toaster ovens with automatic shut-offs. Keep a mop handy in the kitchen so you can wipe up spills instantly. You should also have a small fire extinguisher. Arrange your kitchen with frequently used items on lower shelves to avoid the need to stand on a stepstool to reach them. Make sure countertops are well-lit to avoid injuries while cutting and preparing food. In the Bathroom    Use a non-slip mat or decals in the tub and shower, since wet, soapy tile or porcelain surfaces are extremely slippery. Make sure bathroom rugs are non-skid or tape them firmly to the floor. Bathtubs should have at least one, preferably two, grab bars, firmly attached to structural supports in the wall. (Do not use built-in soap holders or glass shower doors as grab bars.)    Tub seats fitted with non-slip material on the legs allow you to wash sitting down. For people with limited mobility, bathtub transfer benches allow you to slide safely into the tub. Raised toilet seats and toilet safety rails are helpful for those with knee or hip problems. In the [place]    Make sure you use a nightlight and that the area around your bed is clear of potential obstacles. Be careful with electric blankets and never go to sleep with a heating pad, which can cause serious burns even if on a low setting. Use fire-resistant mattress covers and pillows, and NEVER smoke in bed. Keep a phone next to the bed that is programmed to dial 911 at the push of a button. If you have a chronic condition, you may want to sign on with an automatic call-in service. Typically the system includes a small pendant that connects directly to an emergency medical voice-response system. You should also make arrangements to stay in contact with someonefriend, neighbor, family memberon a regular schedule.    Fire Prevention   According to the [employer] (Smoke Alarms for Every) [facility] [facility], senior citizens are one of the two highest risk groups for death and serious injuries due to residential fires. When cooking, wear short-sleeved items, never a bulky long-sleeved robe. The [facility]'s Safety Checklist for Older Consumers emphasizes the importance of checking basements, garages, workshops and storage areas for fire hazards, such as volatile liquids, piles of old rags or clothing and overloaded circuits. Never smoke in bed or when lying down on a couch or recliner chair. Small portable electric or kerosene heaters are responsible for many home fires and should be used cautiously if at all. If you do use one, be sure to keep them away from flammable materials. In case of fire, make sure you have a pre-established emergency exit plan. Have a professional check your fireplace and other fuel-burning appliances yearly. Helping Hands   Baby boomers entering the atkins years will continue to see the development of new products to help older adults live safely and independently in spite of age-related changes. Making Life More Livable  , by Naty Brian, lists over 1,000 products for \"living well in the mature years,\" such as bathing and mobility aids, household security devices, ergonomically designed knives and peelers, and faucet valves and knobs for temperature control. Medical supply stores and organizations are good sources of information about products that improve your quality of life and insure your safety. Last Reviewed: November 2009 Federico Peterson MD   Updated: 3/7/2011     ·        Advance Care Planning: Care Instructions  Your Care Instructions     It can be hard to live with an illness that cannot be cured. But if your health is getting worse, you may want to make decisions about end-of-life care. Planning for the end of your life does not mean that you are giving up. It is a way to make sure that your wishes are met. Clearly stating your wishes can make it easier for your loved ones. Making plans while you are still able may also ease your mind and make your final days less stressful and more meaningful. Follow-up care is a key part of your treatment and safety. Be sure to make and go to all appointments, and call your doctor if you are having problems. It's also a good idea to know your test results and keep a list of the medicines you take. What can you do to plan for the end of life? You can bring these issues up with your doctor. You do not need to wait until your doctor starts the conversation. You might start with, \"What makes life worth living for me is. Yvette Mendoza \" And then follow it with, \"I would not be willing to live with . Yvette Mendoza \" When you complete this sentence it helps your doctor understand your wishes. Talk openly and honestly with your doctor. This is the best way to understand the decisions you will need to make as your health changes. Know that you can always change your mind. Ask your doctor about commonly used life-support measures. These include tube feedings, breathing machines, and fluids given through a vein (IV). Understanding these treatments will help you decide whether you want them. You may choose to have these life-supporting treatments for a limited time. This allows a trial period to see whether they will help you. You may also decide that you want your doctor to take only certain measures to keep you alive. It may help to think about the big picture, like what makes life worth living for you or what your values and goals are. Talk to your doctor about how long you are likely to live. Your doctor may be able to give you an idea of what usually happens with your specific illness. Think about preparing papers that state your wishes. These papers are called advance directives. If you do this early and review them often, there will not be any confusion about what you want.  You can change your

## 2021-06-13 DIAGNOSIS — N40.1 BENIGN PROSTATIC HYPERPLASIA WITH LOWER URINARY TRACT SYMPTOMS, SYMPTOM DETAILS UNSPECIFIED: ICD-10-CM

## 2021-06-13 DIAGNOSIS — E03.9 ACQUIRED HYPOTHYROIDISM: ICD-10-CM

## 2021-06-14 RX ORDER — LEVOTHYROXINE SODIUM 0.03 MG/1
25 TABLET ORAL
Qty: 90 TABLET | Refills: 3 | Status: SHIPPED | OUTPATIENT
Start: 2021-06-14 | End: 2021-09-13 | Stop reason: SDUPTHER

## 2021-06-14 RX ORDER — TAMSULOSIN HYDROCHLORIDE 0.4 MG/1
0.4 CAPSULE ORAL DAILY
Qty: 90 CAPSULE | Refills: 3 | Status: SHIPPED | OUTPATIENT
Start: 2021-06-14 | End: 2021-10-14 | Stop reason: SDUPTHER

## 2021-06-30 ENCOUNTER — TELEPHONE (OUTPATIENT)
Dept: ONCOLOGY | Age: 86
End: 2021-06-30

## 2021-06-30 NOTE — TELEPHONE ENCOUNTER
I CALLED AND SPOKE TO PATIENT'S SON, PETER MATHUR, TO REMIND HIM TO HAVE HIS FATHER HAVE HIS LAB WORK DONE PRIOR TO APPT ON 7/9/21,  82 [address] HE IS GOING TO TAKE HIS FATHER EARLY NEXT WEEK TO [facility] TO HAVE IT DONE.

## 2021-07-06 ENCOUNTER — HOSPITAL ENCOUNTER (OUTPATIENT)
Age: 86
Discharge: HOME | End: 2021-07-06
Payer: MEDICARE

## 2021-07-06 DIAGNOSIS — D50.9 IRON DEFICIENCY ANEMIA, UNSPECIFIED IRON DEFICIENCY ANEMIA TYPE: ICD-10-CM

## 2021-07-06 DIAGNOSIS — K90.9 IRON MALABSORPTION: ICD-10-CM

## 2021-07-06 DIAGNOSIS — D50.8 IRON DEFICIENCY ANEMIA SECONDARY TO INADEQUATE DIETARY IRON INTAKE: ICD-10-CM

## 2021-07-06 LAB
ABSOLUTE EOS #: 0.1 K/UL (ref 0–0.4)
ABSOLUTE IMMATURE GRANULOCYTE: ABNORMAL K/UL (ref 0–0.3)
ABSOLUTE LYMPH #: 1.7 K/UL (ref 1–4.8)
ABSOLUTE MONO #: 0.4 K/UL (ref 0.1–1.3)
BASOPHILS # BLD: 1 % (ref 0–2)
BASOPHILS ABSOLUTE: 0 K/UL (ref 0–0.2)
DIFFERENTIAL TYPE: ABNORMAL
EOSINOPHILS RELATIVE PERCENT: 2 % (ref 0–4)
FERRITIN: 1184 UG/L (ref 30–400)
FOLATE: >20 NG/ML
HCT VFR BLD CALC: 32.4 % (ref 41–53)
HEMOGLOBIN: 10.6 G/DL (ref 13.5–17.5)
IMMATURE GRANULOCYTES: ABNORMAL %
IRON SATURATION: 40 % (ref 20–55)
IRON: 78 UG/DL (ref 59–158)
LYMPHOCYTES # BLD: 40 % (ref 24–44)
MCH RBC QN AUTO: 31.6 PG (ref 26–34)
MCHC RBC AUTO-ENTMCNC: 32.8 G/DL (ref 31–37)
MCV RBC AUTO: 96.5 FL (ref 80–100)
MONOCYTES # BLD: 9 % (ref 1–7)
NRBC AUTOMATED: ABNORMAL PER 100 WBC
PDW BLD-RTO: 14.3 % (ref 11.5–14.9)
PLATELET # BLD: 170 K/UL (ref 150–450)
PLATELET ESTIMATE: ABNORMAL
PMV BLD AUTO: 8.4 FL (ref 6–12)
RBC # BLD: 3.35 M/UL (ref 4.5–5.9)
RBC # BLD: ABNORMAL 10*6/UL
SEG NEUTROPHILS: 48 % (ref 36–66)
SEGMENTED NEUTROPHILS ABSOLUTE COUNT: 2 K/UL (ref 1.3–9.1)
TOTAL IRON BINDING CAPACITY: 195 UG/DL (ref 250–450)
UNSATURATED IRON BINDING CAPACITY: 117 UG/DL (ref 112–347)
VITAMIN B-12: 524 PG/ML (ref 232–1245)
WBC # BLD: 4.2 K/UL (ref 3.5–11)
WBC # BLD: ABNORMAL 10*3/UL

## 2021-07-06 PROCEDURE — 82728 ASSAY OF FERRITIN: CPT

## 2021-07-06 PROCEDURE — 82607 VITAMIN B-12: CPT

## 2021-07-06 PROCEDURE — 83550 IRON BINDING TEST: CPT

## 2021-07-06 PROCEDURE — 83540 ASSAY OF IRON: CPT

## 2021-07-06 PROCEDURE — 85025 COMPLETE CBC W/AUTO DIFF WBC: CPT

## 2021-07-06 PROCEDURE — 36415 COLL VENOUS BLD VENIPUNCTURE: CPT

## 2021-07-06 PROCEDURE — 82746 ASSAY OF FOLIC ACID SERUM: CPT

## 2021-07-09 ENCOUNTER — TELEPHONE (OUTPATIENT)
Dept: ONCOLOGY | Age: 86
End: 2021-07-09

## 2021-07-09 ENCOUNTER — OFFICE VISIT (OUTPATIENT)
Dept: ONCOLOGY | Age: 86
End: 2021-07-09
Payer: MEDICARE

## 2021-07-09 VITALS
HEART RATE: 62 BPM | WEIGHT: 167.8 LBS | BODY MASS INDEX: 26.28 KG/M2 | DIASTOLIC BLOOD PRESSURE: 54 MMHG | TEMPERATURE: 98 F | SYSTOLIC BLOOD PRESSURE: 138 MMHG

## 2021-07-09 DIAGNOSIS — K90.9 IRON MALABSORPTION: Primary | ICD-10-CM

## 2021-07-09 DIAGNOSIS — D50.8 IRON DEFICIENCY ANEMIA SECONDARY TO INADEQUATE DIETARY IRON INTAKE: ICD-10-CM

## 2021-07-09 PROCEDURE — 1036F TOBACCO NON-USER: CPT | Performed by: INTERNAL MEDICINE

## 2021-07-09 PROCEDURE — 1123F ACP DISCUSS/DSCN MKR DOCD: CPT | Performed by: INTERNAL MEDICINE

## 2021-07-09 PROCEDURE — G8427 DOCREV CUR MEDS BY ELIG CLIN: HCPCS | Performed by: INTERNAL MEDICINE

## 2021-07-09 PROCEDURE — 4040F PNEUMOC VAC/ADMIN/RCVD: CPT | Performed by: INTERNAL MEDICINE

## 2021-07-09 PROCEDURE — 99214 OFFICE O/P EST MOD 30 MIN: CPT | Performed by: INTERNAL MEDICINE

## 2021-07-09 PROCEDURE — G8417 CALC BMI ABV UP PARAM F/U: HCPCS | Performed by: INTERNAL MEDICINE

## 2021-07-09 RX ORDER — OMEPRAZOLE 20 MG/1
20 CAPSULE, DELAYED RELEASE ORAL DAILY
Qty: 90 CAPSULE | Refills: 0 | Status: SHIPPED | OUTPATIENT
Start: 2021-07-09 | End: 2021-10-14 | Stop reason: ALTCHOICE

## 2021-07-09 RX ORDER — OMEPRAZOLE 20 MG/1
20 CAPSULE, DELAYED RELEASE ORAL DAILY
Qty: 30 CAPSULE | Refills: 5 | Status: SHIPPED | OUTPATIENT
Start: 2021-07-09 | End: 2021-07-09 | Stop reason: SDUPTHER

## 2021-07-09 NOTE — TELEPHONE ENCOUNTER
AVS from 7/9/21     RV one year with labs before RV    *rv is sched for [email or web address]  *pt will have labs 1 week prior to rv  (psa,cbc,cmp,ferritin,vitb12,folate,cbc)        PT was given AVS and an appt schedule

## 2021-07-10 NOTE — PROGRESS NOTES
_           Chief Complaint   Patient presents with    Follow-up    Other     iron/low hgb     DIAGNOSIS:       Normocytic anemia with evidence of iron deficiency. Anemia multifactorial, Anemia of chronic renal insufficiency, iron deficiency and possible bone marrow disease. Gastritis with positive H. pylori status post treatment      CURRENT THERAPY:         Oral iron replacement  Recent treatment for H. Pylori  Status post iron dextran January 2018. IV Iron infusion July 2020. BRIEF CASE HISTORY:      Mr. Rigo Bonds is a very pleasant 80 y.o. male with history of multiple comorbidities as stated below. Patient had history of anemia for the last few months and he was maintained on oral iron. He has some GI symptoms and he is unable to tolerates treatment. Patient denies any active bleeding. No melena or hematochezia. No hematemesis. No history of heartburn or acid reflux. No hematuria. No nausea or vomiting. No weight loss or decreased appetite. No fever or lymphadenopathy. He has increasing weakness and fatigue. No other complaints. INTERIM HISTORY:   The patient is seen for follow-up anemia. He had evidence of iron deficiency. He had oral iron. He has significant side effects related to the oral treatment. He cannot tolerate the pills. He continued to have weakness and fatigue. Recent hemoglobin is worse. No active bleeding. He had GI workup which showed gastritis with no active bleeding. Continues to have heartburn and acid reflux. No melena or hematochezia. No other complaints.       PAST MEDICAL HISTORY: has a past medical history of Acute bronchitis due to infection, Allergic rhinitis due to allergen, Anemia, Aortic stenosis, moderate-severe, Chronic kidney disease, Chronic right-sided low back pain with right-sided sciatica, Diabetes ([address] Diabetic nephropathy associated with type 2 diabetes mellitus ([facility] [address] Diastolic dysfunction without heart failure, Gastroesophageal reflux disease without esophagitis, H. pylori infection, Hearing loss, Helicobacter pylori gastritis, Hyperlipidemia, Hypertension, Hypothyroidism, Iron deficiency anemia, LVH (left ventricular hypertrophy) due to hypertensive disease, Osteoarthritis, Pulmonary hypertension ([facility] [address] PVD (peripheral vascular disease) ([facility] and Type 2 diabetes mellitus with diabetic polyneuropathy, with long-term current use of insulin ([facility] PAST SURGICAL HISTORY: has a past surgical history that includes hernia repair; pr egd transoral biopsy single/multiple (N/A, 8/29/2017); pr colon ca scrn not hi rsk ind (N/A, 8/29/2017); Upper gastrointestinal endoscopy (08/30/2017); Colonoscopy (08/29/2017); Cataract removal with implant (Left, 10/03/2017); pr xcapsl ctrc rmvl insj io lens prosth w/o ecp (Left, 10/3/2017); Cataract removal with implant (Right, 03/01/2018); and pr xcapsl ctrc rmvl insj io lens prosth w/o ecp (Right, 3/1/2018). CURRENT MEDICATIONS:  has a current medication list which includes the following prescription(s): levothyroxine, tamsulosin, albuterol sulfate hfa, atorvastatin, enalapril, amlodipine, metformin, docusate sodium, ipratropium, accu-chek za plus, camphor-menthol-methyl salicylate, lidocaine, accu-chek za plus, accu-chek fastclix lancets, lancets 30g, accu-chek multiclix lancet dev, alcohol swabs, and omeprazole. ALLERGIES:  is allergic to aspirin and sulfa antibiotics. FAMILY HISTORY: Negative for any hematological or oncological conditions. SOCIAL HISTORY:  reports that he quit smoking about 53 years ago. He has never used smokeless tobacco. He reports that he does not drink alcohol and does not use drugs. REVIEW OF SYSTEMS:     · General: He has weakness and fatigue. No unanticipated weight loss or decreased appetite. No fever or chills. · Eyes: No blurred vision, eye pain or double vision. · Ears: No hearing problems or drainage. No tinnitus. · Throat: No sore throat, problems with swallowing or dysphagia. · Respiratory: No cough, sputum or hemoptysis. No shortness of breath. No pleuritic chest pain. · Cardiovascular: No chest pain, orthopnea or PND. No lower extremity edema. No palpitation. · Gastrointestinal: No problems with swallowing. No abdominal pain or bloating. No nausea or vomiting. No diarrhea or constipation. No GI bleeding. · Genitourinary: No dysuria, hematuria, frequency or urgency. · Musculoskeletal: No muscle aches or pains. No limitation of movement. No back pain. No gait disturbance, No joint complaints. · Dermatologic: No skin rashes or pruritus. No skin lesions or discolorations. · Psychiatric: No depression, anxiety, or stress or signs of schizophrenia. No change in mood or affect. · Hematologic: No history of bleeding tendency. No bruises or ecchymosis. No history of clotting problems. · Infectious disease: No fever, chills or frequent infections. · Endocrine: No problems with opacity. No polydipsia or polyuria. No temperature intolerance. · Neurologic: No headaches or dizziness. No weakness or numbness of the extremities. No changes in balance, coordination,  memory, mentation, behavior. · Allergic/Immunologic: No nasal congestion or hives. No repeated infections. PHYSICAL EXAM:  The patient is not in acute distress. Vital signs: Blood pressure (!) 138/54, pulse 62, temperature 98 °F (36.7 °C), temperature source Oral, weight 167 lb 12.8 oz (76.1 kg). HEENT:  Eyes are normal. Ears, nose and throat are normal.  Neck: Supple. No lymph node enlargement. No thyroid enlargement. Trachea is centrally located. Chest:  Clear to auscultation. No wheezes or crepitations. Heart: Regular sinus rhythm. Abdomen: Soft, nontender. No hepatosplenomegaly. No masses. Extremities:  With no edema.   Lymph Nodes:  No cervical, axillary or inguinal lymph node enlargement. Neurologic:  Conscious and oriented. No focal neurological deficits. Psychosocial: No depression, anxiety or stress. Skin: No rashes, bruises or ecchymoses. Review of Diagnostic data:   Lab Results   Component Value Date    WBC 4.2 07/06/2021    HGB 10.6 (L) 07/06/2021    HCT 32.4 (L) 07/06/2021    MCV 96.5 07/06/2021     07/06/2021       Chemistry        Component Value Date/Time     04/22/2021 1327    K 4.6 04/22/2021 1327     04/22/2021 1327    CO2 28 04/22/2021 1327    BUN 17 04/22/2021 1327    CREATININE 0.98 04/22/2021 1327        Component Value Date/Time    CALCIUM 9.5 04/22/2021 1327    ALKPHOS 64 04/22/2021 1327    AST 18 04/22/2021 1327    ALT 15 04/22/2021 1327    BILITOT 0.51 04/22/2021 1327        Lab Results   Component Value Date    IRON 78 07/06/2021    TIBC 195 (L) 07/06/2021    FERRITIN 1,184 (H) 07/06/2021     Vitamin B12 and folate are normal.    IMPRESSION:   Normocytic anemia with evidence of iron deficiency. Intolerable side effects to oral iron. Anemia multifactorial, Anemia of chronic renal insufficiency, iron deficiency and possible bone marrow disease. Gastritis with positive H. pylori status post treatment    PLAN:   Obviously patient did not tolerate PO IRON or VITAMIN C. He was treated with IV iron infusion in the past.  His hemoglobin is stable. iron studies are normal. No need for iron infusion. We will continue to monitor. In addition he has evidence of chronic renal insufficiency. And considering his age likely could be having element of MDS. However the present time he has minimal symptoms and I do not see a need to do bone marrow test.  Continue monitoring would be appropriate. He is on ranitidine and omeprazole to be used for GERD and gastritis symptoms. Recommended follow-up with his gastroenterologist.  We will see him again in 12 months with repeated labs.   Patient's questions were answered to the best of his satisfaction and he verbalized full understanding and agreement.

## 2021-08-05 ENCOUNTER — HOSPITAL ENCOUNTER (OUTPATIENT)
Age: 86
Discharge: HOME | End: 2021-08-05
Payer: MEDICARE

## 2021-08-05 DIAGNOSIS — R35.1 BENIGN PROSTATIC HYPERPLASIA WITH NOCTURIA: ICD-10-CM

## 2021-08-05 DIAGNOSIS — N40.1 BENIGN PROSTATIC HYPERPLASIA WITH NOCTURIA: ICD-10-CM

## 2021-08-05 LAB — PROSTATE SPECIFIC ANTIGEN: 0.44 UG/L

## 2021-08-05 PROCEDURE — 36415 COLL VENOUS BLD VENIPUNCTURE: CPT

## 2021-08-05 PROCEDURE — 84153 ASSAY OF PSA TOTAL: CPT

## 2021-08-12 ENCOUNTER — OFFICE VISIT (OUTPATIENT)
Dept: UROLOGY | Age: 86
End: 2021-08-12
Payer: MEDICARE

## 2021-08-12 VITALS
DIASTOLIC BLOOD PRESSURE: 70 MMHG | WEIGHT: 167 LBS | TEMPERATURE: 97.4 F | SYSTOLIC BLOOD PRESSURE: 138 MMHG | HEIGHT: 67 IN | BODY MASS INDEX: 26.21 KG/M2 | HEART RATE: 80 BPM

## 2021-08-12 DIAGNOSIS — R39.14 BENIGN PROSTATIC HYPERPLASIA WITH INCOMPLETE BLADDER EMPTYING: Primary | ICD-10-CM

## 2021-08-12 DIAGNOSIS — N40.1 BENIGN PROSTATIC HYPERPLASIA WITH INCOMPLETE BLADDER EMPTYING: Primary | ICD-10-CM

## 2021-08-12 DIAGNOSIS — R35.1 NOCTURIA: ICD-10-CM

## 2021-08-12 PROCEDURE — 4040F PNEUMOC VAC/ADMIN/RCVD: CPT | Performed by: UROLOGY

## 2021-08-12 PROCEDURE — 1036F TOBACCO NON-USER: CPT | Performed by: UROLOGY

## 2021-08-12 PROCEDURE — G8427 DOCREV CUR MEDS BY ELIG CLIN: HCPCS | Performed by: UROLOGY

## 2021-08-12 PROCEDURE — 1123F ACP DISCUSS/DSCN MKR DOCD: CPT | Performed by: UROLOGY

## 2021-08-12 PROCEDURE — G8417 CALC BMI ABV UP PARAM F/U: HCPCS | Performed by: UROLOGY

## 2021-08-12 PROCEDURE — 99214 OFFICE O/P EST MOD 30 MIN: CPT | Performed by: UROLOGY

## 2021-08-12 RX ORDER — TAMSULOSIN HYDROCHLORIDE 0.4 MG/1
0.4 CAPSULE ORAL DAILY
Qty: 90 CAPSULE | Refills: 3 | Status: SHIPPED | OUTPATIENT
Start: 2021-08-12

## 2021-08-12 ASSESSMENT — ENCOUNTER SYMPTOMS
COUGH: 0
EYE REDNESS: 0
WHEEZING: 0
VOMITING: 0
NAUSEA: 0
BACK PAIN: 1
ABDOMINAL PAIN: 1
CONSTIPATION: 1
DIARRHEA: 0
SHORTNESS OF BREATH: 0
EYE PAIN: 0

## 2021-08-12 NOTE — PROGRESS NOTES
Review of Systems   Constitutional: Negative for appetite change, chills and fatigue. Eyes: Negative for pain, redness and visual disturbance. Respiratory: Negative for cough, shortness of breath and wheezing. Cardiovascular: Negative for chest pain and leg swelling. Gastrointestinal: Positive for abdominal pain and constipation. Negative for diarrhea, nausea and vomiting. Genitourinary: Positive for difficulty urinating, dysuria, frequency and urgency. Negative for flank pain and hematuria. Musculoskeletal: Positive for back pain. Negative for joint swelling and myalgias. Skin: Negative for rash and wound. Neurological: Negative for dizziness, weakness and numbness. Hematological: Does not bruise/bleed easily. All other systems reviewed and are negative.
08/30/2017    H Pylori     Family History   Problem Relation Age of Onset    High Blood Pressure Mother     Diabetes Mother     High Blood Pressure Father     Thyroid Disease Father     Thyroid Disease Brother      Outpatient Medications Marked as Taking for the 8/12/21 encounter (Office Visit) with Shilpa Oconnell MD   Medication Sig Dispense Refill    tamsulosin (FLOMAX) 0.4 MG capsule Take 1 capsule by mouth daily 90 capsule 3    omeprazole (PRILOSEC) 20 MG delayed release capsule Take 1 capsule by mouth Daily 90 capsule 0    levothyroxine (SYNTHROID) 25 MCG tablet Take 1 tablet by mouth every morning (before breakfast) 90 tablet 3    tamsulosin (FLOMAX) 0.4 MG capsule Take 1 capsule by mouth daily 90 capsule 3    albuterol sulfate HFA (PROVENTIL HFA) 108 (90 Base) MCG/ACT inhaler Inhale 2 puffs into the lungs every 6 hours as needed for Wheezing or Shortness of Breath (cough) 1 Inhaler 0    atorvastatin (LIPITOR) 20 MG tablet Take 1 tablet by mouth nightly 90 tablet 3    enalapril (VASOTEC) 20 MG tablet Take 1 tablet by mouth daily 90 tablet 3    amLODIPine (NORVASC) 5 MG tablet Take 1 tablet by mouth daily 90 tablet 3    metFORMIN (GLUCOPHAGE-XR) 500 MG extended release tablet Take 1 tablet by mouth daily (with breakfast) If not recalled 90 tablet 3    docusate sodium (COLACE) 100 MG capsule Take 1 capsule by mouth 2 times daily For constipation 180 capsule 3    ipratropium (ATROVENT) 0.03 % nasal spray USE 2 SPRAYS INTO EACH NOSTRIL THREE TIMES DAILY 30 mL 5    blood glucose test strips (ACCU-CHEK JOEL PLUS) strip TEST ONCE A DAY, FASTING IN THE MORNING 100 strip 3    camphor-menthol-methyl salicylate (BENGAY ULTRA STRENGTH) 4-10-30 % CREA cream Apply topically 3 times daily as needed for Pain 113 g 0    lidocaine (LIDODERM) 5 % Place 1 patch onto the skin daily 12 hours on, 12 hours off.  30 patch 3    Blood Glucose Monitoring Suppl (ACCU-CHEK JOEL PLUS) w/Device KIT USE AS DIRECTED 1 kit 0

## 2021-09-13 DIAGNOSIS — E03.9 ACQUIRED HYPOTHYROIDISM: ICD-10-CM

## 2021-09-13 RX ORDER — LEVOTHYROXINE SODIUM 0.03 MG/1
25 TABLET ORAL
Qty: 90 TABLET | Refills: 3 | Status: SHIPPED | OUTPATIENT
Start: 2021-09-13 | End: 2022-06-13

## 2021-09-13 NOTE — TELEPHONE ENCOUNTER
Please Approve or Refuse.   Send to Pharmacy per Pt's Request:      Next Visit Date:  10/14/2021   Last Visit Date: 6/2/2021    Hemoglobin A1C (%)   Date Value   04/29/2021 6.4   12/16/2020 6.7   09/15/2020 6.3             ( goal A1C is < 7)   BP Readings from Last 3 Encounters:   08/12/21 138/70   07/09/21 (!) 138/54   06/02/21 118/68          (goal 120/80)  BUN   Date Value Ref Range Status   04/22/2021 17 8 - 23 mg/dL Final     CREATININE   Date Value Ref Range Status   04/22/2021 0.98 0.70 - 1.20 mg/dL Final     Potassium   Date Value Ref Range Status   04/22/2021 4.6 3.7 - 5.3 mmol/L Final

## 2021-10-13 NOTE — PROGRESS NOTES
Visit Information    Have you changed or started any medications since your last visit including any over-the-counter medicines, vitamins, or herbal medicines? yes -    Have you stopped taking any of your medications? Is so, why? -  no  Are you having any side effects from any of your medications? - no    Have you seen any other physician or provider since your last visit? yes -    Have you had any other diagnostic tests since your last visit?  no   Have you been seen in the emergency room and/or had an admission in a hospital since we last saw you?  no   Have you had your routine dental cleaning in the past 6 months?  yes -      Do you have an active MyChart account? If no, what is the barrier?   Yes    Patient Care Team:  Jhon Richardson MD as PCP - General (Family Medicine)  Jhon Richardson MD as PCP - [facility] Provider  Hai Knight (Optometry)  Vijaya Jiang MD as Surgeon (Cardiology)  Jass Mcbride MD as Consulting Physician (Urology)  Katy Curtis MD as Consulting Physician (Gastroenterology)  Marcheta Cushing, MD as Surgeon (Ophthalmology)  Joey Rabago (Certified Nurse Practitioner)  Lacey Carrero MD as Consulting Physician (Oncology)    Medical History Review  Past Medical, Family, and Social History reviewed and does contribute to the patient presenting condition    Health Maintenance   Topic Date Due    Flu vaccine (1) 09/01/2021    Lipid screen  12/04/2021    TSH testing  04/22/2022    Potassium monitoring  04/22/2022    Creatinine monitoring  04/22/2022    Annual Wellness Visit (AWV)  06/03/2022    DTaP/Tdap/Td vaccine (2 - Td or Tdap) 09/25/2030    Shingles Vaccine  Completed    Pneumococcal 65+ years Vaccine  Completed    COVID-19 Vaccine  Completed    Hepatitis A vaccine  Aged Out    Hib vaccine  Aged Out    Meningococcal (ACWY) vaccine  Aged Out

## 2021-10-14 ENCOUNTER — OFFICE VISIT (OUTPATIENT)
Dept: FAMILY MEDICINE CLINIC | Age: 86
End: 2021-10-14
Payer: MEDICARE

## 2021-10-14 VITALS
TEMPERATURE: 97.3 F | WEIGHT: 166 LBS | HEIGHT: 67 IN | SYSTOLIC BLOOD PRESSURE: 128 MMHG | DIASTOLIC BLOOD PRESSURE: 72 MMHG | OXYGEN SATURATION: 97 % | HEART RATE: 76 BPM | BODY MASS INDEX: 26.06 KG/M2

## 2021-10-14 DIAGNOSIS — E78.5 HYPERLIPIDEMIA WITH TARGET LDL LESS THAN 70: ICD-10-CM

## 2021-10-14 DIAGNOSIS — I10 ESSENTIAL HYPERTENSION: ICD-10-CM

## 2021-10-14 DIAGNOSIS — E11.42 TYPE 2 DIABETES MELLITUS WITH DIABETIC POLYNEUROPATHY, WITHOUT LONG-TERM CURRENT USE OF INSULIN (HCC): Primary | ICD-10-CM

## 2021-10-14 DIAGNOSIS — K21.9 GASTROESOPHAGEAL REFLUX DISEASE WITHOUT ESOPHAGITIS: ICD-10-CM

## 2021-10-14 DIAGNOSIS — Z23 NEED FOR INFLUENZA VACCINATION: ICD-10-CM

## 2021-10-14 DIAGNOSIS — H10.13 ALLERGIC CONJUNCTIVITIS OF BOTH EYES: ICD-10-CM

## 2021-10-14 DIAGNOSIS — J20.9 ACUTE BRONCHITIS DUE TO INFECTION: ICD-10-CM

## 2021-10-14 DIAGNOSIS — R05.8 COUGH PRESENT FOR GREATER THAN 3 WEEKS: ICD-10-CM

## 2021-10-14 LAB — HBA1C MFR BLD: 6.2 %

## 2021-10-14 PROCEDURE — 83036 HEMOGLOBIN GLYCOSYLATED A1C: CPT | Performed by: FAMILY MEDICINE

## 2021-10-14 PROCEDURE — 90694 VACC AIIV4 NO PRSRV 0.5ML IM: CPT | Performed by: FAMILY MEDICINE

## 2021-10-14 PROCEDURE — 1123F ACP DISCUSS/DSCN MKR DOCD: CPT | Performed by: FAMILY MEDICINE

## 2021-10-14 PROCEDURE — G0008 ADMIN INFLUENZA VIRUS VAC: HCPCS | Performed by: FAMILY MEDICINE

## 2021-10-14 PROCEDURE — 1036F TOBACCO NON-USER: CPT | Performed by: FAMILY MEDICINE

## 2021-10-14 PROCEDURE — G8427 DOCREV CUR MEDS BY ELIG CLIN: HCPCS | Performed by: FAMILY MEDICINE

## 2021-10-14 PROCEDURE — G8484 FLU IMMUNIZE NO ADMIN: HCPCS | Performed by: FAMILY MEDICINE

## 2021-10-14 PROCEDURE — 99214 OFFICE O/P EST MOD 30 MIN: CPT | Performed by: FAMILY MEDICINE

## 2021-10-14 PROCEDURE — 4040F PNEUMOC VAC/ADMIN/RCVD: CPT | Performed by: FAMILY MEDICINE

## 2021-10-14 PROCEDURE — G8417 CALC BMI ABV UP PARAM F/U: HCPCS | Performed by: FAMILY MEDICINE

## 2021-10-14 RX ORDER — LEVOCETIRIZINE DIHYDROCHLORIDE 5 MG/1
5 TABLET, FILM COATED ORAL NIGHTLY
Qty: 90 TABLET | Refills: 0 | Status: SHIPPED | OUTPATIENT
Start: 2021-10-14 | End: 2022-01-19 | Stop reason: SDUPTHER

## 2021-10-14 RX ORDER — AZITHROMYCIN 250 MG/1
TABLET, FILM COATED ORAL
Qty: 6 TABLET | Refills: 0 | Status: SHIPPED | OUTPATIENT
Start: 2021-10-14 | End: 2021-10-19

## 2021-10-14 RX ORDER — OFLOXACIN 3 MG/ML
1 SOLUTION/ DROPS OPHTHALMIC 4 TIMES DAILY
Qty: 10 ML | Refills: 0 | Status: SHIPPED | OUTPATIENT
Start: 2021-10-14 | End: 2022-02-24 | Stop reason: ALTCHOICE

## 2021-10-14 RX ORDER — POLYVINYL ALCOHOL, POVIDONE 14; 6 MG/ML; MG/ML
2 SOLUTION/ DROPS OPHTHALMIC
Qty: 0.5 ML | Refills: 0 | Status: SHIPPED | OUTPATIENT
Start: 2021-10-14

## 2021-10-14 RX ORDER — FAMOTIDINE 20 MG/1
TABLET, FILM COATED ORAL
Qty: 180 TABLET | Refills: 3 | Status: SHIPPED | OUTPATIENT
Start: 2021-10-14 | End: 2022-10-15 | Stop reason: ALTCHOICE

## 2021-10-14 RX ORDER — FAMOTIDINE 20 MG/1
20 TABLET, FILM COATED ORAL 2 TIMES DAILY
Qty: 60 TABLET | Refills: 3 | Status: SHIPPED | OUTPATIENT
Start: 2021-10-14 | End: 2021-10-14

## 2021-10-14 RX ORDER — LOSARTAN POTASSIUM 50 MG/1
50 TABLET ORAL DAILY
Qty: 90 TABLET | Refills: 3 | Status: SHIPPED | OUTPATIENT
Start: 2021-10-14 | End: 2022-01-11 | Stop reason: SDUPTHER

## 2021-10-14 SDOH — ECONOMIC STABILITY: FOOD INSECURITY: WITHIN THE PAST 12 MONTHS, YOU WORRIED THAT YOUR FOOD WOULD RUN OUT BEFORE YOU GOT MONEY TO BUY MORE.: NEVER TRUE

## 2021-10-14 SDOH — ECONOMIC STABILITY: FOOD INSECURITY: WITHIN THE PAST 12 MONTHS, THE FOOD YOU BOUGHT JUST DIDN'T LAST AND YOU DIDN'T HAVE MONEY TO GET MORE.: NEVER TRUE

## 2021-10-14 ASSESSMENT — PATIENT HEALTH QUESTIONNAIRE - PHQ9
SUM OF ALL RESPONSES TO PHQ QUESTIONS 1-9: 0
SUM OF ALL RESPONSES TO PHQ QUESTIONS 1-9: 0
1. LITTLE INTEREST OR PLEASURE IN DOING THINGS: 0
SUM OF ALL RESPONSES TO PHQ QUESTIONS 1-9: 0
SUM OF ALL RESPONSES TO PHQ9 QUESTIONS 1 & 2: 0
2. FEELING DOWN, DEPRESSED OR HOPELESS: 0

## 2021-10-14 ASSESSMENT — ENCOUNTER SYMPTOMS
CONSTIPATION: 1
NAUSEA: 0
VOMITING: 0
COUGH: 1
SHORTNESS OF BREATH: 0
ABDOMINAL PAIN: 0
WHEEZING: 0
EYE ITCHING: 1
CHEST TIGHTNESS: 0
ABDOMINAL DISTENTION: 0
DIARRHEA: 0
EYE DISCHARGE: 1

## 2021-10-14 ASSESSMENT — SOCIAL DETERMINANTS OF HEALTH (SDOH): HOW HARD IS IT FOR YOU TO PAY FOR THE VERY BASICS LIKE FOOD, HOUSING, MEDICAL CARE, AND HEATING?: NOT HARD AT ALL

## 2021-10-14 NOTE — RESULT ENCOUNTER NOTE
Addressed during office visit today, A1c 6.2, improved diabetes, very well controlled, continue treatment recommended during the office visit.

## 2021-10-14 NOTE — PROGRESS NOTES
Diamond Marie (:  1924) is a 80 y.o. male,Established patient, here for evaluation of the following chief complaint(s): Diabetes, Hypertension, Hyperlipidemia, Cough (REQUENTLY HAS COUGH), and Immunizations (YES HAD COVID-19/ )      ASSESSMENT/PLAN:    1. Type 2 diabetes mellitus with diabetic polyneuropathy, without long-term current use of insulin (HCC)  improving  -     POCT glycosylated hemoglobin (Hb A1C)  6.2, very well controlled improving diabetes  Lab Results   Component Value Date    LABA1C 6.2 10/14/2021    LABA1C 6.4 2021    LABA1C 6.7 2020       -     CBC; Future  -     Comprehensive Metabolic Panel; Future  -     TSH without Reflex; Future  -advised home blood glucose testing  daily  -daily feet exam, Foot care: advised to wash feet daily, pat dry and apply lotion at night, avoiding between toes. Need to look at feet daily and report to a physician any signs of inflammation or skin damage  -annual dilated eye exam  -Low carb, low fat diet, increase fruits and vegetables, and exercise 4-5 times a day 30-40 minutes a day discussed  -continue current treatment Metformin  mg  -continue Aspirin  -We will change ACE inhibitor to ARB due to cough,   and statin/Lipitor 20 mg      2. Essential hypertension   Well controlled. Will recheck labs. - start    losartan (COZAAR) 50 MG tablet; Take 1 tablet by mouth daily ** stop enalapril due to persistent cough**, Disp-90 tablet, R-3Normal  -     CBC; Future  -     Comprehensive Metabolic Panel; Future  Discussed low salt diet and BP and pulse monitoring. 3. Hyperlipidemia with target LDL less than 70  Almost well controlled  Lab Results   Component Value Date    LDLCHOLESTEROL 79 2020   continue lipitor 20 mg    4. Acute bronchitis due to infection  Failing to change as expected.    -start   azithromycin (ZITHROMAX) 250 MG tablet; 500 mg orally on day one followed by 250 mg daily on days two through five, Disp-6 tablet, R-0Normal  5. Allergic conjunctivitis of both eyes  worsening    - start    levocetirizine (XYZAL) 5 MG tablet; Take 1 tablet by mouth nightly For allergies, Disp-90 tablet, R-0Normal  -  start   ofloxacin (OCUFLOX) 0.3 % solution; Place 1 drop into both eyes 4 times daily, Disp-10 mL, R-0Normal 2 3 superinfection for bacterial conjunctivitis  - start    Polyvinyl Alcohol-Povidone PF (REFRESH) 1.4-0.6 % SOLN; Apply 2 drops to eye every 3-4 hours as needed (For dry eyes) Okay to substitute. For itchy eyes, Disp-0.5 mL, R-0Normal  -Advised to use baby soap [employer] [employer] no tears, and warm water, to wash his eyes every day  6. Cough present for greater than 3 weeks  Failing to change as expected. Changed ACE inhibitor to ARB  We will start him on Xyzal, will treat the bronchitis, and will do chest x-ray to rule out lung or heart disorder  -     azithromycin (ZITHROMAX) 250 MG tablet; 500 mg orally on day one followed by 250 mg daily on days two through five, Disp-6 tablet, R-0Normal  -     XR CHEST (2 VW); Future  7. Gastroesophageal reflux disease without esophagitis  Improved with medications  We will start him on Pepcid 20 mg twice a day to help with his allergies, and will stop omeprazole  8. Need for influenza vaccination  -     INFLUENZA, QUADV, ADJUVANTED, 65 YRS =, IM, PF, PREFILL SYR, 0.5ML (FLUAD)      Corbin received counseling on the following healthy behaviors: nutrition, exercise, medication adherence and falls precautions    Reviewed prior labs and health maintenance  Discussed use, benefit, and side effects of prescribed medications. Barriers to medication compliance addressed. Patient given educational materials - see patient instructions  Was a self-tracking handout given in paper form or via [employer]? Yes  All patient questions answered. Patient voiced understanding.   The patient's past medical,surgical, social, and family history as well as his current medications and allergies were reviewed as documented in today's encounter. Medications, labs, diagnostic studies, consultations and follow-up as documented in this encounter. Return in about 3 months (around 1/14/2022) for 1st language not English, **POC A1C, DM2, HTN, HLP, LABS F/U. Data Unavailable    Future Appointments   Date Time Provider [place]   11/4/2021  4:00 PM Isaac Milligan, DPM [address]   2/24/2022  3:00 [facility]   6/2/2022  3:30 PM Yue Nogueira MD [facility]   7/15/2022  1:00 PM French Ma MD [facility]         SUBJECTIVE/OBJECTIVE:    Comes with his son,  Emmie Alaniz's  first language is Pashto, he understands basic English and he can express  self in very basic English. he declines video          Diabetes Mellitus Type II, Follow-up:    Current symptoms/problems include hyperglycemia, paresthesia of the feet and visual disturbances. Symptoms have been present for several years. 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 no    Is He on ACE inhibitor or angiotensin II receptor blocker? Yes     reports that he quit smoking about 54 years ago. He has never used smokeless tobacco.     Counseling given: Yes      Daily Aspirin?  No: Due to anemia, and risk outweigh the benefits at his age      A1c is improving, very well controlled, 6.2  Lab Results   Component Value Date    LABA1C 6.2 10/14/2021    LABA1C 6.4 04/29/2021    LABA1C 6.7 12/16/2020       Urine microabumin is Elevated. Lab Results   Component Value Date    LABMICR 106 (H) 11/15/2017      Weight has been stable    Wt Readings from Last 3 Encounters:   10/14/21 166 lb (75.3 kg)   08/12/21 167 lb (75.8 kg)   07/09/21 167 lb 12.8 oz (76.1 kg)         Hypertension:   Aimee Moore  is not  Exercising, and is adherent to low salt diet. Blood pressure is well controlled at home. Cardiac symptoms  fatigue. Patient denies  chest pain, chest pressure/discomfort, claudication, dyspnea, exertional chest pressure/discomfort, irregular heart beat, lower extremity edema, near-syncope, orthopnea, palpitations, paroxysmal nocturnal dyspnea, syncope and tachypnea. Use of agents associated with hypertension: none. History of target organ damage: left ventricular hypertrophy. Patient has known moderate aortic stenosis, high pulmonary pressure RVSP 51 mmHg, EF 55%, on 1/17/2017. He was evaluated by cardiologist, due to his advanced age, no intervention was recommended. No additional work-up was recommended at that time, and he declines any further evaluation regarding this    BP controlled. Aimee Moore reports compliance with BP medications, and tolerates them well, denies side effects. BP Readings from Last 3 Encounters:   10/14/21 128/72   08/12/21 138/70   07/09/21 (!) 138/54        Pulse is Normal.    Pulse Readings from Last 3 Encounters:   10/14/21 76   08/12/21 80   07/09/21 62         Hyperlipidemia:  No new myalgias or GI upset on atorvastatin (Lipitor). Medication compliance: compliant all of the time. Patient is  following a low fat, low cholesterol diet. LDL is borderline high.     Lab Results   Component Value Date    LDLCHOLESTEROL 78 12/04/2020       His son Aimee Moore is very concerned that he continues to cough and he is always requesting Tussin, has been using 1 bottle every 3 months. Patient says he gets \" itchy throat\" a lot and then she starts to cough  Patient says also that he has been coughing a lot for the past 2 months and drinking a lot of\" yellow phlegm\", not getting better. He feels more tired. He denies fever, chills or night sweats. He denies chest pain or shortness of breath. Patient also complains of \"itchy eyes\",  \"watery eyes\", congestion, postnasal drip, runny nose a lot. Has been using nasal spray, Atrovent but does not help. Especially when eating he starts having a lot of runny nose. He says GERD has been much better with omeprazole. He denies stomach pain, nausea or vomiting his appetite is fair, same as before  Occasionally he gets constipation. If not taking omeprazole, he gets heartburn. [x]Negative depression screening. PHQ Scores 10/14/2021 2021 2021 2020 2020 2019 2018   PHQ2 Score 0 0 0 0 0 0 0   PHQ9 Score 0 0 0 0 0 0 0                Social History     Tobacco Use    Smoking status: Former Smoker     Quit date: 10/27/1967     Years since quittin.0    Smokeless tobacco: Never Used   Vaping Use    Vaping Use: Never used   Substance Use Topics    Alcohol use: No    Drug use: No         Review of Systems   Constitutional: Positive for fatigue. Negative for activity change, appetite change, chills, diaphoresis, fever and unexpected weight change. HENT: Positive for congestion, postnasal drip and rhinorrhea. Negative for nosebleeds, sinus pressure and sinus pain. Itching   Eyes: Positive for discharge, itching and visual disturbance. Respiratory: Positive for cough. Negative for chest tightness, shortness of breath and wheezing. Cardiovascular: Negative for chest pain, palpitations and leg swelling. Gastrointestinal: Positive for constipation.  Negative for abdominal distention, abdominal pain, blood in stool, diarrhea, nausea and vomiting. Endocrine: Negative for cold intolerance, heat intolerance, polydipsia, polyphagia and polyuria. Musculoskeletal: Positive for arthralgias and myalgias. Allergic/Immunologic: Positive for environmental allergies. Neurological: Positive for numbness (feet). Hematological: Does not bruise/bleed easily. Psychiatric/Behavioral: Negative for dysphoric mood. -vital signs stable and within normal limits except mildly overweight per BMI  /72   Pulse 76   Temp 97.3 °F (36.3 °C)   Ht 5' 7\" (1.702 m)   Wt 166 lb (75.3 kg)   SpO2 97%   BMI 26.00 kg/m²         Physical Exam  Vitals and nursing note reviewed. Constitutional:       General: He is not in acute distress. Appearance: Normal appearance. He is well-developed and normal weight. He is not diaphoretic. HENT:      Head: Normocephalic. Right Ear: External ear normal.      Left Ear: External ear normal.      Ears:      Comments: Wears hearing aids     Mouth/Throat:      Comments: I did not examine the mouth due to coronavirus pandemic and wearing masks    Eyes:      General: Lids are normal. No scleral icterus. Right eye: Discharge present. Left eye: Discharge present. Extraocular Movements: Extraocular movements intact. Conjunctiva/sclera:      Right eye: Right conjunctiva is injected. Left eye: Left conjunctiva is injected. Neck:      Thyroid: No thyromegaly. Cardiovascular:      Rate and Rhythm: Normal rate and regular rhythm. Heart sounds: Murmur heard. Crescendo systolic murmur is present with a grade of 4/6. Pulmonary:      Effort: Pulmonary effort is normal. No respiratory distress. Breath sounds: Normal breath sounds. No wheezing or rales. Chest:      Chest wall: No tenderness. Abdominal:      General: Bowel sounds are normal. There is distension. Palpations: Abdomen is soft. There is no hepatomegaly or splenomegaly. Tenderness:  There is no abdominal tenderness. Comments: Bloated, but no abdominal tenderness   Musculoskeletal:      Cervical back: Normal range of motion and neck supple. Right lower leg: No edema. Left lower leg: No edema. Comments: Up and go test more than 12 seconds. High risk for falls. He declines physical therapy. Lymphadenopathy:      Cervical: No cervical adenopathy. Skin:     General: Skin is warm and dry. Capillary Refill: Capillary refill takes less than 2 seconds. Coloration: Skin is pale (mildly pale). Neurological:      Mental Status: He is alert and oriented to person, place, and time. Deep Tendon Reflexes: Reflexes are normal and symmetric. Psychiatric:         Mood and Affect: Mood normal.         Behavior: Behavior normal.         Thought Content: Thought content normal.         Judgment: Judgment normal.           I personally reviewed testing with patient.   Hyperglycemia controlled  Hyperlipidemia  Anemia stable    Otherwise labs within normal limits        Lab Results   Component Value Date    WBC 4.2 07/06/2021    HGB 10.6 (L) 07/06/2021    HCT 32.4 (L) 07/06/2021    MCV 96.5 07/06/2021     07/06/2021       Lab Results   Component Value Date     04/22/2021    K 4.6 04/22/2021     04/22/2021    CO2 28 04/22/2021    BUN 17 04/22/2021    CREATININE 0.98 04/22/2021    GLUCOSE 167 04/22/2021    CALCIUM 9.5 04/22/2021        Lab Results   Component Value Date    ALT 15 04/22/2021    AST 18 04/22/2021    ALKPHOS 64 04/22/2021    BILITOT 0.51 04/22/2021       Lab Results   Component Value Date    TSH 4.21 04/22/2021       Lab Results   Component Value Date    CHOL 150 12/04/2020    CHOL 134 11/08/2019    CHOL 123 10/25/2018     Lab Results   Component Value Date    TRIG 100 12/04/2020    TRIG 78 11/08/2019    TRIG 59 10/25/2018     Lab Results   Component Value Date    HDL 51 12/04/2020    HDL 49 11/08/2019    HDL 51 10/25/2018     Lab Results   Component Value Date LDLCHOLESTEROL 79 2020    LDLCHOLESTEROL 69 2019    LDLCHOLESTEROL 60 10/25/2018     Lab Results   Component Value Date    CHOLHDLRATIO 2.9 2020    CHOLHDLRATIO 2.7 2019    CHOLHDLRATIO 2.4 10/25/2018         Lab Results   Component Value Date    NTXPEOHZ81 524 2021     Lab Results   Component Value Date    FOLATE >20.0 2021     Lab Results   Component Value Date    VITD25 34.9 2021         Orders Placed This Encounter   Medications    azithromycin (ZITHROMAX) 250 MG tablet     Si mg orally on day one followed by 250 mg daily on days two through five     Dispense:  6 tablet     Refill:  0    levocetirizine (XYZAL) 5 MG tablet     Sig: Take 1 tablet by mouth nightly For allergies     Dispense:  90 tablet     Refill:  0    ofloxacin (OCUFLOX) 0.3 % solution     Sig: Place 1 drop into both eyes 4 times daily     Dispense:  10 mL     Refill:  0    Polyvinyl Alcohol-Povidone PF (REFRESH) 1.4-0.6 % SOLN     Sig: Apply 2 drops to eye every 3-4 hours as needed (For dry eyes) Okay to substitute.  For itchy eyes     Dispense:  0.5 mL     Refill:  0    losartan (COZAAR) 50 MG tablet     Sig: Take 1 tablet by mouth daily ** stop enalapril**     Dispense:  90 tablet     Refill:  3    DISCONTD: famotidine (PEPCID) 20 MG tablet     Sig: Take 1 tablet by mouth 2 times daily Stop Omeprazole     Dispense:  60 tablet     Refill:  3       Orders Placed This Encounter   Procedures    XR CHEST (2 VW)     Standing Status:   Future     Standing Expiration Date:   10/14/2022     Order Specific Question:   Reason for exam:     Answer:   Cough    INFLUENZA, QUADV, ADJUVANTED, 72 YRS =, IM, PF, PREFILL SYR, 0.5ML (FLUAD)    CBC     Standing Status:   Future     Standing Expiration Date:   10/14/2022    Comprehensive Metabolic Panel     Standing Status:   Future     Standing Expiration Date:   2021    TSH without Reflex     Standing Status:   Future     Standing Expiration Date: 10/14/2022    POCT glycosylated hemoglobin (Hb A1C)       Medications Discontinued During This Encounter   Medication Reason    enalapril (VASOTEC) 20 MG tablet Alternate therapy    omeprazole (PRILOSEC) 20 MG delayed release capsule Alternate therapy    tamsulosin (FLOMAX) 0.4 MG capsule DUPLICATE         On this date 10/14/2021 I have spent 39 minutes reviewing previous notes, test results and face to face with the patient discussing the diagnosis and importance of compliance with the treatment plan as well as documenting on the day of the visit, and care coordination with his son Elias. This note was completed by using the assistance of a speech-recognition program. However, inadvertent computerized transcription errors may be present. Although every effort was made to ensure accuracy, no guarantees can be provided that every mistake has been identified and corrected by editing . An electronic signature was used to authenticate this note.   Electronically signed by Michael Alaniz MD on 10/17/2021 at 8:22 PM

## 2021-10-17 PROBLEM — R05.8 COUGH PRESENT FOR GREATER THAN 3 WEEKS: Status: ACTIVE | Noted: 2021-10-17

## 2021-10-17 ASSESSMENT — ENCOUNTER SYMPTOMS
SINUS PRESSURE: 0
RHINORRHEA: 1
SINUS PAIN: 0
BLOOD IN STOOL: 0

## 2021-10-18 DIAGNOSIS — E78.5 HYPERLIPIDEMIA WITH TARGET LDL LESS THAN 70: ICD-10-CM

## 2021-10-19 ENCOUNTER — HOSPITAL ENCOUNTER (OUTPATIENT)
Age: 86
Discharge: HOME | End: 2021-10-19
Payer: MEDICARE

## 2021-10-19 ENCOUNTER — HOSPITAL ENCOUNTER (OUTPATIENT)
Dept: GENERAL RADIOLOGY | Age: 86
Discharge: HOME | End: 2021-10-21
Payer: MEDICARE

## 2021-10-19 ENCOUNTER — HOSPITAL ENCOUNTER (OUTPATIENT)
Age: 86
Discharge: HOME | End: 2021-10-21
Payer: MEDICARE

## 2021-10-19 DIAGNOSIS — E11.42 TYPE 2 DIABETES MELLITUS WITH DIABETIC POLYNEUROPATHY, WITHOUT LONG-TERM CURRENT USE OF INSULIN (HCC): ICD-10-CM

## 2021-10-19 DIAGNOSIS — I10 ESSENTIAL HYPERTENSION: ICD-10-CM

## 2021-10-19 DIAGNOSIS — R05.8 COUGH PRESENT FOR GREATER THAN 3 WEEKS: ICD-10-CM

## 2021-10-19 LAB
ALBUMIN SERPL-MCNC: 4.1 G/DL (ref 3.5–5.2)
ALBUMIN/GLOBULIN RATIO: ABNORMAL (ref 1–2.5)
ALP BLD-CCNC: 73 U/L (ref 40–129)
ALT SERPL-CCNC: 15 U/L (ref 5–41)
ANION GAP SERPL CALCULATED.3IONS-SCNC: 9 MMOL/L (ref 9–17)
AST SERPL-CCNC: 19 U/L
BILIRUB SERPL-MCNC: 0.39 MG/DL (ref 0.3–1.2)
BUN BLDV-MCNC: 21 MG/DL (ref 8–23)
BUN/CREAT BLD: ABNORMAL (ref 9–20)
CALCIUM SERPL-MCNC: 9.6 MG/DL (ref 8.6–10.4)
CHLORIDE BLD-SCNC: 103 MMOL/L (ref 98–107)
CO2: 27 MMOL/L (ref 20–31)
CREAT SERPL-MCNC: 0.97 MG/DL (ref 0.7–1.2)
GFR AFRICAN AMERICAN: >60 ML/MIN
GFR NON-AFRICAN AMERICAN: >60 ML/MIN
GFR SERPL CREATININE-BSD FRML MDRD: ABNORMAL ML/MIN/{1.73_M2}
GFR SERPL CREATININE-BSD FRML MDRD: ABNORMAL ML/MIN/{1.73_M2}
GLUCOSE BLD-MCNC: 160 MG/DL (ref 70–99)
HCT VFR BLD CALC: 33.4 % (ref 41–53)
HEMOGLOBIN: 10.8 G/DL (ref 13.5–17.5)
MCH RBC QN AUTO: 31.7 PG (ref 26–34)
MCHC RBC AUTO-ENTMCNC: 32.5 G/DL (ref 31–37)
MCV RBC AUTO: 97.6 FL (ref 80–100)
NRBC AUTOMATED: ABNORMAL PER 100 WBC
PDW BLD-RTO: 15 % (ref 11.5–14.9)
PLATELET # BLD: 179 K/UL (ref 150–450)
PMV BLD AUTO: 8.7 FL (ref 6–12)
POTASSIUM SERPL-SCNC: 4.4 MMOL/L (ref 3.7–5.3)
RBC # BLD: 3.42 M/UL (ref 4.5–5.9)
SODIUM BLD-SCNC: 139 MMOL/L (ref 135–144)
TOTAL PROTEIN: 7.4 G/DL (ref 6.4–8.3)
TSH SERPL DL<=0.05 MIU/L-ACNC: 4.14 MIU/L (ref 0.3–5)
WBC # BLD: 4.5 K/UL (ref 3.5–11)

## 2021-10-19 PROCEDURE — 85027 COMPLETE CBC AUTOMATED: CPT

## 2021-10-19 PROCEDURE — 71046 X-RAY EXAM CHEST 2 VIEWS: CPT

## 2021-10-19 PROCEDURE — 84443 ASSAY THYROID STIM HORMONE: CPT

## 2021-10-19 PROCEDURE — 36415 COLL VENOUS BLD VENIPUNCTURE: CPT

## 2021-10-19 PROCEDURE — 80053 COMPREHEN METABOLIC PANEL: CPT

## 2021-10-19 RX ORDER — ATORVASTATIN CALCIUM 20 MG/1
TABLET, FILM COATED ORAL
Qty: 90 TABLET | Refills: 3 | Status: SHIPPED | OUTPATIENT
Start: 2021-10-19 | End: 2022-01-19 | Stop reason: SDUPTHER

## 2021-10-19 NOTE — TELEPHONE ENCOUNTER
Please Approve or Refuse.   Send to Pharmacy per Pt's Request:    Next Visit Date:  10/18/2021   Last Visit Date: Visit date not found    Hemoglobin A1C (%)   Date Value   10/14/2021 6.2   04/29/2021 6.4   12/16/2020 6.7             ( goal A1C is < 7)   BP Readings from Last 3 Encounters:   10/14/21 128/72   08/12/21 138/70   07/09/21 (!) 138/54          (goal 120/80)  BUN   Date Value Ref Range Status   04/22/2021 17 8 - 23 mg/dL Final     CREATININE   Date Value Ref Range Status   04/22/2021 0.98 0.70 - 1.20 mg/dL Final     Potassium   Date Value Ref Range Status   04/22/2021 4.6 3.7 - 5.3 mmol/L Final

## 2021-10-20 NOTE — RESULT ENCOUNTER NOTE
Noted  Future Appointments  11/4/2021  4:00 PM    [facility]rett [facility]  2/24/2022  3:00 PM    [facility] French [facility]  6/2/2022   3:30 PM    [facility] French [facility]     [facility]  7/15/2022  1:00 PM    Harjeet Huggins MD     [place] [id number]

## 2021-11-04 ENCOUNTER — OFFICE VISIT (OUTPATIENT)
Dept: PODIATRY | Age: 86
End: 2021-11-04
Payer: MEDICARE

## 2021-11-04 VITALS — WEIGHT: 166 LBS | HEIGHT: 67 IN | BODY MASS INDEX: 26.06 KG/M2

## 2021-11-04 DIAGNOSIS — M79.675 PAIN IN TOES OF BOTH FEET: ICD-10-CM

## 2021-11-04 DIAGNOSIS — I73.9 PERIPHERAL VASCULAR DISORDER (HCC): ICD-10-CM

## 2021-11-04 DIAGNOSIS — E11.42 TYPE 2 DIABETES MELLITUS WITH DIABETIC POLYNEUROPATHY, WITHOUT LONG-TERM CURRENT USE OF INSULIN (HCC): ICD-10-CM

## 2021-11-04 DIAGNOSIS — B35.1 ONYCHOMYCOSIS: Primary | ICD-10-CM

## 2021-11-04 DIAGNOSIS — M79.674 PAIN IN TOES OF BOTH FEET: ICD-10-CM

## 2021-11-04 PROCEDURE — 1036F TOBACCO NON-USER: CPT | Performed by: PODIATRIST

## 2021-11-04 PROCEDURE — 4040F PNEUMOC VAC/ADMIN/RCVD: CPT | Performed by: PODIATRIST

## 2021-11-04 PROCEDURE — G8484 FLU IMMUNIZE NO ADMIN: HCPCS | Performed by: PODIATRIST

## 2021-11-04 PROCEDURE — 11721 DEBRIDE NAIL 6 OR MORE: CPT | Performed by: PODIATRIST

## 2021-11-04 PROCEDURE — G8417 CALC BMI ABV UP PARAM F/U: HCPCS | Performed by: PODIATRIST

## 2021-11-04 PROCEDURE — 99213 OFFICE O/P EST LOW 20 MIN: CPT | Performed by: PODIATRIST

## 2021-11-04 PROCEDURE — G8427 DOCREV CUR MEDS BY ELIG CLIN: HCPCS | Performed by: PODIATRIST

## 2021-11-04 PROCEDURE — 1123F ACP DISCUSS/DSCN MKR DOCD: CPT | Performed by: PODIATRIST

## 2021-11-04 ASSESSMENT — ENCOUNTER SYMPTOMS
DIARRHEA: 0
NAUSEA: 0
CONSTIPATION: 0
COLOR CHANGE: 0
VOMITING: 0

## 2021-11-04 NOTE — PROGRESS NOTES
[facility]  Return Patient    Chief Complaint   Patient presents with    Nail Problem     b/l nail trim, last seen Governor Joy ZUÑIGA 6/2/21    Diabetes       Subjective: Theoplis Homans comes to clinic for Nail Problem (b/l nail trim, last seen Governor Joy ZUÑIGA 6/2/21) and Diabetes    he is a diabetic and states that he has been doing well. I saw him over a year ago. Pt currently has complaint of thickened, elongated nails that they cannot manage by themselves. Pt's primary care physician is Governor Joy MD   Pt's last blood sugar was 194. Lab Results   Component Value Date    LABA1C 6.2 10/14/2021      Complains of numbness in the feet bilat.   Past Medical History:   Diagnosis Date    Acute bronchitis due to infection 9/1/2017    Allergic rhinitis due to allergen 7/22/2018    Anemia 1/6/2017    Aortic stenosis, moderate-severe 2/16/2017    Chronic kidney disease     Chronic right-sided low back pain with right-sided sciatica 3/4/2020    Diabetes ([address]     Diabetic nephropathy associated with type 2 diabetes mellitus ([address] 6/44/5114    Diastolic dysfunction without heart failure 2/16/2017    Gastroesophageal reflux disease without esophagitis 12/10/2018    H. pylori infection     Hearing loss     Helicobacter pylori gastritis 8/31/2017    Hyperlipidemia     Hypertension     Hypothyroidism     Iron deficiency anemia 2/20/2017    LVH (left ventricular hypertrophy) due to hypertensive disease 2/16/2017    Osteoarthritis     Pulmonary hypertension ([address] 9/19/2017    RVSP 51 mmHg on 9/13/17       PVD (peripheral vascular disease) ([facility])     Type 2 diabetes mellitus with diabetic polyneuropathy, with long-term current use of insulin ([facility]) 10/27/2016       Allergies   Allergen Reactions    Aspirin      Anemia, hematuria      Sulfa Antibiotics Rash     Current Outpatient Medications on File Prior to Visit   Medication Sig Dispense Refill    atorvastatin (LIPITOR) Lancets MISC USE TO TEST BLOOD GLUCOSE ONCE A  each 3    Lancets 30G MISC Testing once a day. Please dispense according to patients insurance:  accu check Bailee plus 50 each 3    Lancets Misc. (ACCU-CHEK MULTICLIX LANCET DEV) KIT Please dispense according to patients insurance. 1 kit 0    Alcohol Swabs PADS Please dispense according to patients insurance/device. Testing 1-2 /day 100 each 2     No current facility-administered medications on file prior to visit. Review of Systems   Constitutional: Negative for chills, diaphoresis, fatigue, fever and unexpected weight change. Cardiovascular: Negative for leg swelling. Gastrointestinal: Negative for constipation, diarrhea, nausea and vomiting. Musculoskeletal: Negative for arthralgias, gait problem and joint swelling. Skin: Negative for color change, pallor, rash and wound. Neurological: Negative for weakness and numbness. Objective:  General: AAO x 3 in NAD.     Derm  Toenail Description  Sites of Onychomycosis Involvement (Check affected area)  [x] [x] [x] [x] [x] [x] [x] [x] [x] [x]  5 4 3 2 1 1 2 3 4 5                          Right                                        Left    Thickness  [x] [x] [x] [x] [x] [x] [x] [x] [x] [x]  5 4 3 2 1 1 2 3 4 5                         Right                                        Left    Dystrophic Changes   [x] [x] [x] [x] [x] [x] [x] [x] [x] [x]  5 4 3 2 1 1 2 3 4 5                         Right                                        Left    Color   [x] [x] [x] [x] [x] [x] [x] [x] [x] [x]  5 4 3 2 1 1 2 3 4 5                          Right                                        Left    Incurvation/Ingrowin   [] [] [] [] [] [] [] [] [] []  5 4 3 2 1 1 2 3 4 5                         Right                                        Left    Inflammation/Pain   [] [] [] [] [] [] [] [] [] []  5 4 3 2 1 1 2 3 4 5                         Right                                        Left       Skin

## 2021-11-08 DIAGNOSIS — I10 ESSENTIAL HYPERTENSION: ICD-10-CM

## 2021-11-09 RX ORDER — AMLODIPINE BESYLATE 5 MG/1
5 TABLET ORAL DAILY
Qty: 90 TABLET | Refills: 3 | Status: SHIPPED | OUTPATIENT
Start: 2021-11-09 | End: 2022-02-08 | Stop reason: SDUPTHER

## 2021-11-09 NOTE — TELEPHONE ENCOUNTER
Please Approve or Refuse.   Send to Pharmacy per Pt's Request:     Next Visit Date:  2/24/2022   Last Visit Date: 10/14/2021    Hemoglobin A1C (%)   Date Value   10/14/2021 6.2   04/29/2021 6.4   12/16/2020 6.7             ( goal A1C is < 7)   BP Readings from Last 3 Encounters:   10/14/21 128/72   08/12/21 138/70   07/09/21 (!) 138/54          (goal 120/80)  BUN   Date Value Ref Range Status   10/19/2021 21 8 - 23 mg/dL Final     CREATININE   Date Value Ref Range Status   10/19/2021 0.97 0.70 - 1.20 mg/dL Final     Potassium   Date Value Ref Range Status   10/19/2021 4.4 3.7 - 5.3 mmol/L Final

## 2022-01-04 DIAGNOSIS — E11.42 TYPE 2 DIABETES MELLITUS WITH DIABETIC POLYNEUROPATHY, WITHOUT LONG-TERM CURRENT USE OF INSULIN (HCC): ICD-10-CM

## 2022-01-04 RX ORDER — METFORMIN HYDROCHLORIDE 500 MG/1
TABLET, EXTENDED RELEASE ORAL
Qty: 90 TABLET | Refills: 3 | Status: SHIPPED | OUTPATIENT
Start: 2022-01-04

## 2022-01-11 DIAGNOSIS — I10 ESSENTIAL HYPERTENSION: ICD-10-CM

## 2022-01-12 RX ORDER — LOSARTAN POTASSIUM 50 MG/1
50 TABLET ORAL DAILY
Qty: 90 TABLET | Refills: 3 | Status: SHIPPED | OUTPATIENT
Start: 2022-01-12

## 2022-01-19 ENCOUNTER — TELEPHONE (OUTPATIENT)
Dept: FAMILY MEDICINE CLINIC | Age: 87
End: 2022-01-19

## 2022-01-19 DIAGNOSIS — H10.13 ALLERGIC CONJUNCTIVITIS OF BOTH EYES: ICD-10-CM

## 2022-01-19 DIAGNOSIS — E78.5 HYPERLIPIDEMIA WITH TARGET LDL LESS THAN 70: ICD-10-CM

## 2022-01-19 DIAGNOSIS — Z23 ENCOUNTER FOR IMMUNIZATION: Primary | ICD-10-CM

## 2022-01-19 RX ORDER — BNT162B2 0.23 MG/2.25ML
30 INJECTION, SUSPENSION INTRAMUSCULAR ONCE
Qty: 0.3 ML | Refills: 0 | Status: SHIPPED | OUTPATIENT
Start: 2022-01-19 | End: 2022-01-19

## 2022-01-19 NOTE — TELEPHONE ENCOUNTER
Son requested booster RX     Diagnosis Orders   1.  Encounter for immunization  COVID-19 mRNA Vac-Anna Marie,Pfizer, (PFIZER-BIONT COVID-19 VAC-ANNA MARIE) 30 MCG/0.3ML SUSP        Future Appointments   Date Time Provider Toy Watts   2/24/2022  3:00 PM [facility]   5/5/2022  4:00 PM Ruben Alexandre [address] [place] [address]   6/2/2022  3:30 PM [facility]   7/15/2022  1:00 PM Eren Vega MD [address] [place] [address]

## 2022-01-20 RX ORDER — ATORVASTATIN CALCIUM 20 MG/1
20 TABLET, FILM COATED ORAL
Qty: 90 TABLET | Refills: 3 | Status: SHIPPED | OUTPATIENT
Start: 2022-01-20

## 2022-01-20 RX ORDER — LEVOCETIRIZINE DIHYDROCHLORIDE 5 MG/1
5 TABLET, FILM COATED ORAL NIGHTLY
Qty: 90 TABLET | Refills: 3 | Status: SHIPPED | OUTPATIENT
Start: 2022-01-20

## 2022-02-08 DIAGNOSIS — I10 ESSENTIAL HYPERTENSION: ICD-10-CM

## 2022-02-09 RX ORDER — AMLODIPINE BESYLATE 5 MG/1
5 TABLET ORAL DAILY
Qty: 90 TABLET | Refills: 3 | Status: SHIPPED | OUTPATIENT
Start: 2022-02-09

## 2022-02-24 ENCOUNTER — OFFICE VISIT (OUTPATIENT)
Dept: FAMILY MEDICINE CLINIC | Age: 87
End: 2022-02-24
Payer: MEDICARE

## 2022-02-24 VITALS
BODY MASS INDEX: 26.78 KG/M2 | DIASTOLIC BLOOD PRESSURE: 60 MMHG | WEIGHT: 170.6 LBS | OXYGEN SATURATION: 98 % | SYSTOLIC BLOOD PRESSURE: 117 MMHG | HEIGHT: 67 IN | HEART RATE: 65 BPM | TEMPERATURE: 97.2 F

## 2022-02-24 DIAGNOSIS — E55.9 VITAMIN D DEFICIENCY: ICD-10-CM

## 2022-02-24 DIAGNOSIS — R06.09 DOE (DYSPNEA ON EXERTION): ICD-10-CM

## 2022-02-24 DIAGNOSIS — I10 ESSENTIAL HYPERTENSION: ICD-10-CM

## 2022-02-24 DIAGNOSIS — J30.0 VASOMOTOR RHINITIS: ICD-10-CM

## 2022-02-24 DIAGNOSIS — E78.5 HYPERLIPIDEMIA WITH TARGET LDL LESS THAN 70: ICD-10-CM

## 2022-02-24 DIAGNOSIS — E11.42 TYPE 2 DIABETES MELLITUS WITH DIABETIC POLYNEUROPATHY, WITHOUT LONG-TERM CURRENT USE OF INSULIN (HCC): Primary | ICD-10-CM

## 2022-02-24 DIAGNOSIS — E03.9 ACQUIRED HYPOTHYROIDISM: ICD-10-CM

## 2022-02-24 DIAGNOSIS — R05.9 COUGH: ICD-10-CM

## 2022-02-24 DIAGNOSIS — I27.20 PULMONARY HYPERTENSION (HCC): ICD-10-CM

## 2022-02-24 DIAGNOSIS — D50.9 IRON DEFICIENCY ANEMIA, UNSPECIFIED IRON DEFICIENCY ANEMIA TYPE: ICD-10-CM

## 2022-02-24 LAB — HBA1C MFR BLD: 6.4 %

## 2022-02-24 PROCEDURE — G8427 DOCREV CUR MEDS BY ELIG CLIN: HCPCS | Performed by: FAMILY MEDICINE

## 2022-02-24 PROCEDURE — 1036F TOBACCO NON-USER: CPT | Performed by: FAMILY MEDICINE

## 2022-02-24 PROCEDURE — 99214 OFFICE O/P EST MOD 30 MIN: CPT | Performed by: FAMILY MEDICINE

## 2022-02-24 PROCEDURE — G8417 CALC BMI ABV UP PARAM F/U: HCPCS | Performed by: FAMILY MEDICINE

## 2022-02-24 PROCEDURE — G8484 FLU IMMUNIZE NO ADMIN: HCPCS | Performed by: FAMILY MEDICINE

## 2022-02-24 PROCEDURE — 83036 HEMOGLOBIN GLYCOSYLATED A1C: CPT | Performed by: FAMILY MEDICINE

## 2022-02-24 PROCEDURE — 1123F ACP DISCUSS/DSCN MKR DOCD: CPT | Performed by: FAMILY MEDICINE

## 2022-02-24 PROCEDURE — 4040F PNEUMOC VAC/ADMIN/RCVD: CPT | Performed by: FAMILY MEDICINE

## 2022-02-24 RX ORDER — IPRATROPIUM BROMIDE 21 UG/1
2 SPRAY, METERED NASAL 3 TIMES DAILY
Qty: 30 ML | Refills: 5 | Status: SHIPPED | OUTPATIENT
Start: 2022-02-24 | End: 2022-02-25

## 2022-02-24 RX ORDER — BENZONATATE 100 MG/1
100 CAPSULE ORAL 3 TIMES DAILY PRN
Qty: 21 CAPSULE | Refills: 0 | Status: SHIPPED | OUTPATIENT
Start: 2022-02-24 | End: 2022-03-03

## 2022-02-24 RX ORDER — ALBUTEROL SULFATE 90 UG/1
2 AEROSOL, METERED RESPIRATORY (INHALATION) EVERY 6 HOURS PRN
Qty: 8 G | Refills: 0 | Status: SHIPPED | OUTPATIENT
Start: 2022-02-24

## 2022-02-24 ASSESSMENT — ENCOUNTER SYMPTOMS
CHEST TIGHTNESS: 0
SINUS PAIN: 0
ABDOMINAL PAIN: 0
CONSTIPATION: 1
VOMITING: 0
SHORTNESS OF BREATH: 1
NAUSEA: 0
DIARRHEA: 0
SINUS PRESSURE: 0
COUGH: 1
ABDOMINAL DISTENTION: 0
WHEEZING: 0
BLOOD IN STOOL: 0
RHINORRHEA: 1

## 2022-02-24 NOTE — PROGRESS NOTES
Bigg Cardozo (:  1924) is a 80 y.o. male,Established patient, here for evaluation of the following chief complaint(s): Hypertension, Hyperlipidemia, Diabetes, Shortness of Breath, and Cough      ASSESSMENT/PLAN:    1. Type 2 diabetes mellitus with diabetic polyneuropathy, without long-term current use of insulin (HCC)  Stable diabetes  -     POCT glycosylated hemoglobin (Hb A1C)  Lab Results   Component Value Date    LABA1C 6.4 2022    LABA1C 6.2 10/14/2021    LABA1C 6.4 2021       -     CBC; Future  -     Comprehensive Metabolic Panel; Future  -advised home blood glucose testing  daily  -daily feet exam, Foot care: advised to wash feet daily, pat dry and apply lotion at night, avoiding between toes. Need to look at feet daily and report to a physician any signs of inflammation or skin damage  -annual dilated eye exam  -Low carb, low fat diet, increase fruits and vegetables, and exercise 4-5 times a day 30-40 minutes a day discussed  -continue current treatment Metformin  mg daily    -continue ARB losartan, and statin Lipitor    2. Essential hypertension  Well controlled. Continue current treatment. Amlodipine 5 mg, losartan 50 mg,  Will recheck labs. -     CBC; Future  -     Comprehensive Metabolic Panel; Future  -     Magnesium; Future  -     Urinalysis with Reflex to Culture; Future  Discussed low salt diet and BP and pulse monitoring. 3. Hyperlipidemia with target LDL less than 70  Controlled, stable  Continue Lipitor 20 mg  Lab Results   Component Value Date    LDLCHOLESTEROL 79 2020       -     Lipid, Fasting; Future  4. Pulmonary hypertension (HCC)  Likely stable  Declines further evaluation of his heart due to his advanced age, he had prior evaluation by cardiologist already.     5. Cough  Failed to resolve with changing ACE inhibitor to losartan  Using cough syrup from over-the-counter, cough drops  Using Flonase and Xyzal, does not help  -To start benzonatate (TESSALON PERLES) 100 MG capsule; Take 1 capsule by mouth 3 times daily as needed for Cough, Disp-21 capsule, R-0Normal  -   To start albuterol sulfate HFA (PROVENTIL HFA) 108 (90 Base) MCG/ACT inhaler; Inhale 2 puffs into the lungs every 6 hours as needed for Wheezing or Shortness of Breath (cough), Disp-8 g, R-0Normal  6. Acquired hypothyroidism  Likely stable  Continue Synthroid 25 MCG daily  -     TSH; Future  7. Iron deficiency anemia, unspecified iron deficiency anemia type  Stable    H&H 10.8/33.4 on 10/19/2021, was 10.6/32.4 on 7/6/2021  Recheck CBC and vitamin B12  -     Vitamin B12 & Folate; Future  Follow-up with hematologist oncologist as scheduled  In the past, he could not tolerate oral iron     8. Vitamin D deficiency  Unsure if improving or not. Will recheck level  Continue supplementation.    -     Vitamin D 25 Hydroxy; Future  9. Vasomotor rhinitis  Failing to improve with Flonase and Zyrtec, I called in for him Atrovent, but  requested 90-day supplies after the appointment  10. PEREYRA (dyspnea on exertion)  Failing to improve  He declines to see cardiologist again  -We will start trial of     albuterol sulfate HFA (PROVENTIL HFA) 108 (90 Base) MCG/ACT inhaler; Inhale 2 puffs into the lungs every 6 hours as needed for Wheezing or Shortness of Breath (cough), Disp-8 g, R-0Normal      Corbin received counseling on the following healthy behaviors: nutrition, exercise, medication adherence and falls preventions. Reviewed prior labs and health maintenance  Discussed use, benefit, and side effects of prescribed medications. Barriers to medication compliance addressed. Patient given educational materials - see patient instructions  Was a self-tracking handout given in paper form or via [employer]? Yes  All patient questions answered. Patient voiced understanding.   The patient's past medical,surgical, social, and family history as well as his current medications and allergies were reviewed as documented in today's encounter. Medications, labs, diagnostic studies, consultations and follow-up as documented in this encounter. Return for KEEP APPT. Data Unavailable    Future Appointments   Date Time Provider Toy Watts   5/5/2022  4:00 PM Silvia An [address]    6/2/2022  3:30 PM Harshad Adames MD [facility]   7/15/2022  1:00 PM Yfn Hampton MD [facility]         SUBJECTIVE/OBJECTIVE:      Comes with his son,  Palma Garcia. Corbin's  first language is [place] he understands basic [place] and he can express  self in very basic [place]. he declines . Diabetes Mellitus Type II, Follow-up:    Current symptoms/problems include hyperglycemia, paresthesia of the feet and visual disturbances. Symptoms have been present for several years. 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 Eye exam current (within one year): yes  Current diet: in general, a \"healthy\" diet  , But occasionally he does eat sweets. Barriers: Advanced age, first language [place]. Current monitoring regimen: home blood tests - daily  Home blood sugar records: fasting range: 110 -up to 140-160's, he did bring the log    Any episodes of hypoglycemia? no    Is He on ACE inhibitor or angiotensin II receptor blocker? Yes      reports that he quit smoking about 54 years ago. He has never used smokeless tobacco.     Counseling given: Yes      Daily Aspirin? No: Benefits are less and the risks of bleeding. A1c is stable very well controlled.    Lab Results   Component Value Date    LABA1C 6.4 02/24/2022    LABA1C 6.2 10/14/2021 LABA1C 6.4 04/29/2021       Urine microabumin is Elevated. Lab Results   Component Value Date    LABMICR 106 (H) 11/15/2017          Wt Readings from Last 3 Encounters:   02/24/22 170 lb 9.6 oz (77.4 kg)   11/04/21 166 lb (75.3 kg)   10/14/21 166 lb (75.3 kg)         Hypertension:   Jess Miner  is not  exercising and is adherent to low salt diet. Blood pressure is well controlled at home. Cardiac symptoms  dyspnea and fatigue. Patient denies  chest pain, chest pressure/discomfort, claudication, exertional chest pressure/discomfort, irregular heart beat, lower extremity edema, near-syncope, orthopnea, palpitations, paroxysmal nocturnal dyspnea, syncope and tachypnea. Use of agents associated with hypertension: thyroid hormones. History of target organ damage: left ventricular hypertrophy. Declines cardiology referral.    He was previously evaluated by cardiologist but due to advanced age, no intervention was recommended. No additional work-up was recommended at that time either. Patient does have moderate aortic stenosis with high pulmonary pressure RVSP 51 mmHg, EF 55 on 1/17/2017. BP controlled. Jess Miner reports compliance with BP medications, and tolerates them well, denies side effects. BP Readings from Last 3 Encounters:   02/24/22 117/60   10/14/21 128/72   08/12/21 138/70        Pulse is Normal.    Pulse Readings from Last 3 Encounters:   02/24/22 65   10/14/21 76   08/12/21 80         Hyperlipidemia:  No new myalgias or GI upset on atorvastatin (Lipitor). Medication compliance: compliant all of the time. Patient is  following a low fat, low cholesterol diet. LDL is Normal.    Lab Results   Component Value Date    LDLCHOLESTEROL 79 12/04/2020       Patient reports knees hurting on and off, related to arthritis. Wakes up dizzy in the morning. Patient says he has been \"suffering because of his son\", who was recently found to have metastatic cancer in the neck.   Son says wakes up late and skips meals, and when waking up he is dizzy. He reports he still has cough, feels like itchy throat, cough is mostly  dry, for about 5 months now. He also reports having dyspnea when sleeping, sometime and wakes up short of breath. Gets short of breath with activities as well. Had albutererol in the past, he is not sure if it would help but not using. He denies chest pain. Still having Runny nose when eating    His son thinks that this is happening due to temp variations from cooler in the house, and warm in his room  Itchy throat  He clears his throat often , white phlegm. sometimes  Changed Enalapril to losartan, but does not feel it is helping. His son says it is the same. Using Flonase but does not feel it is helping either. Kika Judd has Vitamin D deficiency. Kika Judd  is  taking Vitamin D supplementation   he feels tired. Lab Results   Component Value Date    VITD25 34.9 04/22/2021     Anemia has been stable  Not taking iron due to upset stomach  Had prior iron infusion but then she refused further iron infusions due to cost    Lab Results   Component Value Date    WBC 4.5 10/19/2021    HGB 10.8 (L) 10/19/2021    HCT 33.4 (L) 10/19/2021    MCV 97.6 10/19/2021     10/19/2021        [x]Negative depression screening. PHQ Scores 2/24/2022 10/14/2021 6/2/2021 4/29/2021 6/2/2020 2/26/2020 4/26/2019   PHQ2 Score 0 0 0 0 0 0 0   PHQ9 Score 0 0 0 0 0 0 0       Prior to Visit Medications    Medication Sig Taking?  Authorizing Provider   amLODIPine (NORVASC) 5 MG tablet Take 1 tablet by mouth daily Yes Farhan Green MD   levocetirizine (XYZAL) 5 MG tablet Take 1 tablet by mouth nightly For allergies Yes Farhan Green MD   atorvastatin (LIPITOR) 20 MG tablet Take 1 tablet by mouth Daily with supper Yes Farhan Green MD   losartan (COZAAR) 50 MG tablet Take 1 tablet by mouth daily ** stop enalapril** Yes Farhan Green MD   metFORMIN (GLUCOPHAGE-XR) 500 MG extended release tablet TAKE 1 TABLET BY MOUTH ONCE DAILY WITH BREAKFAST Yes Cirilo Rosales MD   Phenylephrine-Chlorphen-DM (TUSS-DM PO) Take by mouth Yes Historical Provider, MD   ofloxacin (OCUFLOX) 0.3 % solution Place 1 drop into both eyes 4 times daily Yes Cirilo Rosales MD   Polyvinyl Alcohol-Povidone PF (REFRESH) 1.4-0.6 % SOLN Apply 2 drops to eye every 3-4 hours as needed (For dry eyes) Okay to substitute. For itchy eyes Yes Cirilo Rosales MD   famotidine (PEPCID) 20 MG tablet TAKE 1 TABLET BY MOUTH TWICE DAILY. STOP OMEPRAZOLE Yes Cirilo Rosales MD   levothyroxine (SYNTHROID) 25 MCG tablet Take 1 tablet by mouth every morning (before breakfast) Yes Cirilo Rosales MD   tamsulosin (FLOMAX) 0.4 MG capsule Take 1 capsule by mouth daily Yes Cassandra Borja MD   albuterol sulfate HFA (PROVENTIL HFA) 108 (90 Base) MCG/ACT inhaler Inhale 2 puffs into the lungs every 6 hours as needed for Wheezing or Shortness of Breath (cough) Yes Cirilo Rosales MD   docusate sodium (COLACE) 100 MG capsule Take 1 capsule by mouth 2 times daily For constipation Yes Cirilo Rosales MD   ipratropium (ATROVENT) 0.03 % nasal spray USE 2 SPRAYS INTO EACH NOSTRIL THREE TIMES DAILY Yes Cirilo Rosales MD   blood glucose test strips (ACCU-CHEK JOEL PLUS) strip TEST ONCE A DAY, FASTING IN THE MORNING Yes Cirilo Rosales MD   camphor-menthol-methyl salicylate (BENGAY ULTRA STRENGTH) 4-10-30 % CREA cream Apply topically 3 times daily as needed for Pain Yes Cirilo Rosales MD   lidocaine (LIDODERM) 5 % Place 1 patch onto the skin daily 12 hours on, 12 hours off. Yes Cirilo Rosales MD   Blood Glucose Monitoring Suppl (ACCU-CHEK JOEL PLUS) w/Device KIT USE AS DIRECTED Yes Cirilo Rosales MD   Accu-Chek FastClix Lancets MISC USE TO TEST BLOOD GLUCOSE ONCE A DAY Yes Cirilo Rosales MD   Lancets 30G MISC Testing once a day.   Please dispense according to patients insurance:  accu check Joel plus Yes Shilpa MD Jayme   Lancets [facility]. (ACCU-CHEK MULTICLIX LANCET DEV) KIT Please dispense according to patients insurance. Yes Jame Wilson MD   Alcohol Swabs PADS Please dispense according to patients insurance/device. Testing 1-2 /day Yes Jame Wilson MD            Social History     Tobacco Use    Smoking status: Former Smoker     Quit date: 10/27/1967     Years since quittin.4    Smokeless tobacco: Never Used   Vaping Use    Vaping Use: Never used   Substance Use Topics    Alcohol use: No    Drug use: No         Review of Systems   Constitutional: Positive for fatigue. Negative for activity change, appetite change, chills, diaphoresis, fever and unexpected weight change. HENT: Positive for congestion, postnasal drip and rhinorrhea. Negative for nosebleeds, sinus pressure and sinus pain. Eyes: Positive for visual disturbance (stable, chronic). Respiratory: Positive for cough and shortness of breath (dyspnea at nighttime). Negative for chest tightness and wheezing. Cardiovascular: Negative for chest pain, palpitations and leg swelling. Gastrointestinal: Positive for constipation. Negative for abdominal distention, abdominal pain, blood in stool, diarrhea, nausea and vomiting. Endocrine: Negative for cold intolerance, heat intolerance, polydipsia, polyphagia and polyuria. Musculoskeletal: Positive for arthralgias and myalgias. Allergic/Immunologic: Positive for environmental allergies. Neurological: Positive for dizziness (on and off) and numbness (feet). Hematological: Does not bruise/bleed easily. Psychiatric/Behavioral: Negative for dysphoric mood. -vital signs stable and within normal limits except Overweight per BMI. /60   Pulse 65   Temp 97.2 °F (36.2 °C)   Ht 5' 7\" (1.702 m)   Wt 170 lb 9.6 oz (77.4 kg)   SpO2 98%   BMI 26.72 kg/m²         Physical Exam  Vitals and nursing note reviewed.    Constitutional:       General: He is not in acute distress. Appearance: Normal appearance. He is well-developed and normal weight. He is not diaphoretic. HENT:      Head: Normocephalic. Right Ear: External ear normal.      Left Ear: External ear normal.      Ears:      Comments: Wears hearing aids     Mouth/Throat:      Comments: I did not examine the mouth due to coronavirus pandemic and wearing masks    Eyes:      General: Lids are normal. No scleral icterus. Right eye: No discharge. Left eye: No discharge. Extraocular Movements: Extraocular movements intact. Conjunctiva/sclera: Conjunctivae normal.      Right eye: Right conjunctiva is not injected. Left eye: Left conjunctiva is not injected. Neck:      Thyroid: No thyromegaly. Cardiovascular:      Rate and Rhythm: Normal rate and regular rhythm. Heart sounds: Murmur heard. Crescendo systolic murmur is present with a grade of 4/6. Pulmonary:      Effort: Pulmonary effort is normal. No respiratory distress. Breath sounds: Normal breath sounds. No wheezing or rales. Comments: Coughing spell during the encounter, drinking water helped. Reports having itching in his throat. Chest:      Chest wall: No tenderness. Abdominal:      General: Bowel sounds are normal. There is distension. Palpations: Abdomen is soft. There is no hepatomegaly or splenomegaly. Tenderness: There is no abdominal tenderness. Comments: Bloated, but no abdominal tenderness   Musculoskeletal:      Cervical back: Normal range of motion and neck supple. Right lower leg: No edema. Left lower leg: No edema. Comments: Up and go test more than 12 seconds. High risk for falls. He declines physical therapy. Lymphadenopathy:      Cervical: No cervical adenopathy. Skin:     General: Skin is warm and dry. Capillary Refill: Capillary refill takes less than 2 seconds. Coloration: Skin is pale (mildly pale).    Neurological:      Mental Status: He is alert and oriented to person, place, and time. Deep Tendon Reflexes: Reflexes are normal and symmetric. Psychiatric:         Mood and Affect: Mood normal.         Behavior: Behavior normal.         Thought Content: Thought content normal.         Judgment: Judgment normal.           I personally reviewed testing with patient.   Anemia is stable  Hyperglycemia controlled  Otherwise labs within normal limits        Lab Results   Component Value Date    WBC 4.5 10/19/2021    HGB 10.8 (L) 10/19/2021    HCT 33.4 (L) 10/19/2021    MCV 97.6 10/19/2021     10/19/2021       Lab Results   Component Value Date     10/19/2021    K 4.4 10/19/2021     10/19/2021    CO2 27 10/19/2021    BUN 21 10/19/2021    CREATININE 0.97 10/19/2021    GLUCOSE 160 10/19/2021    CALCIUM 9.6 10/19/2021        Lab Results   Component Value Date    ALT 15 10/19/2021    AST 19 10/19/2021    ALKPHOS 73 10/19/2021    BILITOT 0.39 10/19/2021       Lab Results   Component Value Date    TSH 4.14 10/19/2021       Lab Results   Component Value Date    CHOL 150 12/04/2020    CHOL 134 11/08/2019    CHOL 123 10/25/2018     Lab Results   Component Value Date    TRIG 100 12/04/2020    TRIG 78 11/08/2019    TRIG 59 10/25/2018     Lab Results   Component Value Date    HDL 51 12/04/2020    HDL 49 11/08/2019    HDL 51 10/25/2018     Lab Results   Component Value Date    LDLCHOLESTEROL 79 12/04/2020    LDLCHOLESTEROL 69 11/08/2019    LDLCHOLESTEROL 60 10/25/2018     Lab Results   Component Value Date    CHOLHDLRATIO 2.9 12/04/2020    CHOLHDLRATIO 2.7 11/08/2019    CHOLHDLRATIO 2.4 10/25/2018         Lab Results   Component Value Date    NFIRXDHA25 524 07/06/2021     Lab Results   Component Value Date    FOLATE >20.0 07/06/2021     Lab Results   Component Value Date    VITD25 34.9 04/22/2021     Lab Results   Component Value Date    IRON 78 07/06/2021    TIBC 195 (L) 07/06/2021    FERRITIN 1,184 (H) 07/06/2021         Orders Placed This importance of compliance with the treatment plan as well as documenting on the day of the visit and care coordination with his son, Margarita Slaughter. This note was completed by using the assistance of a speech-recognition program. However, inadvertent computerized transcription errors may be present. Although every effort was made to ensure accuracy, no guarantees can be provided that every mistake has been identified and corrected by editing . An electronic signature was used to authenticate this note.   Electronically signed by Sarah Dillon MD on 2/28/2022 at 5:10 PM

## 2022-02-24 NOTE — PROGRESS NOTES
Visit Information    Have you changed or started any medications since your last visit including any over-the-counter medicines, vitamins, or herbal medicines? no   Have you stopped taking any of your medications? Is so, why? -  no  Are you having any side effects from any of your medications? - no    Have you seen any other physician or provider since your last visit?  no   Have you had any other diagnostic tests since your last visit?  no   Have you been seen in the emergency room and/or had an admission in a hospital since we last saw you?  no   Have you had your routine dental cleaning in the past 6 months?  no     Do you have an active MyChart account? If no, what is the barrier?   Yes    Patient Care Team:  Harshad Adames MD as PCP - General (Family Medicine)  Harshad Adames MD as PCP - [facility]  Antonio Herndon (Optometry)  JoseA Pierson MD as Surgeon (Cardiology)  Queen Monie MD as Consulting Physician (Urology)  Alma Delia Toledo MD as Consulting Physician (Gastroenterology)  Russell Watters MD as Surgeon (Ophthalmology)  Vinh Matt (Certified Nurse Practitioner)  Yfn Hampton MD as Consulting Physician (Oncology)  Steve Santiago DPM as Consulting Physician (Podiatry)    Medical History Review  Past Medical, Family, and Social History reviewed and does contribute to the patient presenting condition    Health Maintenance   Topic Date Due    Lipid screen  12/04/2021    Annual Wellness Visit (AWV)  06/03/2022    Depression Screen  10/14/2022    TSH testing  10/19/2022    Potassium monitoring  10/19/2022    Creatinine monitoring  10/19/2022    DTaP/Tdap/Td vaccine (2 - Td or Tdap) 09/25/2030    Flu vaccine  Completed    Shingles Vaccine  Completed    Pneumococcal 65+ years Vaccine  Completed    COVID-19 Vaccine  Completed    Hepatitis A vaccine  Aged Out    Hib vaccine  Aged Out    Meningococcal (ACWY) vaccine  Aged Out

## 2022-02-25 DIAGNOSIS — J30.0 VASOMOTOR RHINITIS: ICD-10-CM

## 2022-02-25 RX ORDER — IPRATROPIUM BROMIDE 21 UG/1
SPRAY, METERED NASAL
Qty: 90 ML | Refills: 3 | Status: SHIPPED | OUTPATIENT
Start: 2022-02-25

## 2022-02-28 PROBLEM — R05.9 COUGH: Status: ACTIVE | Noted: 2021-10-17

## 2022-03-30 PROBLEM — R05.9 COUGH: Status: RESOLVED | Noted: 2021-10-17 | Resolved: 2022-03-30

## 2022-05-31 ENCOUNTER — HOSPITAL ENCOUNTER (OUTPATIENT)
Age: 87
Discharge: HOME | End: 2022-05-31
Payer: MEDICARE

## 2022-05-31 DIAGNOSIS — E03.9 ACQUIRED HYPOTHYROIDISM: ICD-10-CM

## 2022-05-31 DIAGNOSIS — D50.9 IRON DEFICIENCY ANEMIA, UNSPECIFIED IRON DEFICIENCY ANEMIA TYPE: ICD-10-CM

## 2022-05-31 DIAGNOSIS — E78.5 HYPERLIPIDEMIA WITH TARGET LDL LESS THAN 70: ICD-10-CM

## 2022-05-31 DIAGNOSIS — E11.42 TYPE 2 DIABETES MELLITUS WITH DIABETIC POLYNEUROPATHY, WITHOUT LONG-TERM CURRENT USE OF INSULIN (HCC): ICD-10-CM

## 2022-05-31 DIAGNOSIS — E83.42 HYPOMAGNESEMIA: Primary | ICD-10-CM

## 2022-05-31 DIAGNOSIS — I10 ESSENTIAL HYPERTENSION: ICD-10-CM

## 2022-05-31 DIAGNOSIS — E55.9 VITAMIN D DEFICIENCY: ICD-10-CM

## 2022-05-31 LAB
ALBUMIN SERPL-MCNC: 4 G/DL (ref 3.5–5.2)
ALP BLD-CCNC: 66 U/L (ref 40–129)
ALT SERPL-CCNC: 12 U/L (ref 5–41)
ANION GAP SERPL CALCULATED.3IONS-SCNC: 12 MMOL/L (ref 9–17)
AST SERPL-CCNC: 17 U/L
BACTERIA: NORMAL
BILIRUB SERPL-MCNC: 0.63 MG/DL (ref 0.3–1.2)
BILIRUBIN URINE: NEGATIVE
BUN BLDV-MCNC: 20 MG/DL (ref 8–23)
CALCIUM SERPL-MCNC: 9.5 MG/DL (ref 8.6–10.4)
CASTS UA: NORMAL /LPF
CHLORIDE BLD-SCNC: 103 MMOL/L (ref 98–107)
CHOLESTEROL, FASTING: 150 MG/DL
CHOLESTEROL/HDL RATIO: 2.9
CO2: 25 MMOL/L (ref 20–31)
COLOR: YELLOW
CREAT SERPL-MCNC: 1.05 MG/DL (ref 0.7–1.2)
EPITHELIAL CELLS UA: NORMAL /HPF
FOLATE: >20 NG/ML
GFR AFRICAN AMERICAN: >60 ML/MIN
GFR NON-AFRICAN AMERICAN: >60 ML/MIN
GFR SERPL CREATININE-BSD FRML MDRD: ABNORMAL ML/MIN/{1.73_M2}
GLUCOSE BLD-MCNC: 163 MG/DL (ref 70–99)
GLUCOSE URINE: NEGATIVE
HCT VFR BLD CALC: 31.6 % (ref 41–53)
HDLC SERPL-MCNC: 51 MG/DL
HEMOGLOBIN: 10.3 G/DL (ref 13.5–17.5)
KETONES, URINE: ABNORMAL
LDL CHOLESTEROL: 82 MG/DL (ref 0–130)
LEUKOCYTE ESTERASE, URINE: NEGATIVE
MAGNESIUM: 1.4 MG/DL (ref 1.6–2.6)
MCH RBC QN AUTO: 31.4 PG (ref 26–34)
MCHC RBC AUTO-ENTMCNC: 32.7 G/DL (ref 31–37)
MCV RBC AUTO: 96.2 FL (ref 80–100)
NITRITE, URINE: NEGATIVE
PDW BLD-RTO: 14.7 % (ref 11.5–14.9)
PH UA: 5.5 (ref 5–8)
PLATELET # BLD: 145 K/UL (ref 150–450)
PMV BLD AUTO: 8.6 FL (ref 6–12)
POTASSIUM SERPL-SCNC: 4.1 MMOL/L (ref 3.7–5.3)
PROTEIN UA: ABNORMAL
RBC # BLD: 3.28 M/UL (ref 4.5–5.9)
RBC UA: NORMAL /HPF
SODIUM BLD-SCNC: 140 MMOL/L (ref 135–144)
SPECIFIC GRAVITY UA: 1.01 (ref 1–1.03)
TOTAL PROTEIN: 7.2 G/DL (ref 6.4–8.3)
TRIGLYCERIDE, FASTING: 83 MG/DL
TSH SERPL DL<=0.05 MIU/L-ACNC: 3.47 UIU/ML (ref 0.3–5)
TURBIDITY: CLEAR
URINE HGB: ABNORMAL
UROBILINOGEN, URINE: NORMAL
VITAMIN B-12: 466 PG/ML (ref 232–1245)
VITAMIN D 25-HYDROXY: 28.4 NG/ML
WBC # BLD: 4.9 K/UL (ref 3.5–11)
WBC UA: NORMAL /HPF

## 2022-05-31 PROCEDURE — 85027 COMPLETE CBC AUTOMATED: CPT

## 2022-05-31 PROCEDURE — 80053 COMPREHEN METABOLIC PANEL: CPT

## 2022-05-31 PROCEDURE — 84443 ASSAY THYROID STIM HORMONE: CPT

## 2022-05-31 PROCEDURE — 36415 COLL VENOUS BLD VENIPUNCTURE: CPT

## 2022-05-31 PROCEDURE — 81001 URINALYSIS AUTO W/SCOPE: CPT

## 2022-05-31 PROCEDURE — 82746 ASSAY OF FOLIC ACID SERUM: CPT

## 2022-05-31 PROCEDURE — 80061 LIPID PANEL: CPT

## 2022-05-31 PROCEDURE — 82607 VITAMIN B-12: CPT

## 2022-05-31 PROCEDURE — 82306 VITAMIN D 25 HYDROXY: CPT

## 2022-05-31 PROCEDURE — 83735 ASSAY OF MAGNESIUM: CPT

## 2022-05-31 RX ORDER — MULTIVITAMIN/IRON/FOLIC ACID 18MG-0.4MG
250 TABLET ORAL DAILY
Qty: 90 TABLET | Refills: 3 | Status: SHIPPED | OUTPATIENT
Start: 2022-05-31

## 2022-05-31 NOTE — RESULT ENCOUNTER NOTE
Please notify patient: Very low magnesium, I will send magnesium to the pharmacyBlood glucose 163 same as before  Anemia is worsening to keep the appointment with the blood doctor  Otherwise labs within normal limits  continue current treatment    Future Appointments  6/2/2022   3:30 PM    Nicola Mcdowell MD     [facility]               [facility]  7/15/2022  1:00 PM    Dany Waldrop MD     [address]

## 2022-06-01 NOTE — RESULT ENCOUNTER NOTE
Please notify patient: Very low magnesium, I will send magnesium to the pharmacy  Blood glucose 163 same as before  Anemia is worsening to keep the appointment with the blood doctor  Vitamin D is slightly lower, to take vitamin D 2000 units total a day with food    Otherwise labs within normal limits  continue current treatment    Future Appointments  6/2/2022   3:30 PM    Lennox [place], MD     [facility]               [facility]  7/15/2022  1:00 PM    Marlo South MD     [address]

## 2022-06-02 ENCOUNTER — OFFICE VISIT (OUTPATIENT)
Dept: FAMILY MEDICINE CLINIC | Age: 87
End: 2022-06-02
Payer: MEDICARE

## 2022-06-02 ENCOUNTER — TELEPHONE (OUTPATIENT)
Dept: FAMILY MEDICINE CLINIC | Age: 87
End: 2022-06-02

## 2022-06-02 VITALS
OXYGEN SATURATION: 98 % | SYSTOLIC BLOOD PRESSURE: 122 MMHG | HEIGHT: 67 IN | BODY MASS INDEX: 26.68 KG/M2 | TEMPERATURE: 97.8 F | DIASTOLIC BLOOD PRESSURE: 58 MMHG | HEART RATE: 74 BPM | WEIGHT: 170 LBS

## 2022-06-02 DIAGNOSIS — E55.9 VITAMIN D DEFICIENCY: ICD-10-CM

## 2022-06-02 DIAGNOSIS — I10 ESSENTIAL HYPERTENSION: ICD-10-CM

## 2022-06-02 DIAGNOSIS — E83.42 HYPOMAGNESEMIA: ICD-10-CM

## 2022-06-02 DIAGNOSIS — D50.9 IRON DEFICIENCY ANEMIA, UNSPECIFIED IRON DEFICIENCY ANEMIA TYPE: ICD-10-CM

## 2022-06-02 DIAGNOSIS — Z00.00 MEDICARE ANNUAL WELLNESS VISIT, SUBSEQUENT: Primary | ICD-10-CM

## 2022-06-02 PROCEDURE — 1123F ACP DISCUSS/DSCN MKR DOCD: CPT | Performed by: FAMILY MEDICINE

## 2022-06-02 PROCEDURE — G0439 PPPS, SUBSEQ VISIT: HCPCS | Performed by: FAMILY MEDICINE

## 2022-06-02 RX ORDER — DIPHENHYDRAMINE HYDROCHLORIDE 50 MG/ML
50 INJECTION INTRAMUSCULAR; INTRAVENOUS
Status: CANCELLED | OUTPATIENT
Start: 2022-06-14

## 2022-06-02 RX ORDER — ALBUTEROL SULFATE 90 UG/1
4 AEROSOL, METERED RESPIRATORY (INHALATION) PRN
Status: CANCELLED | OUTPATIENT
Start: 2022-06-14

## 2022-06-02 RX ORDER — ONDANSETRON 2 MG/ML
8 INJECTION INTRAMUSCULAR; INTRAVENOUS
Status: CANCELLED | OUTPATIENT
Start: 2022-06-14

## 2022-06-02 RX ORDER — SODIUM CHLORIDE 9 MG/ML
5-250 INJECTION, SOLUTION INTRAVENOUS PRN
Status: CANCELLED | OUTPATIENT
Start: 2022-06-14

## 2022-06-02 RX ORDER — EPINEPHRINE 1 MG/ML
0.3 INJECTION, SOLUTION, CONCENTRATE INTRAVENOUS PRN
Status: CANCELLED | OUTPATIENT
Start: 2022-06-14

## 2022-06-02 RX ORDER — ACETAMINOPHEN 325 MG/1
650 TABLET ORAL
Status: CANCELLED | OUTPATIENT
Start: 2022-06-14

## 2022-06-02 RX ORDER — SODIUM CHLORIDE 9 MG/ML
INJECTION, SOLUTION INTRAVENOUS CONTINUOUS
Status: CANCELLED | OUTPATIENT
Start: 2022-06-14

## 2022-06-02 RX ORDER — SODIUM CHLORIDE 0.9 % (FLUSH) 0.9 %
5-40 SYRINGE (ML) INJECTION PRN
Status: CANCELLED | OUTPATIENT
Start: 2022-06-14

## 2022-06-02 RX ORDER — FAMOTIDINE 10 MG/ML
20 INJECTION, SOLUTION INTRAVENOUS
Status: CANCELLED | OUTPATIENT
Start: 2022-06-14

## 2022-06-02 RX ORDER — HEPARIN SODIUM (PORCINE) LOCK FLUSH IV SOLN 100 UNIT/ML 100 UNIT/ML
500 SOLUTION INTRAVENOUS PRN
Status: CANCELLED | OUTPATIENT
Start: 2022-06-14

## 2022-06-02 RX ORDER — CHOLECALCIFEROL (VITAMIN D3) 50 MCG
2000 TABLET ORAL DAILY
Qty: 90 TABLET | Refills: 3 | Status: SHIPPED | OUTPATIENT
Start: 2022-06-02

## 2022-06-02 ASSESSMENT — PATIENT HEALTH QUESTIONNAIRE - PHQ9
SUM OF ALL RESPONSES TO PHQ QUESTIONS 1-9: 0
SUM OF ALL RESPONSES TO PHQ9 QUESTIONS 1 & 2: 0
1. LITTLE INTEREST OR PLEASURE IN DOING THINGS: 0
SUM OF ALL RESPONSES TO PHQ QUESTIONS 1-9: 0
2. FEELING DOWN, DEPRESSED OR HOPELESS: 0

## 2022-06-02 ASSESSMENT — VISUAL ACUITY
OS_CC: 20/20
OD_CC: 20/25

## 2022-06-02 ASSESSMENT — LIFESTYLE VARIABLES
HOW OFTEN DO YOU HAVE A DRINK CONTAINING ALCOHOL: NEVER
HOW MANY STANDARD DRINKS CONTAINING ALCOHOL DO YOU HAVE ON A TYPICAL DAY: 1 OR 2

## 2022-06-02 NOTE — TELEPHONE ENCOUNTER
Please coordinate with daughter in law,  is getting IV fluids 6/14/22. Please contact  Anthony Brush is daughter in law.

## 2022-06-02 NOTE — PATIENT INSTRUCTIONS
Personalized Preventive Plan for Samanta Yeh - 6/2/2022  Medicare offers a range of preventive health benefits. Some of the tests and screenings are paid in full while other may be subject to a deductible, co-insurance, and/or copay. Some of these benefits include a comprehensive review of your medical history including lifestyle, illnesses that may run in your family, and various assessments and screenings as appropriate. After reviewing your medical record and screening and assessments performed today your provider may have ordered immunizations, labs, imaging, and/or referrals for you. A list of these orders (if applicable) as well as your Preventive Care list are included within your After Visit Summary for your review. Other Preventive Recommendations:    · A preventive eye exam performed by an eye specialist is recommended every 1-2 years to screen for glaucoma; cataracts, macular degeneration, and other eye disorders. · A preventive dental visit is recommended every 6 months. · Try to get at least 150 minutes of exercise per week or 10,000 steps per day on a pedometer . · Order or download the FREE \"Exercise & Physical Activity: Your Everyday Guide\" from [employer]. Call 8-155.925.4856 or search [employer] online. · You need 1204-9542 mg of calcium and 2592-8169 IU of vitamin D per day. It is possible to meet your calcium requirement with diet alone, but a vitamin D supplement is usually necessary to meet this goal.  · When exposed to the sun, use a sunscreen that protects against both UVA and UVB radiation with an SPF of 30 or greater. Reapply every 2 to 3 hours or after sweating, drying off with a towel, or swimming. · Always wear a seat belt when traveling in a car. Always wear a helmet when riding a bicycle or motorcycle. Heart-Healthy Diet   Sodium, Fat, and Cholesterol Controlled Diet       What Is a Heart Healthy Diet?    A heart-healthy diet is one that limits sodium , certain types of fat , and cholesterol . This type of diet is recommended for:   People with any form of cardiovascular disease (eg, coronary heart disease , peripheral vascular disease , previous heart attack , previous stroke )   People with risk factors for cardiovascular disease, such as high blood pressure , high cholesterol , or diabetes   Anyone who wants to lower their risk of developing cardiovascular disease   Sodium    Sodium is a mineral found in many foods. In general, most people consume much more sodium than they need. Diets high in sodium can increase blood pressure and lead to edema (water retention). On a heart-healthy diet, you should consume no more than 2,300 mg (milligrams) of sodium per dayabout the amount in one teaspoon of table salt. The foods highest in sodium include table salt (about 50% sodium), processed foods, convenience foods, and preserved foods. Cholesterol    Cholesterol is a fat-like, waxy substance in your blood. Our bodies make some cholesterol. It is also found in animal products, with the highest amounts in fatty meat, egg yolks, whole milk, cheese, shellfish, and organ meats. On a heart-healthy diet, you should limit your cholesterol intake to less than 200 mg per day. It is normal and important to have some cholesterol in your bloodstream. But too much cholesterol can cause plaque to build up within your arteries, which can eventually lead to a heart attack or stroke. The two types of cholesterol that are most commonly referred to are:   Low-density lipoprotein (LDL) cholesterol  Also known as bad cholesterol, this is the cholesterol that tends to build up along your arteries. Bad cholesterol levels are increased by eating fats that are saturated or hydrogenated. Optimal level of this cholesterol is less than 100. Over 130 starts to get risky for heart disease.    High-density lipoprotein (HDL) cholesterol  Also known as good cholesterol, this type of cholesterol actually carries cholesterol away from your arteries and may, therefore, help lower your risk of having a heart attack. You want this level to be high (ideally greater than 60). It is a risk to have a level less than 40. You can raise this good cholesterol by eating olive oil, canola oil, avocados, or nuts. Exercise raises this level, too. Fat    Fat is calorie dense and packs a lot of calories into a small amount of food. Even though fats should be limited due to their high calorie content, not all fats are bad. In fact, some fats are quite healthful. Fat can be broken down into four main types. The good-for-you fats are:   Monounsaturated fat  found in oils such as olive and canola, avocados, and nuts and natural nut butters; can decrease cholesterol levels, while keeping levels of HDL cholesterol high   Polyunsaturated fat  found in oils such as safflower, sunflower, soybean, corn, and sesame; can decrease total cholesterol and LDL cholesterol   Omega-3 fatty acids  particularly those found in fatty fish (such as salmon, trout, tuna, mackerel, herring, and sardines); can decrease risk of arrhythmias, decrease triglyceride levels, and slightly lower blood pressure   The fats that you want to limit are:   Saturated fat  found in animal products, many fast foods, and a few vegetables; increases total blood cholesterol, including LDL levels   Animal fats that are saturated include: butter, lard, whole-milk dairy products, meat fat, and poultry skin   Vegetable fats that are saturated include: hydrogenated shortening, palm oil, coconut oil, cocoa butter   Hydrogenated or trans fat  found in margarine and vegetable shortening, most shelf stable snack foods, and fried foods; increases LDL and decreases HDL     It is generally recommended that you limit your total fat for the day to less than 30% of your total calories.  If you follow an 1800-calorie heart healthy diet, for example, this would mean 60 grams of fat or less per day. Saturated fat and trans fat in your diet raises your blood cholesterol the most, much more than dietary cholesterol does. For this reason, on a heart-healthy diet, less than 7% of your calories should come from saturated fat and ideally 0% from trans fat. On an 1800-calorie diet, this translates into less than 14 grams of saturated fat per day, leaving 46 grams of fat to come from mono- and polyunsaturated fats.    Food Choices on a Heart Healthy Diet   Food Category   Foods Recommended   Foods to Avoid   Grains   Breads and rolls without salted tops Most dry and cooked cereals Unsalted crackers and breadsticks Low-sodium or homemade breadcrumbs or stuffing All rice and pastas   Breads, rolls, and crackers with salted tops High-fat baked goods (eg, muffins, donuts, pastries) Quick breads, self-rising flour, and biscuit mixes Regular bread crumbs Instant hot cereals Commercially prepared rice, pasta, or stuffing mixes   Vegetables   Most fresh, frozen, and low-sodium canned vegetables Low-sodium and salt-free vegetable juices Canned vegetables if unsalted or rinsed   Regular canned vegetables and juices, including sauerkraut and pickled vegetables Frozen vegetables with sauces Commercially prepared potato and vegetable mixes   Fruits   Most fresh, frozen, and canned fruits All fruit juices   Fruits processed with salt or sodium   Milk   Nonfat or low-fat (1%) milk Nonfat or low-fat yogurt Cottage cheese, low-fat ricotta, cheeses labeled as low-fat and low-sodium   Whole milk Reduced-fat (2%) milk Malted and chocolate milk Full fat yogurt Most cheeses (unless low-fat and low salt) Buttermilk (no more than 1 cup per week)   Meats and Beans   Lean cuts of fresh or frozen beef, veal, lamb, or pork (look for the word loin) Fresh or frozen poultry without the skin Fresh or frozen fish and some shellfish Egg whites and egg substitutes (Limit whole eggs to three per week) Tofu Nuts or seeds (unsalted, dry-roasted), low-sodium peanut butter Dried peas, beans, and lentils   Any smoked, cured, salted, or canned meat, fish, or poultry (including newman, chipped beef, cold cuts, hot dogs, sausages, sardines, and anchovies) Poultry skins Breaded and/or fried fish or meats Canned peas, beans, and lentils Salted nuts   Fats and Oils   Olive oil and canola oil Low-sodium, low-fat salad dressings and mayonnaise   Butter, margarine, coconut and palm oils, [place]man fat   Snacks, Sweets, and Condiments   Low-sodium or unsalted versions of broths, soups, soy sauce, and condiments Pepper, herbs, and spices; vinegar, lemon, or lime juice Low-fat frozen desserts (yogurt, sherbet, fruit bars) Sugar, cocoa powder, honey, syrup, jam, and preserves Low-fat, trans-fat free cookies, cakes, and pies Chele and animal crackers, fig bars, ruben snaps   High-fat desserts Broth, soups, gravies, and sauces, made from instant mixes or other high-sodium ingredients Salted snack foods Canned olives Meat tenderizers, seasoning salt, and most flavored vinegars   Beverages   Low-sodium carbonated beverages Tea and coffee in moderation Soy milk   Commercially softened water   Suggestions   Make whole grains, fruits, and vegetables the base of your diet. Choose heart-healthy fats such as canola, olive, and flaxseed oil, and foods high in heart-healthy fats, such as nuts, seeds, soybeans, tofu, and fish. Eat fish at least twice per week; the fish highest in omega-3 fatty acids and lowest in mercury include salmon, herring, mackerel, sardines, and canned chunk light tuna. If you eat fish less than twice per week or have high triglycerides, talk to your doctor about taking fish oil supplements. Read food labels.    For products low in fat and cholesterol, look for fat free, low-fat, cholesterol free, saturated fat free, and trans fat freeAlso scan the Nutrition Facts Label, which lists saturated fat, trans fat, and cholesterol amounts. For products low in sodium, look for sodium free, very low sodium, low sodium, no added salt, and unsalted   Skip the salt when cooking or at the table; if food needs more flavor, get creative and try out different herbs and spices. Garlic and onion also add substantial flavor to foods. Trim any visible fat off meat and poultry before cooking, and drain the fat off after price. Use cooking methods that require little or no added fat, such as grilling, boiling, baking, poaching, broiling, roasting, steaming, stir-frying, and sauting. Avoid fast food and convenience food. They tend to be high in saturated and trans fat and have a lot of added salt. Talk to a registered dietitian for individualized diet advice. Last Reviewed: March 2011 Shraddha Hernandez MS, MPH, RD   Updated: 3/29/2011   ·     Heart-Healthy Diet   Sodium, Fat, and Cholesterol Controlled Diet       What Is a Heart Healthy Diet? A heart-healthy diet is one that limits sodium , certain types of fat , and cholesterol . This type of diet is recommended for:   People with any form of cardiovascular disease (eg, coronary heart disease , peripheral vascular disease , previous heart attack , previous stroke )   People with risk factors for cardiovascular disease, such as high blood pressure , high cholesterol , or diabetes   Anyone who wants to lower their risk of developing cardiovascular disease   Sodium    Sodium is a mineral found in many foods. In general, most people consume much more sodium than they need. Diets high in sodium can increase blood pressure and lead to edema (water retention). On a heart-healthy diet, you should consume no more than 2,300 mg (milligrams) of sodium per dayabout the amount in one teaspoon of table salt. The foods highest in sodium include table salt (about 50% sodium), processed foods, convenience foods, and preserved foods.    Cholesterol    Cholesterol is a fat-like, waxy substance in your blood. Our bodies make some cholesterol. It is also found in animal products, with the highest amounts in fatty meat, egg yolks, whole milk, cheese, shellfish, and organ meats. On a heart-healthy diet, you should limit your cholesterol intake to less than 200 mg per day. It is normal and important to have some cholesterol in your bloodstream. But too much cholesterol can cause plaque to build up within your arteries, which can eventually lead to a heart attack or stroke. The two types of cholesterol that are most commonly referred to are:   Low-density lipoprotein (LDL) cholesterol  Also known as bad cholesterol, this is the cholesterol that tends to build up along your arteries. Bad cholesterol levels are increased by eating fats that are saturated or hydrogenated. Optimal level of this cholesterol is less than 100. Over 130 starts to get risky for heart disease. High-density lipoprotein (HDL) cholesterol  Also known as good cholesterol, this type of cholesterol actually carries cholesterol away from your arteries and may, therefore, help lower your risk of having a heart attack. You want this level to be high (ideally greater than 60). It is a risk to have a level less than 40. You can raise this good cholesterol by eating olive oil, canola oil, avocados, or nuts. Exercise raises this level, too. Fat    Fat is calorie dense and packs a lot of calories into a small amount of food. Even though fats should be limited due to their high calorie content, not all fats are bad. In fact, some fats are quite healthful. Fat can be broken down into four main types.    The good-for-you fats are:   Monounsaturated fat  found in oils such as olive and canola, avocados, and nuts and natural nut butters; can decrease cholesterol levels, while keeping levels of HDL cholesterol high   Polyunsaturated fat  found in oils such as safflower, sunflower, soybean, corn, and sesame; can decrease total cholesterol and LDL cholesterol   Omega-3 fatty acids  particularly those found in fatty fish (such as salmon, trout, tuna, mackerel, herring, and sardines); can decrease risk of arrhythmias, decrease triglyceride levels, and slightly lower blood pressure   The fats that you want to limit are:   Saturated fat  found in animal products, many fast foods, and a few vegetables; increases total blood cholesterol, including LDL levels   Animal fats that are saturated include: butter, lard, whole-milk dairy products, meat fat, and poultry skin   Vegetable fats that are saturated include: hydrogenated shortening, palm oil, coconut oil, cocoa butter   Hydrogenated or trans fat  found in margarine and vegetable shortening, most shelf stable snack foods, and fried foods; increases LDL and decreases HDL     It is generally recommended that you limit your total fat for the day to less than 30% of your total calories. If you follow an 1800-calorie heart healthy diet, for example, this would mean 60 grams of fat or less per day. Saturated fat and trans fat in your diet raises your blood cholesterol the most, much more than dietary cholesterol does. For this reason, on a heart-healthy diet, less than 7% of your calories should come from saturated fat and ideally 0% from trans fat. On an 1800-calorie diet, this translates into less than 14 grams of saturated fat per day, leaving 46 grams of fat to come from mono- and polyunsaturated fats.    Food Choices on a Heart Healthy Diet   Food Category   Foods Recommended   Foods to Avoid   Grains   Breads and rolls without salted tops Most dry and cooked cereals Unsalted crackers and breadsticks Low-sodium or homemade breadcrumbs or stuffing All rice and pastas   Breads, rolls, and crackers with salted tops High-fat baked goods (eg, muffins, donuts, pastries) Quick breads, self-rising flour, and biscuit mixes Regular bread crumbs Instant hot cereals Commercially prepared rice, pasta, or stuffing mixes Vegetables   Most fresh, frozen, and low-sodium canned vegetables Low-sodium and salt-free vegetable juices Canned vegetables if unsalted or rinsed   Regular canned vegetables and juices, including sauerkraut and pickled vegetables Frozen vegetables with sauces Commercially prepared potato and vegetable mixes   Fruits   Most fresh, frozen, and canned fruits All fruit juices   Fruits processed with salt or sodium   Milk   Nonfat or low-fat (1%) milk Nonfat or low-fat yogurt Cottage cheese, low-fat ricotta, cheeses labeled as low-fat and low-sodium   Whole milk Reduced-fat (2%) milk Malted and chocolate milk Full fat yogurt Most cheeses (unless low-fat and low salt) Buttermilk (no more than 1 cup per week)   Meats and Beans   Lean cuts of fresh or frozen beef, veal, lamb, or pork (look for the word loin) Fresh or frozen poultry without the skin Fresh or frozen fish and some shellfish Egg whites and egg substitutes (Limit whole eggs to three per week) Tofu Nuts or seeds (unsalted, dry-roasted), low-sodium peanut butter Dried peas, beans, and lentils   Any smoked, cured, salted, or canned meat, fish, or poultry (including newman, chipped beef, cold cuts, hot dogs, sausages, sardines, and anchovies) Poultry skins Breaded and/or fried fish or meats Canned peas, beans, and lentils Salted nuts   Fats and Oils   Olive oil and canola oil Low-sodium, low-fat salad dressings and mayonnaise   Butter, margarine, coconut and palm oils, newman fat   Snacks, Sweets, and Condiments   Low-sodium or unsalted versions of broths, soups, soy sauce, and condiments Pepper, herbs, and spices; vinegar, lemon, or lime juice Low-fat frozen desserts (yogurt, sherbet, fruit bars) Sugar, cocoa powder, honey, syrup, jam, and preserves Low-fat, trans-fat free cookies, cakes, and pies Chele and animal crackers, fig bars, ruben snaps   High-fat desserts Broth, soups, gravies, and sauces, made from instant mixes or other high-sodium ingredients Salted snack foods Canned olives Meat tenderizers, seasoning salt, and most flavored vinegars   Beverages   Low-sodium carbonated beverages Tea and coffee in moderation Soy milk   Commercially softened water   Suggestions   Make whole grains, fruits, and vegetables the base of your diet. Choose heart-healthy fats such as canola, olive, and flaxseed oil, and foods high in heart-healthy fats, such as nuts, seeds, soybeans, tofu, and fish. Eat fish at least twice per week; the fish highest in omega-3 fatty acids and lowest in mercury include salmon, herring, mackerel, sardines, and canned chunk light tuna. If you eat fish less than twice per week or have high triglycerides, talk to your doctor about taking fish oil supplements. Read food labels. For products low in fat and cholesterol, look for fat free, low-fat, cholesterol free, saturated fat free, and trans fat freeAlso scan the Nutrition Facts Label, which lists saturated fat, trans fat, and cholesterol amounts. For products low in sodium, look for sodium free, very low sodium, low sodium, no added salt, and unsalted   Skip the salt when cooking or at the table; if food needs more flavor, get creative and try out different herbs and spices. Garlic and onion also add substantial flavor to foods. Trim any visible fat off meat and poultry before cooking, and drain the fat off after price. Use cooking methods that require little or no added fat, such as grilling, boiling, baking, poaching, broiling, roasting, steaming, stir-frying, and sauting. Avoid fast food and convenience food. They tend to be high in saturated and trans fat and have a lot of added salt. Talk to a registered dietitian for individualized diet advice. Last Reviewed: March 2011 Cortney Pickett MS, MPH, RD   Updated: 3/29/2011   ·     High-Fiber Diet     What Is Fiber? Dietary fiber is a form of carbohydrate found in plants that cannot be digested by humans.  All plants contain fiber, including fruits, vegetables, grains, and legumes. Fiber is often classified into two categories: soluble and insoluble. Soluble fiber draws water into the bowel and can help slow digestion. Examples of foods that are high in soluble fiber include oatmeal, oat bran, barley, legumes (eg, beans and peas), apples, and strawberries. Insoluble fiber speeds digestion and can add bulk to the stool. Examples of foods that are high in insoluble fiber include whole-wheat products, wheat bran, cauliflower, green beans, and potatoes. Why Follow a High-Fiber Diet? A high-fiber diet is often recommended to prevent and treat constipation , hemorrhoids , diverticulitis , and irritable bowel syndrome . Eating a high-fiber diet can also help improve your cholesterol levels, lower your risk of coronary heart disease , reduce your risk of type 2 diabetes , and lower your weight. For people with type 1 or 2 diabetes, a high-fiber diet can also help stabilize blood sugar levels. How Much Fiber Should I Eat? A high-fiber diet should contain  20-35 grams  of fiber a day. This is actually the amount recommended for the general adult population; however, most Americans eat only 15 grams of fiber per day. Digestion of Fiber   Eating a higher fiber diet than usual can take some getting used to by your body's digestive system. To avoid the side effects of sudden increases in dietary fiber (eg, gas, cramping, bloating, and diarrhea), increase fiber gradually and be sure to drink plenty of fluids every day. Tips for Increasing Fiber Intake   Whenever possible, choose whole grains over refined grains (eg, brown rice instead of white rice, whole-wheat bread instead of white bread). Include a variety of grains in your diet, such as wheat, rye, barley, oats, quinoa, and bulgur. Eat more vegetarian-based meals. Here are some ideas: black bean burgers, eggplant lasagna, and veggie tofu stir-santoro.     Choose high-fiber snacks, such as fruits, popcorn, whole-grain crackers, and nuts. Make whole-grain cereal or whole-grain toast part of your daily breakfast regime. When eating out, whether ordering a sandwich or dinner, ask for extra vegetables. When baking, replace part of the white flour with whole-wheat flour. Whole-wheat flour is particularly easy to incorporate into a recipe. High-Fiber Diet Eating Guide   Food Category   Foods Recommended   Notes   Grains   Whole-grain breads, muffins, bagels, or lida bread Rye bread Whole-wheat crackers or crisp breads Whole-grain or bran cereals Oatmeal, oat bran, or grits Wheat germ Whole-wheat pasta and brown rice   Read the ingredients list on food labels. Look for products that list \"whole\" as the first ingredient (eg, whole-wheat, whole oats). Choose cereals with at least 2 grams of fiber per serving. Vegetables   All vegetables, especially asparagus, bean sprouts, broccoli, [place] sprouts, cabbage, carrots, cauliflower, celery, corn, greens, green beans, green pepper, onions, peas, potatoes (with skin), snow peas, spinach, squash, sweet potatoes, tomatoes, zucchini   For maximum fiber intake, eat the peels of fruits and vegetablesjust be sure to wash them well first.   Fruits   All fruits, especially apples, berries, grapefruits, mangoes, nectarines, oranges, peaches, pears, dried fruits (figs, dates, prunes, raisins)   Choose raw fruits and vegetables over juice, cooked, or cannedraw fruit has more fiber. Dried fruit is also a good source of fiber. Milk   With the exception of yogurt containing inulin (a type of fiber), dairy foods provide little fiber. Add more fiber by topping your yogurt or cottage cheese with fresh fruit, whole grain or bran cereals, nuts, or seeds.    Meats and Beans   All beans and peas, especially Garbanzo beans, kidney beans, lentils, lima beans, split peas, and chacon beans All nuts and seeds, especially almonds, peanuts, [place] nuts, cashews, peanut butter, walnuts, sesame and sunflower seeds All meat, poultry, fish, and eggs   Increase fiber in meat dishes by adding chacon beans, kidney beans, black-eyed peas, bran, or oatmeal. If you are following a low-fat diet, use nuts and seeds only in moderation. Fats and Oils   All in moderation   Fats and oils do not provide fiber   Snacks, Sweets, and Condiments   Fruit Nuts Popcorn, whole-wheat pretzels, or trail mix made with dried fruits, nuts, and seeds Cakes, breads, and cookies made with oatmeal or whole-wheat flour   Most snack foods do not provide much fiber. Choose snacks with at least 2 grams of fiber per serving. Last Reviewed: March 2011 Anjana Rick MS, MPH, RD   Updated: 3/29/2011   ·     Keep Your Memory Akash Dos Santos       Many factors can affect your ability to remembera hectic lifestyle, aging, stress, chronic disease, and certain medicines. But, there are steps you can take to sharpen your mind and help preserve your memory. Challenge Your Brain   Regularly challenging your mind may help keeps it in top shape. Good mental exercises include:   Crossword puzzlesUse a dictionary if you need it; you will learn more that way. Brainteasers Try some! Crafts, such as wood working and sewing   Hobbies, such as gardening and building model airplanes   SocializingVisit old friends or join groups to meet new ones. Reading   Learning a new language   Taking a class, whether it be art history or isaca chi   TravelingExperience the food, history, and culture of your destination   Learning to use a computer   Going to museums, the theater, or thought-provoking movies   Changing things in your daily life, such as reversing your pattern in the grocery store or brushing your teeth using your nondominant hand   Use Memory Aids   There is no need to remember every detail on your own.  These memory aids can help:   Calendars and day planners   Electronic organizers to store all sorts of helpful informationThese devices can \"beep\" to remind you of appointments. A book of days to record birthdays, anniversaries, and other occasions that occur on the same date every year   Detailed \"to-do\" lists and strategically placed sticky notes   Quick \"study\" sessionsBefore a gathering, review who will be there so their names will be fresh in your mind. Establish routinesFor example, keep your keys, wallet, and umbrella in the same place all the time or take medicine with your 8:00 AM glass of juice   Live a Healthy Life   Many actions that will keep your body strong will do the same for your mind. For example:   Talk to Your Doctor About Herbs and Supplements    Malnutrition and vitamin deficiencies can impair your mental function. For example, vitamin B12 deficiency can cause a range of symptoms, including confusion. But, what if your nutritional needs are being met? Can herbs and supplements still offer a benefit? Researchers have investigated a range of natural remedies, such as ginkgo , ginseng , and the supplement phosphatidylserine (PS). So far, though, the evidence is inconsistent as to whether these products can improve memory or thinking. If you are interested in taking herbs and supplements, talk to your doctor first because they may interact with other medicines that you are taking. Exercise Regularly    Among the many benefits of regular exercise are increased blood flow to the brain and decreased risk of certain diseases that can interfere with memory function. One study found that even moderate exercise has a beneficial effect. Examples of \"moderate\" exercise include:   Playing 18 holes of golf once a week, without a cart   Playing tennis twice a week   Walking one mile per day   Manage Stress    It can be tough to remember what is important when your mind is cluttered. Make time for relaxation. Choose activities that calm you down, and make it routine.    Manage Chronic Conditions    Side effects of high blood pressure , diabetes, and heart disease can interfere with mental function. Many of the lifestyle steps discussed here can help manage these conditions. Strive to eat a healthy diet, exercise regularly, get stress under control, and follow your doctor's advice for your condition. Minimize Medications    Talk to your doctor about the medicines that you take. Some may be unnecessary. Also, healthy lifestyle habits may lower the need for certain drugs.      Last Reviewed: April 2010 Zion Siegel MD   Updated: 4/13/2010   ·

## 2022-06-02 NOTE — PROGRESS NOTES
Visit Information    Have you changed or started any medications since your last visit including any over-the-counter medicines, vitamins, or herbal medicines? no   Have you stopped taking any of your medications? Is so, why? -  no  Are you having any side effects from any of your medications? - no    Have you seen any other physician or provider since your last visit?  no   Have you had any other diagnostic tests since your last visit?  no   Have you been seen in the emergency room and/or had an admission in a hospital since we last saw you?  no   Have you had your routine dental cleaning in the past 6 months?  yes -      Do you have an active MyChart account? If no, what is the barrier?   Yes    Patient Care Team:  Dale Rasmussen MD as PCP - General (Family Medicine)  Dale Rasmussen MD as PCP - [facility][1]  Ross Leslie (Optometry)  Wing Wesley MD as Surgeon (Cardiology)  Paula Hurst MD as Consulting Physician (Urology)  Shannan Santana MD as Consulting Physician (Gastroenterology)  Rowan Mcnally MD as Surgeon (Ophthalmology)  John Jackson (Certified Nurse Practitioner)  Cici Bhatti MD as Consulting Physician (Oncology)  Yaya Bravo DPM as Consulting Physician (Podiatry)    Medical History Review  Past Medical, Family, and Social History reviewed and does contribute to the patient presenting condition    Health Maintenance   Topic Date Due    Annual Wellness Visit (AWV)  06/03/2022    Depression Screen  02/24/2023    Lipids  05/31/2023    DTaP/Tdap/Td vaccine (2 - Td or Tdap) 09/25/2030    Flu vaccine  Completed    Shingles vaccine  Completed    Pneumococcal 65+ years Vaccine  Completed    COVID-19 Vaccine  Completed    Hepatitis A vaccine  Aged Out    Hib vaccine  Aged Out    Meningococcal (ACWY) vaccine  Aged Out

## 2022-06-02 NOTE — PROGRESS NOTES
Medicare Annual Wellness Visit    Clare Sample is here for Medicare AWV, Other (he was trimming his nose hairs and cut himself in his nose and it bled a lot, he had low hemoglobin), and Other (-0 yomimaryanne #[id number])    Assessment & Plan   Medicare annual wellness visit, subsequent  Vitamin D deficiency  worsening    -   start  vitamin D (CHOLECALCIFEROL) 50 MCG (2000 UT) TABS tablet; Take 1 tablet by mouth daily Ok to continue multivitamin, Disp-90 tablet, R-3Normal  Okay to continue multivitamin  Hypomagnesemia  Ongoing  He just started magnesium 250 mg, denies heartburn  Recheck labs in 1 week  -     Magnesium; Future  -     Basic Metabolic Panel; Future  Essential hypertension  Well controlled. Continue current treatment. Will recheck labs. -     Magnesium; Future  -     Basic Metabolic Panel; Future  -     Urinalysis with Reflex to Culture; Future  Iron deficiency anemia, unspecified iron deficiency anemia type  Worsening  he benefits from iron infusion, order placed  FERRIC CARBOXYMALTOSE Rosemary Henning)  Also follow-up with hematologist oncologist      Recommendations for Preventive Services Due: see orders and patient instructions/AVS.  Recommended screening schedule for the next 5-10 years is provided to the patient in written form: see Patient Instructions/AVS.    Future Appointments   Date Time Provider Toy Watts   7/15/2022  1:00 PM Allison Voss MD [address] [place] [address]   10/14/2022  3:15 PM Janet Hoff MD [facility]   6/2/2023  3:30 PM Janet Hoff MD [facility]ons          Return in 1 year (on 6/2/2023) for Medicare Annual Wellness Visit in 1 year. Subjective   The following chronic problems were also addressed today:    Comes with his daughter in law, [place]. We also used , Sissy Gilbert #[id number]  He does understand [place] and he can express himself in [place] and simple sentences. Patient says everything hurts, legs, shoulders, head.   He says he is getting short of breath when climbing up the stairs, when he reaches the last step. He denies feeling like passing out or fainting. He does have moderate aortic stenosis and he refused to see cardiologist anymore. Magnesium is low    Lab Results   Component Value Date    MG 1.4 05/31/2022     Corbin has Vitamin D deficiency. Ct [place]  is  taking Vitamin D supplementation from the multivitamin. he feels tired. Lab Results   Component Value Date    VITD25 28.4 (L) 05/31/2022     I also told him anemia is slightly worsening. He also trimmed his nose hair and  cut himself in the nose and he bled a lot he says. This was a few days ago. He does have appointment with hematologist oncologist in 6 weeks. He is agreeable finally to get another iron infusion which he has gotten last time on 7/31/2020 due to iron malabsorption, iron deficiency anemia secondary to intake. He has been on ferrous sulfate before. Lab Results   Component Value Date    WBC 4.9 05/31/2022    HGB 10.3 (L) 05/31/2022    HCT 31.6 (L) 05/31/2022    MCV 96.2 05/31/2022     (L) 05/31/2022     Hypertension:    he  is not exercising and is adherent to low salt diet. Blood pressure is well controlled at home. Cardiac symptoms dyspnea and fatigue. Patient denies chest pain, chest pressure/discomfort, claudication, exertional chest pressure/discomfort, irregular heart beat, lower extremity edema, near-syncope, orthopnea, palpitations, paroxysmal nocturnal dyspnea, syncope and tachypnea. Cardiovascular risk factors: advanced age (older than 54 for men, 72 for women), diabetes mellitus, dyslipidemia, hypertension, male gender and sedentary lifestyle. Use of agents associated with hypertension: thyroid hormones. History of target organ damage: left ventricular hypertrophy.         BP Readings from Last 3 Encounters:   06/02/22 (!) 122/58   02/24/22 117/60   10/14/21 128/72          Patient's complete Health Risk Assessment and screening values have been reviewed and are found in Flowsheets. The following problems were reviewed today and where indicated follow up appointments were made and/or referrals ordered. Positive Risk Factor Screenings with Interventions:    Fall Risk:  Do you feel unsteady or are you worried about falling? : (!) yes  2 or more falls in past year?: (!) yes  Fall with injury in past year?: no     Fall Risk Interventions:    · Home safety tips provided  · Home exercises provided to promote strength and balance  · Patient declines any further evaluation/treatment for this issue              General Health and ACP:  General  In general, how would you say your health is?: Good  In the past 7 days, have you experienced any of the following: New or Increased Pain, New or Increased Fatigue, Loneliness, Social Isolation, Stress or Anger?: No  Do you get the social and emotional support that you need?: (!) No  Do you have a Living Will?: (!) No    Advance Directives     Power of  Living Will ACP-Advance Directive ACP-Power of     Not on File Not on File Not on File Not on File      General Health Risk Interventions:  · Inadequate social/emotional support: patient declines any further intervention for this issue, His son was recently diagnosed with tongue cancer, being very tired, having feeding tube, it is affecting him a lot.   · No Living Will: Advance Care Planning addressed with patient today and I explained to him and his daughter-in-law, they wanted a paperwork package, they will discuss with his son at home    Health Habits/Nutrition:     Physical Activity: Inactive    Days of Exercise per Week: 0 days    Minutes of Exercise per Session: 0 min     Have you lost any weight without trying in the past 3 months?: No  Body mass index: (!) 26.62  Have you seen the dentist within the past year?: (!) No    Health Habits/Nutrition Interventions:  · Dental exam overdue:  patient declines dental evaluation · BMI mildly high advised to increase activity level and walk more    Hearing/Vision:  Do you or your family notice any trouble with your hearing that hasn't been managed with hearing aids?: (!) Yes  Do you have difficulty driving, watching TV, or doing any of your daily activities because of your eyesight?: No  Have you had an eye exam within the past year?: Yes   Visual Acuity Screening    Right eye Left eye Both eyes   Without correction:      With correction: 20/25 20/20 20/40     Hearing/Vision Interventions:  · Hearing concerns:  patient declines any further evaluation/treatment for hearing issues     ADLs:  In the past 7 days, did you need help from others to perform any of the following everyday activities: Eating, dressing, grooming, bathing, toileting, or walking/balance?: No  In the past 7 days, did you need help from others to take care of any of the following: Laundry, housekeeping, banking/finances, shopping, telephone use, food preparation, transportation, or taking medications?: (!) Yes  Select all that apply: (!) Laundry,Banking/Finances,Food Preparation,Transportation    ADL Interventions:  · Patient declines any further evaluation/treatment for this issue, his daughter-in-law and his son have been helping him. He does not drive. Does not cook. He does not do laundry or banking. Objective   Vitals:    06/02/22 1528   BP: (!) 122/58   Pulse: 74   Temp: 97.8 °F (36.6 °C)   SpO2: 98%   Weight: 170 lb (77.1 kg)   Height: 5' 7\" (1.702 m)      Body mass index is 26.63 kg/m².         General Appearance: alert and oriented to person, place and time, well-developed and well-nourished, in no acute distress  ENT: bilateral tympanic membrane normal and hearing grossly normal bilaterally by finger rub  Pulmonary/Chest: clear to auscultation bilaterally- no wheezes, rales or rhonchi, normal air movement, no respiratory distress  Cardiovascular: normal rate, regular rhythm, normal S1 and S2, no JVD and systolic murmur present- 4/6 at 2nd left intercostal space and at 2nd right intercostal space  Abdomen: soft, non-tender, non-distended, normal bowel sounds, no masses or organomegaly  Extremities: no edema bilateral legs  Musculoskeletal: normal range of motion, no joint swelling, deformity or tenderness, 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 No hypersensitivity is noted  Nostril exam, no bleeding, no lesions noted      Allergies   Allergen Reactions    Aspirin      Anemia, hematuria      Sulfa Antibiotics Rash     Prior to Visit Medications    Medication Sig Taking? Authorizing Provider   Magnesium Oxide (MAGNESIUM-OXIDE) 250 MG TABS tablet Take 1 tablet by mouth daily Take with food, in the evening Yes Wilman Jimenez MD   ipratropium (ATROVENT) 0.03 % nasal spray USE 2 SPRAYS IN EACH NOSTRIL THREE TIMES DAILY Yes Wilman Jimenez MD   albuterol sulfate HFA (PROVENTIL HFA) 108 (90 Base) MCG/ACT inhaler Inhale 2 puffs into the lungs every 6 hours as needed for Wheezing or Shortness of Breath (cough) Yes Wilman Jimenez MD   amLODIPine (NORVASC) 5 MG tablet Take 1 tablet by mouth daily Yes Wilman Jimenez MD   levocetirizine (XYZAL) 5 MG tablet Take 1 tablet by mouth nightly For allergies Yes Wilman Jimenez MD   atorvastatin (LIPITOR) 20 MG tablet Take 1 tablet by mouth Daily with supper Yes Wilman Jimenez MD   losartan (COZAAR) 50 MG tablet Take 1 tablet by mouth daily ** stop enalapril** Yes Wilman Jimenez MD   metFORMIN (GLUCOPHAGE-XR) 500 MG extended release tablet TAKE 1 TABLET BY MOUTH ONCE DAILY WITH BREAKFAST Yes Shilpa Delacruz MD   Polyvinyl Alcohol-Povidone PF (REFRESH) 1.4-0.6 % SOLN Apply 2 drops to eye every 3-4 hours as needed (For dry eyes) Okay to substitute. For itchy eyes Yes Wilman Jimenez MD   famotidine (PEPCID) 20 MG tablet TAKE 1 TABLET BY MOUTH TWICE DAILY.  STOP OMEPRAZOLE Yes Nicola Mcdowell MD   levothyroxine (SYNTHROID) 25 MCG tablet Take 1 tablet by mouth every morning (before breakfast) Yes Nicola Mcdowell MD   tamsulosin (FLOMAX) 0.4 MG capsule Take 1 capsule by mouth daily Yes Indiana Lindo MD   docusate sodium (COLACE) 100 MG capsule Take 1 capsule by mouth 2 times daily For constipation Yes Nicola Mcdowell MD   blood glucose test strips (ACCU-CHEK BAILEE PLUS) strip TEST ONCE A DAY, FASTING IN THE MORNING Yes Nicola Mcdowell MD   camphor-menthol-methyl salicylate (BENGAY ULTRA STRENGTH) 4-10-30 % CREA cream Apply topically 3 times daily as needed for Pain Yes Nicola Mcdowell MD   lidocaine (LIDODERM) 5 % Place 1 patch onto the skin daily 12 hours on, 12 hours off. Yes Nicola Mcdowell MD   Blood Glucose Monitoring Suppl (ACCU-CHEK BAILEE PLUS) w/Device KIT USE AS DIRECTED Yes Nicola Mcdowell MD   Accu-Chek FastClix Lancets MISC USE TO TEST BLOOD GLUCOSE ONCE A DAY Yes Nicola Mcdowell MD   Lancets 30G MISC Testing once a day. Please dispense according to patients insurance:  accu check Bailee plus Yes Niocla Mcdowell MD   Lancets Misc. (ACCU-CHEK MULTICLIX LANCET DEV) KIT Please dispense according to patients insurance. Yes Nicola Mcdowell MD   Alcohol Swabs PADS Please dispense according to patients insurance/device.  Testing 1-2 /day Yes Nicola Mcdowell MD       CareTeam (Including outside providers/suppliers regularly involved in providing care):   Patient Care Team:  Nicola Mcdowell MD as PCP - General (Family Medicine)  Nicola Mcdowell [facility]  Maria Elena Burgess (Optometry)  Ayanna Larkin MD as Surgeon (Cardiology)  Indiana Lindo MD as Consulting Physician (Urology)  Shad Lechuga MD as Consulting Physician (Gastroenterology)  Sandeep Garcia MD as Surgeon (Ophthalmology)  Zach Johnson (Certified Nurse Practitioner)  Dany Waldrop MD as Consulting Physician (Oncology)  Robert Corbett DPM as Consulting Physician (Podiatry)     Reviewed and updated this visit:  Tobacco  Allergies  Meds  Problems  Med Hx  Surg Hx  Soc Hx  Fam Hx                    Most recent labs discussed with the patient and his daughter-in-law. Slightly worsening anemia, moderate in severity, thrombocytopenia, which is new. Hyperglycemia controlled. A1c controlled. Low magnesium.   Vitamin D deficiency  Otherwise labs within normal limits      Lab Results   Component Value Date    WBC 4.9 05/31/2022    HGB 10.3 (L) 05/31/2022    HCT 31.6 (L) 05/31/2022    MCV 96.2 05/31/2022     (L) 05/31/2022       Lab Results   Component Value Date     05/31/2022    K 4.1 05/31/2022     05/31/2022    CO2 25 05/31/2022    BUN 20 05/31/2022    CREATININE 1.05 05/31/2022    GLUCOSE 163 05/31/2022    CALCIUM 9.5 05/31/2022      Lab Results   Component Value Date    LABA1C 6.4 02/24/2022    LABA1C 6.2 10/14/2021    LABA1C 6.4 04/29/2021       Lab Results   Component Value Date    ALT 12 05/31/2022    AST 17 05/31/2022    ALKPHOS 66 05/31/2022    BILITOT 0.63 05/31/2022       Lab Results   Component Value Date    TSH 3.47 05/31/2022       Lab Results   Component Value Date    CHOL 150 12/04/2020    CHOL 134 11/08/2019    CHOL 123 10/25/2018     Lab Results   Component Value Date    TRIG 100 12/04/2020    TRIG 78 11/08/2019    TRIG 59 10/25/2018     Lab Results   Component Value Date    HDL 51 05/31/2022    HDL 51 12/04/2020    HDL 49 11/08/2019     Lab Results   Component Value Date    LDLCHOLESTEROL 82 05/31/2022    LDLCHOLESTEROL 79 12/04/2020    LDLCHOLESTEROL 69 11/08/2019       Lab Results   Component Value Date    CHOLHDLRATIO 2.9 05/31/2022    CHOLHDLRATIO 2.9 12/04/2020    CHOLHDLRATIO 2.7 11/08/2019       Lab Results   Component Value Date    BMXLMUPM42 466 05/31/2022       Lab Results   Component Value Date    FOLATE >20.0 05/31/2022         Lab Results   Component Value Date    MG 1.4 05/31/2022      Lab Results Component Value Date    VITD25 28.4 (L) 05/31/2022     Electronically signed by Eliud Young MD on 6/2/22 at 6:22 PM EDT

## 2022-06-03 NOTE — TELEPHONE ENCOUNTER
Noted  Future Appointments   Date Time Provider Toy Watts   7/15/2022  1:00 PM Zhou Carballo MD [address] [place] [address]   10/14/2022  3:15 PM Davina Man MD [facility]   6/2/2023  3:30 PM [facility]

## 2022-06-06 ENCOUNTER — TELEPHONE (OUTPATIENT)
Dept: INFUSION THERAPY | Age: 87
End: 2022-06-06

## 2022-06-10 DIAGNOSIS — E03.9 ACQUIRED HYPOTHYROIDISM: ICD-10-CM

## 2022-06-13 RX ORDER — LEVOTHYROXINE SODIUM 0.03 MG/1
TABLET ORAL
Qty: 90 TABLET | Refills: 3 | Status: SHIPPED | OUTPATIENT
Start: 2022-06-13

## 2022-06-14 ENCOUNTER — TELEPHONE (OUTPATIENT)
Dept: INFUSION THERAPY | Age: 87
End: 2022-06-14

## 2022-06-14 NOTE — TELEPHONE ENCOUNTER
Per pt son, would prefer [facility] for injectafer infusions. Referral sent to [facility].

## 2022-06-22 ENCOUNTER — HOSPITAL ENCOUNTER (OUTPATIENT)
Dept: INFUSION THERAPY | Age: 87
Discharge: HOME | End: 2022-06-22
Payer: MEDICARE

## 2022-06-22 VITALS
RESPIRATION RATE: 16 BRPM | HEART RATE: 54 BPM | TEMPERATURE: 98.1 F | DIASTOLIC BLOOD PRESSURE: 57 MMHG | SYSTOLIC BLOOD PRESSURE: 125 MMHG

## 2022-06-22 DIAGNOSIS — D50.8 IRON DEFICIENCY ANEMIA SECONDARY TO INADEQUATE DIETARY IRON INTAKE: ICD-10-CM

## 2022-06-22 DIAGNOSIS — K90.9 IRON MALABSORPTION: Primary | ICD-10-CM

## 2022-06-22 DIAGNOSIS — D50.8 OTHER IRON DEFICIENCY ANEMIA: ICD-10-CM

## 2022-06-22 PROCEDURE — 96365 THER/PROPH/DIAG IV INF INIT: CPT

## 2022-06-22 PROCEDURE — 6360000002 HC RX W HCPCS: Performed by: FAMILY MEDICINE

## 2022-06-22 PROCEDURE — 2580000003 HC RX 258: Performed by: FAMILY MEDICINE

## 2022-06-22 RX ORDER — ALBUTEROL SULFATE 90 UG/1
4 AEROSOL, METERED RESPIRATORY (INHALATION) PRN
Status: CANCELLED | OUTPATIENT
Start: 2022-06-29

## 2022-06-22 RX ORDER — FAMOTIDINE 10 MG/ML
20 INJECTION, SOLUTION INTRAVENOUS
Status: CANCELLED | OUTPATIENT
Start: 2022-06-29

## 2022-06-22 RX ORDER — HEPARIN SODIUM (PORCINE) LOCK FLUSH IV SOLN 100 UNIT/ML 100 UNIT/ML
500 SOLUTION INTRAVENOUS PRN
Status: CANCELLED | OUTPATIENT
Start: 2022-06-29

## 2022-06-22 RX ORDER — SODIUM CHLORIDE 9 MG/ML
5-250 INJECTION, SOLUTION INTRAVENOUS PRN
Status: CANCELLED | OUTPATIENT
Start: 2022-06-29

## 2022-06-22 RX ORDER — DIPHENHYDRAMINE HYDROCHLORIDE 50 MG/ML
50 INJECTION INTRAMUSCULAR; INTRAVENOUS
Status: CANCELLED | OUTPATIENT
Start: 2022-06-29

## 2022-06-22 RX ORDER — ACETAMINOPHEN 325 MG/1
650 TABLET ORAL
Status: CANCELLED | OUTPATIENT
Start: 2022-06-29

## 2022-06-22 RX ORDER — SODIUM CHLORIDE 9 MG/ML
INJECTION, SOLUTION INTRAVENOUS CONTINUOUS
Status: CANCELLED | OUTPATIENT
Start: 2022-06-29

## 2022-06-22 RX ORDER — SODIUM CHLORIDE 0.9 % (FLUSH) 0.9 %
5-40 SYRINGE (ML) INJECTION PRN
Status: CANCELLED | OUTPATIENT
Start: 2022-06-29

## 2022-06-22 RX ORDER — ONDANSETRON 2 MG/ML
8 INJECTION INTRAMUSCULAR; INTRAVENOUS
Status: CANCELLED | OUTPATIENT
Start: 2022-06-29

## 2022-06-22 RX ORDER — SODIUM CHLORIDE 9 MG/ML
5-250 INJECTION, SOLUTION INTRAVENOUS PRN
Status: DISCONTINUED | OUTPATIENT
Start: 2022-06-22 | End: 2022-06-23 | Stop reason: HOSPADM

## 2022-06-22 RX ORDER — EPINEPHRINE 1 MG/ML
0.3 INJECTION, SOLUTION, CONCENTRATE INTRAVENOUS PRN
Status: CANCELLED | OUTPATIENT
Start: 2022-06-29

## 2022-06-22 RX ADMIN — SODIUM CHLORIDE 150 ML/HR: 9 INJECTION, SOLUTION INTRAVENOUS at 13:54

## 2022-06-22 RX ADMIN — FERRIC CARBOXYMALTOSE INJECTION 750 MG: 50 INJECTION, SOLUTION INTRAVENOUS at 14:09

## 2022-06-22 NOTE — PROGRESS NOTES
Patient here with family for day 1 of 2 injectafer. Denies any issues at this time. Vitals stable. He stayed 1/2 hour observation post treatment. He tolerated treatment well and was discharged home in stable condition with family. He is due to return 6/29 for day 2 of 2 injectafer.

## 2022-06-27 ENCOUNTER — HOSPITAL ENCOUNTER (OUTPATIENT)
Age: 87
Discharge: HOME | End: 2022-06-27
Payer: MEDICARE

## 2022-06-27 DIAGNOSIS — I10 ESSENTIAL HYPERTENSION: ICD-10-CM

## 2022-06-27 DIAGNOSIS — E83.42 HYPOMAGNESEMIA: ICD-10-CM

## 2022-06-27 LAB
ANION GAP SERPL CALCULATED.3IONS-SCNC: 11 MMOL/L (ref 9–17)
BILIRUBIN URINE: NEGATIVE
BUN BLDV-MCNC: 24 MG/DL (ref 8–23)
CALCIUM SERPL-MCNC: 10 MG/DL (ref 8.6–10.4)
CHLORIDE BLD-SCNC: 102 MMOL/L (ref 98–107)
CO2: 26 MMOL/L (ref 20–31)
COLOR: YELLOW
COMMENT UA: ABNORMAL
CREAT SERPL-MCNC: 1.13 MG/DL (ref 0.7–1.2)
GFR AFRICAN AMERICAN: >60 ML/MIN
GFR NON-AFRICAN AMERICAN: >60 ML/MIN
GFR SERPL CREATININE-BSD FRML MDRD: ABNORMAL ML/MIN/{1.73_M2}
GLUCOSE BLD-MCNC: 164 MG/DL (ref 70–99)
GLUCOSE URINE: NEGATIVE
KETONES, URINE: ABNORMAL
LEUKOCYTE ESTERASE, URINE: NEGATIVE
MAGNESIUM: 1.9 MG/DL (ref 1.6–2.6)
NITRITE, URINE: NEGATIVE
PH UA: 5 (ref 5–8)
POTASSIUM SERPL-SCNC: 4.6 MMOL/L (ref 3.7–5.3)
PROTEIN UA: NEGATIVE
SODIUM BLD-SCNC: 139 MMOL/L (ref 135–144)
SPECIFIC GRAVITY UA: 1.01 (ref 1–1.03)
TURBIDITY: CLEAR
URINE HGB: NEGATIVE
UROBILINOGEN, URINE: NORMAL

## 2022-06-27 PROCEDURE — 81003 URINALYSIS AUTO W/O SCOPE: CPT

## 2022-06-27 PROCEDURE — 36415 COLL VENOUS BLD VENIPUNCTURE: CPT

## 2022-06-27 PROCEDURE — 83735 ASSAY OF MAGNESIUM: CPT

## 2022-06-27 PROCEDURE — 80048 BASIC METABOLIC PNL TOTAL CA: CPT

## 2022-06-27 NOTE — RESULT ENCOUNTER NOTE
Please notify patient: Blood glucose mildly high 164 however he was hungry, ketones in the urine  Mild dehydration to make sure to drink 6 x 8 ounce glasses of water every day  Improved magnesium continue the same treatment  Otherwise labs within normal limits  continue current treatment    Future Appointments  2022  2:00 PM    [facility]  [facility]/2022  1:00 PM    Ran Cunningham MD     [address]  10/14/2022 3:15 PM    Lynann Apgar, MD     [facility]               [facility]  2023   3:30 PM    Lynann Apgar, MD     [facility]               [address] [facility]

## 2022-06-29 ENCOUNTER — HOSPITAL ENCOUNTER (OUTPATIENT)
Dept: INFUSION THERAPY | Age: 87
Discharge: HOME | End: 2022-06-29
Payer: MEDICARE

## 2022-06-29 VITALS — SYSTOLIC BLOOD PRESSURE: 112 MMHG | DIASTOLIC BLOOD PRESSURE: 53 MMHG | RESPIRATION RATE: 16 BRPM | HEART RATE: 56 BPM

## 2022-06-29 DIAGNOSIS — K90.9 IRON MALABSORPTION: Primary | ICD-10-CM

## 2022-06-29 DIAGNOSIS — D50.8 IRON DEFICIENCY ANEMIA SECONDARY TO INADEQUATE DIETARY IRON INTAKE: ICD-10-CM

## 2022-06-29 DIAGNOSIS — D50.8 OTHER IRON DEFICIENCY ANEMIA: ICD-10-CM

## 2022-06-29 PROCEDURE — 2580000003 HC RX 258: Performed by: FAMILY MEDICINE

## 2022-06-29 PROCEDURE — 6360000002 HC RX W HCPCS: Performed by: FAMILY MEDICINE

## 2022-06-29 PROCEDURE — 96365 THER/PROPH/DIAG IV INF INIT: CPT

## 2022-06-29 RX ORDER — SODIUM CHLORIDE 0.9 % (FLUSH) 0.9 %
5-40 SYRINGE (ML) INJECTION PRN
OUTPATIENT
Start: 2022-06-29

## 2022-06-29 RX ORDER — DIPHENHYDRAMINE HYDROCHLORIDE 50 MG/ML
50 INJECTION INTRAMUSCULAR; INTRAVENOUS
OUTPATIENT
Start: 2022-06-29

## 2022-06-29 RX ORDER — ONDANSETRON 2 MG/ML
8 INJECTION INTRAMUSCULAR; INTRAVENOUS
OUTPATIENT
Start: 2022-06-29

## 2022-06-29 RX ORDER — EPINEPHRINE 1 MG/ML
0.3 INJECTION, SOLUTION, CONCENTRATE INTRAVENOUS PRN
OUTPATIENT
Start: 2022-06-29

## 2022-06-29 RX ORDER — SODIUM CHLORIDE 9 MG/ML
5-250 INJECTION, SOLUTION INTRAVENOUS PRN
Status: CANCELLED | OUTPATIENT
Start: 2022-06-29

## 2022-06-29 RX ORDER — ALBUTEROL SULFATE 90 UG/1
4 AEROSOL, METERED RESPIRATORY (INHALATION) PRN
OUTPATIENT
Start: 2022-06-29

## 2022-06-29 RX ORDER — SODIUM CHLORIDE 9 MG/ML
INJECTION, SOLUTION INTRAVENOUS CONTINUOUS
OUTPATIENT
Start: 2022-06-29

## 2022-06-29 RX ORDER — ACETAMINOPHEN 325 MG/1
650 TABLET ORAL
OUTPATIENT
Start: 2022-06-29

## 2022-06-29 RX ORDER — HEPARIN SODIUM (PORCINE) LOCK FLUSH IV SOLN 100 UNIT/ML 100 UNIT/ML
500 SOLUTION INTRAVENOUS PRN
OUTPATIENT
Start: 2022-06-29

## 2022-06-29 RX ORDER — SODIUM CHLORIDE 9 MG/ML
5-250 INJECTION, SOLUTION INTRAVENOUS PRN
OUTPATIENT
Start: 2022-06-29

## 2022-06-29 RX ORDER — SODIUM CHLORIDE 9 MG/ML
5-250 INJECTION, SOLUTION INTRAVENOUS PRN
Status: DISCONTINUED | OUTPATIENT
Start: 2022-06-29 | End: 2022-06-30 | Stop reason: HOSPADM

## 2022-06-29 RX ORDER — FAMOTIDINE 10 MG/ML
20 INJECTION, SOLUTION INTRAVENOUS
OUTPATIENT
Start: 2022-06-29

## 2022-06-29 RX ADMIN — FERRIC CARBOXYMALTOSE INJECTION 750 MG: 50 INJECTION, SOLUTION INTRAVENOUS at 14:18

## 2022-06-29 RX ADMIN — SODIUM CHLORIDE 250 ML/HR: 9 INJECTION, SOLUTION INTRAVENOUS at 14:17

## 2022-06-29 NOTE — PROGRESS NOTES
Pt here for 2 of 2 Injectafer infusions. Infusion complete without incident. Pt d/c'd in stable condition. Returns 7-15-22 for MD f/u.

## 2022-07-08 DIAGNOSIS — D50.8 IRON DEFICIENCY ANEMIA SECONDARY TO INADEQUATE DIETARY IRON INTAKE: Primary | ICD-10-CM

## 2022-07-13 ENCOUNTER — HOSPITAL ENCOUNTER (OUTPATIENT)
Age: 87
Discharge: HOME | End: 2022-07-13
Payer: MEDICARE

## 2022-07-13 DIAGNOSIS — D50.8 IRON DEFICIENCY ANEMIA SECONDARY TO INADEQUATE DIETARY IRON INTAKE: ICD-10-CM

## 2022-07-13 LAB
ABSOLUTE EOS #: 0.1 K/UL (ref 0–0.4)
ABSOLUTE LYMPH #: 1.6 K/UL (ref 1–4.8)
ABSOLUTE MONO #: 0.3 K/UL (ref 0.1–1.3)
ALBUMIN SERPL-MCNC: 4 G/DL (ref 3.5–5.2)
ALP BLD-CCNC: 70 U/L (ref 40–129)
ALT SERPL-CCNC: 22 U/L (ref 5–41)
ANION GAP SERPL CALCULATED.3IONS-SCNC: 9 MMOL/L (ref 9–17)
AST SERPL-CCNC: 25 U/L
BASOPHILS # BLD: 1 % (ref 0–2)
BASOPHILS ABSOLUTE: 0 K/UL (ref 0–0.2)
BILIRUB SERPL-MCNC: 0.54 MG/DL (ref 0.3–1.2)
BUN BLDV-MCNC: 25 MG/DL (ref 8–23)
CALCIUM SERPL-MCNC: 9.6 MG/DL (ref 8.6–10.4)
CHLORIDE BLD-SCNC: 102 MMOL/L (ref 98–107)
CO2: 26 MMOL/L (ref 20–31)
CREAT SERPL-MCNC: 1.04 MG/DL (ref 0.7–1.2)
EOSINOPHILS RELATIVE PERCENT: 1 % (ref 0–4)
FERRITIN: 2168 NG/ML (ref 30–400)
FOLATE: >20 NG/ML
GFR AFRICAN AMERICAN: >60 ML/MIN
GFR NON-AFRICAN AMERICAN: >60 ML/MIN
GFR SERPL CREATININE-BSD FRML MDRD: ABNORMAL ML/MIN/{1.73_M2}
GLUCOSE BLD-MCNC: 168 MG/DL (ref 70–99)
HCT VFR BLD CALC: 33.5 % (ref 41–53)
HEMOGLOBIN: 11.1 G/DL (ref 13.5–17.5)
LYMPHOCYTES # BLD: 36 % (ref 24–44)
MCH RBC QN AUTO: 32.4 PG (ref 26–34)
MCHC RBC AUTO-ENTMCNC: 33.2 G/DL (ref 31–37)
MCV RBC AUTO: 97.7 FL (ref 80–100)
MONOCYTES # BLD: 8 % (ref 1–7)
PDW BLD-RTO: 14.7 % (ref 11.5–14.9)
PLATELET # BLD: 127 K/UL (ref 150–450)
PMV BLD AUTO: 7.9 FL (ref 6–12)
POTASSIUM SERPL-SCNC: 4.4 MMOL/L (ref 3.7–5.3)
PROSTATE SPECIFIC ANTIGEN: 0.48 NG/ML
RBC # BLD: 3.43 M/UL (ref 4.5–5.9)
SEG NEUTROPHILS: 54 % (ref 36–66)
SEGMENTED NEUTROPHILS ABSOLUTE COUNT: 2.4 K/UL (ref 1.3–9.1)
SODIUM BLD-SCNC: 137 MMOL/L (ref 135–144)
TOTAL PROTEIN: 7.2 G/DL (ref 6.4–8.3)
VITAMIN B-12: 453 PG/ML (ref 232–1245)
WBC # BLD: 4.4 K/UL (ref 3.5–11)

## 2022-07-13 PROCEDURE — 84153 ASSAY OF PSA TOTAL: CPT

## 2022-07-13 PROCEDURE — 36415 COLL VENOUS BLD VENIPUNCTURE: CPT

## 2022-07-13 PROCEDURE — 82746 ASSAY OF FOLIC ACID SERUM: CPT

## 2022-07-13 PROCEDURE — 80053 COMPREHEN METABOLIC PANEL: CPT

## 2022-07-13 PROCEDURE — 85025 COMPLETE CBC W/AUTO DIFF WBC: CPT

## 2022-07-13 PROCEDURE — 82728 ASSAY OF FERRITIN: CPT

## 2022-07-13 PROCEDURE — 82607 VITAMIN B-12: CPT

## 2022-07-15 ENCOUNTER — TELEPHONE (OUTPATIENT)
Dept: ONCOLOGY | Age: 87
End: 2022-07-15

## 2022-07-15 ENCOUNTER — OFFICE VISIT (OUTPATIENT)
Dept: ONCOLOGY | Age: 87
End: 2022-07-15
Payer: MEDICARE

## 2022-07-15 VITALS
SYSTOLIC BLOOD PRESSURE: 139 MMHG | WEIGHT: 166.6 LBS | TEMPERATURE: 97.5 F | DIASTOLIC BLOOD PRESSURE: 65 MMHG | BODY MASS INDEX: 26.09 KG/M2 | HEART RATE: 83 BPM

## 2022-07-15 DIAGNOSIS — D64.9 NORMOCYTIC ANEMIA: Primary | ICD-10-CM

## 2022-07-15 DIAGNOSIS — E78.5 HYPERLIPIDEMIA WITH TARGET LDL LESS THAN 70: ICD-10-CM

## 2022-07-15 DIAGNOSIS — D50.8 IRON DEFICIENCY ANEMIA SECONDARY TO INADEQUATE DIETARY IRON INTAKE: ICD-10-CM

## 2022-07-15 PROCEDURE — 99211 OFF/OP EST MAY X REQ PHY/QHP: CPT | Performed by: INTERNAL MEDICINE

## 2022-07-15 PROCEDURE — 99214 OFFICE O/P EST MOD 30 MIN: CPT | Performed by: INTERNAL MEDICINE

## 2022-07-15 PROCEDURE — 1123F ACP DISCUSS/DSCN MKR DOCD: CPT | Performed by: INTERNAL MEDICINE

## 2022-07-15 NOTE — TELEPHONE ENCOUNTER
AVS from 07/15/22    RV one year with labs before RV    Doctor's schedule isn't built for next year. AVS in recall drawer.     PT was given AVS   Electronically signed by Chidi Delgadillo on 7/15/2022 at 2:13 PM

## 2022-07-19 NOTE — PROGRESS NOTES
_           Chief Complaint   Patient presents with    Follow-up     Review status of disease    Discuss Labs    Pain     Whole body aches     DIAGNOSIS:       Normocytic anemia with evidence of iron deficiency. Anemia multifactorial, Anemia of chronic renal insufficiency, iron deficiency and possible bone marrow disease. Gastritis with positive H. pylori status post treatment      CURRENT THERAPY:         Oral iron replacement  Recent treatment for H. Pylori  Status post iron dextran January 2018. IV Iron infusion July 2020. BRIEF CASE HISTORY:      Mr. Arcadio Mcdermott is a very pleasant 80 y.o. male with history of multiple comorbidities as stated below. Patient had history of anemia for the last few months and he was maintained on oral iron. He has some GI symptoms and he is unable to tolerates treatment. Patient denies any active bleeding. No melena or hematochezia. No hematemesis. No history of heartburn or acid reflux. No hematuria. No nausea or vomiting. No weight loss or decreased appetite. No fever or lymphadenopathy. He has increasing weakness and fatigue. No other complaints. INTERIM HISTORY:   The patient is seen for follow-up anemia. He had evidence of iron deficiency. He had oral iron. He has significant side effects related to the oral treatment. He cannot tolerate the pills. He continued to have weakness and fatigue. Recent hemoglobin is worse. No active bleeding. He had GI workup which showed gastritis with no active bleeding. Continues to have heartburn and acid reflux. No melena or hematochezia. No other complaints.       PAST MEDICAL HISTORY: has a past medical history of Acute bronchitis due to infection, Allergic rhinitis due to allergen, Anemia, Aortic stenosis, moderate-severe, Chronic kidney disease, Chronic right-sided low back pain with right-sided sciatica, Diabetes ([address] Diabetic nephropathy associated with type 2 diabetes mellitus ([facility] Diastolic dysfunction without heart failure, Gastroesophageal reflux disease without esophagitis, H. pylori infection, Hearing loss, Helicobacter pylori gastritis, Hyperlipidemia, Hypertension, Hypothyroidism, Iron deficiency anemia, LVH (left ventricular hypertrophy) due to hypertensive disease, Osteoarthritis, Pulmonary hypertension ([facility] [address] PVD (peripheral vascular disease) ([facility] and Type 2 diabetes mellitus with diabetic polyneuropathy, with long-term current use of insulin ([facility] PAST SURGICAL HISTORY: has a past surgical history that includes hernia repair; pr egd transoral biopsy single/multiple (N/A, 8/29/2017); pr colon ca scrn not hi rsk ind (N/A, 8/29/2017); Upper gastrointestinal endoscopy (08/30/2017); Colonoscopy (08/29/2017); Cataract removal with implant (Left, 10/03/2017); pr xcapsl ctrc rmvl insj io lens prosth w/o ecp (Left, 10/3/2017); Cataract removal with implant (Right, 03/01/2018); and pr xcapsl ctrc rmvl insj io lens prosth w/o ecp (Right, 3/1/2018). CURRENT MEDICATIONS:  has a current medication list which includes the following prescription(s): levothyroxine, vitamin d, magnesium oxide, ipratropium, albuterol sulfate hfa, amlodipine, levocetirizine, atorvastatin, losartan, metformin, refresh, famotidine, tamsulosin, docusate sodium, accu-chek za plus, camphor-menthol-methyl salicylate, lidocaine, accu-chek za plus, accu-chek fastclix lancets, lancets 30g, accu-chek multiclix lancet dev, and alcohol swabs. ALLERGIES:  is allergic to aspirin and sulfa antibiotics. FAMILY HISTORY: Negative for any hematological or oncological conditions. SOCIAL HISTORY:  reports that he quit smoking about 54 years ago. He has never used smokeless tobacco. He reports that he does not drink alcohol and does not use drugs. REVIEW OF SYSTEMS:     General: He has weakness and fatigue.  No unanticipated weight loss or decreased appetite. No fever or chills. Eyes: No blurred vision, eye pain or double vision. Ears: No hearing problems or drainage. No tinnitus. Throat: No sore throat, problems with swallowing or dysphagia. Respiratory: No cough, sputum or hemoptysis. No shortness of breath. No pleuritic chest pain. Cardiovascular: No chest pain, orthopnea or PND. No lower extremity edema. No palpitation. Gastrointestinal: No problems with swallowing. No abdominal pain or bloating. No nausea or vomiting. No diarrhea or constipation. No GI bleeding. Genitourinary: No dysuria, hematuria, frequency or urgency. Musculoskeletal: No muscle aches or pains. No limitation of movement. No back pain. No gait disturbance, No joint complaints. Dermatologic: No skin rashes or pruritus. No skin lesions or discolorations. Psychiatric: No depression, anxiety, or stress or signs of schizophrenia. No change in mood or affect. Hematologic: No history of bleeding tendency. No bruises or ecchymosis. No history of clotting problems. Infectious disease: No fever, chills or frequent infections. Endocrine: No problems with opacity. No polydipsia or polyuria. No temperature intolerance. Neurologic: No headaches or dizziness. No weakness or numbness of the extremities. No changes in balance, coordination,  memory, mentation, behavior. Allergic/Immunologic: No nasal congestion or hives. No repeated infections. PHYSICAL EXAM:  The patient is not in acute distress. Vital signs: Blood pressure 139/65, pulse 83, temperature 97.5 °F (36.4 °C), temperature source Temporal, weight 166 lb 9.6 oz (75.6 kg). HEENT:  Eyes are normal. Ears, nose and throat are normal.  Neck: Supple. No lymph node enlargement. No thyroid enlargement. Trachea is centrally located. Chest:  Clear to auscultation. No wheezes or crepitations. Heart: Regular sinus rhythm. Abdomen: Soft, nontender. No hepatosplenomegaly. No masses.   Extremities: With no edema. Lymph Nodes:  No cervical, axillary or inguinal lymph node enlargement. Neurologic:  Conscious and oriented. No focal neurological deficits. Psychosocial: No depression, anxiety or stress. Skin: No rashes, bruises or ecchymoses. Review of Diagnostic data:   Lab Results   Component Value Date    WBC 4.4 07/13/2022    HGB 11.1 (L) 07/13/2022    HCT 33.5 (L) 07/13/2022    MCV 97.7 07/13/2022     (L) 07/13/2022       Chemistry        Component Value Date/Time     07/13/2022 1306    K 4.4 07/13/2022 1306     07/13/2022 1306    CO2 26 07/13/2022 1306    BUN 25 (H) 07/13/2022 1306    CREATININE 1.04 07/13/2022 1306        Component Value Date/Time    CALCIUM 9.6 07/13/2022 1306    ALKPHOS 70 07/13/2022 1306    AST 25 07/13/2022 1306    ALT 22 07/13/2022 1306    BILITOT 0.54 07/13/2022 1306        Lab Results   Component Value Date    IRON 78 07/06/2021    TIBC 195 (L) 07/06/2021    FERRITIN 2,168 (H) 07/13/2022     Vitamin B12 and folate are normal.    IMPRESSION:   Normocytic anemia with evidence of iron deficiency. Intolerable side effects to oral iron. Anemia multifactorial, Anemia of chronic renal insufficiency, iron deficiency and possible bone marrow disease. Gastritis with positive H. pylori status post treatment    PLAN:   Obviously patient did not tolerate PO IRON or VITAMIN C. He was treated with IV iron infusion in the past.  His hemoglobin is stable. iron studies are normal. No need for iron infusion. We will continue to monitor. In addition he has evidence of chronic renal insufficiency. And considering his age likely could be having element of MDS. However the present time he has minimal symptoms and I do not see a need to do bone marrow test.  Continue monitoring would be appropriate. He is on ranitidine and omeprazole to be used for GERD and gastritis symptoms.   Recommended follow-up with his gastroenterologist.  We will see him again in 12 months with repeated labs. Patient's questions were answered to the best of his satisfaction and he verbalized full understanding and agreement.

## 2022-08-30 ENCOUNTER — OFFICE VISIT (OUTPATIENT)
Dept: UROLOGY | Age: 87
End: 2022-08-30
Payer: MEDICARE

## 2022-08-30 VITALS — TEMPERATURE: 98 F | HEIGHT: 67 IN | WEIGHT: 166 LBS | BODY MASS INDEX: 26.06 KG/M2

## 2022-08-30 DIAGNOSIS — N40.1 BENIGN PROSTATIC HYPERPLASIA WITH INCOMPLETE BLADDER EMPTYING: Primary | ICD-10-CM

## 2022-08-30 DIAGNOSIS — R39.14 BENIGN PROSTATIC HYPERPLASIA WITH INCOMPLETE BLADDER EMPTYING: Primary | ICD-10-CM

## 2022-08-30 PROCEDURE — 99213 OFFICE O/P EST LOW 20 MIN: CPT | Performed by: UROLOGY

## 2022-08-30 PROCEDURE — 1123F ACP DISCUSS/DSCN MKR DOCD: CPT | Performed by: UROLOGY

## 2022-08-30 RX ORDER — TAMSULOSIN HYDROCHLORIDE 0.4 MG/1
0.4 CAPSULE ORAL DAILY
Qty: 90 CAPSULE | Refills: 3 | Status: SHIPPED | OUTPATIENT
Start: 2022-08-30

## 2022-08-30 ASSESSMENT — ENCOUNTER SYMPTOMS
EYES NEGATIVE: 1
ABDOMINAL PAIN: 1
NAUSEA: 0
VOMITING: 0
SHORTNESS OF BREATH: 0
RESPIRATORY NEGATIVE: 1
EYE REDNESS: 0
CONSTIPATION: 0
EYE PAIN: 0
COUGH: 0
WHEEZING: 0
DIARRHEA: 0
BACK PAIN: 0

## 2022-08-30 NOTE — PROGRESS NOTES
[address] [facility] [id number]  Dept: [address] [facility] Urology Office Note - Established    Patient:  Ferdinand Schwartz  YOB: 1924  Date: 8/30/2022    The patient is a 80 y.o. male who presents todayfor evaluation of the following problems:   Chief Complaint   Patient presents with    1 Year Follow Up       HPI  Here for bph and farrar. Overall doing ok ,some mild luts, and incomplete empthing    Summary of old records: N/A    Additional History: N/A    Procedures Today: N/A    Urinalysis today:  No results found for this visit on 08/30/22. Last several PSA's:  Lab Results   Component Value Date    PSA 0.48 07/13/2022    PSA 0.44 08/05/2021    PSA 0.50 02/17/2017     Last total testosterone:  No results found for: TESTOSTERONE    AUA Symptom Score (8/30/2022):                                Last BUN and creatinine:  Lab Results   Component Value Date    BUN 25 (H) 07/13/2022     Lab Results   Component Value Date    CREATININE 1.04 07/13/2022       Additional Lab/Culture results: none    Imaging Reviewed during this Office Visit: none  (results were independently reviewed by physician and radiology report verified)    PAST MEDICAL, FAMILY AND SOCIAL HISTORY UPDATE:  Past Medical History:   Diagnosis Date    Acute bronchitis due to infection 9/1/2017    Allergic rhinitis due to allergen 7/22/2018    Anemia 1/6/2017    Aortic stenosis, moderate-severe 2/16/2017    Chronic kidney disease     Chronic right-sided low back pain with right-sided sciatica 3/4/2020    Diabetes ([address]     Diabetic nephropathy associated with type 2 diabetes mellitus ([address] 2/79/6379    Diastolic dysfunction without heart failure 2/16/2017    Gastroesophageal reflux disease without esophagitis 12/10/2018    H. pylori infection     Hearing loss     Helicobacter pylori gastritis 8/31/2017    Hyperlipidemia     Hypertension Hypothyroidism     Iron deficiency anemia 2017    LVH (left ventricular hypertrophy) due to hypertensive disease 2017    Osteoarthritis     Pulmonary hypertension ([facility] 2017    RVSP 51 mmHg on 17       PVD (peripheral vascular disease) ([facility] [address]     Type 2 diabetes mellitus with diabetic polyneuropathy, with long-term current use of insulin ([facility] [address] 10/27/2016     Past Surgical History:   Procedure Laterality Date    CATARACT REMOVAL WITH IMPLANT Left 10/03/2017    [facility]/Complex with iris stretching    CATARACT REMOVAL WITH IMPLANT Right 2018    [facility]/Complex with iris stretching    COLONOSCOPY  2017    retained stools    HERNIA REPAIR      NE COLON CA SCRN NOT HI RSK IND N/A 2017     DIAGNOSTIC COLONOSCOPY performed by Irene Narayanan MD at [address] [place] [address] EGD TRANSORAL BIOPSY SINGLE/MULTIPLE N/A 2017    EGD BIOPSY performed by Irene Narayanan MD at [address] W/O ECP Left 10/3/2017    EYE CATARACT EMULSIFICATION IOL IMPLANT performed by Tiff Glass MD at [address] W/O ECP Right 3/1/2018    EYE CATARACT EMUSIFICATION WITH IOL RIGHT performed by Tiff Glass MD at [address] [place] [address]  2017    H Pylori     Family History   Problem Relation Age of Onset    High Blood Pressure Mother     Diabetes Mother     High Blood Pressure Father     Thyroid Disease Father     Thyroid Disease Brother      Outpatient Medications Marked as Taking for the 22 encounter (Office Visit) with Mandi Johnson MD   Medication Sig Dispense Refill    tamsulosin (FLOMAX) 0.4 MG capsule Take 1 capsule by mouth daily 90 capsule 3       Aspirin and Sulfa antibiotics  Social History     Tobacco Use   Smoking Status Former    Types: Cigarettes    Quit date: 10/27/1967    Years since quittin.8   Smokeless Tobacco Never     (Ifpatient a smoker, smoking cessation counseling offered)    Social History     Substance and Sexual Activity   Alcohol Use No       REVIEW OF SYSTEMS:  Review of Systems    Physical Exam:      Vitals:    08/30/22 1528   Temp: 98 °F (36.7 °C)     Body mass index is 26 kg/m². Patient is a 80 y.o. male in no acute distress and alert and oriented to person, place and time. Physical Exam  Constitutional: Patient in no acute distress. Neuro: Alert and oriented to person, place and time. Psych: Mood normal, affect normal  Skin: No rash noted  HEENT: Head: Normocephalic andatraumatic  Conjunctivae and EOM are normal. Pupils are equal, round  Nose:Normal  Right External Ear: Normal; Left External Ear: Normal  Mouth: Mucosa Moist  Neck: Supple  Lungs: Respiratory effort is normal  Cardiovascular: Warm & Pink  Abdomen: Soft, non-tender, non-distended with no CVA,  No flank tenderness,  Or hepatosplenomegaly       Assessment and Plan      1. Benign prostatic hyperplasia with incomplete bladder emptying           Plan:       Return in about 1 year (around 8/30/2023) for Follow up. Prescriptions Ordered:  Orders Placed This Encounter   Medications    tamsulosin (FLOMAX) 0.4 MG capsule     Sig: Take 1 capsule by mouth daily     Dispense:  90 capsule     Refill:  3     Orders Placed:  No orders of the defined types were placed in this encounter. Jamshid Johnson MD    Agree with the ROS entered by the MA.

## 2022-10-14 ENCOUNTER — OFFICE VISIT (OUTPATIENT)
Dept: FAMILY MEDICINE CLINIC | Age: 87
End: 2022-10-14
Payer: MEDICARE

## 2022-10-14 DIAGNOSIS — R10.13 DYSPEPSIA: ICD-10-CM

## 2022-10-14 DIAGNOSIS — E11.42 TYPE 2 DIABETES MELLITUS WITH DIABETIC POLYNEUROPATHY, WITHOUT LONG-TERM CURRENT USE OF INSULIN (HCC): Primary | ICD-10-CM

## 2022-10-14 DIAGNOSIS — E03.9 ACQUIRED HYPOTHYROIDISM: ICD-10-CM

## 2022-10-14 DIAGNOSIS — D69.6 THROMBOCYTOPENIA (HCC): ICD-10-CM

## 2022-10-14 DIAGNOSIS — E78.5 HYPERLIPIDEMIA WITH TARGET LDL LESS THAN 70: ICD-10-CM

## 2022-10-14 DIAGNOSIS — I10 ESSENTIAL HYPERTENSION: ICD-10-CM

## 2022-10-14 DIAGNOSIS — K90.9 IRON MALABSORPTION: ICD-10-CM

## 2022-10-14 DIAGNOSIS — Z23 ENCOUNTER FOR IMMUNIZATION: ICD-10-CM

## 2022-10-14 LAB — HBA1C MFR BLD: 6.1 %

## 2022-10-14 PROCEDURE — 1123F ACP DISCUSS/DSCN MKR DOCD: CPT | Performed by: FAMILY MEDICINE

## 2022-10-14 PROCEDURE — 83036 HEMOGLOBIN GLYCOSYLATED A1C: CPT | Performed by: FAMILY MEDICINE

## 2022-10-14 PROCEDURE — 3044F HG A1C LEVEL LT 7.0%: CPT | Performed by: FAMILY MEDICINE

## 2022-10-14 PROCEDURE — 99214 OFFICE O/P EST MOD 30 MIN: CPT | Performed by: FAMILY MEDICINE

## 2022-10-14 RX ORDER — OMEPRAZOLE 40 MG/1
40 CAPSULE, DELAYED RELEASE ORAL
Qty: 90 CAPSULE | Refills: 2 | Status: SHIPPED | OUTPATIENT
Start: 2022-10-14

## 2022-10-14 ASSESSMENT — ENCOUNTER SYMPTOMS
COUGH: 1
BLOOD IN STOOL: 0
ABDOMINAL DISTENTION: 0
NAUSEA: 0
DIARRHEA: 0
SHORTNESS OF BREATH: 1
ABDOMINAL PAIN: 1
VOMITING: 0
CONSTIPATION: 1
WHEEZING: 0
CHEST TIGHTNESS: 0

## 2022-10-14 NOTE — PROGRESS NOTES
Visit Information    Have you changed or started any medications since your last visit including any over-the-counter medicines, vitamins, or herbal medicines? no   Are you having any side effects from any of your medications? -  no  Have you stopped taking any of your medications? Is so, why? -  no    Have you seen any other physician or provider since your last visit? No  Have you had any other diagnostic tests since your last visit? No  Have you been seen in the emergency room and/or had an admission to a hospital since we last saw you? No  Have you had your routine dental cleaning in the past 6 months? no    Have you activated your [employer] account? If not, what are your barriers?  Yes     Patient Care Team:  Josette Workman MD as PCP - General (Family Medicine)  Josette Workman MD as PCP - [facility]  Valorie Killian (Optometry)  Juve Edwards MD as Surgeon (Cardiology)  Marla Stoll MD as Consulting Physician (Urology)  Toby Holm MD as Consulting Physician (Gastroenterology)  Denise Ortega MD as Surgeon (Ophthalmology)  Freddy Gallagher (Certified Nurse Practitioner)  Avinash Spivey MD as Consulting Physician (Oncology)  Luiz Eden DPM as Consulting Physician (Podiatry)    Medical History Review  Past Medical, Family, and Social History reviewed and does contribute to the patient presenting condition    Health Maintenance   Topic Date Due    Lipids  05/31/2023    Depression Screen  06/02/2023    Annual Wellness Visit (AWV)  06/03/2023    DTaP/Tdap/Td vaccine (2 - Td or Tdap) 09/25/2030    Flu vaccine  Completed    Shingles vaccine  Completed    Pneumococcal 65+ years Vaccine  Completed    COVID-19 Vaccine  Completed    Hepatitis A vaccine  Aged Out    Hib vaccine  Aged Out    Meningococcal (ACWY) vaccine  Aged Out

## 2022-10-14 NOTE — RESULT ENCOUNTER NOTE
Addressed during office visit today, A1c 6.1, well-controlled and improved diabetes, continue treatment recommended during the office visit.

## 2022-10-14 NOTE — PROGRESS NOTES
Destiny Shrestha (:  1924) is a 80 y.o. male,Established patient, here for evaluation of the following chief complaint(s): Hypertension, Diabetes, Abdominal Pain (After he eats ), and Constipation (No BM  in two days - colace when he takes two it upsets his stomach so he has been only taking one )      ASSESSMENT/PLAN:    1. Type 2 diabetes mellitus with diabetic polyneuropathy, without long-term current use of insulin (HCC)  Improved diabetes  -     POCT glycosylated hemoglobin (Hb A1C) 6.1, improved    Lab Results   Component Value Date    LABA1C 6.1 10/14/2022    LABA1C 6.4 2022    LABA1C 6.2 10/14/2021       -     CBC with Auto Differential; Future  -     Comprehensive Metabolic Panel; Future  -     Uric Acid; Future  -     pneumococcal 20-valent conjugat (PREVNAR) 0.5 ML QUENTIN inj; Inject 0.5 mLs into the muscle once for 1 dose On 10/30/2022, Disp-0.5 mL, R-0Normal  To update immunizations    -advised home blood glucose testing  daily  -daily feet exam, Foot care: advised to wash feet daily, pat dry and apply lotion at night, avoiding between toes. Need to look at feet daily and report to a physician any signs of inflammation or skin damage  -annual dilated eye exam  -Low carb, low fat diet, increase fruits and vegetables, and exercise 4-5 times a day 30-40 minutes a day discussed  -continue current treatment metformin  mg daily  -continue ARB losartan and statin Lipitor      2. Essential hypertension  Well controlled. Continue current treatment. Losartan 50 Mg daily  Will recheck labs. Discussed low salt diet and BP and pulse monitoring.    -     CBC with Auto Differential; Future  -     Comprehensive Metabolic Panel; Future  -     Uric Acid; Future  -     Urinalysis with Reflex to Culture; Future  3. Hyperlipidemia with target LDL less than 70  Improved  To continue Lipitor 20 Mg daily   Lab Results   Component Value Date    LDLCHOLESTEROL 82 2022       4.  Dyspepsia  Failing to resolve  Will change Pepcid to omeprazole  Has history of H. pylori, recheck H. Pylori  Smaller portions discussed  -     H. pylori antigen; Future  -     omeprazole (PRILOSEC) 40 MG delayed release capsule; Take 1 capsule by mouth every morning (before breakfast) ** stop pepcid**, Disp-90 capsule, R-2Normal    5. Iron malabsorption  Improved  He recently received iron infusion  Cannot tolerate oral iron  -     CBC with Auto Differential; Future  -     Vitamin B12 & Folate; Future    6. Acquired hypothyroidism  Likely stable  To continue Synthroid 25 MCG daily  Advised to take Synthroid in the morning, on empty stomach, without any other meds and with a full glass of water. Recheck labs  -     T4, Free; Future  -     TSH; Future  7. Encounter for immunization  -     pneumococcal 20-valent conjugat (PREVNAR) 0.5 ML QUENTIN inj; Inject 0.5 mLs into the muscle once for 1 dose On 10/30/2022, Disp-0.5 mL, R-0Normal    8. Thrombocytopenia ([address]  Slightly worse than before, platelets 955 on 6/26/0142, was 145 on 5/31/2022  We will continue to monitor      [place] received counseling on the following healthy behaviors: nutrition, exercise, medication adherence, and falls precautions   Reviewed prior labs and health maintenance  Discussed use, benefit, and side effects of prescribed medications. Barriers to medication compliance addressed. Patient given educational materials - see patient instructions  Was a self-tracking handout given in paper form or via [employer] [facility]? Yes  All patient questions answered. Patient voiced understanding. The patient's past medical,surgical, social, and family history as well as his current medications and allergies were reviewed as documented in today's encounter. Medications, labs, diagnostic studies, consultations and follow-up as documented in this encounter. Return in about 4 months (around 2/14/2023) for Visit type diabetes, **POC A1C, DM2, HTN,HLP, LABS F/U.     Data Unavailable    Future Appointments   Date Time Provider Toy Watts   1/13/2023  2:45 PM Becka Lazar MD fp sc [facility]   6/2/2023  3:30 PM [facility]         SUBJECTIVE/OBJECTIVE:    Comes with his son, Alexei Ledesma. Corbin's  first language is [place] he understands basic [place] and he can express  self in very basic [place]. he declines Ipad        Diabetes Mellitus Type II, Follow-up:    Current symptoms/problems include hyperglycemia, paresthesia of the feet, and visual disturbances. Symptoms have been present for several years. Known diabetic complications: nephropathy and peripheral neuropathy  Cardiovascular risk factors: advanced age (older than 54 for men, 72 for women), diabetes mellitus, dyslipidemia, hypertension, male gender, microalbuminuria, and sedentary lifestyle    Medication compliance:  compliant all of the time  Current diabetic medications include oral agent (monotherapy): Glucophage XR. Eye exam current (within one year): yes  Current diet: in general, a \"healthy\" diet      Barriers: Advanced age    Current monitoring regimen: home blood tests - daily  Home blood sugar records: fasting range:   120, 130, he says most of the times the home blood glucose is below 160  Any episodes of hypoglycemia? no    Is He on ACE inhibitor or angiotensin II receptor blocker? Yes      reports that he quit smoking about 55 years ago. His smoking use included cigarettes. He has never used smokeless tobacco.     Counseling given: Yes      Daily Aspirin? No: Risks of bleeding are higher than the benefits, due to his advanced age and anemia      A1c is improved, well controlled. Lab Results   Component Value Date    LABA1C 6.1 10/14/2022    LABA1C 6.4 02/24/2022    LABA1C 6.2 10/14/2021       Urine microabumin is Elevated.   Lab Results   Component Value Date    LABMICR 106 (H) 11/15/2017        Stable weight  Wt Readings from Last 3 Encounters:   10/14/22 169 lb (76.7 kg)   08/30/22 166 lb (75.3 kg)   07/15/22 166 lb 9.6 oz (75.6 kg)         Hypertension:   Vicente Carrizales  is not  exercising and is adherent to low salt diet. Blood pressure is well controlled at home. Cardiac symptoms  dyspnea and fatigue, at baseline. Patient denies  chest pain, chest pressure/discomfort, claudication, exertional chest pressure/discomfort, irregular heart beat, lower extremity edema, near-syncope, orthopnea, palpitations, paroxysmal nocturnal dyspnea, syncope, and tachypnea. Use of agents associated with hypertension: thyroid hormones. History of target organ damage: left ventricular hypertrophy. Patient has known aortic stenosis, moderate in severity, for many years, has seen cardiologist in the past, he declines further follow-up due to his advanced age  He was told that he will need invasive interventions which he declined at that time. BP controlled. Vicente Carrizales reports compliance with BP medications, and tolerates them well, denies side effects. BP Readings from Last 3 Encounters:   10/14/22 110/60   07/15/22 139/65   06/29/22 (!) 112/53        Pulse is Normal.    Pulse Readings from Last 3 Encounters:   10/14/22 73   07/15/22 83   06/29/22 56         Hyperlipidemia:  No new myalgias or GI upset on atorvastatin (Lipitor). Medication compliance: compliant all of the time. Patient is  following a low fat, low cholesterol diet. LDL is  borderline high . Lab Results   Component Value Date    LDLCHOLESTEROL 82 05/31/2022     Patient reports of intermittent stomach upset and occasional pain. He denies food getting stuck, or pills getting stuck when swallowing them. Sometimes gets upset stomach  Patient's son says he was supposed to have teeth pulled, but could not afford. His son thinks that the bacteria from the teeth is going into his stomach. The patient says, he has been taking good care of his teeth, and he denies any tooth pain or swelling of the mouth.   He says he has good appetite but when food reach the stomach it upsets him. He denies nausea, vomiting. He does get occasional constipation. When taking 2 pills of Colace it upsets his stomach. He thinks Pepcid does not help his stomach upset. He eats a little bit and then it upsets the stomach, so he needs to stop    Got 2 iron infusions for anemia. His son says he has to pay 100s of dollars as his co-pay. dyspnea on exertion at baseline  Patient says there is no coughing when eating  Sometimes gets cough from postnasal drip  Has Sneezing and runny nose everywhere when eating, for many years, nothing helps      Hypothyroidism: Recent symptoms: fatigue, feeling cold and constipation. He denies weight gain, weight loss, cold intolerance, heat intolerance, hair loss, dry skin, diarrhea, edema, anxiety, tremor, palpitations, and dysphagia. Patient is  taking his medication consistently on an empty stomach. TSH is Normal.    No results found for: [facility]  Lab Results   Component Value Date    TSH 3.47 05/31/2022    TSH 4.14 10/19/2021    TSH 4.21 04/22/2021           Prior to Visit Medications    Medication Sig Taking?  Authorizing Provider   tamsulosin (FLOMAX) 0.4 MG capsule Take 1 capsule by mouth daily Yes Maria Dolores Liu MD   levothyroxine (SYNTHROID) 25 MCG tablet TAKE 1 TABLET BY MOUTH EVERY MORNING BEFORE BREAKFAST Yes Gisele Guthrie MD   vitamin D (CHOLECALCIFEROL) 50 MCG (2000 UT) TABS tablet Take 1 tablet by mouth daily Ok to continue multivitamin Yes Gisele Guthrie MD   Magnesium Oxide (MAGNESIUM-OXIDE) 250 MG TABS tablet Take 1 tablet by mouth daily Take with food, in the evening Yes Gisele Guthrie MD   ipratropium (ATROVENT) 0.03 % nasal spray USE 2 SPRAYS IN EACH NOSTRIL THREE TIMES DAILY Yes Gisele Guthrie MD   albuterol sulfate HFA (PROVENTIL HFA) 108 (90 Base) MCG/ACT inhaler Inhale 2 puffs into the lungs every 6 hours as needed for Wheezing or Shortness of Breath (cough) Yes Marion Aldrich MD   Alcohol Swabs PADS Please dispense according to patients insurance/device. Testing 1-2 /day Yes Cata Vásquez MD            Social History     Tobacco Use    Smoking status: Former     Types: Cigarettes     Quit date: 10/27/1967     Years since quittin.0    Smokeless tobacco: Never   Vaping Use    Vaping Use: Never used   Substance Use Topics    Alcohol use: No    Drug use: No         Review of Systems   Constitutional:  Positive for fatigue. Negative for activity change, appetite change, chills, diaphoresis, fever and unexpected weight change. HENT:  Positive for postnasal drip and sneezing. Negative for trouble swallowing. Eyes:  Positive for visual disturbance (stable, chronic). Respiratory:  Positive for cough (less) and shortness of breath (dyspnea at nighttime). Negative for chest tightness and wheezing. Cardiovascular:  Negative for chest pain, palpitations and leg swelling. Gastrointestinal:  Positive for abdominal pain (stomach, on and off) and constipation (better). Negative for abdominal distention, blood in stool, diarrhea, nausea and vomiting. Endocrine: Positive for cold intolerance. Negative for heat intolerance, polydipsia, polyphagia and polyuria. Genitourinary:  Positive for difficulty urinating (better with Flomax). Musculoskeletal:  Positive for arthralgias (knees, chronic) and gait problem (sometimes using cane). Allergic/Immunologic: Positive for environmental allergies and immunocompromised state (due to advanced age). Neurological:  Positive for numbness (feet). Negative for tremors. Hematological:  Does not bruise/bleed easily.       -vital signs stable and within normal limits except overweight per BMI  /60   Pulse 73   Temp 97.8 °F (36.6 °C)   Ht 5' 7\" (1.702 m)   Wt 169 lb (76.7 kg)   SpO2 98%   BMI 26.47 kg/m²         Physical Exam  Vitals and nursing note reviewed.    Constitutional:       General: He is not in acute distress. Appearance: Normal appearance. He is well-developed and normal weight. He is not diaphoretic. HENT:      Head: Normocephalic. Right Ear: External ear normal.      Left Ear: External ear normal.      Ears:      Comments: Wears hearing aids     Mouth/Throat:      Comments: I did not examine the mouth due to coronavirus pandemic and wearing masks    Eyes:      General: Lids are normal. No scleral icterus. Right eye: No discharge. Left eye: No discharge. Extraocular Movements: Extraocular movements intact. Conjunctiva/sclera: Conjunctivae normal.      Right eye: Right conjunctiva is not injected. Left eye: Left conjunctiva is not injected. Neck:      Thyroid: No thyromegaly. Cardiovascular:      Rate and Rhythm: Normal rate and regular rhythm. Heart sounds: Murmur heard. Crescendo systolic murmur is present with a grade of 4/6. Pulmonary:      Effort: Pulmonary effort is normal. No respiratory distress. Breath sounds: Normal breath sounds. No wheezing or rales. Chest:      Chest wall: No tenderness. Abdominal:      General: Bowel sounds are normal. There is distension. Palpations: Abdomen is soft. There is no hepatomegaly or splenomegaly. Tenderness: There is no abdominal tenderness. Comments: Bloated, but no abdominal tenderness   Musculoskeletal:      Cervical back: Normal range of motion and neck supple. Right lower leg: No edema. Left lower leg: No edema. Comments: Up and go test more than 12 seconds. High risk for falls. He declines physical therapy. Lymphadenopathy:      Cervical: No cervical adenopathy. Skin:     General: Skin is warm and dry. Capillary Refill: Capillary refill takes less than 2 seconds. Neurological:      Mental Status: He is alert and oriented to person, place, and time. Deep Tendon Reflexes: Reflexes are normal and symmetric.    Psychiatric:         Mood and Affect: Mood normal.         Behavior: Behavior normal.         Thought Content: Thought content normal.         Judgment: Judgment normal.         I personally reviewed testing with patient. Hyperglycemia  BUN borderline high  Anemia improved  Mild thrombocytopenia slightly worse than before  Vitamin D deficiency  Otherwise labs within normal limits    Hospital Outpatient Visit on 07/13/2022   Component Date Value Ref Range Status    Ferritin 07/13/2022 2,168 (A)  30 - 400 ng/mL Final    Vitamin B-12 07/13/2022 453  232 - 1245 pg/mL Final    Folate 07/13/2022 >20.0  >4.8 ng/mL Final    Glucose 07/13/2022 168 (A)  70 - 99 mg/dL Final    BUN 07/13/2022 25 (A)  8 - 23 mg/dL Final    Creatinine 07/13/2022 1.04  0.70 - 1.20 mg/dL Final    Calcium 07/13/2022 9.6  8.6 - 10.4 mg/dL Final    Sodium 07/13/2022 137  135 - 144 mmol/L Final    Potassium 07/13/2022 4.4  3.7 - 5.3 mmol/L Final    Chloride 07/13/2022 102  98 - 107 mmol/L Final    CO2 07/13/2022 26  20 - 31 mmol/L Final    Anion Gap 07/13/2022 9  9 - 17 mmol/L Final    Alkaline Phosphatase 07/13/2022 70  40 - 129 U/L Final    ALT 07/13/2022 22  5 - 41 U/L Final    AST 07/13/2022 25  <40 U/L Final    Total Bilirubin 07/13/2022 0.54  0.3 - 1.2 mg/dL Final    Total Protein 07/13/2022 7.2  6.4 - 8.3 g/dL Final    Albumin 07/13/2022 4.0  3.5 - 5.2 g/dL Final    GFR Non- 07/13/2022 >60  >60 mL/min Final    GFR  07/13/2022 >60  >60 mL/min Final    GFR Comment 07/13/2022        Final    Comment: Average GFR for 79or more years old:   76 mL/min/1.73sq m  Chronic Kidney Disease:   <60 mL/min/1.73sq m  Kidney failure:   <15 mL/min/1.73sq m              eGFR calculated using average adult body mass.  Additional eGFR calculator available at:        [employer]            WBC 07/13/2022 4.4  3.5 - 11.0 k/uL Final    RBC 07/13/2022 3.43 (A)  4.5 - 5.9 m/uL Final    Hemoglobin 07/13/2022 11.1 (A)  13.5 - 17.5 g/dL Final Hematocrit 07/13/2022 33.5 (A)  41 - 53 % Final    MCV 07/13/2022 97.7  80 - 100 fL Final    MCH 07/13/2022 32.4  26 - 34 pg Final    MCHC 07/13/2022 33.2  31 - 37 g/dL Final    RDW 07/13/2022 14.7  11.5 - 14.9 % Final    Platelets 45/33/5014 127 (A)  150 - 450 k/uL Final    MPV 07/13/2022 7.9  6.0 - 12.0 fL Final    Seg Neutrophils 07/13/2022 54  36 - 66 % Final    Lymphocytes 07/13/2022 36  24 - 44 % Final    Monocytes 07/13/2022 8 (A)  1 - 7 % Final    Eosinophils % 07/13/2022 1  0 - 4 % Final    Basophils 07/13/2022 1  0 - 2 % Final    Segs Absolute 07/13/2022 2.40  1.3 - 9.1 k/uL Final    Absolute Lymph # 07/13/2022 1.60  1.0 - 4.8 k/uL Final    Absolute Mono # 07/13/2022 0.30  0.1 - 1.3 k/uL Final    Absolute Eos # 07/13/2022 0.10  0.0 - 0.4 k/uL Final    Basophils Absolute 07/13/2022 0.00  0.0 - 0.2 k/uL Final    PSA 07/13/2022 0.48  <4.1 ng/mL Final    Comment: The Roche \"ECLIA\" assay is used. Results obtained with different assay methods cannot be   used interchangeably.                Lab Results   Component Value Date    TSH 3.47 05/31/2022       Lab Results   Component Value Date    CHOL 150 12/04/2020    CHOL 134 11/08/2019    CHOL 123 10/25/2018     Lab Results   Component Value Date    TRIG 100 12/04/2020    TRIG 78 11/08/2019    TRIG 59 10/25/2018     Lab Results   Component Value Date    HDL 51 05/31/2022    HDL 51 12/04/2020    HDL 49 11/08/2019     Lab Results   Component Value Date    LDLCHOLESTEROL 82 05/31/2022    LDLCHOLESTEROL 79 12/04/2020    LDLCHOLESTEROL 69 11/08/2019     Lab Results   Component Value Date    CHOLHDLRATIO 2.9 05/31/2022    CHOLHDLRATIO 2.9 12/04/2020    CHOLHDLRATIO 2.7 11/08/2019         Lab Results   Component Value Date    WQCLUJXW99 453 07/13/2022     Lab Results   Component Value Date    FOLATE >20.0 07/13/2022     Lab Results   Component Value Date    VITD25 28.4 (L) 05/31/2022         Orders Placed This Encounter   Medications    omeprazole (PRILOSEC) 40 MG delayed release capsule     Sig: Take 1 capsule by mouth every morning (before breakfast) ** stop pepcid**     Dispense:  90 capsule     Refill:  2    pneumococcal 20-valent conjugat (PREVNAR) 0.5 ML QUENTIN inj     Sig: Inject 0.5 mLs into the muscle once for 1 dose On 10/30/2022     Dispense:  0.5 mL     Refill:  0       Orders Placed This Encounter   Procedures    H. pylori antigen     Standing Status:   Future     Standing Expiration Date:   10/14/2023    CBC with Auto Differential     Standing Status:   Future     Standing Expiration Date:   10/14/2023    Comprehensive Metabolic Panel     Standing Status:   Future     Standing Expiration Date:   12/11/2022    T4, Free     Standing Status:   Future     Standing Expiration Date:   10/14/2023    TSH     Standing Status:   Future     Standing Expiration Date:   10/14/2023    Uric Acid     Standing Status:   Future     Standing Expiration Date:   10/14/2023    Urinalysis with Reflex to Culture     Standing Status:   Future     Standing Expiration Date:   10/14/2023     Order Specific Question:   SPECIFY(EX-CATH,MIDSTREAM,CYSTO,ETC)? Answer:   MIDSTREAM    Vitamin B12 & Folate     Standing Status:   Future     Standing Expiration Date:   12/11/2022    POCT glycosylated hemoglobin (Hb A1C)       There are no discontinued medications. On this date 10/14/2022 I have spent 35 minutes reviewing previous notes, test results and face to face with the patient discussing the diagnosis and importance of compliance with the treatment plan as well as documenting on the day of the visit. This note was completed by using the assistance of a speech-recognition program. However, inadvertent computerized transcription errors may be present. Although every effort was made to ensure accuracy, no guarantees can be provided that every mistake has been identified and corrected by editing . An electronic signature was used to authenticate this note.   Electronically signed by Michelle Talbert MD on 10/15/2022 at 10:19 PM

## 2022-10-15 VITALS
OXYGEN SATURATION: 98 % | HEIGHT: 67 IN | BODY MASS INDEX: 26.53 KG/M2 | SYSTOLIC BLOOD PRESSURE: 110 MMHG | TEMPERATURE: 97.8 F | WEIGHT: 169 LBS | DIASTOLIC BLOOD PRESSURE: 60 MMHG | HEART RATE: 73 BPM

## 2022-10-15 PROBLEM — D69.6 THROMBOCYTOPENIA (HCC): Status: ACTIVE | Noted: 2022-10-15

## 2022-10-15 ASSESSMENT — ENCOUNTER SYMPTOMS: TROUBLE SWALLOWING: 0

## 2022-10-25 ENCOUNTER — HOSPITAL ENCOUNTER (OUTPATIENT)
Age: 87
Discharge: HOME | End: 2022-10-25
Payer: MEDICARE

## 2022-10-25 DIAGNOSIS — E03.9 ACQUIRED HYPOTHYROIDISM: ICD-10-CM

## 2022-10-25 DIAGNOSIS — I10 ESSENTIAL HYPERTENSION: ICD-10-CM

## 2022-10-25 DIAGNOSIS — K90.9 IRON MALABSORPTION: ICD-10-CM

## 2022-10-25 DIAGNOSIS — R10.13 DYSPEPSIA: ICD-10-CM

## 2022-10-25 DIAGNOSIS — E11.42 TYPE 2 DIABETES MELLITUS WITH DIABETIC POLYNEUROPATHY, WITHOUT LONG-TERM CURRENT USE OF INSULIN (HCC): ICD-10-CM

## 2022-10-25 LAB
ABSOLUTE EOS #: 0.1 K/UL (ref 0–0.4)
ABSOLUTE LYMPH #: 2 K/UL (ref 1–4.8)
ABSOLUTE MONO #: 0.4 K/UL (ref 0.1–1.3)
ALBUMIN SERPL-MCNC: 4.1 G/DL (ref 3.5–5.2)
ALP BLD-CCNC: 72 U/L (ref 40–129)
ALT SERPL-CCNC: 15 U/L (ref 5–41)
ANION GAP SERPL CALCULATED.3IONS-SCNC: 11 MMOL/L (ref 9–17)
AST SERPL-CCNC: 20 U/L
BASOPHILS # BLD: 0 % (ref 0–2)
BASOPHILS ABSOLUTE: 0 K/UL (ref 0–0.2)
BILIRUB SERPL-MCNC: 0.5 MG/DL (ref 0.3–1.2)
BILIRUBIN URINE: NEGATIVE
BUN BLDV-MCNC: 23 MG/DL (ref 8–23)
CALCIUM SERPL-MCNC: 9 MG/DL (ref 8.6–10.4)
CHLORIDE BLD-SCNC: 102 MMOL/L (ref 98–107)
CO2: 26 MMOL/L (ref 20–31)
COLOR: YELLOW
COMMENT UA: NORMAL
CREAT SERPL-MCNC: 1.08 MG/DL (ref 0.7–1.2)
EOSINOPHILS RELATIVE PERCENT: 2 % (ref 0–4)
GFR SERPL CREATININE-BSD FRML MDRD: >60 ML/MIN/1.73M2
GLUCOSE BLD-MCNC: 153 MG/DL (ref 70–99)
GLUCOSE URINE: NEGATIVE
HCT VFR BLD CALC: 31.6 % (ref 41–53)
HEMOGLOBIN: 10.4 G/DL (ref 13.5–17.5)
KETONES, URINE: NEGATIVE
LEUKOCYTE ESTERASE, URINE: NEGATIVE
LYMPHOCYTES # BLD: 41 % (ref 24–44)
MCH RBC QN AUTO: 32.1 PG (ref 26–34)
MCHC RBC AUTO-ENTMCNC: 32.7 G/DL (ref 31–37)
MCV RBC AUTO: 97.9 FL (ref 80–100)
MONOCYTES # BLD: 9 % (ref 1–7)
NITRITE, URINE: NEGATIVE
PDW BLD-RTO: 14 % (ref 11.5–14.9)
PH UA: 5 (ref 5–8)
PLATELET # BLD: 147 K/UL (ref 150–450)
PMV BLD AUTO: 8.4 FL (ref 6–12)
POTASSIUM SERPL-SCNC: 4.2 MMOL/L (ref 3.7–5.3)
PROTEIN UA: NEGATIVE
RBC # BLD: 3.23 M/UL (ref 4.5–5.9)
SEG NEUTROPHILS: 48 % (ref 36–66)
SEGMENTED NEUTROPHILS ABSOLUTE COUNT: 2.3 K/UL (ref 1.3–9.1)
SODIUM BLD-SCNC: 139 MMOL/L (ref 135–144)
SPECIFIC GRAVITY UA: 1.02 (ref 1–1.03)
THYROXINE, FREE: 0.98 NG/DL (ref 0.93–1.7)
TOTAL PROTEIN: 6.8 G/DL (ref 6.4–8.3)
TSH SERPL DL<=0.05 MIU/L-ACNC: 3.24 UIU/ML (ref 0.3–5)
TURBIDITY: CLEAR
URIC ACID: 4.6 MG/DL (ref 3.4–7)
URINE HGB: NEGATIVE
UROBILINOGEN, URINE: NORMAL
WBC # BLD: 4.9 K/UL (ref 3.5–11)

## 2022-10-25 PROCEDURE — 82746 ASSAY OF FOLIC ACID SERUM: CPT

## 2022-10-25 PROCEDURE — 36415 COLL VENOUS BLD VENIPUNCTURE: CPT

## 2022-10-25 PROCEDURE — 85025 COMPLETE CBC W/AUTO DIFF WBC: CPT

## 2022-10-25 PROCEDURE — 80053 COMPREHEN METABOLIC PANEL: CPT

## 2022-10-25 PROCEDURE — 81003 URINALYSIS AUTO W/O SCOPE: CPT

## 2022-10-25 PROCEDURE — 84439 ASSAY OF FREE THYROXINE: CPT

## 2022-10-25 PROCEDURE — 84443 ASSAY THYROID STIM HORMONE: CPT

## 2022-10-25 PROCEDURE — 82607 VITAMIN B-12: CPT

## 2022-10-25 PROCEDURE — 84550 ASSAY OF BLOOD/URIC ACID: CPT

## 2022-10-26 DIAGNOSIS — D69.6 THROMBOCYTOPENIA (HCC): Primary | ICD-10-CM

## 2022-10-26 DIAGNOSIS — D50.9 IRON DEFICIENCY ANEMIA, UNSPECIFIED IRON DEFICIENCY ANEMIA TYPE: ICD-10-CM

## 2022-10-26 LAB
FOLATE: >20 NG/ML
VITAMIN B-12: 503 PG/ML (ref 232–1245)

## 2022-10-26 NOTE — RESULT ENCOUNTER NOTE
Please notify patient: Patient did drop his hemoglobin again  Blood glucose 153 mildly high, but good control of diabetes  Hemoglobin is 10.4 was 11.13 months ago, repeat CBC with differential in 1 week, new order placed, not fasting.   Platelets are improved  Otherwise labs within normal limits  continue current treatment    Future Appointments  1/13/2023  2:45 PM    Erum Blake MD     [facility]               [facility]  6/2/2023   3:30 PM    Erum Blake MD     [facility]               [address] [facility]

## 2022-10-27 ENCOUNTER — HOSPITAL ENCOUNTER (OUTPATIENT)
Age: 87
Setting detail: SPECIMEN
Discharge: HOME | End: 2022-10-27
Payer: MEDICARE

## 2022-10-27 PROCEDURE — 87338 HPYLORI STOOL AG IA: CPT

## 2022-10-28 LAB
DIRECT EXAM: NEGATIVE
SPECIMEN DESCRIPTION: NORMAL

## 2022-10-28 NOTE — RESULT ENCOUNTER NOTE
Please notify patient: Negative H. Pylori  If persistent, issues, we can stop metformin completely, and improve low-carb diet and see if stomach issues get better      Future Appointments  1/13/2023  2:45 PM    Becka Lazar MD     [facility]               [facility]  6/2/2023   3:30 PM    Becka Lazar MD     [facility]               [address]

## 2022-11-15 ENCOUNTER — APPOINTMENT (OUTPATIENT)
Dept: GENERAL RADIOLOGY | Age: 87
End: 2022-11-15
Payer: MEDICARE

## 2022-11-15 ENCOUNTER — HOSPITAL ENCOUNTER (EMERGENCY)
Age: 87
Discharge: HOME | End: 2022-11-15
Attending: STUDENT IN AN ORGANIZED HEALTH CARE EDUCATION/TRAINING PROGRAM
Payer: MEDICARE

## 2022-11-15 ENCOUNTER — APPOINTMENT (OUTPATIENT)
Dept: CT IMAGING | Age: 87
End: 2022-11-15
Payer: MEDICARE

## 2022-11-15 VITALS
RESPIRATION RATE: 18 BRPM | BODY MASS INDEX: 24.59 KG/M2 | WEIGHT: 166 LBS | TEMPERATURE: 97.8 F | HEART RATE: 62 BPM | OXYGEN SATURATION: 99 % | SYSTOLIC BLOOD PRESSURE: 144 MMHG | HEIGHT: 69 IN | DIASTOLIC BLOOD PRESSURE: 57 MMHG
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
## 2022-11-15 DIAGNOSIS — R55 NEAR SYNCOPE: ICD-10-CM

## 2022-11-15 LAB
ABSOLUTE EOS #: 0.1 K/UL (ref 0–0.4)
ABSOLUTE LYMPH #: 1.4 K/UL (ref 1–4.8)
ABSOLUTE MONO #: 0.4 K/UL (ref 0.1–1.3)
ALBUMIN SERPL-MCNC: 3.7 G/DL (ref 3.5–5.2)
ALP BLD-CCNC: 71 U/L (ref 40–129)
ALT SERPL-CCNC: 16 U/L (ref 5–41)
ANION GAP SERPL CALCULATED.3IONS-SCNC: 8 MMOL/L (ref 9–17)
AST SERPL-CCNC: 20 U/L
BASOPHILS # BLD: 0 % (ref 0–2)
BASOPHILS ABSOLUTE: 0 K/UL (ref 0–0.2)
BILIRUB SERPL-MCNC: 0.5 MG/DL (ref 0.3–1.2)
BUN BLDV-MCNC: 17 MG/DL (ref 8–23)
CALCIUM SERPL-MCNC: 9.5 MG/DL (ref 8.6–10.4)
CHLORIDE BLD-SCNC: 102 MMOL/L (ref 98–107)
CO2: 29 MMOL/L (ref 20–31)
CREAT SERPL-MCNC: 1.01 MG/DL (ref 0.7–1.2)
EOSINOPHILS RELATIVE PERCENT: 1 % (ref 0–4)
GFR SERPL CREATININE-BSD FRML MDRD: >60 ML/MIN/1.73M2
GLUCOSE BLD-MCNC: 168 MG/DL (ref 70–99)
HCT VFR BLD CALC: 30.8 % (ref 41–53)
HEMOGLOBIN: 10.1 G/DL (ref 13.5–17.5)
LYMPHOCYTES # BLD: 25 % (ref 24–44)
MCH RBC QN AUTO: 32.4 PG (ref 26–34)
MCHC RBC AUTO-ENTMCNC: 33 G/DL (ref 31–37)
MCV RBC AUTO: 98.4 FL (ref 80–100)
MONOCYTES # BLD: 8 % (ref 1–7)
PDW BLD-RTO: 14.5 % (ref 11.5–14.9)
PLATELET # BLD: 164 K/UL (ref 150–450)
PMV BLD AUTO: 8.1 FL (ref 6–12)
POTASSIUM SERPL-SCNC: 4.2 MMOL/L (ref 3.7–5.3)
RBC # BLD: 3.12 M/UL (ref 4.5–5.9)
SEG NEUTROPHILS: 66 % (ref 36–66)
SEGMENTED NEUTROPHILS ABSOLUTE COUNT: 3.6 K/UL (ref 1.3–9.1)
SODIUM BLD-SCNC: 139 MMOL/L (ref 135–144)
THYROXINE, FREE: 1.08 NG/DL (ref 0.93–1.7)
TOTAL PROTEIN: 6.8 G/DL (ref 6.4–8.3)
TROPONIN, HIGH SENSITIVITY: 35 NG/L (ref 0–22)
TROPONIN, HIGH SENSITIVITY: 35 NG/L (ref 0–22)
TSH SERPL DL<=0.05 MIU/L-ACNC: 3.06 UIU/ML (ref 0.3–5)
WBC # BLD: 5.5 K/UL (ref 3.5–11)

## 2022-11-15 PROCEDURE — 84439 ASSAY OF FREE THYROXINE: CPT

## 2022-11-15 PROCEDURE — 36415 COLL VENOUS BLD VENIPUNCTURE: CPT

## 2022-11-15 PROCEDURE — 80053 COMPREHEN METABOLIC PANEL: CPT

## 2022-11-15 PROCEDURE — 71045 X-RAY EXAM CHEST 1 VIEW: CPT

## 2022-11-15 PROCEDURE — 93005 ELECTROCARDIOGRAM TRACING: CPT | Performed by: STUDENT IN AN ORGANIZED HEALTH CARE EDUCATION/TRAINING PROGRAM

## 2022-11-15 PROCEDURE — 99285 EMERGENCY DEPT VISIT HI MDM: CPT

## 2022-11-15 PROCEDURE — 70450 CT HEAD/BRAIN W/O DYE: CPT

## 2022-11-15 PROCEDURE — 84484 ASSAY OF TROPONIN QUANT: CPT

## 2022-11-15 PROCEDURE — 84443 ASSAY THYROID STIM HORMONE: CPT

## 2022-11-15 PROCEDURE — 85025 COMPLETE CBC W/AUTO DIFF WBC: CPT

## 2022-11-15 ASSESSMENT — LIFESTYLE VARIABLES
HOW OFTEN DO YOU HAVE A DRINK CONTAINING ALCOHOL: NEVER
HOW MANY STANDARD DRINKS CONTAINING ALCOHOL DO YOU HAVE ON A TYPICAL DAY: PATIENT DOES NOT DRINK

## 2022-11-15 ASSESSMENT — PAIN - FUNCTIONAL ASSESSMENT: PAIN_FUNCTIONAL_ASSESSMENT: 0-10

## 2022-11-15 ASSESSMENT — PAIN DESCRIPTION - ORIENTATION: ORIENTATION: POSTERIOR

## 2022-11-15 ASSESSMENT — ENCOUNTER SYMPTOMS
RHINORRHEA: 0
VOMITING: 0
ABDOMINAL PAIN: 0
BACK PAIN: 0
COLOR CHANGE: 0
NAUSEA: 0
DIARRHEA: 0
SHORTNESS OF BREATH: 0

## 2022-11-15 ASSESSMENT — PAIN SCALES - GENERAL: PAINLEVEL_OUTOF10: 6

## 2022-11-15 ASSESSMENT — PAIN DESCRIPTION - LOCATION: LOCATION: HEAD

## 2022-11-16 ENCOUNTER — CARE COORDINATION (OUTPATIENT)
Dept: CARE COORDINATION | Age: 87
End: 2022-11-16

## 2022-11-16 LAB
EKG ATRIAL RATE: 63 BPM
EKG P AXIS: 46 DEGREES
EKG P-R INTERVAL: 290 MS
EKG Q-T INTERVAL: 406 MS
EKG QRS DURATION: 106 MS
EKG QTC CALCULATION (BAZETT): 415 MS
EKG R AXIS: 5 DEGREES
EKG T AXIS: 124 DEGREES
EKG VENTRICULAR RATE: 63 BPM

## 2022-11-16 PROCEDURE — 93010 ELECTROCARDIOGRAM REPORT: CPT | Performed by: INTERNAL MEDICINE

## 2022-11-16 NOTE — ED TRIAGE NOTES
Mode of arrival (squad #, walk in, police, etc) : walk in        Chief complaint(s): Fall, Hit head no LOC        Arrival Note (brief scenario, treatment PTA, etc). : Patient brought into the ED by son r/t a fall after dizzy spell fell back hit his head no LOC. Patient A&Ox4.        C= \"Have you ever felt that you should Cut down on your drinking? \"  No  A= \"Have people Annoyed you by criticizing your drinking? \"  No  G= \"Have you ever felt bad or Guilty about your drinking? \"  No  E= \"Have you ever had a drink as an Eye-opener first thing in the morning to steady your nerves or to help a hangover? \"  No      Deferred []      Reason for deferring: N/A    *If yes to two or more: probable alcohol abuse. *

## 2022-11-16 NOTE — CARE COORDINATION
Ambulatory Care Coordination  ED Follow up Call    Reason for ED visit:  Fall/Syncope   Status:     improved    Did you call your PCP prior to going to the ED? No      Did you receive a discharge instructions from the Emergency Room? Yes  Review of Instructions:     Understands what to report/when to return?:  Yes   Understands discharge instructions?:  Yes   Following discharge instructions?:  Yes   If not why? Are there any new complaints of pain? No  New Pain Meds? No    Constipation prophylaxis needed? N/A    If you have a wound is the dressing clean, dry, and intact? N/A  Understands wound care regimen? N/A    Are there any other complaints/concerns that you wish to tell your provider? no    FU appts/Provider:    Future Appointments   Date Time Provider Toy Watts   1/13/2023  2:45 PM [facility]   6/2/2023  3:30 PM [facility]           New Medications?:   No      Medication Reconciliation by phone - No  Understands Medications? Yes  Taking Medications? Yes  Can you swallow your pills? No    Any further needs in the home i.e. Equipment? No    Link to services in community?:  No   Which services:    Spoke with patient's son, who states he is doing ok. He is still sleeping, \"He likes to sleep in\". His son states he came home, ate dinner, and went to bed. \"He is sleeping soundly\". Asked if he had checked on his and he stated that he had. Instructed him to call for a follow up with his PCP. Instructed him to return to the ED if any chest pain, shortness of breath, or continued dizziness. He verbalized understanding. He denies any other needs.

## 2022-11-16 NOTE — ED PROVIDER NOTES
EMERGENCY DEPARTMENT ENCOUNTER   ATTENDING ATTESTATION     Pt Name: Romaine Albright  MRN: [id number]  [place] 9/20/1924  Date of evaluation: 11/15/22       Romaine Albright is a 80 y.o. male who presents with Fall    Stood up, felt \"dizzy spell\", fell and hit his head    Syncope workup and likely admission    MDM:     Work-up is largely unremarkable we have recommended admission to the hospital but patient declines    Here with his son    We are using his son as an  as he declined the official  but was offered    He understands the risks of leaving including sudden cardiac death, MI, stroke    He would still like to leave he is alert and oriented and of sound mind    Will follow-up with the primary doctor    Leaving Darci Garcia.:   Vitals:    11/15/22 1901 11/15/22 2030   BP: (!) 150/50 (!) 144/57   Pulse: 65 62   Resp: 16 18   Temp: 97.8 °F (36.6 °C)    TempSrc: Oral    SpO2: 98% 99%   Weight: 166 lb (75.3 kg)    Height: 5' 9\" (1.753 m)          I personally saw and examined the patient. I have reviewed and agree with the resident's findings, including all diagnostic interpretations and treatment plan as written. I was present for the key portions of any procedures performed and the inclusive time noted for any critical care statement. The care is provided during an unprecedented national emergency due to the novel coronavirus, COVID 19.   Boston Luong MD  Attending Emergency Physician           Boston Luong MD  11/15/22 1932

## 2022-11-16 NOTE — ED NOTES
Pt assisted with standing at the side of the bed and using urinal. Pt continent of urine. Pt is steady. Denies any dizziness.      Reid Urrutia RN  11/15/22 [id number]

## 2022-12-15 ENCOUNTER — OFFICE VISIT (OUTPATIENT)
Dept: PODIATRY | Age: 87
End: 2022-12-15

## 2022-12-15 VITALS — WEIGHT: 166 LBS | BODY MASS INDEX: 24.59 KG/M2 | HEIGHT: 69 IN

## 2022-12-15 DIAGNOSIS — M79.674 PAIN IN TOES OF BOTH FEET: ICD-10-CM

## 2022-12-15 DIAGNOSIS — B35.1 ONYCHOMYCOSIS OF TOENAIL: Primary | ICD-10-CM

## 2022-12-15 DIAGNOSIS — E11.42 TYPE 2 DIABETES MELLITUS WITH DIABETIC POLYNEUROPATHY, WITHOUT LONG-TERM CURRENT USE OF INSULIN (HCC): ICD-10-CM

## 2022-12-15 DIAGNOSIS — M79.675 PAIN IN TOES OF BOTH FEET: ICD-10-CM

## 2022-12-21 ASSESSMENT — ENCOUNTER SYMPTOMS
BACK PAIN: 0
DIARRHEA: 0
NAUSEA: 0
COLOR CHANGE: 0
SHORTNESS OF BREATH: 0

## 2022-12-21 NOTE — PROGRESS NOTES
SUBJECTIVE: Haroon Deleon is a 80 y.o. male who returns to the office with chief complaint of painful fungal toenails. Patient relates toe nails are thickened/difficult to trim as well as painful with ambulation and with shoe gear. Chief Complaint   Patient presents with    Nail Problem     Nail trim/last saw Dr.Florentina Delacruz 10/14/22    Diabetes     Blood sugar 137     Review of Systems   Constitutional:  Negative for activity change, appetite change, chills, diaphoresis, fatigue and fever. Respiratory:  Negative for shortness of breath. Cardiovascular:  Negative for leg swelling. Gastrointestinal:  Negative for diarrhea and nausea. Endocrine: Negative for cold intolerance, heat intolerance and polyuria. Musculoskeletal:  Positive for arthralgias. Negative for back pain, gait problem, joint swelling and myalgias. Skin:  Negative for color change, pallor, rash and wound. Allergic/Immunologic: Negative for environmental allergies and food allergies. Neurological:  Negative for dizziness, weakness, light-headedness and numbness. Hematological:  Does not bruise/bleed easily. Psychiatric/Behavioral:  Negative for behavioral problems, confusion and self-injury. The patient is not nervous/anxious. OBJECTIVE: Clinical evaluation of patient reveals nails 1,2,3,4,5 of the right foot and nails 1,2,3,4,5 of the left foot to present with thickness, elongation, discoloration, brittleness, and subungual debris. There was pain with palpation and debridement of the toenails of the bilateral feet. No open lesions noted to either foot today. The right DP pulse is palpable. The left DP pulse is palpable. The right PT pulse is palpable. The left PT pulse is palpable. Protective sensation is absent to the right plantar foot as noted with a 5.07 Monroe-Mau monofilament. Protective sensation is absent to the left plantar foot as noted with a 5.07 Monroe-Mau monofilament.    Glucose: 137 mg/dl. Class A Findings (1 needed)   [] Non-traumatic amputation of foot or integral skeleton portion thereof. [] Q7.      Class B Findings (2 needed)   1. [] Absent posterior tibial pulse   2. [] Absent dorsalis pedis pulse   3. [x] Advanced trophic changes; three of the following are required:   ·         [x] hair growth (decrease or absence)   ·         [x] nail changes (thickening)   ·         [x] pigmentary changes (discoloration)   ·         [x] skin texture (thin, shiny)   ·         [x] skin color (rubor or redness)   [] Q8.      Class C Findings (1 Class B, 2 Class C needed)   1. [] Claudication   2. [] Temperature changes   3. [x] Edema   4. [x] Paresthesia   5. [] Burning   [x] Q9.     ASSESSMENT:    Diagnosis Orders   1. Onychomycosis of toenail  MS DEBRIDEMENT OF NAILS, 6 OR MORE    HM DIABETES FOOT EXAM      2. Pain in toes of both feet  MS DEBRIDEMENT OF NAILS, 6 OR MORE    HM DIABETES FOOT EXAM      3. Type 2 diabetes mellitus with diabetic polyneuropathy, without long-term current use of insulin (HCC)  MS DEBRIDEMENT OF NAILS, 6 OR MORE    HM DIABETES FOOT EXAM        PLAN: Toenails 1,2,3,4,5 of the right foot and 1,2,3,4,5 of the left foot were debrided in length and thickness using a nail nipper and a . Return in about 9 weeks (around 2/16/2023) for At risk diabetic foot care.    12/15/2022      Romeo Bloom DPM

## 2022-12-28 DIAGNOSIS — E11.42 TYPE 2 DIABETES MELLITUS WITH DIABETIC POLYNEUROPATHY, WITHOUT LONG-TERM CURRENT USE OF INSULIN (HCC): ICD-10-CM

## 2022-12-29 RX ORDER — METFORMIN HYDROCHLORIDE 500 MG/1
TABLET, EXTENDED RELEASE ORAL
Qty: 90 TABLET | Refills: 3 | Status: SHIPPED | OUTPATIENT
Start: 2022-12-29

## 2022-12-29 NOTE — TELEPHONE ENCOUNTER
Please Approve or Refuse.   Send to Pharmacy per Pt's Request:      Next Visit Date:  1/13/2023   Last Visit Date: 10/14/2022    Hemoglobin A1C (%)   Date Value   10/14/2022 6.1   02/24/2022 6.4   10/14/2021 6.2             ( goal A1C is < 7)   BP Readings from Last 3 Encounters:   11/15/22 (!) 144/57   10/14/22 110/60   07/15/22 139/65          (goal 120/80)  BUN   Date Value Ref Range Status   11/15/2022 17 8 - 23 mg/dL Final     Creatinine   Date Value Ref Range Status   11/15/2022 1.01 0.70 - 1.20 mg/dL Final     Potassium   Date Value Ref Range Status   11/15/2022 4.2 3.7 - 5.3 mmol/L Final

## 2023-01-13 ENCOUNTER — OFFICE VISIT (OUTPATIENT)
Dept: FAMILY MEDICINE CLINIC | Age: 88
End: 2023-01-13

## 2023-01-13 VITALS
SYSTOLIC BLOOD PRESSURE: 120 MMHG | WEIGHT: 172 LBS | HEART RATE: 77 BPM | BODY MASS INDEX: 25.48 KG/M2 | DIASTOLIC BLOOD PRESSURE: 60 MMHG | TEMPERATURE: 97.3 F | HEIGHT: 69 IN | OXYGEN SATURATION: 99 %

## 2023-01-13 DIAGNOSIS — E03.9 ACQUIRED HYPOTHYROIDISM: ICD-10-CM

## 2023-01-13 DIAGNOSIS — J30.0 VASOMOTOR RHINITIS: ICD-10-CM

## 2023-01-13 DIAGNOSIS — E11.42 TYPE 2 DIABETES MELLITUS WITH DIABETIC POLYNEUROPATHY, WITHOUT LONG-TERM CURRENT USE OF INSULIN (HCC): Primary | ICD-10-CM

## 2023-01-13 DIAGNOSIS — M15.9 PRIMARY OSTEOARTHRITIS INVOLVING MULTIPLE JOINTS: ICD-10-CM

## 2023-01-13 DIAGNOSIS — I27.20 PULMONARY HYPERTENSION (HCC): ICD-10-CM

## 2023-01-13 DIAGNOSIS — D69.6 THROMBOCYTOPENIA (HCC): ICD-10-CM

## 2023-01-13 DIAGNOSIS — E55.9 VITAMIN D DEFICIENCY: ICD-10-CM

## 2023-01-13 DIAGNOSIS — I10 ESSENTIAL HYPERTENSION: ICD-10-CM

## 2023-01-13 DIAGNOSIS — E78.5 HYPERLIPIDEMIA WITH TARGET LDL LESS THAN 70: ICD-10-CM
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
## 2023-01-13 LAB — HBA1C MFR BLD: 6.5 %

## 2023-01-13 RX ORDER — FLUTICASONE PROPIONATE 50 MCG
SPRAY, SUSPENSION (ML) NASAL
Qty: 48 G | OUTPATIENT
Start: 2023-01-13

## 2023-01-13 RX ORDER — FLUTICASONE PROPIONATE 50 MCG
1 SPRAY, SUSPENSION (ML) NASAL 2 TIMES DAILY
Qty: 16 G | Refills: 1 | Status: SHIPPED | OUTPATIENT
Start: 2023-01-13

## 2023-01-13 RX ORDER — ACETAMINOPHEN 500 MG
500 TABLET ORAL EVERY 12 HOURS PRN
Qty: 120 TABLET | Refills: 0
Start: 2023-01-13

## 2023-01-13 RX ORDER — FLUTICASONE PROPIONATE 50 MCG
1 SPRAY, SUSPENSION (ML) NASAL 2 TIMES DAILY
Qty: 16 G | Refills: 1
Start: 2023-01-13 | End: 2023-01-13

## 2023-01-13 SDOH — ECONOMIC STABILITY: INCOME INSECURITY: IN THE LAST 12 MONTHS, WAS THERE A TIME WHEN YOU WERE NOT ABLE TO PAY THE MORTGAGE OR RENT ON TIME?: NO

## 2023-01-13 SDOH — ECONOMIC STABILITY: HOUSING INSECURITY
IN THE LAST 12 MONTHS, WAS THERE A TIME WHEN YOU DID NOT HAVE A STEADY PLACE TO SLEEP OR SLEPT IN A SHELTER (INCLUDING NOW)?: NO

## 2023-01-13 SDOH — ECONOMIC STABILITY: TRANSPORTATION INSECURITY
IN THE PAST 12 MONTHS, HAS LACK OF TRANSPORTATION KEPT YOU FROM MEETINGS, WORK, OR FROM GETTING THINGS NEEDED FOR DAILY LIVING?: NO

## 2023-01-13 SDOH — ECONOMIC STABILITY: FOOD INSECURITY: WITHIN THE PAST 12 MONTHS, THE FOOD YOU BOUGHT JUST DIDN'T LAST AND YOU DIDN'T HAVE MONEY TO GET MORE.: NEVER TRUE

## 2023-01-13 SDOH — ECONOMIC STABILITY: TRANSPORTATION INSECURITY
IN THE PAST 12 MONTHS, HAS THE LACK OF TRANSPORTATION KEPT YOU FROM MEDICAL APPOINTMENTS OR FROM GETTING MEDICATIONS?: NO

## 2023-01-13 SDOH — ECONOMIC STABILITY: FOOD INSECURITY: WITHIN THE PAST 12 MONTHS, YOU WORRIED THAT YOUR FOOD WOULD RUN OUT BEFORE YOU GOT MONEY TO BUY MORE.: NEVER TRUE

## 2023-01-13 ASSESSMENT — ENCOUNTER SYMPTOMS
WHEEZING: 0
ABDOMINAL PAIN: 1
CONSTIPATION: 1
BLOOD IN STOOL: 0
DIARRHEA: 0
VOMITING: 0
CHEST TIGHTNESS: 0
TROUBLE SWALLOWING: 0
NAUSEA: 0
SHORTNESS OF BREATH: 1
COUGH: 1
ABDOMINAL DISTENTION: 0
RHINORRHEA: 1

## 2023-01-13 ASSESSMENT — PATIENT HEALTH QUESTIONNAIRE - PHQ9
SUM OF ALL RESPONSES TO PHQ QUESTIONS 1-9: 0
2. FEELING DOWN, DEPRESSED OR HOPELESS: 0
SUM OF ALL RESPONSES TO PHQ QUESTIONS 1-9: 0
SUM OF ALL RESPONSES TO PHQ9 QUESTIONS 1 & 2: 0
1. LITTLE INTEREST OR PLEASURE IN DOING THINGS: 0
SUM OF ALL RESPONSES TO PHQ QUESTIONS 1-9: 0
SUM OF ALL RESPONSES TO PHQ QUESTIONS 1-9: 0

## 2023-01-13 ASSESSMENT — SOCIAL DETERMINANTS OF HEALTH (SDOH): HOW HARD IS IT FOR YOU TO PAY FOR THE VERY BASICS LIKE FOOD, HOUSING, MEDICAL CARE, AND HEATING?: NOT HARD AT ALL

## 2023-01-13 NOTE — RESULT ENCOUNTER NOTE
Addressed during office visit today, A1c 6.5 slightly worsening than before but very well controlled, continue treatment recommended during the office visit.

## 2023-01-13 NOTE — PROGRESS NOTES
Visit Information    Have you changed or started any medications since your last visit including any over-the-counter medicines, vitamins, or herbal medicines? no   Are you having any side effects from any of your medications? -  no  Have you stopped taking any of your medications? Is so, why? -  no    Have you seen any other physician or provider since your last visit? No  Have you had any other diagnostic tests since your last visit? No  Have you been seen in the emergency room and/or had an admission to a hospital since we last saw you? No  Have you had your routine dental cleaning in the past 6 months? Have you activated your [employer] account? If not, what are your barriers?  Yes     Patient Care Team:  Garrick Cook MD as PCP - General (Family Medicine)  Garrick Cook MD as PCP - [facility]  Meli Mosley MD as Surgeon (Cardiology)  Antonino Wood MD as Consulting Physician (Urology)  Ann Marie Gerardo MD as Consulting Physician (Gastroenterology)  Claudia Gonzalez MD as Surgeon (Ophthalmology)  Zaira Miller MD as Consulting Physician (Oncology)  Stacey Goetz DPM as Consulting Physician (Podiatry)    Medical History Review  Past Medical, Family, and Social History reviewed and does contribute to the patient presenting condition    Health Maintenance   Topic Date Due    Lipids  05/31/2023    Depression Screen  06/02/2023    Annual Wellness Visit (AWV)  06/03/2023    DTaP/Tdap/Td vaccine (2 - Td or Tdap) 09/25/2030    Flu vaccine  Completed    Shingles vaccine  Completed    Pneumococcal 65+ years Vaccine  Completed    COVID-19 Vaccine  Completed    Hepatitis A vaccine  Aged Out    Hib vaccine  Aged Out    Meningococcal (ACWY) vaccine  Aged Out

## 2023-01-13 NOTE — PROGRESS NOTES
Carlotta Gardiner (:  1924) is a 80 y.o. male,Established patient, here for evaluation of the following chief complaint(s): Diabetes, Fatigue (Weakness - walking is hard at times ), Joint Pain, Other (Itchy eyes and runny nose all the time - meds not helping - throat itches as well - does not go away ), Hypertension, Hyperlipidemia, Thyroid Problem, and Arthritis      ASSESSMENT/PLAN:    1. Type 2 diabetes mellitus with diabetic polyneuropathy, without long-term current use of insulin (HCC)  Slightly worsening but very well controlled  -     POCT glycosylated hemoglobin (Hb A1C)  Lab Results   Component Value Date    LABA1C 6.5 2023    LABA1C 6.1 10/14/2022    LABA1C 6.4 2022       -     CBC with Auto Differential; Future  -     Comprehensive Metabolic Panel; Future  -     Uric Acid; Future  -     Vitamin B12 & Folate; Future  -advised home blood glucose testing  daily  -daily feet exam, Foot care: advised to wash feet daily, pat dry and apply lotion at night, avoiding between toes. Need to look at feet daily and report to a physician any signs of inflammation or skin damage  -annual dilated eye exam  -Low carb, low fat diet, increase fruits and vegetables, and exercise 4-5 times a day 30-40 minutes a day discussed  -continue current treatment metformin  Mg daily  -continue ARB losartan and statin Lipitor    2. Essential hypertension  Well controlled. Continue current treatment. Losartan 50 Mg daily, amlodipine 5 Mg daily  Will recheck labs. Discussed low salt diet and BP and pulse monitoring.    -     CBC with Auto Differential; Future  -     Comprehensive Metabolic Panel; Future  -     Magnesium; Future  -     Urinalysis with Reflex to Culture; Future  -     Uric Acid; Future  3. Hyperlipidemia with target LDL less than 70  Improved  To continue Lipitor 20 Mg daily  4.  Primary osteoarthritis involving multiple joints  Worsening  Advised to take Tylenol twice a day every day to help improve his pain during the colder season, consider knee braces as wrist gloves and gloves/Copper for arthritis    -     acetaminophen (TYLENOL) 500 MG tablet; Take 1 tablet by mouth every 12 hours as needed for Pain, Disp-120 tablet, R-0NO PRINT  5. Acquired hypothyroidism  Stable  To continue Synthroid 25 MCG  Advised to take Synthroid in the morning, on empty stomach, without any other meds and with a full glass of water.    -     TSH; Future  -     T4, Free; Future  6. Fall, initial encounter  Falls precautions discussed  -     [facility]  7. Pulmonary hypertension (HCC)  Likely stable  Will continue to monitor, declines echo 2D, due to no cardiac intervention    8. Thrombocytopenia (HCC)  Intermittent, improved  Will continue to monitor  CBC with differential  9. Vasomotor rhinitis  Failing to improve  Continue Xyzal  -     fluticasone (FLONASE) 50 MCG/ACT nasal spray; 1 spray by Each Nostril route in the morning and at bedtime Stop atrovent. OK to try Flonase sensimist, Disp-16 g, R-1Normal  10. Vitamin D deficiency  Unsure if improving or not.  Will recheck level  Continue supplementation.    -     Vitamin D 25 Hydroxy; Future      Corbin received counseling on the following healthy behaviors: nutrition, exercise, medication adherence, and falls precautions    Reviewed prior labs and health maintenance  Discussed use, benefit, and side effects of prescribed medications.   Barriers to medication compliance addressed.   Patient given educational materials - see patient instructions  Was a self-tracking handout given in paper form or via [employer]?  Yes  All patient questions answered.  Patient voiced understanding.  The patient's past medical,surgical, social, and family history as well as his current medications and allergies were reviewed as documented in today's encounter.    Medications, labs, diagnostic studies, consultations and follow-up as documented in this encounter.    Return for  KEEP APPT. Data Unavailable    Future Appointments   Date Time Provider [place] Watts   6/2/2023  3:30 PM [facility]   7/21/2023 10:00 AM Elena Forman MD [facility]         SUBJECTIVE/OBJECTIVE:    Comes with his son, Lilo Hernandez      Diabetes Mellitus Type II, Follow-up:    Current symptoms/problems include hyperglycemia, paresthesia of the feet, and visual disturbances. Symptoms have been present for several years. Known diabetic complications: nephropathy and peripheral neuropathy  Cardiovascular risk factors: advanced age (older than 54 for men, 72 for women), diabetes mellitus, dyslipidemia, hypertension, male gender, and sedentary lifestyle    Medication compliance:  compliant all of the time  Current diabetic medications include oral agent (monotherapy): Glucophage XR. Eye exam current (within one year): yes  Current diet: in general, a \"healthy\" diet      Barriers: impairment:  physical: Osteoarthritis in multiple joints, and advanced age    Current monitoring regimen: home blood tests - daily  Home blood sugar records: fasting range: 115  Any episodes of hypoglycemia? no    Is He on ACE inhibitor or angiotensin II receptor blocker? Yes      reports that he quit smoking about 55 years ago. His smoking use included cigarettes. He has never used smokeless tobacco.     Counseling given: Yes      Daily Aspirin? No due to high risk for falls and bleeding      A1c is slightly worsening from before but very well controlled. Lab Results   Component Value Date    LABA1C 6.5 01/13/2023    LABA1C 6.1 10/14/2022    LABA1C 6.4 02/24/2022       Urine microabumin is Elevated. Lab Results   Component Value Date    LABMICR 106 (H) 11/15/2017        Weight is increased  Wt Readings from Last 3 Encounters:   01/13/23 172 lb (78 kg)   12/15/22 166 lb (75.3 kg)   11/15/22 166 lb (75.3 kg)         Hypertension:   Lilo Hernandez  is not  exercising and is adherent to low salt diet. Blood pressure is well controlled at home. Cardiac symptoms  fatigue. Patient denies  chest pain, chest pressure/discomfort, claudication, dyspnea, exertional chest pressure/discomfort, irregular heart beat, lower extremity edema, near-syncope, orthopnea, palpitations, paroxysmal nocturnal dyspnea, syncope, and tachypnea. Use of agents associated with hypertension: thyroid hormones. History of target organ damage: left ventricular hypertrophy. Patient also has known moderate aortic stenosis for many years, he has seen cardiologist in the past, he declines further work-up due to advanced age, he does not want to see the cardiologist again, because he cannot do anything for him anymore. Pulmonary hypertension on prior ultrasound, 51 mmHg on 9/13/2017, report in media. He does not want to follow with cardiologist as no intervention will be done, declines any additional testing at this time. BP controlled. Jeanine Lombard reports compliance with BP medications, and tolerates them well, denies side effects. BP Readings from Last 3 Encounters:   01/13/23 120/60   11/15/22 (!) 144/57   10/14/22 110/60        Pulse is Normal.    Pulse Readings from Last 3 Encounters:   01/13/23 77   11/15/22 62   10/14/22 73         Hyperlipidemia:  No new myalgias or GI upset on atorvastatin (Lipitor). Medication compliance: compliant all of the time. Patient is  following a low fat, low cholesterol diet. LDL is improved. Lab Results   Component Value Date    LDLCHOLESTEROL 82 05/31/2022     Patient reports he has been having worsening joint pains, \"my arms, legs and hands\"  He does have Tylenol but not always taking it. He has not tried knee braces. His son says during the winter he usually has more pain and not feeling so well, but in summer he does not complain so much about pain. Intensity of the pain is 5 out of 10 today in his joints.     Paresh Rising on 11/15/2022 backwards while in the kitchen his son says he took him to ED, but patient refused to be admitted. Patient's son says he does not walk too much, but climbes up and down the stairs 4 times a day. Patient says she will exercise in the room. Will complete years agreeable for physical therapy referral but only if the insurance will cover    He also complains of always having runny nose when eating, which also bothers him a lot. Using Atrovent, does not feel it is helping  Every time he eats gets the nasal drainage, it is always clear and watery. He reports a lot of sneezing, runny nose, and postnasal drip. He says his Allergies started since came to [address], never had them before. Hypothyroidism: Recent symptoms: fatigue, increased weight and feeling cold all the time. He denies weight loss, heat intolerance, hair loss, dry skin, constipation, diarrhea, edema, and anxiety. Patient is  taking his medication consistently on an empty stomach. TSH is Normal.    No results found for: [facility]  Lab Results   Component Value Date    TSH 3.06 11/15/2022    TSH 3.24 10/25/2022    TSH 3.47 05/31/2022     Mild thrombocytopenia intermittent with chronic anemia, received iron infusion several months ago. Anemia stable. He has seen hematologist oncologist in the past..  Thrombocytopenia has improved  147 on 10/25/2022, 127 on 7/13/2022, 145 on 5/31/2022. Has been present for several years since 2018 intermittently  Lab Results   Component Value Date    WBC 5.5 11/15/2022    HGB 10.1 (L) 11/15/2022    HCT 30.8 (L) 11/15/2022    MCV 98.4 11/15/2022     11/15/2022       Corbin has Vitamin D deficiency. Anahi Retana  is  taking Vitamin D supplementation   he feels tired. Lab Results   Component Value Date    VITD25 28.4 (L) 05/31/2022            [x]Negative depression screening. PHQ Scores 1/13/2023 6/2/2022 2/24/2022 10/14/2021 6/2/2021 4/29/2021 6/2/2020   PHQ2 Score 0 0 0 0 0 0 0   PHQ9 Score 0 0 0 0 0 0 0       Prior to Visit Medications    Medication Sig Taking? Authorizing Provider   metFORMIN (GLUCOPHAGE-XR) 500 MG extended release tablet TAKE 1 TABLET BY MOUTH Daniel Del Real Yes Garrick Cook MD   omeprazole (PRILOSEC) 40 MG delayed release capsule Take 1 capsule by mouth every morning (before breakfast) ** stop pepcid** Yes Garrick Cook MD   levothyroxine (SYNTHROID) 25 MCG tablet TAKE 1 TABLET BY MOUTH EVERY MORNING BEFORE BREAKFAST Yes Garrick Cook MD   vitamin D (CHOLECALCIFEROL) 50 MCG (2000 UT) TABS tablet Take 1 tablet by mouth daily Ok to continue multivitamin Yes Garrick Cook MD   Magnesium Oxide (MAGNESIUM-OXIDE) 250 MG TABS tablet Take 1 tablet by mouth daily Take with food, in the evening Yes Garrick Cook MD   ipratropium (ATROVENT) 0.03 % nasal spray USE 2 SPRAYS IN EACH NOSTRIL THREE TIMES DAILY Yes Garrick Cook MD   albuterol sulfate HFA (PROVENTIL HFA) 108 (90 Base) MCG/ACT inhaler Inhale 2 puffs into the lungs every 6 hours as needed for Wheezing or Shortness of Breath (cough) Yes Garrick Cook MD   amLODIPine (NORVASC) 5 MG tablet Take 1 tablet by mouth daily Yes Garrick Cook MD   levocetirizine (XYZAL) 5 MG tablet Take 1 tablet by mouth nightly For allergies Yes Garrick Cook MD   atorvastatin (LIPITOR) 20 MG tablet Take 1 tablet by mouth Daily with supper Yes Garrick Cook MD   losartan (COZAAR) 50 MG tablet Take 1 tablet by mouth daily ** stop enalapril** Yes Shilpa Delacruz MD   Polyvinyl Alcohol-Povidone PF (REFRESH) 1.4-0.6 % SOLN Apply 2 drops to eye every 3-4 hours as needed (For dry eyes) Okay to substitute.  For itchy eyes Yes Garrick Cook MD   tamsulosin (FLOMAX) 0.4 MG capsule Take 1 capsule by mouth daily Yes Antonino Wood MD   docusate sodium (COLACE) 100 MG capsule Take 1 capsule by mouth 2 times daily For constipation Yes Garrick Cook MD   blood glucose test strips (ACCU-CHEK JOEL PLUS) strip TEST ONCE A DAY, FASTING IN THE MORNING Yes Michelle Talbert MD   camphor-menthol-methyl salicylate (BENGAY ULTRA STRENGTH) 4-10-30 % CREA cream Apply topically 3 times daily as needed for Pain Yes Michelle Talbert MD   lidocaine (LIDODERM) 5 % Place 1 patch onto the skin daily 12 hours on, 12 hours off. Yes Michelle Talbert MD   Blood Glucose Monitoring Suppl (ACCU-CHEK BAILEE PLUS) w/Device KIT USE AS DIRECTED Yes Michelle Talbert MD   Accu-Chek FastClix Lancets MISC USE TO TEST BLOOD GLUCOSE ONCE A DAY Yes Michelle Talbert MD   Lancets 30G MISC Testing once a day. Please dispense according to patients insurance:  accu check Bailee plus Yes Michelle Talbert MD   Lancets Misc. (ACCU-CHEK MULTICLIX LANCET DEV) KIT Please dispense according to patients insurance. Yes Michelle Talbert MD   Alcohol Swabs PADS Please dispense according to patients insurance/device. Testing 1-2 /day Yes Michelle Talbert MD            Social History     Tobacco Use    Smoking status: Former     Types: Cigarettes     Quit date: 10/27/1967     Years since quittin.2    Smokeless tobacco: Never   Vaping Use    Vaping Use: Never used   Substance Use Topics    Alcohol use: No    Drug use: No         Review of Systems   Constitutional:  Positive for fatigue and unexpected weight change. Negative for activity change, appetite change, chills, diaphoresis and fever. HENT:  Positive for congestion, postnasal drip, rhinorrhea and sneezing. Negative for sinus pressure, sinus pain and trouble swallowing. Eyes:  Positive for visual disturbance (stable, chronic). Respiratory:  Positive for cough and shortness of breath (dyspnea at nighttime). Negative for chest tightness and wheezing. Cardiovascular:  Negative for chest pain, palpitations and leg swelling. Gastrointestinal:  Positive for abdominal pain (stomach, on and off) and constipation (better). Negative for abdominal distention, blood in stool, diarrhea, nausea and vomiting.    Endocrine: Positive for cold intolerance. Negative for heat intolerance, polydipsia, polyphagia and polyuria. Genitourinary:  Positive for difficulty urinating (better with Flomax). Musculoskeletal:  Positive for arthralgias and gait problem. Allergic/Immunologic: Positive for environmental allergies and immunocompromised state (due to advanced age). Neurological:  Positive for numbness (feet). Negative for tremors. Hematological:  Does not bruise/bleed easily. Psychiatric/Behavioral:  Negative for dysphoric mood. -vital signs stable and within normal limits except mildly overweight per BMI  /60   Pulse 77   Temp 97.3 °F (36.3 °C)   Ht 5' 9\" (1.753 m)   Wt 172 lb (78 kg)   SpO2 99%   BMI 25.40 kg/m²         Physical Exam  Vitals and nursing note reviewed. Constitutional:       General: He is not in acute distress. Appearance: Normal appearance. He is well-developed and normal weight. He is not diaphoretic. HENT:      Head: Normocephalic. Right Ear: External ear normal.      Left Ear: External ear normal.      Ears:      Comments: Wears hearing aids     Mouth/Throat:      Comments: I did not examine the mouth due to coronavirus pandemic and wearing masks    Eyes:      General: Lids are normal. No scleral icterus. Right eye: No discharge. Left eye: No discharge. Extraocular Movements: Extraocular movements intact. Conjunctiva/sclera: Conjunctivae normal.      Right eye: Right conjunctiva is not injected. Left eye: Left conjunctiva is not injected. Neck:      Thyroid: No thyromegaly. Cardiovascular:      Rate and Rhythm: Normal rate and regular rhythm. Heart sounds: Murmur heard. Crescendo systolic murmur is present with a grade of 4/6. Pulmonary:      Effort: Pulmonary effort is normal. No respiratory distress. Breath sounds: Normal breath sounds. No wheezing or rales. Chest:      Chest wall: No tenderness.    Abdominal:      General: Bowel sounds are normal. There is no distension. Palpations: Abdomen is soft. There is no hepatomegaly or splenomegaly. Tenderness: There is no abdominal tenderness. Musculoskeletal:      Right hand: Deformity and tenderness present. Decreased range of motion. Left hand: Deformity and tenderness present. Decreased range of motion. Cervical back: Normal range of motion and neck supple. Right knee: Bony tenderness and crepitus present. Normal range of motion. Tenderness present. Left knee: Bony tenderness and crepitus present. Normal range of motion. Tenderness present. Right lower leg: No edema. Left lower leg: No edema. Comments: Up and go test more than 12 seconds. High risk for falls. osteoarthritis nodules in his hands, crepitus knees bilateral    Lymphadenopathy:      Cervical: No cervical adenopathy. Skin:     General: Skin is warm and dry. Capillary Refill: Capillary refill takes less than 2 seconds. Findings: No rash. Neurological:      Mental Status: He is alert and oriented to person, place, and time. Gait: Gait abnormal.      Deep Tendon Reflexes: Reflexes are normal and symmetric. Comments: Up and go test more than 12 seconds. High risk for falls. Needs  physical support when getting up and down the exam table. Psychiatric:         Mood and Affect: Mood normal.         Behavior: Behavior normal.         Thought Content: Thought content normal.         Judgment: Judgment normal.         I personally reviewed testing with patient.   Vitamin D deficiency   Improved hyperlipidemia  Improved thrombocytopenia  Hyperglycemia  Stable anemia of chronic disease, he cannot tolerate oral iron, has received iron infusion recently  otherwise labs within normal limits        Lab Results   Component Value Date    WBC 5.5 11/15/2022    HGB 10.1 (L) 11/15/2022    HCT 30.8 (L) 11/15/2022    MCV 98.4 11/15/2022     11/15/2022       Lab Results   Component Value Date/Time     11/15/2022 08:45 PM    K 4.2 11/15/2022 08:45 PM     11/15/2022 08:45 PM    CO2 29 11/15/2022 08:45 PM    BUN 17 11/15/2022 08:45 PM    CREATININE 1.01 11/15/2022 08:45 PM    GLUCOSE 168 11/15/2022 08:45 PM    CALCIUM 9.5 11/15/2022 08:45 PM        Lab Results   Component Value Date    ALT 16 11/15/2022    AST 20 11/15/2022    ALKPHOS 71 11/15/2022    BILITOT 0.5 11/15/2022       Lab Results   Component Value Date    TSH 3.06 11/15/2022       Lab Results   Component Value Date    CHOL 150 2020    CHOL 134 2019    CHOL 123 10/25/2018     Lab Results   Component Value Date    TRIG 100 2020    TRIG 78 2019    TRIG 59 10/25/2018     Lab Results   Component Value Date    HDL 51 2022    HDL 51 2020    HDL 49 2019     Lab Results   Component Value Date    LDLCHOLESTEROL 82 2022    LDLCHOLESTEROL 79 2020    LDLCHOLESTEROL 69 2019     Lab Results   Component Value Date    CHOLHDLRATIO 2.9 2022    CHOLHDLRATIO 2.9 2020    CHOLHDLRATIO 2.7 2019         Lab Results   Component Value Date    ENEDBWNX61 503 10/25/2022     Lab Results   Component Value Date    FOLATE >20.0 10/25/2022     Lab Results   Component Value Date    VITD25 28.4 (L) 2022         Orders Placed This Encounter   Medications    acetaminophen (TYLENOL) 500 MG tablet     Sig: Take 1 tablet by mouth every 12 hours as needed for Pain     Dispense:  120 tablet     Refill:  0    DISCONTD: fluticasone (FLONASE) 50 MCG/ACT nasal spray     Si spray by Each Nostril route in the morning and at bedtime Stop atrovent. OK to try Flonase sensimist     Dispense:  16 g     Refill:  1    fluticasone (FLONASE) 50 MCG/ACT nasal spray     Si spray by Each Nostril route in the morning and at bedtime Stop atrovent.  OK to try Flonase sensimist     Dispense:  16 g     Refill:  1       Orders Placed This Encounter   Procedures    CBC with Auto Differential Standing Status:   Future     Standing Expiration Date:   1/13/2024    Comprehensive Metabolic Panel     Standing Status:   Future     Standing Expiration Date:   1/13/2024    Magnesium     Standing Status:   Future     Standing Expiration Date:   1/13/2024    TSH     Standing Status:   Future     Standing Expiration Date:   1/13/2024    T4, Free     Standing Status:   Future     Standing Expiration Date:   1/13/2024    Urinalysis with Reflex to Culture     Standing Status:   Future     Standing Expiration Date:   1/13/2024     Order Specific Question:   SPECIFY(EX-CATH,MIDSTREAM,CYSTO,ETC)? Answer:   MIDSTREAM    Uric Acid     Standing Status:   Future     Standing Expiration Date:   1/13/2024    Vitamin B12 & Folate     Standing Status:   Future     Standing Expiration Date:   1/13/2024    Vitamin D 25 Hydroxy     Standing Status:   Future     Standing Expiration Date:   1/13/2024    [facility] [place]     Referral Priority:   Routine     Referral Type:   Eval and Treat     Referral Reason:   Specialty Services Required     Requested Specialty:   Physical Therapist     Number of Visits Requested:   1    POCT glycosylated hemoglobin (Hb A1C)       Medications Discontinued During This Encounter   Medication Reason    tamsulosin (FLOMAX) 0.4 MG capsule DUPLICATE    ipratropium (ATROVENT) 0.03 % nasal spray Alternate therapy    fluticasone (FLONASE) 50 MCG/ACT nasal spray          On this date 1/13/2023 I have spent 35 minutes reviewing previous notes, test results and face to face with the patient discussing the diagnosis and importance of compliance with the treatment plan as well as documenting on the day of the visit. This note was completed by using the assistance of a speech-recognition program. However, inadvertent computerized transcription errors may be present.  Although every effort was made to ensure accuracy, no guarantees can be provided that every mistake has been identified and corrected by editing . An electronic signature was used to authenticate this note.   Electronically signed by Josette Workman MD on 1/14/2023 at 7:13 PM

## 2023-01-14 PROBLEM — M15.9 PRIMARY OSTEOARTHRITIS INVOLVING MULTIPLE JOINTS: Status: ACTIVE | Noted: 2023-01-14
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
## 2023-01-14 ASSESSMENT — ENCOUNTER SYMPTOMS
SINUS PAIN: 0
SINUS PRESSURE: 0

## 2023-01-17 ENCOUNTER — HOSPITAL ENCOUNTER (OUTPATIENT)
Age: 88
Discharge: HOME | End: 2023-01-17
Payer: MEDICARE

## 2023-01-17 DIAGNOSIS — E03.9 ACQUIRED HYPOTHYROIDISM: ICD-10-CM

## 2023-01-17 DIAGNOSIS — E11.42 TYPE 2 DIABETES MELLITUS WITH DIABETIC POLYNEUROPATHY, WITHOUT LONG-TERM CURRENT USE OF INSULIN (HCC): ICD-10-CM

## 2023-01-17 DIAGNOSIS — D69.6 THROMBOCYTOPENIA (HCC): ICD-10-CM

## 2023-01-17 DIAGNOSIS — E55.9 VITAMIN D DEFICIENCY: ICD-10-CM

## 2023-01-17 DIAGNOSIS — I10 ESSENTIAL HYPERTENSION: ICD-10-CM

## 2023-01-17 LAB
ABSOLUTE EOS #: 0.1 K/UL (ref 0–0.4)
ABSOLUTE LYMPH #: 2.3 K/UL (ref 1–4.8)
ABSOLUTE MONO #: 0.5 K/UL (ref 0.1–1.3)
ALBUMIN SERPL-MCNC: 4.1 G/DL (ref 3.5–5.2)
ALP BLD-CCNC: 71 U/L (ref 40–129)
ALT SERPL-CCNC: 18 U/L (ref 5–41)
ANION GAP SERPL CALCULATED.3IONS-SCNC: 9 MMOL/L (ref 9–17)
AST SERPL-CCNC: 20 U/L
BACTERIA: NORMAL
BASOPHILS # BLD: 1 % (ref 0–2)
BASOPHILS ABSOLUTE: 0 K/UL (ref 0–0.2)
BILIRUB SERPL-MCNC: 0.6 MG/DL (ref 0.3–1.2)
BILIRUBIN URINE: NEGATIVE
BUN BLDV-MCNC: 24 MG/DL (ref 8–23)
CALCIUM SERPL-MCNC: 9.5 MG/DL (ref 8.6–10.4)
CASTS UA: NORMAL /LPF
CHLORIDE BLD-SCNC: 102 MMOL/L (ref 98–107)
CO2: 27 MMOL/L (ref 20–31)
COLOR: YELLOW
CREAT SERPL-MCNC: 1.18 MG/DL (ref 0.7–1.2)
EOSINOPHILS RELATIVE PERCENT: 2 % (ref 0–4)
EPITHELIAL CELLS UA: NORMAL /HPF
GFR SERPL CREATININE-BSD FRML MDRD: 56 ML/MIN/1.73M2
GLUCOSE BLD-MCNC: 161 MG/DL (ref 70–99)
GLUCOSE URINE: NEGATIVE
HCT VFR BLD CALC: 33.5 % (ref 41–53)
HEMOGLOBIN: 10.7 G/DL (ref 13.5–17.5)
KETONES, URINE: NEGATIVE
LEUKOCYTE ESTERASE, URINE: NEGATIVE
LYMPHOCYTES # BLD: 45 % (ref 24–44)
MAGNESIUM: 1.5 MG/DL (ref 1.6–2.6)
MCH RBC QN AUTO: 31.6 PG (ref 26–34)
MCHC RBC AUTO-ENTMCNC: 31.9 G/DL (ref 31–37)
MCV RBC AUTO: 99.1 FL (ref 80–100)
MONOCYTES # BLD: 9 % (ref 1–7)
NITRITE, URINE: NEGATIVE
PDW BLD-RTO: 14.4 % (ref 11.5–14.9)
PH UA: 5 (ref 5–8)
PLATELET # BLD: 157 K/UL (ref 150–450)
PMV BLD AUTO: 9.1 FL (ref 6–12)
POTASSIUM SERPL-SCNC: 4.2 MMOL/L (ref 3.7–5.3)
PROTEIN UA: NEGATIVE
RBC # BLD: 3.38 M/UL (ref 4.5–5.9)
RBC UA: NORMAL /HPF
SEG NEUTROPHILS: 43 % (ref 36–66)
SEGMENTED NEUTROPHILS ABSOLUTE COUNT: 2.2 K/UL (ref 1.3–9.1)
SODIUM BLD-SCNC: 138 MMOL/L (ref 135–144)
SPECIFIC GRAVITY UA: 1.01 (ref 1–1.03)
THYROXINE, FREE: 1.18 NG/DL (ref 0.93–1.7)
TOTAL PROTEIN: 7.3 G/DL (ref 6.4–8.3)
TSH SERPL DL<=0.05 MIU/L-ACNC: 4.15 UIU/ML (ref 0.3–5)
TURBIDITY: CLEAR
URIC ACID: 5 MG/DL (ref 3.4–7)
URINE HGB: ABNORMAL
UROBILINOGEN, URINE: NORMAL
VITAMIN D 25-HYDROXY: 36.8 NG/ML
WBC # BLD: 5.1 K/UL (ref 3.5–11)
WBC UA: NORMAL /HPF

## 2023-01-17 PROCEDURE — 82746 ASSAY OF FOLIC ACID SERUM: CPT

## 2023-01-17 PROCEDURE — 84550 ASSAY OF BLOOD/URIC ACID: CPT

## 2023-01-17 PROCEDURE — 82306 VITAMIN D 25 HYDROXY: CPT

## 2023-01-17 PROCEDURE — 81001 URINALYSIS AUTO W/SCOPE: CPT

## 2023-01-17 PROCEDURE — 82607 VITAMIN B-12: CPT

## 2023-01-17 PROCEDURE — 84439 ASSAY OF FREE THYROXINE: CPT

## 2023-01-17 PROCEDURE — 84443 ASSAY THYROID STIM HORMONE: CPT

## 2023-01-17 PROCEDURE — 83735 ASSAY OF MAGNESIUM: CPT

## 2023-01-17 PROCEDURE — 85025 COMPLETE CBC W/AUTO DIFF WBC: CPT

## 2023-01-17 PROCEDURE — 80053 COMPREHEN METABOLIC PANEL: CPT

## 2023-01-17 PROCEDURE — 36415 COLL VENOUS BLD VENIPUNCTURE: CPT

## 2023-01-18 DIAGNOSIS — E83.42 HYPOMAGNESEMIA: ICD-10-CM

## 2023-01-18 DIAGNOSIS — E55.9 VITAMIN D DEFICIENCY: ICD-10-CM

## 2023-01-18 DIAGNOSIS — E11.42 TYPE 2 DIABETES MELLITUS WITH DIABETIC POLYNEUROPATHY, WITHOUT LONG-TERM CURRENT USE OF INSULIN (HCC): Primary | ICD-10-CM

## 2023-01-18 LAB
FOLATE: >20 NG/ML
VITAMIN B-12: 474 PG/ML (ref 232–1245)

## 2023-01-18 RX ORDER — MULTIVITAMIN/IRON/FOLIC ACID 18MG-0.4MG
250 TABLET ORAL DAILY
Qty: 90 TABLET | Refills: 3 | Status: SHIPPED | OUTPATIENT
Start: 2023-01-18

## 2023-01-18 RX ORDER — CHOLECALCIFEROL (VITAMIN D3) 50 MCG
2000 TABLET ORAL DAILY
Qty: 90 TABLET | Refills: 3 | Status: SHIPPED | OUTPATIENT
Start: 2023-01-18

## 2023-01-18 NOTE — RESULT ENCOUNTER NOTE
Please notify patient: Vitamin B12 is normal but towards the lower limits he would benefit from taking vitamin B12 1000 MCG daily from over-the-counter or I can send to the pharmacy    Vitamin D is borderline low to make sure he takes vitamin D 2000 units daily with food    Blood glucose 161 same as before. Kidney function mildly decreased very mild is the first time, I suspect dehydration and aging, he should make sure drinks 8 x 8 ounce glasses of water every day. Magnesium is low, needs magnesium 250 Mg daily that will help with the achiness and muscle pain and joint pains  Anemia is slightly improved, lymphocytes are mildly high which is new might have an infection? Upper respiratory infection?   Otherwise labs within normal limits  continue current treatment    I will send to the pharmacy vitamin D, B12, and magnesium    Future Appointments  6/2/2023   3:30 PM    Sarah Bellamy MD     [facility]               [email or web address]  7/21/2023  10:00 AM   Franklin Bailey MD     [id number]

## 2023-02-06 DIAGNOSIS — I10 ESSENTIAL HYPERTENSION: ICD-10-CM

## 2023-02-06 RX ORDER — AMLODIPINE BESYLATE 5 MG/1
5 TABLET ORAL DAILY
Qty: 90 TABLET | Refills: 3 | Status: SHIPPED | OUTPATIENT
Start: 2023-02-06
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
## 2023-04-02 ENCOUNTER — PATIENT MESSAGE (OUTPATIENT)
Dept: FAMILY MEDICINE CLINIC | Age: 88
End: 2023-04-02

## 2023-04-02 DIAGNOSIS — R10.13 DYSPEPSIA: ICD-10-CM

## 2023-04-02 DIAGNOSIS — K21.9 GASTROESOPHAGEAL REFLUX DISEASE WITHOUT ESOPHAGITIS: Primary | ICD-10-CM

## 2023-04-02 DIAGNOSIS — H10.13 ALLERGIC CONJUNCTIVITIS OF BOTH EYES: ICD-10-CM

## 2023-04-02 DIAGNOSIS — E11.42 TYPE 2 DIABETES MELLITUS WITH DIABETIC POLYNEUROPATHY, WITHOUT LONG-TERM CURRENT USE OF INSULIN (HCC): ICD-10-CM

## 2023-04-03 RX ORDER — LEVOCETIRIZINE DIHYDROCHLORIDE 5 MG/1
TABLET, FILM COATED ORAL
Qty: 90 TABLET | Refills: 3 | Status: SHIPPED | OUTPATIENT
Start: 2023-04-03

## 2023-04-03 RX ORDER — FAMOTIDINE 20 MG/1
20 TABLET, FILM COATED ORAL 2 TIMES DAILY
Qty: 180 TABLET | Refills: 1 | Status: SHIPPED | OUTPATIENT
Start: 2023-04-03

## 2023-04-03 RX ORDER — LEVOCETIRIZINE DIHYDROCHLORIDE 5 MG/1
5 TABLET, FILM COATED ORAL NIGHTLY
Qty: 90 TABLET | Refills: 3 | OUTPATIENT
Start: 2023-04-03

## 2023-04-03 RX ORDER — METFORMIN HYDROCHLORIDE 500 MG/1
500 TABLET, EXTENDED RELEASE ORAL
Qty: 90 TABLET | Refills: 3 | Status: SHIPPED | OUTPATIENT
Start: 2023-04-03

## 2023-04-03 NOTE — TELEPHONE ENCOUNTER
From: Rut Chavarria  To: Dr. Zoraida Irene: 4/2/2023 11:05 AM EDT  Subject: Famotidine    I would like a refill of Famotidine 20mg tablet. It was last prescribed 10/09/22 quantity 180 tablets. Thanks.

## 2023-04-05 RX ORDER — TAMSULOSIN HYDROCHLORIDE 0.4 MG/1
0.4 CAPSULE ORAL DAILY
Qty: 90 CAPSULE | Refills: 3 | Status: SHIPPED | OUTPATIENT
Start: 2023-04-05

## 2023-04-07 DIAGNOSIS — E78.5 HYPERLIPIDEMIA WITH TARGET LDL LESS THAN 70: ICD-10-CM

## 2023-04-07 DIAGNOSIS — I10 ESSENTIAL HYPERTENSION: ICD-10-CM

## 2023-04-10 RX ORDER — ATORVASTATIN CALCIUM 20 MG/1
20 TABLET, FILM COATED ORAL
Qty: 90 TABLET | Refills: 3 | OUTPATIENT
Start: 2023-04-10

## 2023-04-10 RX ORDER — LOSARTAN POTASSIUM 50 MG/1
50 TABLET ORAL DAILY
Qty: 90 TABLET | Refills: 3 | OUTPATIENT
Start: 2023-04-10

## 2023-04-20 DIAGNOSIS — E83.42 HYPOMAGNESEMIA: ICD-10-CM

## 2023-04-21 NOTE — TELEPHONE ENCOUNTER
Please Approve or Refuse.   Send to Pharmacy per Pt's Request: [email or web address]      Next Visit Date:  6/2/2023   Last Visit Date: 1/13/2023    Hemoglobin A1C (%)   Date Value   01/13/2023 6.5   10/14/2022 6.1   02/24/2022 6.4             ( goal A1C is < 7)   BP Readings from Last 3 Encounters:   01/13/23 120/60   11/15/22 (!) 144/57   10/14/22 110/60          (goal 120/80)  BUN   Date Value Ref Range Status   01/17/2023 24 (H) 8 - 23 mg/dL Final     Creatinine   Date Value Ref Range Status   01/17/2023 1.18 0.70 - 1.20 mg/dL Final     Potassium   Date Value Ref Range Status   01/17/2023 4.2 3.7 - 5.3 mmol/L Final

## 2023-06-02 ENCOUNTER — OFFICE VISIT (OUTPATIENT)
Dept: FAMILY MEDICINE CLINIC | Age: 88
End: 2023-06-02

## 2023-06-02 VITALS
HEART RATE: 75 BPM | BODY MASS INDEX: 26.16 KG/M2 | HEIGHT: 69 IN | SYSTOLIC BLOOD PRESSURE: 120 MMHG | OXYGEN SATURATION: 97 % | DIASTOLIC BLOOD PRESSURE: 56 MMHG | WEIGHT: 176.6 LBS | TEMPERATURE: 97.6 F

## 2023-06-02 DIAGNOSIS — D64.9 NORMOCYTIC ANEMIA: ICD-10-CM

## 2023-06-02 DIAGNOSIS — E03.9 ACQUIRED HYPOTHYROIDISM: ICD-10-CM

## 2023-06-02 DIAGNOSIS — E78.5 HYPERLIPIDEMIA WITH TARGET LDL LESS THAN 70: ICD-10-CM

## 2023-06-02 DIAGNOSIS — I12.9 BENIGN HYPERTENSION WITH CKD (CHRONIC KIDNEY DISEASE) STAGE III (HCC): ICD-10-CM

## 2023-06-02 DIAGNOSIS — E83.42 HYPOMAGNESEMIA: ICD-10-CM

## 2023-06-02 DIAGNOSIS — K21.9 GASTROESOPHAGEAL REFLUX DISEASE WITHOUT ESOPHAGITIS: ICD-10-CM

## 2023-06-02 DIAGNOSIS — R10.30 LOWER ABDOMINAL PAIN: ICD-10-CM

## 2023-06-02 DIAGNOSIS — E11.42 TYPE 2 DIABETES MELLITUS WITH DIABETIC POLYNEUROPATHY, WITHOUT LONG-TERM CURRENT USE OF INSULIN (HCC): ICD-10-CM

## 2023-06-02 DIAGNOSIS — E11.21 DIABETIC NEPHROPATHY ASSOCIATED WITH TYPE 2 DIABETES MELLITUS (HCC): ICD-10-CM

## 2023-06-02 DIAGNOSIS — N18.30 BENIGN HYPERTENSION WITH CKD (CHRONIC KIDNEY DISEASE) STAGE III (HCC): ICD-10-CM

## 2023-06-02 DIAGNOSIS — M15.9 PRIMARY OSTEOARTHRITIS INVOLVING MULTIPLE JOINTS: Primary | ICD-10-CM

## 2023-06-02 LAB — HBA1C MFR BLD: 6.5 %

## 2023-06-02 RX ORDER — OMEPRAZOLE 40 MG/1
40 CAPSULE, DELAYED RELEASE ORAL
Qty: 90 CAPSULE | Refills: 1 | Status: SHIPPED | OUTPATIENT
Start: 2023-06-02

## 2023-06-02 RX ORDER — METHYLPREDNISOLONE 4 MG/1
TABLET ORAL
Qty: 1 KIT | Refills: 0 | Status: SHIPPED | OUTPATIENT
Start: 2023-06-02

## 2023-06-02 RX ORDER — LANOLIN ALCOHOL/MO/W.PET/CERES
CREAM (GRAM) TOPICAL
COMMUNITY
Start: 2023-04-21

## 2023-06-02 RX ORDER — YEAST,DRIED (S. CEREVISIAE)
1 POWDER (GRAM) ORAL DAILY
Qty: 90 TABLET | Refills: 0
Start: 2023-06-02

## 2023-06-02 ASSESSMENT — ENCOUNTER SYMPTOMS
ABDOMINAL PAIN: 0
COUGH: 0
BLOOD IN STOOL: 0
SHORTNESS OF BREATH: 0
CONSTIPATION: 0
VOMITING: 0
CHEST TIGHTNESS: 0
DIARRHEA: 0
NAUSEA: 0
WHEEZING: 0
ABDOMINAL DISTENTION: 0

## 2023-06-02 NOTE — PROGRESS NOTES
Visit Information    Have you changed or started any medications since your last visit including any over-the-counter medicines, vitamins, or herbal medicines? no   Have you stopped taking any of your medications? Is so, why? -  no  Are you having any side effects from any of your medications? - no    Have you seen any other physician or provider since your last visit? NO  Have you had any other diagnostic tests since your last visit?  no   Have you been seen in the emergency room and/or had an admission in a hospital since we last saw you?  no   Have you had your routine dental cleaning in the past 6 months?  no     Do you have an active MyChart account? If no, what is the barrier?   Yes    Patient Care Team:  Mahendra Booth MD as PCP - General (Family Medicine)  Mahendra Booth MD as PCP - Empaneled Provider  Kadie Mackenzie MD as Surgeon (Cardiology)  Emilee De La Vega MD as Consulting Physician (Urology)  Ronaldo Acevedo MD as Consulting Physician (Gastroenterology)  Vikas Wesley MD as Surgeon (Ophthalmology)  Shane Greenwood MD as Consulting Physician (Oncology)  Gregg Mahmood DPM as Consulting Physician (Podiatry)    Medical History Review  Past Medical, Family, and Social History reviewed and does contribute to the patient presenting condition    Health Maintenance   Topic Date Due    Lipids  05/31/2023    Annual Wellness Visit (AWV)  06/03/2023    Depression Screen  01/13/2024    DTaP/Tdap/Td vaccine (2 - Td or Tdap) 09/25/2030    Flu vaccine  Completed    Shingles vaccine  Completed    Pneumococcal 65+ years Vaccine  Completed    COVID-19 Vaccine  Completed    Hepatitis A vaccine  Aged Out    Hib vaccine  Aged Out    Meningococcal (ACWY) vaccine  Aged Out
it  -     CBC; Future  -     Comprehensive Metabolic Panel; Future  -     CT ABDOMEN PELVIS WO CONTRAST; Future  -     Urinalysis with Reflex to Culture; Future  4. Type 2 diabetes mellitus with diabetic polyneuropathy, without long-term current use of insulin ([facility])  Stable  Lab Results   Component Value Date    LABA1C 6.5 06/02/2023    LABA1C 6.5 01/13/2023    LABA1C 6.1 10/14/2022       -     POCT glycosylated hemoglobin (Hb A1C)  -     CBC; Future  -     Comprehensive Metabolic Panel; Future  -     Vitamin B12 & Folate; Future  -     Uric Acid; Future  -advised home blood glucose testing  daily  -daily feet exam, Foot care: advised to wash feet daily, pat dry and apply lotion at night, avoiding between toes. Need to look at feet daily and report to a physician any signs of inflammation or skin damage  -annual dilated eye exam  -Low carb, low fat diet, increase fruits and vegetables, and exercise 4-5 times a day 30-40 minutes a day discussed  -continue current treatment metformin  Mg daily  During the treatment with Medrol pack, I advised him low-carb diet, and to increase metformin to twice a day for 7 to 10 days due to steroid-induced hyperglycemia, written instructions also given    -continue ARB losartan and statin Lipitor    5. Diabetic nephropathy associated with type 2 diabetes mellitus ([facility] [address]  worsening  Continue Losartan and good diabetes control  Lab Results   Component Value Date    LABMICR 106 (H) 11/15/2017   Recheck labsCheck labs    -     CBC; Future  -     Comprehensive Metabolic Panel; Future  6. Benign hypertension with CKD (chronic kidney disease) stage III ([facility])  Well controlled. Continue current treatment. Losartan 50 Mg daily  Will recheck labs. -     CBC; Future  -     Comprehensive Metabolic Panel; Future  -     Uric Acid; Future  -     Magnesium; Future  7.  Hyperlipidemia with target LDL less than 70  Improved  To continue Lipitor 20 Mg daily  Lab Results   Component Value Date

## 2023-06-02 NOTE — RESULT ENCOUNTER NOTE
Addressed during office visit today, hemoglobin A1c 6.5, continue treatment recommended during the office visit.

## 2023-06-03 PROBLEM — R01.1 MURMUR, CARDIAC: Status: RESOLVED | Noted: 2017-01-05 | Resolved: 2023-06-03

## 2023-06-03 PROBLEM — E83.42 HYPOMAGNESEMIA: Status: ACTIVE | Noted: 2023-06-03

## 2023-06-03 ASSESSMENT — ENCOUNTER SYMPTOMS: TROUBLE SWALLOWING: 0

## 2023-06-17 DIAGNOSIS — E03.9 ACQUIRED HYPOTHYROIDISM: ICD-10-CM

## 2023-06-19 RX ORDER — LEVOTHYROXINE SODIUM 0.03 MG/1
25 TABLET ORAL
Qty: 90 TABLET | Refills: 3 | Status: SHIPPED | OUTPATIENT
Start: 2023-06-19

## 2023-06-19 RX ORDER — LEVOTHYROXINE SODIUM 0.03 MG/1
TABLET ORAL
Qty: 90 TABLET | Refills: 3 | OUTPATIENT
Start: 2023-06-19

## 2023-06-19 NOTE — TELEPHONE ENCOUNTER
Please Approve or Refuse.   Send to Pharmacy per Pt's Request: [email or web address]      Next Visit Date:  9/26/2023   Last Visit Date: 6/2/2023    Hemoglobin A1C (%)   Date Value   06/02/2023 6.5   01/13/2023 6.5   10/14/2022 6.1             ( goal A1C is < 7)   BP Readings from Last 3 Encounters:   06/02/23 (!) 120/56   01/13/23 120/60   11/15/22 (!) 144/57          (goal 120/80)  BUN   Date Value Ref Range Status   01/17/2023 24 (H) 8 - 23 mg/dL Final     Creatinine   Date Value Ref Range Status   01/17/2023 1.18 0.70 - 1.20 mg/dL Final     Potassium   Date Value Ref Range Status   01/17/2023 4.2 3.7 - 5.3 mmol/L Final

## 2023-06-19 NOTE — TELEPHONE ENCOUNTER
Please Approve or Refuse.   Send to Pharmacy per Pt's Request: [email or web address]      Next Visit Date:  6/17/2023   Last Visit Date: 6/2/2023    Hemoglobin A1C (%)   Date Value   06/02/2023 6.5   01/13/2023 6.5   10/14/2022 6.1             ( goal A1C is < 7)   BP Readings from Last 3 Encounters:   06/02/23 (!) 120/56   01/13/23 120/60   11/15/22 (!) 144/57          (goal 120/80)  BUN   Date Value Ref Range Status   01/17/2023 24 (H) 8 - 23 mg/dL Final     Creatinine   Date Value Ref Range Status   01/17/2023 1.18 0.70 - 1.20 mg/dL Final     Potassium   Date Value Ref Range Status   01/17/2023 4.2 3.7 - 5.3 mmol/L Final

## 2023-06-30 DIAGNOSIS — E11.42 TYPE 2 DIABETES MELLITUS WITH DIABETIC POLYNEUROPATHY, WITHOUT LONG-TERM CURRENT USE OF INSULIN (HCC): ICD-10-CM

## 2023-07-03 RX ORDER — METFORMIN HYDROCHLORIDE 500 MG/1
500 TABLET, EXTENDED RELEASE ORAL
Qty: 90 TABLET | Refills: 3 | Status: SHIPPED | OUTPATIENT
Start: 2023-07-03

## 2023-07-07 ENCOUNTER — TELEPHONE (OUTPATIENT)
Dept: UROLOGY | Age: 88
End: 2023-07-07

## 2023-07-07 NOTE — TELEPHONE ENCOUNTER
Patient called in and left a voicemail about his yearly recall. Writer called patient back and left a voicemail asking for returned call.

## 2023-07-14 DIAGNOSIS — D50.8 IRON DEFICIENCY ANEMIA SECONDARY TO INADEQUATE DIETARY IRON INTAKE: Primary | ICD-10-CM

## 2023-07-18 ENCOUNTER — HOSPITAL ENCOUNTER (OUTPATIENT)
Age: 88
Setting detail: SPECIMEN
Discharge: HOME | End: 2023-07-18

## 2023-07-18 ENCOUNTER — TELEPHONE (OUTPATIENT)
Dept: ONCOLOGY | Age: 88
End: 2023-07-18

## 2023-07-18 DIAGNOSIS — D50.8 IRON DEFICIENCY ANEMIA SECONDARY TO INADEQUATE DIETARY IRON INTAKE: ICD-10-CM

## 2023-07-18 LAB
BASOPHILS # BLD: 0.03 K/UL (ref 0–0.2)
BASOPHILS NFR BLD: 1 % (ref 0–2)
EOSINOPHIL # BLD: 0.12 K/UL (ref 0–0.44)
EOSINOPHILS RELATIVE PERCENT: 2 % (ref 1–4)
ERYTHROCYTE [DISTWIDTH] IN BLOOD BY AUTOMATED COUNT: 15.8 % (ref 11.8–14.4)
FERRITIN SERPL-MCNC: 1471 NG/ML (ref 30–400)
HCT VFR BLD AUTO: 32.3 % (ref 40.7–50.3)
HGB BLD-MCNC: 10.3 G/DL (ref 13–17)
IMM GRANULOCYTES # BLD AUTO: <0.03 K/UL (ref 0–0.3)
IMM GRANULOCYTES NFR BLD: 0 %
IRON SATN MFR SERPL: 47 % (ref 20–55)
IRON SERPL-MCNC: 82 UG/DL (ref 59–158)
LYMPHOCYTES # BLD: 40 % (ref 24–43)
LYMPHOCYTES NFR BLD: 2.27 K/UL (ref 1.1–3.7)
MCH RBC QN AUTO: 32.7 PG (ref 25.2–33.5)
MCHC RBC AUTO-ENTMCNC: 31.9 G/DL (ref 28.4–34.8)
MCV RBC AUTO: 102.5 FL (ref 82.6–102.9)
MONOCYTES NFR BLD: 0.43 K/UL (ref 0.1–1.2)
MONOCYTES NFR BLD: 8 % (ref 3–12)
NEUTROPHILS NFR BLD: 49 % (ref 36–65)
NEUTS SEG NFR BLD: 2.78 K/UL (ref 1.5–8.1)
NRBC BLD-RTO: 0 PER 100 WBC
PLATELET # BLD AUTO: 236 K/UL (ref 138–453)
PMV BLD AUTO: 12.4 FL (ref 8.1–13.5)
RBC # BLD AUTO: 3.15 M/UL (ref 4.21–5.77)
RBC # BLD: ABNORMAL 10*6/UL
TIBC SERPL-MCNC: 176 UG/DL (ref 250–450)
UNSATURATED IRON BINDING CAPACITY: 94 UG/DL (ref 112–347)
WBC OTHER # BLD: 5.6 K/UL (ref 3.5–11.3)

## 2023-07-21 ENCOUNTER — TELEPHONE (OUTPATIENT)
Dept: ONCOLOGY | Age: 88
End: 2023-07-21

## 2023-07-21 ENCOUNTER — OFFICE VISIT (OUTPATIENT)
Dept: ONCOLOGY | Age: 88
End: 2023-07-21
Payer: MEDICARE

## 2023-07-21 VITALS
RESPIRATION RATE: 18 BRPM | HEART RATE: 72 BPM | OXYGEN SATURATION: 98 % | WEIGHT: 174.5 LBS | SYSTOLIC BLOOD PRESSURE: 145 MMHG | TEMPERATURE: 97.5 F | DIASTOLIC BLOOD PRESSURE: 61 MMHG | BODY MASS INDEX: 25.77 KG/M2

## 2023-07-21 DIAGNOSIS — D64.9 NORMOCYTIC ANEMIA: Primary | ICD-10-CM

## 2023-07-21 PROCEDURE — 99211 OFF/OP EST MAY X REQ PHY/QHP: CPT | Performed by: INTERNAL MEDICINE

## 2023-07-21 PROCEDURE — 1123F ACP DISCUSS/DSCN MKR DOCD: CPT | Performed by: INTERNAL MEDICINE

## 2023-07-21 PROCEDURE — G8417 CALC BMI ABV UP PARAM F/U: HCPCS | Performed by: INTERNAL MEDICINE

## 2023-07-21 PROCEDURE — G8427 DOCREV CUR MEDS BY ELIG CLIN: HCPCS | Performed by: INTERNAL MEDICINE

## 2023-07-21 PROCEDURE — 99214 OFFICE O/P EST MOD 30 MIN: CPT | Performed by: INTERNAL MEDICINE

## 2023-07-21 PROCEDURE — 1036F TOBACCO NON-USER: CPT | Performed by: INTERNAL MEDICINE

## 2023-07-21 NOTE — TELEPHONE ENCOUNTER
AVS from 7/21/23      RV one year with labs before RV    Rv sched for 7/26/24 @ 1:00 pm     Pt will have labs drawn one week prior to RV    Pt was given AVS and appointment schedule    Electronically signed by Agustina Goldberg on 7/21/2023 at 11:07 AM

## 2023-07-27 NOTE — PROGRESS NOTES
_           Chief Complaint   Patient presents with    Follow-up     1 year     DIAGNOSIS:       Normocytic anemia with evidence of iron deficiency. Anemia multifactorial, Anemia of chronic renal insufficiency, iron deficiency and possible bone marrow disease. Gastritis with positive H. pylori status post treatment      CURRENT THERAPY:         Oral iron replacement  Recent treatment for H. Pylori  Status post iron dextran January 2018. IV Iron infusion July 2020. BRIEF CASE HISTORY:      Mr. Oj Castillo is a very pleasant 80 y.o. male with history of multiple comorbidities as stated below. Patient had history of anemia for the last few months and he was maintained on oral iron. He has some GI symptoms and he is unable to tolerates treatment. Patient denies any active bleeding. No melena or hematochezia. No hematemesis. No history of heartburn or acid reflux. No hematuria. No nausea or vomiting. No weight loss or decreased appetite. No fever or lymphadenopathy. He has increasing weakness and fatigue. No other complaints. INTERIM HISTORY:   The patient is seen for follow-up anemia. He had evidence of iron deficiency. He had oral iron. He has significant side effects related to the oral treatment. He cannot tolerate the pills. He continued to have weakness and fatigue. Recent hemoglobin is worse. No active bleeding. He had GI workup which showed gastritis with no active bleeding. Continues to have heartburn and acid reflux. No melena or hematochezia. No other complaints.       PAST MEDICAL HISTORY: has a past medical history of Acute bronchitis due to infection, Allergic rhinitis due to allergen, Anemia, Aortic stenosis, moderate-severe, Benign hypertension with CKD (chronic kidney disease) stage III ([address]), Chronic kidney disease, Chronic right-sided low back pain with right-sided sciatica,

## 2023-09-07 ENCOUNTER — OFFICE VISIT (OUTPATIENT)
Dept: UROLOGY | Age: 88
End: 2023-09-07
Payer: MEDICARE

## 2023-09-07 VITALS — HEIGHT: 69 IN | WEIGHT: 174 LBS | BODY MASS INDEX: 25.77 KG/M2

## 2023-09-07 DIAGNOSIS — R35.1 NOCTURIA: ICD-10-CM

## 2023-09-07 DIAGNOSIS — R39.14 BENIGN PROSTATIC HYPERPLASIA WITH INCOMPLETE BLADDER EMPTYING: Primary | ICD-10-CM

## 2023-09-07 DIAGNOSIS — N40.1 BENIGN PROSTATIC HYPERPLASIA WITH INCOMPLETE BLADDER EMPTYING: Primary | ICD-10-CM

## 2023-09-07 PROCEDURE — 1036F TOBACCO NON-USER: CPT | Performed by: UROLOGY

## 2023-09-07 PROCEDURE — 1123F ACP DISCUSS/DSCN MKR DOCD: CPT | Performed by: UROLOGY

## 2023-09-07 PROCEDURE — 99214 OFFICE O/P EST MOD 30 MIN: CPT | Performed by: UROLOGY

## 2023-09-07 PROCEDURE — G8427 DOCREV CUR MEDS BY ELIG CLIN: HCPCS | Performed by: UROLOGY

## 2023-09-07 PROCEDURE — G8417 CALC BMI ABV UP PARAM F/U: HCPCS | Performed by: UROLOGY

## 2023-09-07 RX ORDER — TAMSULOSIN HYDROCHLORIDE 0.4 MG/1
0.4 CAPSULE ORAL DAILY
Qty: 90 CAPSULE | Refills: 3 | Status: SHIPPED | OUTPATIENT
Start: 2023-09-07

## 2023-09-07 ASSESSMENT — ENCOUNTER SYMPTOMS
RESPIRATORY NEGATIVE: 1
GASTROINTESTINAL NEGATIVE: 1
BACK PAIN: 0
EYES NEGATIVE: 1
EYE REDNESS: 0
VOMITING: 0
ABDOMINAL PAIN: 0
EYE PAIN: 0
CONSTIPATION: 0
WHEEZING: 0
DIARRHEA: 0
COUGH: 0
SHORTNESS OF BREATH: 0
NAUSEA: 0

## 2023-09-18 ENCOUNTER — HOSPITAL ENCOUNTER (OUTPATIENT)
Age: 88
Discharge: HOME | End: 2023-09-18
Payer: MEDICARE

## 2023-09-18 DIAGNOSIS — D64.9 NORMOCYTIC ANEMIA: ICD-10-CM

## 2023-09-18 DIAGNOSIS — I12.9 BENIGN HYPERTENSION WITH CKD (CHRONIC KIDNEY DISEASE) STAGE III (HCC): ICD-10-CM

## 2023-09-18 DIAGNOSIS — E11.21 DIABETIC NEPHROPATHY ASSOCIATED WITH TYPE 2 DIABETES MELLITUS (HCC): ICD-10-CM

## 2023-09-18 DIAGNOSIS — E03.9 ACQUIRED HYPOTHYROIDISM: ICD-10-CM

## 2023-09-18 DIAGNOSIS — R10.30 LOWER ABDOMINAL PAIN: ICD-10-CM

## 2023-09-18 DIAGNOSIS — E11.42 TYPE 2 DIABETES MELLITUS WITH DIABETIC POLYNEUROPATHY, WITHOUT LONG-TERM CURRENT USE OF INSULIN (HCC): ICD-10-CM

## 2023-09-18 DIAGNOSIS — E78.5 HYPERLIPIDEMIA WITH TARGET LDL LESS THAN 70: ICD-10-CM

## 2023-09-18 DIAGNOSIS — N18.30 BENIGN HYPERTENSION WITH CKD (CHRONIC KIDNEY DISEASE) STAGE III (HCC): ICD-10-CM

## 2023-09-18 LAB
ALBUMIN SERPL-MCNC: 3.9 G/DL (ref 3.5–5.2)
ALP SERPL-CCNC: 73 U/L (ref 40–129)
ALT SERPL-CCNC: 22 U/L (ref 5–41)
ANION GAP SERPL CALCULATED.3IONS-SCNC: 9 MMOL/L (ref 9–17)
AST SERPL-CCNC: 22 U/L
BACTERIA URNS QL MICRO: ABNORMAL
BILIRUB SERPL-MCNC: 0.5 MG/DL (ref 0.3–1.2)
BILIRUB UR QL STRIP: NEGATIVE
BUN SERPL-MCNC: 26 MG/DL (ref 8–23)
CALCIUM SERPL-MCNC: 9.7 MG/DL (ref 8.6–10.4)
CASTS #/AREA URNS LPF: ABNORMAL /LPF
CHLORIDE SERPL-SCNC: 103 MMOL/L (ref 98–107)
CHOLEST SERPL-MCNC: 149 MG/DL
CHOLESTEROL/HDL RATIO: 3.2
CLARITY UR: CLEAR
CO2 SERPL-SCNC: 25 MMOL/L (ref 20–31)
COLOR UR: YELLOW
CREAT SERPL-MCNC: 1.1 MG/DL (ref 0.7–1.2)
EPI CELLS #/AREA URNS HPF: ABNORMAL /HPF
ERYTHROCYTE [DISTWIDTH] IN BLOOD BY AUTOMATED COUNT: 15.1 % (ref 11.5–14.9)
GFR SERPL CREATININE-BSD FRML MDRD: >60 ML/MIN/1.73M2
GLUCOSE SERPL-MCNC: 162 MG/DL (ref 70–99)
GLUCOSE UR STRIP-MCNC: NEGATIVE MG/DL
HCT VFR BLD AUTO: 30.4 % (ref 41–53)
HDLC SERPL-MCNC: 47 MG/DL
HGB BLD-MCNC: 9.8 G/DL (ref 13.5–17.5)
HGB UR QL STRIP.AUTO: ABNORMAL
KETONES UR STRIP-MCNC: NEGATIVE MG/DL
LDLC SERPL CALC-MCNC: 85 MG/DL (ref 0–130)
LEUKOCYTE ESTERASE UR QL STRIP: NEGATIVE
MAGNESIUM SERPL-MCNC: 1.7 MG/DL (ref 1.6–2.6)
MCH RBC QN AUTO: 32.3 PG (ref 26–34)
MCHC RBC AUTO-ENTMCNC: 32.3 G/DL (ref 31–37)
MCV RBC AUTO: 100.2 FL (ref 80–100)
NITRITE UR QL STRIP: NEGATIVE
PH UR STRIP: 5 [PH] (ref 5–8)
PLATELET # BLD AUTO: 159 K/UL (ref 150–450)
PMV BLD AUTO: 8.9 FL (ref 6–12)
POTASSIUM SERPL-SCNC: 4.3 MMOL/L (ref 3.7–5.3)
PROT SERPL-MCNC: 7 G/DL (ref 6.4–8.3)
PROT UR STRIP-MCNC: ABNORMAL MG/DL
RBC # BLD AUTO: 3.03 M/UL (ref 4.5–5.9)
RBC #/AREA URNS HPF: ABNORMAL /HPF
SODIUM SERPL-SCNC: 137 MMOL/L (ref 135–144)
SP GR UR STRIP: 1.01 (ref 1–1.03)
T4 FREE SERPL-MCNC: 1.2 NG/DL (ref 0.9–1.7)
TRIGL SERPL-MCNC: 84 MG/DL
TSH SERPL DL<=0.05 MIU/L-ACNC: 4.05 UIU/ML (ref 0.3–5)
URATE SERPL-MCNC: 4.7 MG/DL (ref 3.4–7)
UROBILINOGEN UR STRIP-ACNC: NORMAL EU/DL (ref 0–1)
WBC #/AREA URNS HPF: ABNORMAL /HPF
WBC OTHER # BLD: 4.8 K/UL (ref 3.5–11)

## 2023-09-18 PROCEDURE — 80061 LIPID PANEL: CPT

## 2023-09-18 PROCEDURE — 80053 COMPREHEN METABOLIC PANEL: CPT

## 2023-09-18 PROCEDURE — 84443 ASSAY THYROID STIM HORMONE: CPT

## 2023-09-18 PROCEDURE — 84439 ASSAY OF FREE THYROXINE: CPT

## 2023-09-18 PROCEDURE — 36415 COLL VENOUS BLD VENIPUNCTURE: CPT

## 2023-09-18 PROCEDURE — 81001 URINALYSIS AUTO W/SCOPE: CPT

## 2023-09-18 PROCEDURE — 82607 VITAMIN B-12: CPT

## 2023-09-18 PROCEDURE — 85027 COMPLETE CBC AUTOMATED: CPT

## 2023-09-18 PROCEDURE — 83735 ASSAY OF MAGNESIUM: CPT

## 2023-09-18 PROCEDURE — 84550 ASSAY OF BLOOD/URIC ACID: CPT

## 2023-09-18 PROCEDURE — 82746 ASSAY OF FOLIC ACID SERUM: CPT

## 2023-09-18 NOTE — RESULT ENCOUNTER NOTE
Please notify patient: Blood glucose high at 162  Kidney function improved chronic kidney disease stage II now  Anemia is slightly worse, than before, could be due to the bone marrow or blood loss, he needs another iron infusion, I can order it for him, it would give him more energy or could see hematologist oncologist to have it done--- which is highly recommended, due to high ferritin level  Magnesium is improved  Otherwise labs within normal limits  continue current treatment    Lab Results       Component                Value               Date                       IRON                     82                  07/18/2023                 TIBC                     176 (L)             07/18/2023                 FERRITIN                 1,471 (H)           07/18/2023                Future Appointments  9/26/2023  3:00 PM    Meaghan Malone MD     [facility]  7/26/2024  1:00 PM    Scooter Moore MD     [address]

## 2023-09-19 LAB
FOLATE SERPL-MCNC: >20 NG/ML
VIT B12 SERPL-MCNC: 741 PG/ML (ref 232–1245)

## 2023-09-22 DIAGNOSIS — E03.9 ACQUIRED HYPOTHYROIDISM: ICD-10-CM

## 2023-09-22 RX ORDER — LEVOTHYROXINE SODIUM 0.03 MG/1
25 TABLET ORAL
Qty: 90 TABLET | Refills: 3 | Status: SHIPPED | OUTPATIENT
Start: 2023-09-22

## 2023-09-22 NOTE — TELEPHONE ENCOUNTER
Please Approve or Refuse.   Send to Pharmacy per Pt's Request: [email or web address]      Next Visit Date:  9/26/2023   Last Visit Date: 6/2/2023    Hemoglobin A1C (%)   Date Value   06/02/2023 6.5   01/13/2023 6.5   10/14/2022 6.1             ( goal A1C is < 7)   BP Readings from Last 3 Encounters:   07/21/23 (!) 145/61   06/02/23 (!) 120/56   01/13/23 120/60          (goal 120/80)  BUN   Date Value Ref Range Status   09/18/2023 26 (H) 8 - 23 mg/dL Final     Creatinine   Date Value Ref Range Status   09/18/2023 1.1 0.7 - 1.2 mg/dL Final     Potassium   Date Value Ref Range Status   09/18/2023 4.3 3.7 - 5.3 mmol/L Final

## 2023-09-26 ENCOUNTER — TELEPHONE (OUTPATIENT)
Dept: FAMILY MEDICINE CLINIC | Age: 88
End: 2023-09-26

## 2023-09-26 ENCOUNTER — OFFICE VISIT (OUTPATIENT)
Dept: FAMILY MEDICINE CLINIC | Age: 88
End: 2023-09-26
Payer: MEDICARE

## 2023-09-26 VITALS
BODY MASS INDEX: 25.21 KG/M2 | OXYGEN SATURATION: 96 % | DIASTOLIC BLOOD PRESSURE: 62 MMHG | TEMPERATURE: 97.8 F | SYSTOLIC BLOOD PRESSURE: 124 MMHG | WEIGHT: 170.2 LBS | HEART RATE: 78 BPM | HEIGHT: 69 IN

## 2023-09-26 DIAGNOSIS — Z11.59 ENCOUNTER FOR SCREENING FOR OTHER VIRAL DISEASES: ICD-10-CM

## 2023-09-26 DIAGNOSIS — Z00.00 MEDICARE ANNUAL WELLNESS VISIT, SUBSEQUENT: Primary | ICD-10-CM

## 2023-09-26 DIAGNOSIS — N18.30 BENIGN HYPERTENSION WITH CKD (CHRONIC KIDNEY DISEASE) STAGE III (HCC): ICD-10-CM

## 2023-09-26 DIAGNOSIS — J30.0 VASOMOTOR RHINITIS: ICD-10-CM

## 2023-09-26 DIAGNOSIS — I12.9 BENIGN HYPERTENSION WITH CKD (CHRONIC KIDNEY DISEASE) STAGE III (HCC): ICD-10-CM

## 2023-09-26 DIAGNOSIS — J20.9 ACUTE BRONCHITIS DUE TO INFECTION: ICD-10-CM

## 2023-09-26 DIAGNOSIS — E11.42 TYPE 2 DIABETES MELLITUS WITH DIABETIC POLYNEUROPATHY, WITHOUT LONG-TERM CURRENT USE OF INSULIN (HCC): ICD-10-CM

## 2023-09-26 DIAGNOSIS — Z23 NEED FOR INFLUENZA VACCINATION: ICD-10-CM

## 2023-09-26 DIAGNOSIS — K90.9 IRON MALABSORPTION: ICD-10-CM

## 2023-09-26 DIAGNOSIS — D50.8 OTHER IRON DEFICIENCY ANEMIA: Primary | ICD-10-CM

## 2023-09-26 DIAGNOSIS — E03.9 ACQUIRED HYPOTHYROIDISM: ICD-10-CM

## 2023-09-26 DIAGNOSIS — H10.13 ALLERGIC CONJUNCTIVITIS OF BOTH EYES: ICD-10-CM

## 2023-09-26 DIAGNOSIS — J01.80 ACUTE NON-RECURRENT SINUSITIS OF OTHER SINUS: ICD-10-CM

## 2023-09-26 LAB — HBA1C MFR BLD: 6.3 %

## 2023-09-26 PROCEDURE — 3044F HG A1C LEVEL LT 7.0%: CPT | Performed by: FAMILY MEDICINE

## 2023-09-26 PROCEDURE — 83036 HEMOGLOBIN GLYCOSYLATED A1C: CPT | Performed by: FAMILY MEDICINE

## 2023-09-26 PROCEDURE — G0439 PPPS, SUBSEQ VISIT: HCPCS | Performed by: FAMILY MEDICINE

## 2023-09-26 PROCEDURE — G0008 ADMIN INFLUENZA VIRUS VAC: HCPCS | Performed by: FAMILY MEDICINE

## 2023-09-26 PROCEDURE — 1123F ACP DISCUSS/DSCN MKR DOCD: CPT | Performed by: FAMILY MEDICINE

## 2023-09-26 PROCEDURE — G8417 CALC BMI ABV UP PARAM F/U: HCPCS | Performed by: FAMILY MEDICINE

## 2023-09-26 PROCEDURE — 90694 VACC AIIV4 NO PRSRV 0.5ML IM: CPT | Performed by: FAMILY MEDICINE

## 2023-09-26 PROCEDURE — 1036F TOBACCO NON-USER: CPT | Performed by: FAMILY MEDICINE

## 2023-09-26 PROCEDURE — 99213 OFFICE O/P EST LOW 20 MIN: CPT | Performed by: FAMILY MEDICINE

## 2023-09-26 PROCEDURE — G8427 DOCREV CUR MEDS BY ELIG CLIN: HCPCS | Performed by: FAMILY MEDICINE

## 2023-09-26 RX ORDER — ONDANSETRON 2 MG/ML
8 INJECTION INTRAMUSCULAR; INTRAVENOUS
OUTPATIENT
Start: 2023-09-27

## 2023-09-26 RX ORDER — ACETAMINOPHEN 325 MG/1
650 TABLET ORAL
OUTPATIENT
Start: 2023-09-27

## 2023-09-26 RX ORDER — SODIUM CHLORIDE 9 MG/ML
5-250 INJECTION, SOLUTION INTRAVENOUS PRN
OUTPATIENT
Start: 2023-09-27

## 2023-09-26 RX ORDER — ALBUTEROL SULFATE 90 UG/1
4 AEROSOL, METERED RESPIRATORY (INHALATION) PRN
OUTPATIENT
Start: 2023-09-27

## 2023-09-26 RX ORDER — HEPARIN SODIUM (PORCINE) LOCK FLUSH IV SOLN 100 UNIT/ML 100 UNIT/ML
500 SOLUTION INTRAVENOUS PRN
OUTPATIENT
Start: 2023-09-27

## 2023-09-26 RX ORDER — BENZONATATE 100 MG/1
100 CAPSULE ORAL 3 TIMES DAILY PRN
Qty: 21 CAPSULE | Refills: 0 | Status: SHIPPED | OUTPATIENT
Start: 2023-09-26 | End: 2023-10-03

## 2023-09-26 RX ORDER — ALBUTEROL SULFATE 90 UG/1
2 AEROSOL, METERED RESPIRATORY (INHALATION) EVERY 6 HOURS PRN
Qty: 8 G | Refills: 0 | Status: SHIPPED | OUTPATIENT
Start: 2023-09-26

## 2023-09-26 RX ORDER — EPINEPHRINE 1 MG/ML
0.3 INJECTION, SOLUTION, CONCENTRATE INTRAVENOUS PRN
OUTPATIENT
Start: 2023-09-27

## 2023-09-26 RX ORDER — FAMOTIDINE 10 MG/ML
20 INJECTION, SOLUTION INTRAVENOUS
OUTPATIENT
Start: 2023-09-27

## 2023-09-26 RX ORDER — SODIUM CHLORIDE 9 MG/ML
INJECTION, SOLUTION INTRAVENOUS CONTINUOUS
OUTPATIENT
Start: 2023-09-27

## 2023-09-26 RX ORDER — FLUTICASONE PROPIONATE 50 MCG
2 SPRAY, SUSPENSION (ML) NASAL 2 TIMES DAILY
Qty: 16 G | Refills: 1 | Status: SHIPPED | OUTPATIENT
Start: 2023-09-26

## 2023-09-26 RX ORDER — BLOOD-GLUCOSE METER
EACH MISCELLANEOUS
Qty: 1 KIT | Refills: 0 | Status: SHIPPED | OUTPATIENT
Start: 2023-09-26

## 2023-09-26 RX ORDER — DIPHENHYDRAMINE HYDROCHLORIDE 50 MG/ML
50 INJECTION INTRAMUSCULAR; INTRAVENOUS
OUTPATIENT
Start: 2023-09-27

## 2023-09-26 RX ORDER — GLUCOSAMINE HCL/CHONDROITIN SU 500-400 MG
CAPSULE ORAL
Qty: 200 STRIP | Refills: 3 | Status: SHIPPED | OUTPATIENT
Start: 2023-09-26

## 2023-09-26 RX ORDER — AZITHROMYCIN 250 MG/1
TABLET, FILM COATED ORAL
Qty: 6 TABLET | Refills: 0 | Status: SHIPPED | OUTPATIENT
Start: 2023-09-26 | End: 2023-10-01

## 2023-09-26 RX ORDER — SODIUM CHLORIDE 0.9 % (FLUSH) 0.9 %
5-40 SYRINGE (ML) INJECTION PRN
OUTPATIENT
Start: 2023-09-27

## 2023-09-26 ASSESSMENT — ENCOUNTER SYMPTOMS
SHORTNESS OF BREATH: 1
WHEEZING: 0
SINUS PAIN: 0
NAUSEA: 0
COUGH: 1
TROUBLE SWALLOWING: 0
DIARRHEA: 0
ABDOMINAL DISTENTION: 0
ABDOMINAL PAIN: 0
CHEST TIGHTNESS: 0
RHINORRHEA: 1
VOMITING: 0
SINUS PRESSURE: 1
CONSTIPATION: 0

## 2023-09-26 ASSESSMENT — PATIENT HEALTH QUESTIONNAIRE - PHQ9
2. FEELING DOWN, DEPRESSED OR HOPELESS: 1
1. LITTLE INTEREST OR PLEASURE IN DOING THINGS: 1
SUM OF ALL RESPONSES TO PHQ QUESTIONS 1-9: 2
SUM OF ALL RESPONSES TO PHQ9 QUESTIONS 1 & 2: 2
SUM OF ALL RESPONSES TO PHQ QUESTIONS 1-9: 2

## 2023-09-26 ASSESSMENT — VISUAL ACUITY
OD_CC: 20/20
OS_CC: 20/40

## 2023-09-26 ASSESSMENT — LIFESTYLE VARIABLES
HOW MANY STANDARD DRINKS CONTAINING ALCOHOL DO YOU HAVE ON A TYPICAL DAY: PATIENT DOES NOT DRINK
HOW OFTEN DO YOU HAVE A DRINK CONTAINING ALCOHOL: NEVER

## 2023-09-26 NOTE — PATIENT INSTRUCTIONS
acuity tests are done as part of routine exams. You may also have this test when you get your 's license or apply for some types of jobs. Visual field tests  These tests are used: To check for vision loss in any area of your range of vision. To screen for certain eye diseases. To look for nerve damage after a stroke, head injury, or other problem that could reduce blood flow to the brain. Refraction and color tests  A refraction test is done to find the right prescription for glasses and contact lenses. A color vision test is done to check for color blindness. Color vision is often tested as part of a routine exam. You may also have this test when you apply for a job where recognizing different colors is important, such as , electronics, or the [employer]. How are vision tests done? Visual acuity test   You cover one eye at a time. You read aloud from a wall chart across the room. You read aloud from a small card that you hold in your hand. Refraction   You look into a special device. The device puts lenses of different strengths in front of each eye to see how strong your glasses or contact lenses need to be. Visual field tests   Your doctor may have you look through special machines. Or your doctor may simply have you stare straight ahead while they move a finger into and out of your field of vision. Color vision test   You look at pieces of printed test patterns in various colors. You say what number or symbol you see. Your doctor may have you trace the number or symbol using a pointer. How do these tests feel? There is very little chance of having a problem from this test. If dilating drops are used for a vision test, they may make the eyes sting and cause a medicine taste in the mouth. Follow-up care is a key part of your treatment and safety. Be sure to make and go to all appointments, and call your doctor if you are having problems.  It's also a good idea to know your test

## 2023-09-26 NOTE — TELEPHONE ENCOUNTER
Lab Results   Component Value Date    WBC 4.8 09/18/2023    HGB 9.8 (L) 09/18/2023    HCT 30.4 (L) 09/18/2023    .2 (H) 09/18/2023     09/18/2023

## 2023-09-26 NOTE — RESULT ENCOUNTER NOTE
Addressed during office visit today, A1c 6.3, improved diabetes, continue treatment recommended during the office visit.

## 2023-09-29 ENCOUNTER — PATIENT MESSAGE (OUTPATIENT)
Dept: FAMILY MEDICINE CLINIC | Age: 88
End: 2023-09-29

## 2023-09-29 DIAGNOSIS — E11.42 TYPE 2 DIABETES MELLITUS WITH DIABETIC POLYNEUROPATHY, WITHOUT LONG-TERM CURRENT USE OF INSULIN (HCC): Primary | ICD-10-CM

## 2023-10-02 RX ORDER — BLOOD-GLUCOSE METER
KIT MISCELLANEOUS
Qty: 1 KIT | Refills: 0 | Status: SHIPPED | OUTPATIENT
Start: 2023-10-02

## 2023-10-02 NOTE — TELEPHONE ENCOUNTER
From: Coty Villalobos  To: Dr. Roxanne Momin: 9/29/2023 6:26 PM EDT  Subject: Lianert was not covered by my insurance. Can you suggest another meter that might be covered?  Thanks

## 2023-10-04 ENCOUNTER — HOSPITAL ENCOUNTER (OUTPATIENT)
Dept: INFUSION THERAPY | Age: 88
Setting detail: INFUSION SERIES
Discharge: HOME | End: 2023-10-04
Payer: MEDICARE

## 2023-10-04 VITALS
SYSTOLIC BLOOD PRESSURE: 130 MMHG | HEART RATE: 81 BPM | RESPIRATION RATE: 16 BRPM | OXYGEN SATURATION: 97 % | TEMPERATURE: 97.1 F | DIASTOLIC BLOOD PRESSURE: 74 MMHG

## 2023-10-04 DIAGNOSIS — K90.9 IRON MALABSORPTION: Primary | ICD-10-CM

## 2023-10-04 DIAGNOSIS — D50.8 OTHER IRON DEFICIENCY ANEMIA: ICD-10-CM

## 2023-10-04 PROCEDURE — 6360000002 HC RX W HCPCS: Performed by: FAMILY MEDICINE

## 2023-10-04 PROCEDURE — 96374 THER/PROPH/DIAG INJ IV PUSH: CPT

## 2023-10-04 RX ORDER — HEPARIN 100 UNIT/ML
500 SYRINGE INTRAVENOUS PRN
OUTPATIENT
Start: 2023-10-04

## 2023-10-04 RX ORDER — SODIUM CHLORIDE 9 MG/ML
5-250 INJECTION, SOLUTION INTRAVENOUS PRN
OUTPATIENT
Start: 2023-10-04

## 2023-10-04 RX ORDER — ALBUTEROL SULFATE 90 UG/1
4 AEROSOL, METERED RESPIRATORY (INHALATION) PRN
OUTPATIENT
Start: 2023-10-04

## 2023-10-04 RX ORDER — ONDANSETRON 2 MG/ML
8 INJECTION INTRAMUSCULAR; INTRAVENOUS
OUTPATIENT
Start: 2023-10-04

## 2023-10-04 RX ORDER — EPINEPHRINE 1 MG/ML
0.3 INJECTION, SOLUTION, CONCENTRATE INTRAVENOUS PRN
OUTPATIENT
Start: 2023-10-04

## 2023-10-04 RX ORDER — ACETAMINOPHEN 325 MG/1
650 TABLET ORAL
OUTPATIENT
Start: 2023-10-04

## 2023-10-04 RX ORDER — SODIUM CHLORIDE 0.9 % (FLUSH) 0.9 %
5-40 SYRINGE (ML) INJECTION PRN
OUTPATIENT
Start: 2023-10-04

## 2023-10-04 RX ORDER — SODIUM CHLORIDE 9 MG/ML
INJECTION, SOLUTION INTRAVENOUS CONTINUOUS
OUTPATIENT
Start: 2023-10-04

## 2023-10-04 RX ORDER — DIPHENHYDRAMINE HYDROCHLORIDE 50 MG/ML
50 INJECTION INTRAMUSCULAR; INTRAVENOUS
OUTPATIENT
Start: 2023-10-04

## 2023-10-04 RX ADMIN — IRON SUCROSE 100 MG: 20 INJECTION, SOLUTION INTRAVENOUS at 11:21

## 2023-10-04 NOTE — PROGRESS NOTES
Pt arrived for Venofer injection. Vitals obtained and PIV started in L FA. Injection given slow IV push over 5 minutes. Pt tolerated well. No s/s adverse reaction noted. PIV removed. Pt discharged home, via wheelchair with son.

## 2023-10-19 ENCOUNTER — HOSPITAL ENCOUNTER (OUTPATIENT)
Age: 88
Discharge: HOME | End: 2023-10-19
Payer: MEDICARE

## 2023-10-19 DIAGNOSIS — D50.8 OTHER IRON DEFICIENCY ANEMIA: ICD-10-CM

## 2023-10-19 DIAGNOSIS — K90.9 IRON MALABSORPTION: ICD-10-CM

## 2023-10-19 DIAGNOSIS — Z11.59 ENCOUNTER FOR SCREENING FOR OTHER VIRAL DISEASES: ICD-10-CM

## 2023-10-19 LAB
FERRITIN SERPL-MCNC: 1571 NG/ML (ref 30–400)
HBV SURFACE AB SERPL IA-ACNC: 119.8 MIU/ML
HCT VFR BLD AUTO: 30.8 % (ref 41–53)
HGB BLD-MCNC: 9.9 G/DL (ref 13.5–17.5)
IRON SATN MFR SERPL: 47 % (ref 20–55)
IRON SERPL-MCNC: 77 UG/DL (ref 59–158)
TIBC SERPL-MCNC: 163 UG/DL (ref 250–450)
UNSATURATED IRON BINDING CAPACITY: 86 UG/DL (ref 112–347)

## 2023-10-19 PROCEDURE — 85014 HEMATOCRIT: CPT

## 2023-10-19 PROCEDURE — 85018 HEMOGLOBIN: CPT

## 2023-10-19 PROCEDURE — 83540 ASSAY OF IRON: CPT

## 2023-10-19 PROCEDURE — 36415 COLL VENOUS BLD VENIPUNCTURE: CPT

## 2023-10-19 PROCEDURE — 83550 IRON BINDING TEST: CPT

## 2023-10-19 PROCEDURE — 86317 IMMUNOASSAY INFECTIOUS AGENT: CPT

## 2023-10-19 PROCEDURE — 82728 ASSAY OF FERRITIN: CPT

## 2023-10-20 NOTE — RESULT ENCOUNTER NOTE
Please notify patient: Immune against hepatitis B, no need for additional immunization    Anemia is still stable but moderate, suggest to make an earlier appointment with Dr. Lori Lizarraga    Ferritin level which is the deposits of iron, is still high, similar was 3 months ago, but improved from 1 year ago, could be related to recent iron infusion, as some iron parameters are still low  Suggest appointment with hematologist oncologist for a discussion      Future Appointments  1/19/2024  2:00 PM    Funmilayo Stapleton MD    [facility]  7/26/2024  1:00 PM    Fab Loaiza MD    [address] [place] [address]

## 2023-11-02 ENCOUNTER — PATIENT MESSAGE (OUTPATIENT)
Dept: FAMILY MEDICINE CLINIC | Age: 88
End: 2023-11-02

## 2023-11-02 DIAGNOSIS — E83.42 HYPOMAGNESEMIA: ICD-10-CM

## 2023-11-03 NOTE — TELEPHONE ENCOUNTER
From: Elma Romo  To: Dr. Minnie Wang: 11/2/2023 6:07 PM EDT  Subject: Magnesium oxide    I am running low om Magnesium oxide medication. On my Medication list there are 2 dosages, 400mg and 250mg. please determine the correct dosage to refill and send the refill request to my local pharmacy. Thank you.

## 2023-11-03 NOTE — TELEPHONE ENCOUNTER
Please Approve or Refuse.  Send to Pharmacy per Pt's Request:      Next Visit Date:  1/19/2024   Last Visit Date: 9/26/2023    Hemoglobin A1C (%)   Date Value   09/26/2023 6.3   06/02/2023 6.5   01/13/2023 6.5             ( goal A1C is < 7)   BP Readings from Last 3 Encounters:   10/04/23 130/74   09/26/23 124/62   07/21/23 (!) 145/61          (goal 120/80)  BUN   Date Value Ref Range Status   09/18/2023 26 (H) 8 - 23 mg/dL Final     Creatinine   Date Value Ref Range Status   09/18/2023 1.1 0.7 - 1.2 mg/dL Final     Potassium   Date Value Ref Range Status   09/18/2023 4.3 3.7 - 5.3 mmol/L Final

## 2023-11-27 DIAGNOSIS — K21.9 GASTROESOPHAGEAL REFLUX DISEASE WITHOUT ESOPHAGITIS: ICD-10-CM

## 2023-11-28 RX ORDER — OMEPRAZOLE 40 MG/1
40 CAPSULE, DELAYED RELEASE ORAL
Qty: 90 CAPSULE | Refills: 0 | Status: SHIPPED | OUTPATIENT
Start: 2023-11-28

## 2023-11-28 NOTE — TELEPHONE ENCOUNTER
Please Approve or Refuse.   Send to Pharmacy per Pt's Request: [facility]     Next Visit Date:  1/19/2024   Last Visit Date: 9/26/2023    Hemoglobin A1C (%)   Date Value   09/26/2023 6.3   06/02/2023 6.5   01/13/2023 6.5             ( goal A1C is < 7)   BP Readings from Last 3 Encounters:   10/04/23 130/74   09/26/23 124/62   07/21/23 (!) 145/61          (goal 120/80)  BUN   Date Value Ref Range Status   09/18/2023 26 (H) 8 - 23 mg/dL Final     Creatinine   Date Value Ref Range Status   09/18/2023 1.1 0.7 - 1.2 mg/dL Final     Potassium   Date Value Ref Range Status   09/18/2023 4.3 3.7 - 5.3 mmol/L Final

## 2023-12-08 ENCOUNTER — HOSPITAL ENCOUNTER (INPATIENT)
Age: 88
LOS: 3 days | Discharge: HOME | DRG: 291 | End: 2023-12-11
Attending: EMERGENCY MEDICINE | Admitting: INTERNAL MEDICINE
Payer: MEDICARE

## 2023-12-08 ENCOUNTER — APPOINTMENT (OUTPATIENT)
Dept: GENERAL RADIOLOGY | Age: 88
DRG: 291 | End: 2023-12-08
Payer: MEDICARE

## 2023-12-08 ENCOUNTER — APPOINTMENT (OUTPATIENT)
Dept: GENERAL RADIOLOGY | Age: 88
DRG: 291 | End: 2023-12-08
Attending: EMERGENCY MEDICINE
Payer: MEDICARE

## 2023-12-08 DIAGNOSIS — I50.43 CHF (CONGESTIVE HEART FAILURE), NYHA CLASS I, ACUTE ON CHRONIC, COMBINED (HCC): ICD-10-CM

## 2023-12-08 DIAGNOSIS — M15.9 PRIMARY OSTEOARTHRITIS INVOLVING MULTIPLE JOINTS: ICD-10-CM

## 2023-12-08 DIAGNOSIS — I50.9 ACUTE CONGESTIVE HEART FAILURE, UNSPECIFIED HEART FAILURE TYPE (HCC): Primary | ICD-10-CM

## 2023-12-08 DIAGNOSIS — M54.41 CHRONIC RIGHT-SIDED LOW BACK PAIN WITH RIGHT-SIDED SCIATICA: ICD-10-CM

## 2023-12-08 DIAGNOSIS — I21.4 NSTEMI (NON-ST ELEVATED MYOCARDIAL INFARCTION) (HCC): ICD-10-CM

## 2023-12-08 DIAGNOSIS — G89.29 CHRONIC RIGHT-SIDED LOW BACK PAIN WITH RIGHT-SIDED SCIATICA: ICD-10-CM

## 2023-12-08 DIAGNOSIS — E83.42 HYPOMAGNESEMIA: ICD-10-CM

## 2023-12-08 LAB
ANION GAP SERPL CALCULATED.3IONS-SCNC: 10 MMOL/L (ref 9–17)
BACTERIA URNS QL MICRO: ABNORMAL
BASOPHILS # BLD: 0 K/UL (ref 0–0.2)
BASOPHILS NFR BLD: 0 % (ref 0–2)
BILIRUB UR QL STRIP: NEGATIVE
BNP SERPL-MCNC: ABNORMAL PG/ML
BUN SERPL-MCNC: 24 MG/DL (ref 8–23)
CALCIUM SERPL-MCNC: 9.3 MG/DL (ref 8.6–10.4)
CASTS #/AREA URNS LPF: ABNORMAL /LPF
CHLORIDE SERPL-SCNC: 99 MMOL/L (ref 98–107)
CLARITY UR: CLEAR
CO2 SERPL-SCNC: 24 MMOL/L (ref 20–31)
COLOR UR: YELLOW
CREAT SERPL-MCNC: 1.1 MG/DL (ref 0.7–1.2)
EOSINOPHIL # BLD: 0 K/UL (ref 0–0.4)
EOSINOPHILS RELATIVE PERCENT: 0 % (ref 0–4)
EPI CELLS #/AREA URNS HPF: ABNORMAL /HPF
ERYTHROCYTE [DISTWIDTH] IN BLOOD BY AUTOMATED COUNT: 16 % (ref 11.5–14.9)
FLUAV RNA RESP QL NAA+PROBE: NOT DETECTED
FLUBV RNA RESP QL NAA+PROBE: NOT DETECTED
GFR SERPL CREATININE-BSD FRML MDRD: >60 ML/MIN/1.73M2
GLUCOSE SERPL-MCNC: 245 MG/DL (ref 70–99)
GLUCOSE UR STRIP-MCNC: NEGATIVE MG/DL
HCT VFR BLD AUTO: 27 % (ref 41–53)
HGB BLD-MCNC: 8.8 G/DL (ref 13.5–17.5)
HGB UR QL STRIP.AUTO: NEGATIVE
INR PPP: 1.2
KETONES UR STRIP-MCNC: ABNORMAL MG/DL
LEUKOCYTE ESTERASE UR QL STRIP: NEGATIVE
LYMPHOCYTES NFR BLD: 0.5 K/UL (ref 1–4.8)
LYMPHOCYTES RELATIVE PERCENT: 11 % (ref 24–44)
MCH RBC QN AUTO: 33 PG (ref 26–34)
MCHC RBC AUTO-ENTMCNC: 32.5 G/DL (ref 31–37)
MCV RBC AUTO: 101.4 FL (ref 80–100)
MONOCYTES NFR BLD: 0.4 K/UL (ref 0.1–1.3)
MONOCYTES NFR BLD: 9 % (ref 1–7)
MUCOUS THREADS URNS QL MICRO: ABNORMAL
MYOGLOBIN SERPL-MCNC: 126 NG/ML (ref 28–72)
MYOGLOBIN SERPL-MCNC: 135 NG/ML (ref 28–72)
NEUTROPHILS NFR BLD: 80 % (ref 36–66)
NEUTS SEG NFR BLD: 3.7 K/UL (ref 1.3–9.1)
NITRITE UR QL STRIP: NEGATIVE
PARTIAL THROMBOPLASTIN TIME: 37 SEC (ref 24–36)
PH UR STRIP: 5 [PH] (ref 5–8)
PLATELET # BLD AUTO: 170 K/UL (ref 150–450)
PMV BLD AUTO: 8.8 FL (ref 6–12)
POTASSIUM SERPL-SCNC: 4.4 MMOL/L (ref 3.7–5.3)
PROT UR STRIP-MCNC: ABNORMAL MG/DL
PROTHROMBIN TIME: 15.3 SEC (ref 11.8–14.6)
RBC # BLD AUTO: 2.66 M/UL (ref 4.5–5.9)
RBC #/AREA URNS HPF: ABNORMAL /HPF
SARS-COV-2 RNA RESP QL NAA+PROBE: NOT DETECTED
SODIUM SERPL-SCNC: 133 MMOL/L (ref 135–144)
SOURCE: NORMAL
SP GR UR STRIP: 1.02 (ref 1–1.03)
SPECIMEN DESCRIPTION: NORMAL
T4 FREE SERPL-MCNC: 1.4 NG/DL (ref 0.9–1.7)
TROPONIN I SERPL HS-MCNC: 66 NG/L (ref 0–22)
TROPONIN I SERPL HS-MCNC: 66 NG/L (ref 0–22)
TSH SERPL DL<=0.05 MIU/L-ACNC: 4.14 UIU/ML (ref 0.3–5)
UROBILINOGEN UR STRIP-ACNC: NORMAL EU/DL (ref 0–1)
WBC #/AREA URNS HPF: ABNORMAL /HPF
WBC OTHER # BLD: 4.7 K/UL (ref 3.5–11)

## 2023-12-08 PROCEDURE — 99223 1ST HOSP IP/OBS HIGH 75: CPT | Performed by: INTERNAL MEDICINE

## 2023-12-08 PROCEDURE — 99285 EMERGENCY DEPT VISIT HI MDM: CPT

## 2023-12-08 PROCEDURE — 85025 COMPLETE CBC W/AUTO DIFF WBC: CPT

## 2023-12-08 PROCEDURE — 83874 ASSAY OF MYOGLOBIN: CPT

## 2023-12-08 PROCEDURE — 6370000000 HC RX 637 (ALT 250 FOR IP): Performed by: INTERNAL MEDICINE

## 2023-12-08 PROCEDURE — 81001 URINALYSIS AUTO W/SCOPE: CPT

## 2023-12-08 PROCEDURE — 82947 ASSAY GLUCOSE BLOOD QUANT: CPT

## 2023-12-08 PROCEDURE — 2060000000 HC ICU INTERMEDIATE R&B

## 2023-12-08 PROCEDURE — 71045 X-RAY EXAM CHEST 1 VIEW: CPT

## 2023-12-08 PROCEDURE — 85730 THROMBOPLASTIN TIME PARTIAL: CPT

## 2023-12-08 PROCEDURE — 96374 THER/PROPH/DIAG INJ IV PUSH: CPT

## 2023-12-08 PROCEDURE — 83880 ASSAY OF NATRIURETIC PEPTIDE: CPT

## 2023-12-08 PROCEDURE — 93005 ELECTROCARDIOGRAM TRACING: CPT | Performed by: EMERGENCY MEDICINE

## 2023-12-08 PROCEDURE — 36415 COLL VENOUS BLD VENIPUNCTURE: CPT

## 2023-12-08 PROCEDURE — 80048 BASIC METABOLIC PNL TOTAL CA: CPT

## 2023-12-08 PROCEDURE — 93005 ELECTROCARDIOGRAM TRACING: CPT

## 2023-12-08 PROCEDURE — 6360000002 HC RX W HCPCS: Performed by: INTERNAL MEDICINE

## 2023-12-08 PROCEDURE — 2580000003 HC RX 258: Performed by: INTERNAL MEDICINE

## 2023-12-08 PROCEDURE — 85610 PROTHROMBIN TIME: CPT

## 2023-12-08 PROCEDURE — 84443 ASSAY THYROID STIM HORMONE: CPT

## 2023-12-08 PROCEDURE — 84439 ASSAY OF FREE THYROXINE: CPT

## 2023-12-08 PROCEDURE — 6360000002 HC RX W HCPCS: Performed by: EMERGENCY MEDICINE

## 2023-12-08 PROCEDURE — 84484 ASSAY OF TROPONIN QUANT: CPT

## 2023-12-08 PROCEDURE — 87636 SARSCOV2 & INF A&B AMP PRB: CPT

## 2023-12-08 RX ORDER — MAGNESIUM SULFATE HEPTAHYDRATE 40 MG/ML
2000 INJECTION, SOLUTION INTRAVENOUS PRN
Status: DISCONTINUED | OUTPATIENT
Start: 2023-12-08 | End: 2023-12-11

## 2023-12-08 RX ORDER — AMLODIPINE BESYLATE 5 MG/1
5 TABLET ORAL DAILY
Status: DISCONTINUED | OUTPATIENT
Start: 2023-12-09 | End: 2023-12-11 | Stop reason: HOSPADM

## 2023-12-08 RX ORDER — ONDANSETRON 4 MG/1
4 TABLET, ORALLY DISINTEGRATING ORAL EVERY 8 HOURS PRN
Status: DISCONTINUED | OUTPATIENT
Start: 2023-12-08 | End: 2023-12-11 | Stop reason: HOSPADM

## 2023-12-08 RX ORDER — SODIUM CHLORIDE 0.9 % (FLUSH) 0.9 %
5-40 SYRINGE (ML) INJECTION EVERY 12 HOURS SCHEDULED
Status: DISCONTINUED | OUTPATIENT
Start: 2023-12-08 | End: 2023-12-11 | Stop reason: HOSPADM

## 2023-12-08 RX ORDER — ATORVASTATIN CALCIUM 20 MG/1
20 TABLET, FILM COATED ORAL
Status: DISCONTINUED | OUTPATIENT
Start: 2023-12-08 | End: 2023-12-11 | Stop reason: HOSPADM

## 2023-12-08 RX ORDER — FUROSEMIDE 10 MG/ML
20 INJECTION INTRAMUSCULAR; INTRAVENOUS ONCE
Status: COMPLETED | OUTPATIENT
Start: 2023-12-08 | End: 2023-12-08

## 2023-12-08 RX ORDER — TAMSULOSIN HYDROCHLORIDE 0.4 MG/1
0.4 CAPSULE ORAL DAILY
Status: DISCONTINUED | OUTPATIENT
Start: 2023-12-09 | End: 2023-12-11 | Stop reason: HOSPADM

## 2023-12-08 RX ORDER — FUROSEMIDE 10 MG/ML
20 INJECTION INTRAMUSCULAR; INTRAVENOUS DAILY
Status: DISCONTINUED | OUTPATIENT
Start: 2023-12-09 | End: 2023-12-11 | Stop reason: HOSPADM

## 2023-12-08 RX ORDER — SODIUM CHLORIDE 0.9 % (FLUSH) 0.9 %
5-40 SYRINGE (ML) INJECTION PRN
Status: DISCONTINUED | OUTPATIENT
Start: 2023-12-08 | End: 2023-12-11 | Stop reason: HOSPADM

## 2023-12-08 RX ORDER — POTASSIUM CHLORIDE 20 MEQ/1
40 TABLET, EXTENDED RELEASE ORAL PRN
Status: DISCONTINUED | OUTPATIENT
Start: 2023-12-08 | End: 2023-12-11

## 2023-12-08 RX ORDER — POLYETHYLENE GLYCOL 3350 17 G/17G
17 POWDER, FOR SOLUTION ORAL DAILY PRN
Status: DISCONTINUED | OUTPATIENT
Start: 2023-12-08 | End: 2023-12-11 | Stop reason: HOSPADM

## 2023-12-08 RX ORDER — LOSARTAN POTASSIUM 50 MG/1
50 TABLET ORAL DAILY
Status: DISCONTINUED | OUTPATIENT
Start: 2023-12-09 | End: 2023-12-11 | Stop reason: HOSPADM

## 2023-12-08 RX ORDER — SODIUM CHLORIDE 9 MG/ML
INJECTION, SOLUTION INTRAVENOUS PRN
Status: DISCONTINUED | OUTPATIENT
Start: 2023-12-08 | End: 2023-12-11 | Stop reason: HOSPADM

## 2023-12-08 RX ORDER — LEVOTHYROXINE SODIUM 0.03 MG/1
25 TABLET ORAL
Status: DISCONTINUED | OUTPATIENT
Start: 2023-12-09 | End: 2023-12-11 | Stop reason: HOSPADM

## 2023-12-08 RX ORDER — ALBUTEROL SULFATE 90 UG/1
2 AEROSOL, METERED RESPIRATORY (INHALATION) EVERY 6 HOURS PRN
Status: DISCONTINUED | OUTPATIENT
Start: 2023-12-08 | End: 2023-12-11 | Stop reason: HOSPADM

## 2023-12-08 RX ORDER — ACETAMINOPHEN 325 MG/1
650 TABLET ORAL EVERY 6 HOURS PRN
Status: DISCONTINUED | OUTPATIENT
Start: 2023-12-08 | End: 2023-12-11 | Stop reason: HOSPADM

## 2023-12-08 RX ORDER — ENOXAPARIN SODIUM 100 MG/ML
40 INJECTION SUBCUTANEOUS DAILY
Status: DISCONTINUED | OUTPATIENT
Start: 2023-12-08 | End: 2023-12-11

## 2023-12-08 RX ORDER — ONDANSETRON 2 MG/ML
4 INJECTION INTRAMUSCULAR; INTRAVENOUS EVERY 6 HOURS PRN
Status: DISCONTINUED | OUTPATIENT
Start: 2023-12-08 | End: 2023-12-11 | Stop reason: HOSPADM

## 2023-12-08 RX ORDER — POTASSIUM CHLORIDE 7.45 MG/ML
10 INJECTION INTRAVENOUS PRN
Status: DISCONTINUED | OUTPATIENT
Start: 2023-12-08 | End: 2023-12-11

## 2023-12-08 RX ORDER — INSULIN LISPRO 100 [IU]/ML
0-4 INJECTION, SOLUTION INTRAVENOUS; SUBCUTANEOUS NIGHTLY
Status: DISCONTINUED | OUTPATIENT
Start: 2023-12-08 | End: 2023-12-11 | Stop reason: HOSPADM

## 2023-12-08 RX ORDER — DOCUSATE SODIUM 100 MG/1
100 CAPSULE, LIQUID FILLED ORAL 2 TIMES DAILY
Status: DISCONTINUED | OUTPATIENT
Start: 2023-12-08 | End: 2023-12-11 | Stop reason: HOSPADM

## 2023-12-08 RX ORDER — ACETAMINOPHEN 500 MG
500 TABLET ORAL EVERY 12 HOURS PRN
Status: DISCONTINUED | OUTPATIENT
Start: 2023-12-08 | End: 2023-12-11 | Stop reason: HOSPADM

## 2023-12-08 RX ORDER — INSULIN LISPRO 100 [IU]/ML
0-4 INJECTION, SOLUTION INTRAVENOUS; SUBCUTANEOUS
Status: DISCONTINUED | OUTPATIENT
Start: 2023-12-09 | End: 2023-12-11 | Stop reason: HOSPADM

## 2023-12-08 RX ORDER — ACETAMINOPHEN 650 MG/1
650 SUPPOSITORY RECTAL EVERY 6 HOURS PRN
Status: DISCONTINUED | OUTPATIENT
Start: 2023-12-08 | End: 2023-12-11 | Stop reason: HOSPADM

## 2023-12-08 RX ADMIN — SODIUM CHLORIDE, PRESERVATIVE FREE 10 ML: 5 INJECTION INTRAVENOUS at 22:45

## 2023-12-08 RX ADMIN — DOCUSATE SODIUM 100 MG: 100 CAPSULE, LIQUID FILLED ORAL at 22:44

## 2023-12-08 RX ADMIN — FUROSEMIDE 20 MG: 10 INJECTION, SOLUTION INTRAMUSCULAR; INTRAVENOUS at 17:51

## 2023-12-08 RX ADMIN — ATORVASTATIN CALCIUM 20 MG: 40 TABLET, FILM COATED ORAL at 22:44

## 2023-12-08 RX ADMIN — ENOXAPARIN SODIUM 40 MG: 100 INJECTION SUBCUTANEOUS at 21:00

## 2023-12-08 ASSESSMENT — ENCOUNTER SYMPTOMS
EYE DISCHARGE: 0
ABDOMINAL PAIN: 0
BACK PAIN: 0
FACIAL SWELLING: 0
SORE THROAT: 0
CONSTIPATION: 0
CHEST TIGHTNESS: 0
WHEEZING: 0
EYE REDNESS: 0
RHINORRHEA: 0
BLOOD IN STOOL: 0
DIARRHEA: 0
NAUSEA: 0
TROUBLE SWALLOWING: 0
COLOR CHANGE: 0
EYE PAIN: 0
SHORTNESS OF BREATH: 1
COUGH: 1
SINUS PRESSURE: 0
VOMITING: 0

## 2023-12-08 ASSESSMENT — PAIN SCALES - GENERAL
PAINLEVEL_OUTOF10: 0
PAINLEVEL_OUTOF10: 0
PAINLEVEL_OUTOF10: 5

## 2023-12-08 ASSESSMENT — PAIN DESCRIPTION - DESCRIPTORS: DESCRIPTORS: ACHING

## 2023-12-08 ASSESSMENT — PAIN - FUNCTIONAL ASSESSMENT: PAIN_FUNCTIONAL_ASSESSMENT: 0-10

## 2023-12-08 ASSESSMENT — PAIN DESCRIPTION - LOCATION: LOCATION: GENERALIZED

## 2023-12-08 NOTE — ED TRIAGE NOTES
Mode of arrival (squad #, walk in, police, etc) : walk-in        Chief complaint(s): SOB and leg swelling        Arrival Note (brief scenario, treatment PTA, etc). : patient states that he has been more SOB and leg swelling for 2-3 days        C= \"Have you ever felt that you should Cut down on your drinking? \"  No  A= \"Have people Annoyed you by criticizing your drinking? \"  No  G= \"Have you ever felt bad or Guilty about your drinking? \"  No  E= \"Have you ever had a drink as an Eye-opener first thing in the morning to steady your nerves or to help a hangover? \"  No      Deferred []      Reason for deferring: N/A    *If yes to two or more: probable alcohol abuse. *

## 2023-12-08 NOTE — ED PROVIDER NOTES
kidney disease, diabetes, pulmonary hypertension    2)  Data Reviewed  Decision Rules/Scores/MIPS utilized:  None    EKG Interpretation    Interpreted by emergency department physician    Rhythm: normal sinus  and 1 degree AV block  Rate: normal  Axis: normal  Ectopy: none  Conduction: 1st degree AV block  ST Segments: nonspecific changes  T Waves: non specific changes  Q Waves: none    Clinical Impression: EKG: normal sinus rhythm, nonspecific ST and T waves changes, 1st degree AV block. Wander Persaud MD      External Documents Reviewed:  None    Imaging that is independently reviewed and interpreted by me as:  None    See more data below for the lab and radiology tests and orders. 3)  Treatment and Disposition      \"ED Course\" Notes From Epic Narrator:  ED Course as of 12/08/23 1824   Fri Dec 08, 2023   1706 Patient and family were updated on results and plan for admission for cardiac workup. [JL]   1800 Discussed the case with Dr. Victor Manuel Perez hospitalist who agrees to admit the patient and will come see the patient shortly. At this point in time we are going to wait for the second troponin to come back if it goes up from his first 1 and then we will start the patient on heparin if not then we will just hold off and do prophylactic Lovenox. [JL]      ED Course User Index  [JL] Wander Persaud MD         PROCEDURES:  None      DATA FOR LAB AND RADIOLOGY TESTS ORDERED BELOW ARE REVIEWED BY THE ED CLINICIAN:    RADIOLOGY: All x-rays, CT, MRI, and formal ultrasound images (except ED bedside ultrasound) are read by the radiologist, see reports below, unless otherwise noted in MDM or here. Reports below are reviewed by myself. XR CHEST PORTABLE   Final Result   Findings compatible with congestive heart failure and/or pneumonia. Small bilateral pleural effusions, right greater than left. LABS: Lab orders shown below, the results are reviewed by myself, and all abnormals are listed below.   Labs

## 2023-12-08 NOTE — H&P
Monocytes % 9 (H) 1 - 7 %    Eosinophils % 0 0 - 4 %    Basophils % 0 0 - 2 %    Neutrophils Absolute 3.70 1.3 - 9.1 k/uL    Lymphocytes Absolute 0.50 (L) 1.0 - 4.8 k/uL    Monocytes Absolute 0.40 0.1 - 1.3 k/uL    Eosinophils Absolute 0.00 0.0 - 0.4 k/uL    Basophils Absolute 0.00 0.0 - 0.2 k/uL   TROP/MYOGLOBIN    Collection Time: 12/08/23  3:50 PM   Result Value Ref Range    Troponin, High Sensitivity 66 (HH) 0 - 22 ng/L    Myoglobin 135 (H) 28 - 72 ng/mL   TSH    Collection Time: 12/08/23  3:50 PM   Result Value Ref Range    TSH 4.14 0.30 - 5.00 uIU/mL   T4, Free    Collection Time: 12/08/23  3:50 PM   Result Value Ref Range    T4 Free 1.4 0.9 - 1.7 ng/dL   APTT    Collection Time: 12/08/23  3:50 PM   Result Value Ref Range    PTT 37.0 (H) 24.0 - 36.0 sec   Protime-INR    Collection Time: 12/08/23  3:50 PM   Result Value Ref Range    Protime 15.3 (H) 11.8 - 14.6 sec    INR 1.2    COVID-19 & Influenza Combo    Collection Time: 12/08/23  4:02 PM    Specimen: Nasopharyngeal Swab   Result Value Ref Range    Specimen Description . NASOPHARYNGEAL SWAB     Source . NASOPHARYNGEAL SWAB     SARS-CoV-2 RNA, RT PCR Not Detected Not Detected    INFLUENZA A Not Detected Not Detected    INFLUENZA B Not Detected Not Detected   Urinalysis with Reflex to Culture    Collection Time: 12/08/23  5:30 PM    Specimen: Urine, clean catch   Result Value Ref Range    Color, UA Yellow Yellow    Turbidity UA Clear Clear    Glucose, Ur NEGATIVE NEGATIVE mg/dL    Bilirubin Urine NEGATIVE NEGATIVE    Ketones, Urine TRACE (A) NEGATIVE mg/dL    Specific Gravity, UA 1.019 1.000 - 1.030    Urine Hgb NEGATIVE NEGATIVE    pH, UA 5.0 5.0 - 8.0    Protein, UA 2+ (A) NEGATIVE mg/dL    Urobilinogen, Urine Normal 0.0 - 1.0 EU/dL    Nitrite, Urine NEGATIVE NEGATIVE    Leukocyte Esterase, Urine NEGATIVE NEGATIVE   Microscopic Urinalysis    Collection Time: 12/08/23  5:30 PM   Result Value Ref Range    WBC, UA 3 to 5 (A) 0 TO 5 /HPF    RBC, UA 0 TO 2 0 TO 2

## 2023-12-09 LAB
ANION GAP SERPL CALCULATED.3IONS-SCNC: 13 MMOL/L (ref 9–17)
BASOPHILS # BLD: 0 K/UL (ref 0–0.2)
BASOPHILS NFR BLD: 0 % (ref 0–2)
BUN SERPL-MCNC: 28 MG/DL (ref 8–23)
CALCIUM SERPL-MCNC: 9 MG/DL (ref 8.6–10.4)
CHLORIDE SERPL-SCNC: 100 MMOL/L (ref 98–107)
CO2 SERPL-SCNC: 22 MMOL/L (ref 20–31)
CREAT SERPL-MCNC: 1.3 MG/DL (ref 0.7–1.2)
EOSINOPHIL # BLD: 0 K/UL (ref 0–0.4)
EOSINOPHILS RELATIVE PERCENT: 1 % (ref 0–4)
ERYTHROCYTE [DISTWIDTH] IN BLOOD BY AUTOMATED COUNT: 16.1 % (ref 11.5–14.9)
GFR SERPL CREATININE-BSD FRML MDRD: 49 ML/MIN/1.73M2
GLUCOSE BLD-MCNC: 129 MG/DL (ref 75–110)
GLUCOSE BLD-MCNC: 147 MG/DL (ref 75–110)
GLUCOSE BLD-MCNC: 169 MG/DL (ref 75–110)
GLUCOSE BLD-MCNC: 176 MG/DL (ref 75–110)
GLUCOSE BLD-MCNC: 204 MG/DL (ref 75–110)
GLUCOSE SERPL-MCNC: 166 MG/DL (ref 70–99)
HCT VFR BLD AUTO: 26.3 % (ref 41–53)
HGB BLD-MCNC: 8.4 G/DL (ref 13.5–17.5)
LYMPHOCYTES NFR BLD: 1.1 K/UL (ref 1–4.8)
LYMPHOCYTES RELATIVE PERCENT: 25 % (ref 24–44)
MAGNESIUM SERPL-MCNC: 1.8 MG/DL (ref 1.6–2.6)
MCH RBC QN AUTO: 32.8 PG (ref 26–34)
MCHC RBC AUTO-ENTMCNC: 32.1 G/DL (ref 31–37)
MCV RBC AUTO: 102.3 FL (ref 80–100)
MONOCYTES NFR BLD: 0.4 K/UL (ref 0.1–1.3)
MONOCYTES NFR BLD: 10 % (ref 1–7)
NEUTROPHILS NFR BLD: 64 % (ref 36–66)
NEUTS SEG NFR BLD: 2.7 K/UL (ref 1.3–9.1)
PLATELET # BLD AUTO: 159 K/UL (ref 150–450)
PMV BLD AUTO: 8.9 FL (ref 6–12)
POTASSIUM SERPL-SCNC: 4.3 MMOL/L (ref 3.7–5.3)
RBC # BLD AUTO: 2.57 M/UL (ref 4.5–5.9)
SODIUM SERPL-SCNC: 135 MMOL/L (ref 135–144)
WBC OTHER # BLD: 4.3 K/UL (ref 3.5–11)

## 2023-12-09 PROCEDURE — 99233 SBSQ HOSP IP/OBS HIGH 50: CPT | Performed by: INTERNAL MEDICINE

## 2023-12-09 PROCEDURE — 80048 BASIC METABOLIC PNL TOTAL CA: CPT

## 2023-12-09 PROCEDURE — 6370000000 HC RX 637 (ALT 250 FOR IP): Performed by: INTERNAL MEDICINE

## 2023-12-09 PROCEDURE — 6360000002 HC RX W HCPCS: Performed by: INTERNAL MEDICINE

## 2023-12-09 PROCEDURE — 97162 PT EVAL MOD COMPLEX 30 MIN: CPT

## 2023-12-09 PROCEDURE — 36415 COLL VENOUS BLD VENIPUNCTURE: CPT

## 2023-12-09 PROCEDURE — 97166 OT EVAL MOD COMPLEX 45 MIN: CPT

## 2023-12-09 PROCEDURE — 97116 GAIT TRAINING THERAPY: CPT

## 2023-12-09 PROCEDURE — 83735 ASSAY OF MAGNESIUM: CPT

## 2023-12-09 PROCEDURE — 85025 COMPLETE CBC W/AUTO DIFF WBC: CPT

## 2023-12-09 PROCEDURE — 99223 1ST HOSP IP/OBS HIGH 75: CPT | Performed by: INTERNAL MEDICINE

## 2023-12-09 PROCEDURE — 2060000000 HC ICU INTERMEDIATE R&B

## 2023-12-09 PROCEDURE — 2580000003 HC RX 258: Performed by: INTERNAL MEDICINE

## 2023-12-09 PROCEDURE — 97530 THERAPEUTIC ACTIVITIES: CPT

## 2023-12-09 RX ADMIN — AMLODIPINE BESYLATE 5 MG: 5 TABLET ORAL at 08:06

## 2023-12-09 RX ADMIN — LEVOTHYROXINE SODIUM 25 MCG: 0.03 TABLET ORAL at 06:33

## 2023-12-09 RX ADMIN — DOCUSATE SODIUM 100 MG: 100 CAPSULE, LIQUID FILLED ORAL at 08:20

## 2023-12-09 RX ADMIN — TAMSULOSIN HYDROCHLORIDE 0.4 MG: 0.4 CAPSULE ORAL at 08:06

## 2023-12-09 RX ADMIN — SODIUM CHLORIDE, PRESERVATIVE FREE 10 ML: 5 INJECTION INTRAVENOUS at 19:36

## 2023-12-09 RX ADMIN — ACETAMINOPHEN 500 MG: 500 TABLET ORAL at 02:47

## 2023-12-09 RX ADMIN — LOSARTAN POTASSIUM 50 MG: 50 TABLET, FILM COATED ORAL at 08:07

## 2023-12-09 RX ADMIN — SODIUM CHLORIDE, PRESERVATIVE FREE 10 ML: 5 INJECTION INTRAVENOUS at 09:06

## 2023-12-09 RX ADMIN — DOCUSATE SODIUM 100 MG: 100 CAPSULE, LIQUID FILLED ORAL at 19:36

## 2023-12-09 RX ADMIN — ENOXAPARIN SODIUM 40 MG: 100 INJECTION SUBCUTANEOUS at 08:07

## 2023-12-09 RX ADMIN — FUROSEMIDE 20 MG: 10 INJECTION, SOLUTION INTRAMUSCULAR; INTRAVENOUS at 08:08

## 2023-12-09 ASSESSMENT — PAIN SCALES - GENERAL: PAINLEVEL_OUTOF10: 5

## 2023-12-09 ASSESSMENT — PAIN DESCRIPTION - LOCATION: LOCATION: BACK

## 2023-12-09 NOTE — ACP (ADVANCE CARE PLANNING)
minutes:      Conversation Outcomes:  ACP discussion completed    Follow-up plan:    [] Schedule follow-up conversation to continue planning  [] Referred individual to Provider for additional questions/concerns   [] Advised patient/agent/surrogate to review completed ACP document and update if needed with changes in condition, patient preferences or care setting    [] This note routed to one or more involved healthcare providers

## 2023-12-10 LAB
ANION GAP SERPL CALCULATED.3IONS-SCNC: 10 MMOL/L (ref 9–17)
B PARAP IS1001 DNA NPH QL NAA+NON-PROBE: NOT DETECTED
B PERT DNA SPEC QL NAA+PROBE: NOT DETECTED
BUN SERPL-MCNC: 34 MG/DL (ref 8–23)
C PNEUM DNA NPH QL NAA+NON-PROBE: NOT DETECTED
CALCIUM SERPL-MCNC: 9.1 MG/DL (ref 8.6–10.4)
CHLORIDE SERPL-SCNC: 100 MMOL/L (ref 98–107)
CO2 SERPL-SCNC: 25 MMOL/L (ref 20–31)
CREAT SERPL-MCNC: 1.5 MG/DL (ref 0.7–1.2)
FLUAV RNA NPH QL NAA+NON-PROBE: NOT DETECTED
FLUBV RNA NPH QL NAA+NON-PROBE: NOT DETECTED
GFR SERPL CREATININE-BSD FRML MDRD: 42 ML/MIN/1.73M2
GLUCOSE BLD-MCNC: 170 MG/DL (ref 75–110)
GLUCOSE BLD-MCNC: 180 MG/DL (ref 75–110)
GLUCOSE BLD-MCNC: 207 MG/DL (ref 75–110)
GLUCOSE BLD-MCNC: 212 MG/DL (ref 75–110)
GLUCOSE SERPL-MCNC: 252 MG/DL (ref 70–99)
HADV DNA NPH QL NAA+NON-PROBE: NOT DETECTED
HCOV 229E RNA NPH QL NAA+NON-PROBE: NOT DETECTED
HCOV HKU1 RNA NPH QL NAA+NON-PROBE: NOT DETECTED
HCOV NL63 RNA NPH QL NAA+NON-PROBE: NOT DETECTED
HCOV OC43 RNA NPH QL NAA+NON-PROBE: NOT DETECTED
HMPV RNA NPH QL NAA+NON-PROBE: NOT DETECTED
HPIV1 RNA NPH QL NAA+NON-PROBE: NOT DETECTED
HPIV2 RNA NPH QL NAA+NON-PROBE: NOT DETECTED
HPIV3 RNA NPH QL NAA+NON-PROBE: NOT DETECTED
HPIV4 RNA NPH QL NAA+NON-PROBE: NOT DETECTED
M PNEUMO DNA NPH QL NAA+NON-PROBE: NOT DETECTED
MAGNESIUM SERPL-MCNC: 1.7 MG/DL (ref 1.6–2.6)
POTASSIUM SERPL-SCNC: 4.2 MMOL/L (ref 3.7–5.3)
RSV RNA NPH QL NAA+NON-PROBE: NOT DETECTED
RV+EV RNA NPH QL NAA+NON-PROBE: NOT DETECTED
SARS-COV-2 RNA NPH QL NAA+NON-PROBE: NOT DETECTED
SODIUM SERPL-SCNC: 135 MMOL/L (ref 135–144)
SPECIMEN DESCRIPTION: NORMAL

## 2023-12-10 PROCEDURE — 0202U NFCT DS 22 TRGT SARS-COV-2: CPT

## 2023-12-10 PROCEDURE — 99233 SBSQ HOSP IP/OBS HIGH 50: CPT | Performed by: INTERNAL MEDICINE

## 2023-12-10 PROCEDURE — 6370000000 HC RX 637 (ALT 250 FOR IP): Performed by: INTERNAL MEDICINE

## 2023-12-10 PROCEDURE — 80048 BASIC METABOLIC PNL TOTAL CA: CPT

## 2023-12-10 PROCEDURE — 83735 ASSAY OF MAGNESIUM: CPT

## 2023-12-10 PROCEDURE — 36415 COLL VENOUS BLD VENIPUNCTURE: CPT

## 2023-12-10 PROCEDURE — 2060000000 HC ICU INTERMEDIATE R&B

## 2023-12-10 PROCEDURE — 2580000003 HC RX 258: Performed by: INTERNAL MEDICINE

## 2023-12-10 PROCEDURE — 6360000002 HC RX W HCPCS: Performed by: INTERNAL MEDICINE

## 2023-12-10 PROCEDURE — 82947 ASSAY GLUCOSE BLOOD QUANT: CPT

## 2023-12-10 RX ADMIN — FUROSEMIDE 20 MG: 10 INJECTION, SOLUTION INTRAMUSCULAR; INTRAVENOUS at 07:36

## 2023-12-10 RX ADMIN — SODIUM CHLORIDE, PRESERVATIVE FREE 10 ML: 5 INJECTION INTRAVENOUS at 07:37

## 2023-12-10 RX ADMIN — ATORVASTATIN CALCIUM 20 MG: 40 TABLET, FILM COATED ORAL at 17:41

## 2023-12-10 RX ADMIN — AMLODIPINE BESYLATE 5 MG: 5 TABLET ORAL at 07:36

## 2023-12-10 RX ADMIN — TAMSULOSIN HYDROCHLORIDE 0.4 MG: 0.4 CAPSULE ORAL at 07:36

## 2023-12-10 RX ADMIN — LEVOTHYROXINE SODIUM 25 MCG: 0.03 TABLET ORAL at 06:04

## 2023-12-10 RX ADMIN — LOSARTAN POTASSIUM 50 MG: 50 TABLET, FILM COATED ORAL at 07:36

## 2023-12-10 RX ADMIN — DOCUSATE SODIUM 100 MG: 100 CAPSULE, LIQUID FILLED ORAL at 21:34

## 2023-12-10 RX ADMIN — SODIUM CHLORIDE, PRESERVATIVE FREE 10 ML: 5 INJECTION INTRAVENOUS at 20:47

## 2023-12-10 RX ADMIN — INSULIN LISPRO 1 UNITS: 100 INJECTION, SOLUTION INTRAVENOUS; SUBCUTANEOUS at 11:49

## 2023-12-10 RX ADMIN — ENOXAPARIN SODIUM 40 MG: 100 INJECTION SUBCUTANEOUS at 07:36

## 2023-12-10 NOTE — PLAN OF CARE
Problem: Safety - Adult  Goal: Free from fall injury  Outcome: Progressing  Note: Patient remains free from injury.

## 2023-12-11 ENCOUNTER — APPOINTMENT (OUTPATIENT)
Age: 88
DRG: 291 | End: 2023-12-11
Attending: INTERNAL MEDICINE
Payer: MEDICARE

## 2023-12-11 VITALS
SYSTOLIC BLOOD PRESSURE: 101 MMHG | OXYGEN SATURATION: 100 % | RESPIRATION RATE: 21 BRPM | HEART RATE: 73 BPM | HEIGHT: 69 IN | TEMPERATURE: 97 F | BODY MASS INDEX: 25.18 KG/M2 | DIASTOLIC BLOOD PRESSURE: 48 MMHG | WEIGHT: 170 LBS

## 2023-12-11 PROBLEM — I21.4 NSTEMI (NON-ST ELEVATED MYOCARDIAL INFARCTION) (HCC): Status: ACTIVE | Noted: 2023-12-11

## 2023-12-11 PROBLEM — I10 PRIMARY HYPERTENSION: Status: ACTIVE | Noted: 2023-12-11

## 2023-12-11 PROBLEM — I50.9 ACUTE CONGESTIVE HEART FAILURE (HCC): Status: ACTIVE | Noted: 2023-12-11

## 2023-12-11 LAB
ECHO AO ROOT DIAM: 2.9 CM
ECHO AO ROOT INDEX: 1.5 CM/M2
ECHO AR MAX VEL PISA: 3 M/S
ECHO AV AREA PEAK VELOCITY: 0.4 CM2
ECHO AV AREA VTI: 0.5 CM2
ECHO AV AREA/BSA PEAK VELOCITY: 0.2 CM2/M2
ECHO AV AREA/BSA VTI: 0.3 CM2/M2
ECHO AV MEAN GRADIENT: 31 MMHG
ECHO AV MEAN VELOCITY: 2.6 M/S
ECHO AV PEAK GRADIENT: 59 MMHG
ECHO AV PEAK VELOCITY: 3.9 M/S
ECHO AV REGURGITANT PHT: 333 MS
ECHO AV VELOCITY RATIO: 0.18
ECHO AV VTI: 74.3 CM
ECHO BSA: 1.94 M2
ECHO EST RA PRESSURE: 3 MMHG
ECHO LA AREA 2C: 33.5 CM2
ECHO LA AREA 4C: 22.5 CM2
ECHO LA DIAMETER INDEX: 1.97 CM/M2
ECHO LA DIAMETER: 3.8 CM
ECHO LA MAJOR AXIS: 5.5 CM
ECHO LA MINOR AXIS: 6.3 CM
ECHO LA TO AORTIC ROOT RATIO: 1.31
ECHO LA VOL BP: 110 ML (ref 18–58)
ECHO LA VOL MOD A2C: 144 ML (ref 18–58)
ECHO LA VOL MOD A4C: 77 ML (ref 18–58)
ECHO LA VOL/BSA BIPLANE: 57 ML/M2 (ref 16–34)
ECHO LA VOLUME INDEX MOD A2C: 75 ML/M2 (ref 16–34)
ECHO LA VOLUME INDEX MOD A4C: 40 ML/M2 (ref 16–34)
ECHO LV E' LATERAL VELOCITY: 8 CM/S
ECHO LV E' SEPTAL VELOCITY: 3 CM/S
ECHO LV EDV A2C: 175 ML
ECHO LV EDV A4C: 104 ML
ECHO LV EDV INDEX A4C: 54 ML/M2
ECHO LV EDV NDEX A2C: 91 ML/M2
ECHO LV EJECTION FRACTION A2C: 47 %
ECHO LV EJECTION FRACTION A4C: 48 %
ECHO LV ESV A2C: 93 ML
ECHO LV ESV A4C: 54 ML
ECHO LV ESV INDEX A2C: 48 ML/M2
ECHO LV ESV INDEX A4C: 28 ML/M2
ECHO LV FRACTIONAL SHORTENING: 19 % (ref 28–44)
ECHO LV INTERNAL DIMENSION DIASTOLE INDEX: 2.69 CM/M2
ECHO LV INTERNAL DIMENSION DIASTOLIC: 5.2 CM (ref 4.2–5.9)
ECHO LV INTERNAL DIMENSION SYSTOLIC INDEX: 2.18 CM/M2
ECHO LV INTERNAL DIMENSION SYSTOLIC: 4.2 CM
ECHO LV IVSD: 1 CM (ref 0.6–1)
ECHO LV MASS 2D: 194.2 G (ref 88–224)
ECHO LV MASS INDEX 2D: 100.6 G/M2 (ref 49–115)
ECHO LV POSTERIOR WALL DIASTOLIC: 1 CM (ref 0.6–1)
ECHO LV RELATIVE WALL THICKNESS RATIO: 0.38
ECHO LVOT AREA: 2.3 CM2
ECHO LVOT AV VTI INDEX: 0.21
ECHO LVOT DIAM: 1.7 CM
ECHO LVOT MEAN GRADIENT: 1 MMHG
ECHO LVOT PEAK GRADIENT: 2 MMHG
ECHO LVOT PEAK VELOCITY: 0.7 M/S
ECHO LVOT STROKE VOLUME INDEX: 18.2 ML/M2
ECHO LVOT SV: 35.2 ML
ECHO LVOT VTI: 15.5 CM
ECHO MV A VELOCITY: 1.04 M/S
ECHO MV AREA VTI: 0.9 CM2
ECHO MV E DECELERATION TIME (DT): 137 MS
ECHO MV E VELOCITY: 1.36 M/S
ECHO MV E/A RATIO: 1.31
ECHO MV E/E' LATERAL: 17
ECHO MV E/E' RATIO (AVERAGED): 31.17
ECHO MV LVOT VTI INDEX: 2.43
ECHO MV MAX VELOCITY: 1.6 M/S
ECHO MV MEAN GRADIENT: 5 MMHG
ECHO MV MEAN VELOCITY: 1 M/S
ECHO MV PEAK GRADIENT: 10 MMHG
ECHO MV VTI: 37.6 CM
ECHO RA AREA 4C: 15.4 CM2
ECHO RA END SYSTOLIC VOLUME APICAL 4 CHAMBER INDEX BSA: 19 ML/M2
ECHO RA VOLUME: 36 ML
ECHO RIGHT VENTRICULAR SYSTOLIC PRESSURE (RVSP): 53 MMHG
ECHO RV TAPSE: 1.8 CM (ref 1.7–?)
ECHO TV REGURGITANT MAX VELOCITY: 3.54 M/S
ECHO TV REGURGITANT PEAK GRADIENT: 50 MMHG
EKG ATRIAL RATE: 88 BPM
EKG P-R INTERVAL: 244 MS
EKG Q-T INTERVAL: 386 MS
EKG QRS DURATION: 112 MS
EKG QTC CALCULATION (BAZETT): 467 MS
EKG R AXIS: 75 DEGREES
EKG T AXIS: 154 DEGREES
EKG VENTRICULAR RATE: 88 BPM
GLUCOSE BLD-MCNC: 182 MG/DL (ref 75–110)
GLUCOSE BLD-MCNC: 190 MG/DL (ref 75–110)
GLUCOSE BLD-MCNC: 204 MG/DL (ref 75–110)

## 2023-12-11 PROCEDURE — 93010 ELECTROCARDIOGRAM REPORT: CPT | Performed by: INTERNAL MEDICINE

## 2023-12-11 PROCEDURE — 2580000003 HC RX 258: Performed by: INTERNAL MEDICINE

## 2023-12-11 PROCEDURE — 6370000000 HC RX 637 (ALT 250 FOR IP): Performed by: INTERNAL MEDICINE

## 2023-12-11 PROCEDURE — 99233 SBSQ HOSP IP/OBS HIGH 50: CPT | Performed by: INTERNAL MEDICINE

## 2023-12-11 PROCEDURE — 97116 GAIT TRAINING THERAPY: CPT

## 2023-12-11 PROCEDURE — 97530 THERAPEUTIC ACTIVITIES: CPT

## 2023-12-11 PROCEDURE — 6360000002 HC RX W HCPCS: Performed by: INTERNAL MEDICINE

## 2023-12-11 PROCEDURE — 93306 TTE W/DOPPLER COMPLETE: CPT

## 2023-12-11 PROCEDURE — 82947 ASSAY GLUCOSE BLOOD QUANT: CPT

## 2023-12-11 PROCEDURE — 99239 HOSP IP/OBS DSCHRG MGMT >30: CPT | Performed by: INTERNAL MEDICINE

## 2023-12-11 RX ORDER — FUROSEMIDE 40 MG/1
40 TABLET ORAL DAILY
Qty: 60 TABLET | Refills: 3 | Status: SHIPPED | OUTPATIENT
Start: 2023-12-11

## 2023-12-11 RX ORDER — ENOXAPARIN SODIUM 100 MG/ML
30 INJECTION SUBCUTANEOUS DAILY
Status: DISCONTINUED | OUTPATIENT
Start: 2023-12-12 | End: 2023-12-11 | Stop reason: HOSPADM

## 2023-12-11 RX ADMIN — LEVOTHYROXINE SODIUM 25 MCG: 0.03 TABLET ORAL at 05:34

## 2023-12-11 RX ADMIN — TAMSULOSIN HYDROCHLORIDE 0.4 MG: 0.4 CAPSULE ORAL at 10:13

## 2023-12-11 RX ADMIN — LOSARTAN POTASSIUM 50 MG: 50 TABLET, FILM COATED ORAL at 10:13

## 2023-12-11 RX ADMIN — DOCUSATE SODIUM 100 MG: 100 CAPSULE, LIQUID FILLED ORAL at 10:42

## 2023-12-11 RX ADMIN — FUROSEMIDE 20 MG: 10 INJECTION, SOLUTION INTRAMUSCULAR; INTRAVENOUS at 10:14

## 2023-12-11 RX ADMIN — SODIUM CHLORIDE, PRESERVATIVE FREE 10 ML: 5 INJECTION INTRAVENOUS at 10:13

## 2023-12-11 RX ADMIN — ACETAMINOPHEN 500 MG: 500 TABLET ORAL at 10:13

## 2023-12-11 NOTE — CONSULTS
.. PALLIATIVE CARE TEAM    Patient: Ricardo Nowak  Room: 2119/2119-01    Reason For Consult   Goals of care evaluation  Distress management  Symptom Management  Guidance and support  Facilitate communications  Assistance in coordinating care  Recommendations for the above    Impression: Ricardo Nowak is a 80y.o. year old male with  has a past medical history of Acute bronchitis due to infection, Allergic rhinitis due to allergen, Anemia, Aortic stenosis, moderate-severe, Benign hypertension with CKD (chronic kidney disease) stage III ([address]), CHF (congestive heart failure), NYHA class I, acute on chronic, combined ([address]), Chronic kidney disease, Chronic right-sided low back pain with right-sided sciatica, Diabetes ([address]), Diabetic nephropathy associated with type 2 diabetes mellitus ([address]), Diastolic dysfunction without heart failure, Gastroesophageal reflux disease without esophagitis, H. pylori infection, Hearing loss, Helicobacter pylori gastritis, Hyperlipidemia, Hypertension, Hypothyroidism, Iron deficiency anemia, LVH (left ventricular hypertrophy) due to hypertensive disease, Osteoarthritis, Pulmonary hypertension ([address]), PVD (peripheral vascular disease) ([address]), and Type 2 diabetes mellitus with diabetic polyneuropathy, with long-term current use of insulin ([address]). .  Currently hospitalized for the management of CHF. The Palliative Care Team is following to assist with goals of care, code status discussion and patient/family support. Code Status  DNRCC  PLAN:   - the patient is alert and oriented and I explain code status and his wishes for resuscitation. The decision is [facility] as he states\" please let me die in peace without pain. \"   - he understands and completes a HCPOA and appoints his son Yadira Gresham.   - copies of the [facility], the original HCPOA and copies are given to the patient   - the patient states that he wants to return home and live his life out   - will follow for goals of care and
mmHg.  Mild to moderate aortic insufficiency. Mild mitral regurgitation. Mild tricuspid regurgitation. Estimated right ventricular systolic pressure  is 28 mmHg. Signature  ----------------------------------------------------------------------------   Electronically signed by Katelin Thomas(Sonographer) on 2017 01:48   PM  ----------------------------------------------------------------------------    ----------------------------------------------------------------------------   Electronically signed by Juan Diaz(Interpreting physician) on 2017   09:04 AM  ----------------------------------------------------------------------------  FINDINGS  Left Atrium  Left atrial dilatation. Left Ventricle  Normal left ventricle size and function with an estimated EF > 55%. No segmental wall motion abnormalities seen. Mild left ventricular hypertrophy. Grade I (mild) left ventricular diastolic dysfunction. Right Atrium  Right atrium is normal in size. Right Ventricle  Normal right ventricular size and function. Mitral Valve  Normal mitral valve structure and function. Mild mitral regurgitation. Aortic Valve  Aortic valve is trileaflet. Moderate-Severe aortic stenosis. Peak instantaneous gradient 54 mmHg and mean gradient 31 mmHg. Mild to moderate aortic insufficiency. Tricuspid Valve  Normal tricuspid valve structure and function. Mild tricuspid regurgitation. Estimated right ventricular systolic pressure is 28 mmHg. Normal right ventricular systolic pressure. Pulmonic Valve  The pulmonic valve is normal in structure. Trivial pulmonic insufficiency. Pericardial Effusion  No significant pericardial effusion is seen. Miscellaneous  Normal aortic root dimension. IVC normal diameter & inspiratory collapse indicating normal RA filling  pressure .     M-mode / 2D Measurements & Calculations:      LVIDd:4.74 cm(3.7 - 5.6 cm)      Diastolic UUQGAS:488 ml   WZDZ.38 cm(2.2 - 4.0 cm)

## 2023-12-11 NOTE — ACP (ADVANCE CARE PLANNING)
portable DNR orders.     Length of ACP Conversation in minutes:      Conversation Outcomes:  ACP discussion completed    Follow-up plan:    [] Schedule follow-up conversation to continue planning  [] Referred individual to Provider for additional questions/concerns   [] Advised patient/agent/surrogate to review completed ACP document and update if needed with changes in condition, patient preferences or care setting    [] This note routed to one or more involved healthcare providers

## 2023-12-11 NOTE — CARE COORDINATION
ONGOING DISCHARGE PLAN:    Patient is alert and oriented x4. Spoke with patient regarding discharge plan and patient confirms that plan is still to discharge to home with family     Echo today     Palliative care consult    IV lasix     CHF referral     Will continue to follow for additional discharge needs. If patient is discharged prior to next notation, then this note serves as note for discharge by case management.     Electronically signed by Jose Arellano RN on 12/11/2023 at 12:02 PM
ONGOING DISCHARGE PLAN:    Spoke with patient yesterday, and he confirmed that plan is home without needs. Declined VNS. Active order for IV Lasix 20mg daily. 2D echo ordered. Palliative Care consulted. Will continue to follow for additional discharge needs. If patient is discharged prior to next notation, then this note serves as note for discharge by case management.     Electronically signed by Cherrie Andrews RN on 12/10/2023 at 11:30 AM
Representative Name:       The Patient and/or Patient Representative Agree with the Discharge Plan?  Yes    Yu Jo RN  Case Management Department  Ph: [phone] Fax: [phone]

## 2023-12-11 NOTE — DISCHARGE INSTR - COC
Discharge: ***    Physician Certification: I certify the above information and transfer of Jovi Montero  is necessary for the continuing treatment of the diagnosis listed and that he requires {Admit to Appropriate Level of Care:93424} for {GREATER/LESS:279272760} 30 days.      Update Admission H&P: {CHP DME Changes in QKR:733069053}    PHYSICIAN SIGNATURE:  Electronically signed by Dania Chacon MD on 12/11/23 at 1:09 PM EST

## 2023-12-11 NOTE — PLAN OF CARE
Problem: Safety - Adult  Goal: Free from fall injury  12/11/2023 0558 by Liban Avery RN  Outcome: Progressing  Flowsheets (Taken 12/10/2023 2018)  Free From Fall Injury: Instruct family/caregiver on patient safety  12/10/2023 1631 by Kehinde Pagan RN  Outcome: Progressing  Note: Patient remains free from injury. Problem: Pain  Goal: Verbalizes/displays adequate comfort level or baseline comfort level  Outcome: Progressing  Flowsheets  Taken 12/11/2023 0558  Verbalizes/displays adequate comfort level or baseline comfort level:   Encourage patient to monitor pain and request assistance   Assess pain using appropriate pain scale   Administer analgesics based on type and severity of pain and evaluate response   Implement non-pharmacological measures as appropriate and evaluate response  Taken 12/10/2023 2018  Verbalizes/displays adequate comfort level or baseline comfort level:   Encourage patient to monitor pain and request assistance   Assess pain using appropriate pain scale  Note: Pt medicated with pain medication prn. Assessed all pain characteristics including level, type, location, frequency, and onset. Non-pharmacologic interventions offered to pt as well. Pt states pain is tolerable at this time.

## 2023-12-12 ENCOUNTER — CARE COORDINATION (OUTPATIENT)
Dept: CASE MANAGEMENT | Age: 88
End: 2023-12-12

## 2023-12-12 NOTE — CARE COORDINATION
Care Transitions Outreach Attempt # 1     Call within 2 business days of discharge: No   Attempted to reach patient for transitions of care follow up. Unable to reach patient. Left HIPPA compliant  requested a return call. Patient: Sharita Akins Patient : [id number] MRN: [id number]    Last Discharge Facility       Date Complaint Diagnosis Description Type Department Provider    23 Shortness of Breath; Leg Swelling Acute congestive heart failure, unspecified heart failure type ([address] [facility]) . .. ED to Hosp-Admission (Discharged) (ADMITTED) KARLEE Harrison MD; Keira Gamble. .. Was this an external facility discharge?  No Discharge Facility Name: [facility]    Future Appointments   Date Time Provider [address]   2024  2:00 PM Maura Waller MD [facility]   2024  1:00 PM Patricia Loaiza MD [address] [place] [address]

## 2023-12-12 NOTE — DISCHARGE SUMMARY
prescribed for you. Read the directions carefully, and ask your doctor or other care provider to review them with you. Where to Get Your Medications        These medications were sent to [address] [facility] [address] [facility], [address] [place] [id number]      Phone: [phone]   furosemide 40 MG tablet         Time Spent on discharge is  35 mins in patient examination, evaluation, counseling as well as medication reconciliation, prescriptions for required medications, discharge plan and follow up. Electronically signed by   Daniel Larson MD  12/12/2023  3:45 PM      Thank you Dr. Annabella Leon MD for the opportunity to be involved in this patient's care.

## 2023-12-12 NOTE — PROGRESS NOTES
[address] [facility]   Occupational Therapy Evaluation  Date: 23  Patient Name: Elizabeth Kearney       Room:   MRN: [id number]  Account: [de-identified]   : 1924  (80 y.o.) Gender: male     Discharge Recommendations:  Further Occupational Therapy is recommended upon facility discharge. OT Equipment Recommendations  Other: TBD    Referring Practitioner: Leonides Walton MD  Diagnosis: hypomagnesemia      Treatment Diagnosis: Impaired self care status    Past Medical History:  has a past medical history of Acute bronchitis due to infection, Allergic rhinitis due to allergen, Anemia, Aortic stenosis, moderate-severe, Benign hypertension with CKD (chronic kidney disease) stage III ([address]), CHF (congestive heart failure), NYHA class I, acute on chronic, combined ([address]), Chronic kidney disease, Chronic right-sided low back pain with right-sided sciatica, Diabetes ([address]), Diabetic nephropathy associated with type 2 diabetes mellitus ([address]), Diastolic dysfunction without heart failure, Gastroesophageal reflux disease without esophagitis, H. pylori infection, Hearing loss, Helicobacter pylori gastritis, Hyperlipidemia, Hypertension, Hypothyroidism, Iron deficiency anemia, LVH (left ventricular hypertrophy) due to hypertensive disease, Osteoarthritis, Pulmonary hypertension ([address]), PVD (peripheral vascular disease) ([address]), and Type 2 diabetes mellitus with diabetic polyneuropathy, with long-term current use of insulin ([address]). Past Surgical History:   has a past surgical history that includes hernia repair; pr egd transoral biopsy single/multiple (N/A, 2017); pr colon ca scrn not hi rsk ind (N/A, 2017); Upper gastrointestinal endoscopy (2017); Colonoscopy (2017); Cataract removal with implant (Left, 10/03/2017); pr xcapsl ctrc rmvl insj io lens prosth w/o ecp (Left, 10/3/2017);  Cataract removal with implant (Right, 2018); and pr xcapsl ctrc rmvl insj io lens prosth w/o ecp
12/09/23 1130   Encounter Summary   Encounter Overview/Reason  Initial Encounter   Service Provided For: Patient   Referral/Consult From: Palliative Care   Last Encounter  12/09/23   Complexity of Encounter Low   Spiritual/Emotional needs   Type Spiritual Support   Assessment/Intervention/Outcome   Assessment Unable to assess  (sleeping)   Intervention Prayer (assurance of)/[place]ink
12/10/23 1011   Encounter Summary   Encounter Overview/Reason  Volunteer Encounter   Service Provided For: Patient   Referral/Consult From: Ralph   Last Encounter  12/10/23   Complexity of Encounter Low   Begin Time 1000   End Time  1015   Total Time Calculated 15 min   Spiritual/Emotional needs   Type Spiritual Support   Rituals, Rites and Sacraments   Type Baptism Communion
12/10/23 1300   Encounter Summary   Encounter Overview/Reason   Encounter   Service Provided For: Patient   Referral/Consult From: Rounding   Last Encounter  12/10/23   Complexity of Encounter Low   Begin Time 1300   End Time  1315   Total Time Calculated 15 min   Spiritual/Emotional needs   Type Spiritual Support   Rituals, Rites and Sacraments   Type Sacrament of Sick
[address]    HISTORY AND PHYSICAL EXAMINATION            Date:   12/9/2023  Patient name:  Angie Guillen  Date of admission:  12/8/2023  3:17 PM  MRN:   [id number]  Account:  [de-identified]  YOB: 1924  PCP:    Helder Hernández MD  Room:   2119/2119-01  Code Status:    Full Code    Chief Complaint:     Chief Complaint   Patient presents with    Shortness of Breath    Leg Swelling       History Obtained From:     patient, electronic medical record    History of Present Illness: The patient is a 80 y.o.    /  male who presents with Shortness of Breath and Leg Swelling   and he is admitted to the hospital for the management of  Shortness of Breath, Swelling in legs , Cough , chest pain   Patient has PMH of HTN, DM, Pulmonary HTN, Hypothyroidism admitted to [facility], with worsening shortness of breath, cough, swelling in legs, symptoms are going on for last 3 days  Patient also noticed chest discomfort both right and left side especially with coughing  Patient has difficulty lying flat, he is sleeping in the chair  No new medication  Had echocardiogram in 2017 suggestive of pulmonary hypertension  In emergency room, patient found to have elevated troponins with flat  EKG suggestive of T wave inversion lateral leads which is new  BNP is very high  Patient started on IV Lasix started to feel better      Past Medical History:     Past Medical History:   Diagnosis Date    Acute bronchitis due to infection 9/1/2017    Allergic rhinitis due to allergen 7/22/2018    Anemia 1/6/2017    Aortic stenosis, moderate-severe 2/16/2017    Benign hypertension with CKD (chronic kidney disease) stage III ([address]) 10/27/2016    CHF (congestive heart failure), NYHA class I, acute on chronic, combined ([address]) 12/8/2023    Chronic kidney disease     Chronic right-sided low back pain with right-sided sciatica 3/4/2020    Diabetes ([address])
[facility]   OCCUPATIONAL THERAPY MISSED TREATMENT NOTE   INPATIENT   Date: 23  Patient Name: Quique Hernandez       Room:   MRN: [id number]   Account #: [de-identified]    : 1924  (80 y.o.)  Gender: male   Referring Practitioner: Amena Anne MD  Diagnosis: hypomagnesemia             REASON FOR MISSED TREATMENT:  23    -   Testing - Pt unavailable as he is getting echo in room. OT will continue to follow and attempt in PM as time permits.     [id number]         Electronically signed by LLOYD Burgos on 23 at 9:55 AM EST
Graciela Rae   Patient:  Magan Rossi   YOB: 1924  MRN:  [id number]  Location: [facility]    2119-01  12/10/23 5:36 PM   [address] or Facility: [facility] PROG CARE From: [facility] RE: Magan Rossi RM: 2119-01 Goals of Care  Allan Das
Physical Therapy        Physical Therapy Cancel Note      DATE: 12/10/2023    NAME: Ricardo Nowak  MRN: [id number]   : 1924      Patient not seen this date for Physical Therapy due to:    Patient Declined: Pt resting in bed on arrival. Pt pleasantly declines therapy at this time, stating he is so tired and \"has no air\". Reports having gone to the bathroom 20 minutes ago and is exhausted. Pt demoing SOB while talking with writer. Will continue to follow and attempt next available date.       Electronically signed by Madelin Joel PTA on 12/10/2023 at 9:31 AM
Physician Progress Note      Keri Martinez  CSN #:                  [id number]  :                       1924  ADMIT DATE:       2023 3:17 PM  [facility] DATE:        2023 6:42 PM  RESPONDING  PROVIDER #:        Walt Nageotte MD          QUERY TEXT:    Pt admitted with acute on chronic combined CHF. Pt noted to also have AS,   HTN, CKD. If possible, please document in progress notes and discharge   summary the etiology of CHF, if able to be determined. The medical record reflects the following:  Risk Factors: AS, HTN, CKD, advanced age  Clinical Indicators: admitted with acute on chronic combined CHF  Treatment: IV Lasix, Norvasc, labs, imaging, Cardiology consult, low sodium   diet, 1500 ml fluid restriction    Thank you! Miryam Mahmood RN, BSN, RHIT, CCDS  Clinical   Options provided:  -- CHF due to Hypertensive Heart Disease  -- CHF due to Hypertensive Heart Disease and Valvular Heart Disease  -- CHF not due to Hypertension but due to valvular heart disease  -- Other - I will add my own diagnosis  -- Disagree - Not applicable / Not valid  -- Disagree - Clinically unable to determine / Unknown  -- Refer to Clinical Documentation Reviewer    PROVIDER RESPONSE TEXT:    This patient has CHF due to hypertensive heart disease and valvular heart   disease.     Query created by: Miryam Mahmood on 2023 12:20 PM      Electronically signed by:  Walt Nageotte MD 2023 3:01 PM
The potassium/magnesium sliding scale has been automatically discontinued per Pharmacy and Therapeutic Committee approved policy because the patient has decreased renal function (CrCl<30 ml/min). The patient's current K/Mag levels are currently:    Recent Labs     12/08/23  1550 12/09/23  0530 12/10/23  1902   K 4.4 4.3 4.2   MG  --  1.8 1.7       Estimated Creatinine Clearance: 27 mL/min (A) (based on SCr of 1.5 mg/dL (H)). For patients with decreased renal function (below 30ml/min) needing potassium/magnesium supplementation, please order individual bolus doses with appropriate monitoring. Please contact the inpatient pharmacy at [phone] with any concerns. Thank you.     Maru Licea, [facility]  12/11/2023  11:30 AM
PCT will re-heat pt's breakfast while pt is walking. Pt pleased with the plan. Overall Cognitive Status: WFL  Overall Orientation Status: Within Functional Limits    Bed Mobility  Yes  Interventions: Safety awareness training, Tactile cues  Rolling: Minimum assistance, Assist X1  Supine to Sit: Minimum assistance, Assist X1  Sit to Supine:  (Not tested, pt left in bedside chair with chair alarm)  Scooting: Supervision (to EOB)    Transfer Training  Transfer Training: Yes  Sit to Stand: Contact-guard assistance  Stand to Sit: Contact-guard assistance  Stand Pivot Transfers: Minimum assistance, Assist X1 (1 Lateral LOB requiring assistance, pt said \"I got it, I got it\". )  Bed to Chair: Stand-by assistance (close stand by)      Gait Training  Gait Training: Yes  Overall Level of Assistance: Contact-guard assistance, Stand-by assistance (Fluctuating, mostly SBA)  Distance (ft): 40 Feet  Assistive Device: Gait belt, Walker, rolling  Interventions: Safety awareness training, Verbal cues  Base of Support: Narrowed  Speed/Yudith: Pace decreased (< 100 feet/min)  Step Length: Left shortened, Right shortened  Swing Pattern: Right symmetrical, Left symmetrical  Stance: Weight shift  Gait Abnormalities: Decreased step clearance, Path deviations, Trunk sway increased    Balance  Sitting: Intact  Sitting - Static: Good (unsupported)  Sitting - Dynamic: Good (unsupported)  Standing: Impaired  Standing - Static: Fair;Occasional  Standing - Dynamic: Fair;Occasional     PT Exercises  Exercise Treatment: ambulation into hallway, tried to educate on use of 2WW and get patient to use to complete walk. See above notes  Static Sitting Balance Exercises: Dangle EOB 7 min.   Dynamic Sitting Balance Exercises: Seated dynamic activity reaching out of base of support  Static Standing Balance Exercises: Standing with and without RW, increased sway without RW  Dynamic Standing Balance Exercises: Standing with and without RW at the sink to
per day    furosemide  20 mg IntraVENous Daily    amLODIPine  5 mg Oral Daily    atorvastatin  20 mg Oral Dinner    docusate sodium  100 mg Oral BID    levothyroxine  25 mcg Oral QAM AC    losartan  50 mg Oral Daily    tamsulosin  0.4 mg Oral Daily    insulin lispro  0-4 Units SubCUTAneous TID WC    insulin lispro  0-4 Units SubCUTAneous Nightly     Continuous Infusions:   sodium chloride       PRN Meds:.sodium chloride flush, sodium chloride, ondansetron **OR** ondansetron, polyethylene glycol, acetaminophen **OR** acetaminophen, acetaminophen, albuterol sulfate HFA    CONSTITUTIONAL: AOx4, no apparent distress, appears stated age   HEAD: normocephalic, atraumatic   EYES: PERRLA, EOMI   ENT: moist mucous membranes, uvula midline   NECK: symmetric, no midline tenderness to palpation   LUNGS: clear to auscultation bilaterally   CARDIOVASCULAR: regular rate and rhythm, esm murmurs, rubs or gallops   ABDOMEN: Soft, non-tender, non-distended with normal active bowel sounds   SKIN: no rash       EKG:   .   ECHO: Pending            Assessment / Acute Cardiac Problems:       Patient Active Problem List:     Acquired hypothyroidism     Type 2 diabetes mellitus with diabetic polyneuropathy, without long-term current use of insulin (HCC)     Benign hypertension with CKD (chronic kidney disease) stage III (HCC)     Hyperlipidemia with target LDL less than 70     Hammer toes, bilateral     Onychomycosis of toenail     PEREYRA (dyspnea on exertion)     Normocytic anemia     Microscopic hematuria     LVH (left ventricular hypertrophy) due to hypertensive disease     Diastolic dysfunction without heart failure     Aortic stenosis, moderate     Diabetic nephropathy associated with type 2 diabetes mellitus (HCC)     Iron deficiency anemia     Benign prostatic hyperplasia with lower urinary tract symptoms     Chronic neck pain     At high risk for falls     Bilateral hearing loss     Vitamin D deficiency     Pulmonary hypertension ([address])
Ventricular Rate 88 BPM    Atrial Rate 88 BPM    P-R Interval 244 ms    QRS Duration 112 ms    Q-T Interval 386 ms    QTc Calculation (Bazett) 467 ms    R Axis 75 degrees    T Axis 154 degrees    Narrative    Sinus rhythm with 1st degree A-V block  Left ventricular hypertrophy with repolarization abnormality  Abnormal ECG  When compared with ECG of 15-NOV-2022 20:30,  Questionable change in QRS axis  ST now depressed in Anterior leads  Nonspecific T wave abnormality now evident in Inferior leads  QT has lengthened       Echo:    Results for orders placed or performed during the hospital encounter of 01/17/17   ECHO Complete 2D W Doppler W Color   Result Value Ref Range    Left Ventricular Ejection Fraction 55     LVEF MODALITY ECHO     Narrative    [facility]    Transthoracic Echocardiography Report (TTE)     Patient Name [address]    Date of Study               01/17/2017                PETER      Date of      09/20/1924  Gender                      Male   Birth      Age          80 year(s)  Race                              Room Number              Height:                     69.69 inch, 177 cm      Corporate ID [id number]  Weight:                     184 pounds, 83.5 kg   #      Patient Acct [de-identified]     BSA:          2.01 m^2      BMI:       26.64   #                                                               kg/m^2      MR #         [id number]      Sonographer                 Katelin Thomas      Accession #  [id number]   Interpreting Physician      [address]      Fellow                   Referring Nurse                            Practitioner      Interpreting             Referring Physician         Shilpa Delacruz   Fellow     Type of Study      TTE procedure:2D Echocardiogram, M-Mode, Doppler, Color Doppler.      Procedure Date  Date: 01/17/2017 Start: 01:12 PM    Study Location: [address] [place] [address]  Technical Quality: Adequate visualization    Indications:Heart murmur,
4/10/23   Gem Guillermo MD   atorvastatin (LIPITOR) 20 MG tablet TAKE 1 TABLET BY MOUTH DAILY WITH SUPPER 4/10/23   Gem Guillermo MD   levocetirizine (XYZAL) 5 MG tablet TAKE 1 TABLET BY MOUTH EVERY NIGHT FOR ALLERGIES 4/3/23   Gem Guillermo MD   amLODIPine (NORVASC) 5 MG tablet TAKE 1 TABLET BY MOUTH DAILY 2/6/23   Gem Guillermo MD   vitamin D (CHOLECALCIFEROL) 50 MCG (2000 UT) TABS tablet Take 1 tablet by mouth daily Ok to continue multivitamin 1/18/23   Gem Guillermo MD   cyanocobalamin ([employer] VITAMIN B12) 1000 MCG tablet Take 1 tablet by mouth daily 1/18/23   Gem Guillermo MD   acetaminophen (TYLENOL) 500 MG tablet Take 1 tablet by mouth every 12 hours as needed for Pain 1/13/23   Gem Guillermo MD   docusate sodium (COLACE) 100 MG capsule Take 1 capsule by mouth 2 times daily For constipation 12/16/20   Gem Guillermo MD   camphor-menthol-methyl salicylate (BENGAY ULTRA STRENGTH) 4-10-30 % CREA cream Apply topically 3 times daily as needed for Pain 2/26/20   Gem Guillermo MD   lidocaine (LIDODERM) 5 % Place 1 patch onto the skin daily 12 hours on, 12 hours off. 2/26/20   Gem Guillermo MD   Accu-Chek FastClix Lancets MISC USE TO TEST BLOOD GLUCOSE ONCE A DAY 2/18/20   Gem Guillermo MD   Lancets Misc. (ACCU-CHEK MULTICLIX LANCET DEV) KIT Please dispense according to patients insurance. 1/5/17   Gem Guillermo MD   Alcohol Swabs PADS Please dispense according to patients insurance/device. Testing 1-2 /day 1/5/17   Gem Guillermo MD        Allergies:     Aspirin and Sulfa antibiotics    Social History:     Tobacco:    reports that he quit smoking about 56 years ago. His smoking use included cigarettes. He started smoking about 77 years ago. He has a 21.00 pack-year smoking history. He has been exposed to tobacco smoke. He has never used smokeless tobacco.  Alcohol:      reports no history of alcohol use.   Drug Use:  reports no
not need it. Left the walker in the room and educated on why it is needed for stability and energy conservation. Will continue to assess     Balance  Sitting - Static: Good  Sitting - Dynamic: Good  Standing - Static: Good;-  Standing - Dynamic: Fair  Exercise Treatment: ambulation into hallway, tried to educate on use of 2WW and get patient to use to complete walk. See above notes        OutComes Score                                                  AM-PAC - Mobility    -PAC Basic Mobility - Inpatient   How much help is needed turning from your back to your side while in a flat bed without using bedrails?: None  How much help is needed moving from lying on your back to sitting on the side of a flat bed without using bedrails?: None  How much help is needed moving to and from a bed to a chair?: A Little  How much help is needed standing up from a chair using your arms?: A Little  How much help is needed walking in hospital room?: A Lot  How much help is needed climbing 3-5 steps with a railing?: A Lot  AM-PAC Inpatient Mobility Raw Score : 18  AM-PAC Inpatient T-Scale Score : 43.63  Mobility Inpatient CMS 0-100% Score: 46.58  Mobility Inpatient CMS G-Code Modifier : CK         Tinneti Score       Goals  Short Term Goals  Time Frame for Short Term Goals: 5-7 days  Short Term Goal 1: Pt will be IND with bed mobility from a flat bed to be IND at home. Short Term Goal 2: Pt will be able to complete 14 steps with B HR with distant supervision to be able to access bed room  Short Term Goal 3: Pt will consistently use a 2WW with transfers and ambulation at supervision level with no LOB for safe in home mobility.   Short Term Goal 4: Pt will be decrease time on 5xSTS to  <15 with stabilized vitals showing improved transfer capacity and overall decreased fall risk with mobility  Patient Goals   Patient Goals : to go home with family       Education  Patient Education  Education Given To: Patient  Education Provided: Plan
cm/m2    LA/AO Root Ratio 1.31     RA Volume Index A4C 19 mL/m2    Ao Root Index 1.50 cm/m2    AV Velocity Ratio 0.18     LVOT:AV VTI Index 0.21     TAWANA/BSA VTI 0.3 cm2/m2    TAWANA/BSA Peak Velocity 0.2 cm2/m2    MV:LVOT VTI Index 2.43    POC Glucose Fingerstick    Collection Time: 12/11/23 11:29 AM   Result Value Ref Range    POC Glucose 204 (H) 75 - 110 mg/dL       Imaging/Diagnostics:      Assessment :      Primary Problem  CHF (congestive heart failure), NYHA class I, acute on chronic, combined ([facility])    Principal Problem:    CHF (congestive heart failure), NYHA class I, acute on chronic, combined ([address])  Resolved Problems:    * No resolved hospital problems.  *       Plan:     Patient status Admit as inpatient in the  Progressive Unit/Step down    Admitted with worsening shortness of breath, pedal edema, orthopnea, concern for acute exacerbation of CHF, has history of pulmonary hypertension, start patient IV Lasix will continue, ordering echocardiogram  Elevated troponin which is flat, patient has noncardiac chest pain due to coughing, will not start heparin at this point in time, cardiology consulted, ordering echo to look for regional wall motion abnormality  Hypertension resume home medication of Cozaar  Diabetes, sliding scale  Hypothyroidism resuming home Synthroid  PT/OT consult  I had an extensive discussion with patient and his family at bedside tried to explain various CODE STATUS, patient and his family has difficulty in understanding various CODE STATUS, will keep patient on full code for now, palliative care consulted  12/10  Patient, clinically doing better  Swelling in legs is improving  Hypertension, controlled, except 1 reading of 99/50  Patient easily becomes short of breath respiration goes to 30s, with minimal exertion per nursing report  Concern for arctic stenosis on previous echo, repeat echo pending  Will continue with IV Lasix  Ordering BMP, magnesium  Negative balance  Palliative care inputs

## 2023-12-13 ENCOUNTER — CARE COORDINATION (OUTPATIENT)
Dept: CASE MANAGEMENT | Age: 88
End: 2023-12-13

## 2023-12-13 LAB
EKG ATRIAL RATE: 91 BPM
EKG P-R INTERVAL: 240 MS
EKG Q-T INTERVAL: 372 MS
EKG QRS DURATION: 114 MS
EKG QTC CALCULATION (BAZETT): 457 MS
EKG R AXIS: 67 DEGREES
EKG T AXIS: -39 DEGREES
EKG VENTRICULAR RATE: 91 BPM

## 2023-12-13 NOTE — CARE COORDINATION
Care Transitions Outreach Attempt #2    Call within 2 business days of discharge: Yes   Attempt #2 to reach patient's HIPAA contact for transitions of care follow up. Unable to reach. Left VM requesting call back. Pt has PCP HFU on 23. CTN sign off if no return call received. Patient: Uvalod James Patient : 1924 MRN: [id number]    Last Discharge Facility       Date Complaint Diagnosis Description Type Department Provider    23 Shortness of Breath; Leg Swelling Acute congestive heart failure, unspecified heart failure type ([address] . .. ED to Hosp-Admission (Discharged) (ADMITTED) KARLEE Wang MD; Greg Garcia. .. Was this an external facility discharge? No Discharge Facility Name: [facility]    Noted following upcoming appointments from discharge chart review:   [facility] follow up appointment(s):   Future Appointments   Date Time Provider [address]   2023 11:15 AM [facility]   2024  2:00 PM [facility]   2024  1:00 PM Tammie Monique MD [address] [place] [address]     Shira Vasques RN BSN [facility]  Care Transitions Nurse  [phone]   [email or web address]

## 2023-12-15 ENCOUNTER — HOSPITAL ENCOUNTER (OUTPATIENT)
Age: 88
Setting detail: SPECIMEN
Discharge: HOME | End: 2023-12-15

## 2023-12-15 DIAGNOSIS — E03.9 ACQUIRED HYPOTHYROIDISM: ICD-10-CM

## 2023-12-15 DIAGNOSIS — E11.42 TYPE 2 DIABETES MELLITUS WITH DIABETIC POLYNEUROPATHY, WITHOUT LONG-TERM CURRENT USE OF INSULIN (HCC): ICD-10-CM

## 2023-12-15 DIAGNOSIS — N18.30 BENIGN HYPERTENSION WITH CKD (CHRONIC KIDNEY DISEASE) STAGE III (HCC): ICD-10-CM

## 2023-12-15 DIAGNOSIS — I12.9 BENIGN HYPERTENSION WITH CKD (CHRONIC KIDNEY DISEASE) STAGE III (HCC): ICD-10-CM

## 2023-12-15 LAB
ALBUMIN SERPL-MCNC: 3.5 G/DL (ref 3.5–5.2)
ALBUMIN/GLOB SERPL: 1 {RATIO} (ref 1–2.5)
ALP SERPL-CCNC: 67 U/L (ref 40–129)
ALT SERPL-CCNC: 16 U/L (ref 5–41)
ANION GAP SERPL CALCULATED.3IONS-SCNC: 10 MMOL/L (ref 9–17)
AST SERPL-CCNC: 35 U/L
BASOPHILS # BLD: <0.03 K/UL (ref 0–0.2)
BASOPHILS NFR BLD: 0 % (ref 0–2)
BILIRUB SERPL-MCNC: 0.5 MG/DL (ref 0.3–1.2)
BUN SERPL-MCNC: 29 MG/DL (ref 8–23)
CALCIUM SERPL-MCNC: 9.6 MG/DL (ref 8.6–10.4)
CHLORIDE SERPL-SCNC: 101 MMOL/L (ref 98–107)
CO2 SERPL-SCNC: 28 MMOL/L (ref 20–31)
CREAT SERPL-MCNC: 1 MG/DL (ref 0.7–1.2)
EOSINOPHIL # BLD: 0.07 K/UL (ref 0–0.44)
EOSINOPHILS RELATIVE PERCENT: 1 % (ref 1–4)
ERYTHROCYTE [DISTWIDTH] IN BLOOD BY AUTOMATED COUNT: 16.9 % (ref 11.8–14.4)
GFR SERPL CREATININE-BSD FRML MDRD: >60 ML/MIN/1.73M2
GLUCOSE SERPL-MCNC: 182 MG/DL (ref 70–99)
HCT VFR BLD AUTO: 30.9 % (ref 40.7–50.3)
HGB BLD-MCNC: 9.5 G/DL (ref 13–17)
IMM GRANULOCYTES # BLD AUTO: <0.03 K/UL (ref 0–0.3)
IMM GRANULOCYTES NFR BLD: 0 %
LYMPHOCYTES NFR BLD: 1.16 K/UL (ref 1.1–3.7)
LYMPHOCYTES RELATIVE PERCENT: 20 % (ref 24–43)
MAGNESIUM SERPL-MCNC: 1.8 MG/DL (ref 1.6–2.6)
MCH RBC QN AUTO: 32.1 PG (ref 25.2–33.5)
MCHC RBC AUTO-ENTMCNC: 30.7 G/DL (ref 28.4–34.8)
MCV RBC AUTO: 104.4 FL (ref 82.6–102.9)
MONOCYTES NFR BLD: 0.5 K/UL (ref 0.1–1.2)
MONOCYTES NFR BLD: 9 % (ref 3–12)
NEUTROPHILS NFR BLD: 70 % (ref 36–65)
NEUTS SEG NFR BLD: 4 K/UL (ref 1.5–8.1)
NRBC BLD-RTO: 0 PER 100 WBC
PHOSPHATE SERPL-MCNC: 3.1 MG/DL (ref 2.5–4.5)
PLATELET # BLD AUTO: 205 K/UL (ref 138–453)
PMV BLD AUTO: 11.2 FL (ref 8.1–13.5)
POTASSIUM SERPL-SCNC: 4.8 MMOL/L (ref 3.7–5.3)
PROT SERPL-MCNC: 6.9 G/DL (ref 6.4–8.3)
RBC # BLD AUTO: 2.96 M/UL (ref 4.21–5.77)
RBC # BLD: ABNORMAL 10*6/UL
RBC # BLD: ABNORMAL 10*6/UL
SODIUM SERPL-SCNC: 139 MMOL/L (ref 135–144)
T4 FREE SERPL-MCNC: 1.4 NG/DL (ref 0.9–1.7)
TSH SERPL DL<=0.05 MIU/L-ACNC: 3.6 UIU/ML (ref 0.3–5)
URATE SERPL-MCNC: 5.9 MG/DL (ref 3.4–7)
WBC OTHER # BLD: 5.8 K/UL (ref 3.5–11.3)

## 2023-12-16 DIAGNOSIS — I35.0 NONRHEUMATIC AORTIC VALVE STENOSIS: Primary | ICD-10-CM

## 2023-12-16 LAB
FOLATE SERPL-MCNC: 19.4 NG/ML
HBV SURFACE AB SERPL IA-ACNC: 135.9 MIU/ML
VIT B12 SERPL-MCNC: 875 PG/ML (ref 232–1245)

## 2023-12-16 NOTE — RESULT ENCOUNTER NOTE
Please notify patient: Immune against hepatitis B, does not need vaccine  Blood glucose mildly high 182  Kidney function is improving stage IIIa mild  Anemia is improved    Due to recent flareup of congestive heart failure related to his aortic valve, must stop metformin which can flareup congestive heart failure as well in his condition.   Stop metformin completely    Otherwise labs within normal limits  continue current treatment    Future Appointments  12/18/2023 11:15 AM   Jacob Valdes MD         [facility]  1/19/2024  2:00 PM    Annabella Leon MD    [facility]  7/26/2024  1:00 PM    Andre Loaiza MD    [facility]

## 2023-12-18 PROBLEM — N17.9 AKI (ACUTE KIDNEY INJURY) (HCC): Status: ACTIVE | Noted: 2023-12-18

## 2023-12-18 PROBLEM — H10.13 ALLERGIC CONJUNCTIVITIS OF BOTH EYES: Status: RESOLVED | Noted: 2021-04-29 | Resolved: 2023-12-18

## 2023-12-27 ENCOUNTER — CARE COORDINATION (OUTPATIENT)
Dept: CARE COORDINATION | Age: 88
End: 2023-12-27

## 2023-12-27 NOTE — CARE COORDINATION
Attempted to reach patient for ACM enrollment. Left a message on his voicemail with call back number. Also reminded of tomorrow's CHF appt at [facility]. Will try to reach him next week.

## 2023-12-28 ENCOUNTER — HOSPITAL ENCOUNTER (OUTPATIENT)
Age: 88
Setting detail: SPECIMEN
Discharge: HOME | End: 2023-12-28
Payer: MEDICARE

## 2023-12-28 ENCOUNTER — HOSPITAL ENCOUNTER (OUTPATIENT)
Dept: OTHER | Age: 88
Discharge: HOME | End: 2023-12-28
Payer: MEDICARE

## 2023-12-28 VITALS
OXYGEN SATURATION: 96 % | RESPIRATION RATE: 20 BRPM | SYSTOLIC BLOOD PRESSURE: 122 MMHG | DIASTOLIC BLOOD PRESSURE: 64 MMHG | HEART RATE: 96 BPM | HEIGHT: 69 IN | BODY MASS INDEX: 25.92 KG/M2 | WEIGHT: 175 LBS

## 2023-12-28 LAB
ANION GAP SERPL CALCULATED.3IONS-SCNC: 10 MMOL/L (ref 9–17)
BNP SERPL-MCNC: ABNORMAL PG/ML
BUN SERPL-MCNC: 41 MG/DL (ref 8–23)
CHLORIDE SERPL-SCNC: 100 MMOL/L (ref 98–107)
CO2 SERPL-SCNC: 26 MMOL/L (ref 20–31)
CREAT SERPL-MCNC: 1.3 MG/DL (ref 0.7–1.2)
GFR SERPL CREATININE-BSD FRML MDRD: 49 ML/MIN/1.73M2
POTASSIUM SERPL-SCNC: 4.8 MMOL/L (ref 3.7–5.3)
SODIUM SERPL-SCNC: 136 MMOL/L (ref 135–144)

## 2023-12-28 PROCEDURE — 80051 ELECTROLYTE PANEL: CPT

## 2023-12-28 PROCEDURE — 83880 ASSAY OF NATRIURETIC PEPTIDE: CPT

## 2023-12-28 PROCEDURE — 36415 COLL VENOUS BLD VENIPUNCTURE: CPT

## 2023-12-28 PROCEDURE — 99202 OFFICE O/P NEW SF 15 MIN: CPT

## 2023-12-28 PROCEDURE — 82565 ASSAY OF CREATININE: CPT

## 2023-12-28 PROCEDURE — 84520 ASSAY OF UREA NITROGEN: CPT

## 2023-12-28 NOTE — PROGRESS NOTES
Chito Hoang is on a Diuretic Yes    Heart Failure Medication Adherence: Currently taking their Heart Failure medications as prescribed. Non-Heart Failure Medication Adherence: Currently taking their non-Heart Failure medications as prescribed. Medication Adverse Reactions Noted: none    Barriers to Medication: none    Recent Medication changes: no      Further Assessment / Education     Verbally reviewed importance of medication compliance with patient; patient verbalized understanding. Discussed 1500mg/day sodium restricted diet; patient verbalized understanding. Moderate daily exercise encouraged as tolerated. Discussed rest breaks as needed; patient verbalized understanding. Patient instructed to weigh self at the same time of each day, using same clothes and same scale; reinforced teaching to monitor for 3-5 lb weight increase over 1-2 days, and to notify the CHF clinic at [phone] or physician office if weight change noted. Patient verbalized understanding. Risks of smoking discussed with the patient if applicable; patient strongly discouraged to smoke. Patient verbalized understanding. Signs and symptoms of CHF discussed with patient, such as feeling more tired than normal, feeling short of breath, coughing that increases when you lie down, sudden weight gain, swelling of your feet, legs or belly. Patient verbalized understanding to notify the CHF clinic at [phone] or physician office if these symptoms occur. Compliance with plan of care and further disease process causes discussed with patient, patient encouraged to keep all follow up appointments. Patient verbalized understanding. Further Assessment / Plan     Recommendations for patient this visit:  Log weights and BP daily  Elevate legs when able  1500mg Sodium and 2L fluids daily    New Orders: 12/2924 8:15am Spoke to Dr. Kirsten Metz and discussed pt assessment and labs including increased BNP 19,743.  Verbal orders

## 2023-12-29 ENCOUNTER — TELEPHONE (OUTPATIENT)
Dept: PHARMACY | Age: 88
End: 2023-12-29

## 2023-12-29 ENCOUNTER — TELEPHONE (OUTPATIENT)
Dept: FAMILY MEDICINE CLINIC | Age: 88
End: 2023-12-29

## 2023-12-29 DIAGNOSIS — E11.42 TYPE 2 DIABETES MELLITUS WITH DIABETIC POLYNEUROPATHY, WITHOUT LONG-TERM CURRENT USE OF INSULIN (HCC): Primary | ICD-10-CM

## 2023-12-29 RX ORDER — BLOOD-GLUCOSE METER
KIT MISCELLANEOUS
Qty: 1 KIT | Refills: 0 | Status: SHIPPED | OUTPATIENT
Start: 2023-12-29

## 2023-12-29 NOTE — TELEPHONE ENCOUNTER
Diagnosis Orders   1. Type 2 diabetes mellitus with diabetic polyneuropathy, without long-term current use of insulin (HCC)  glucose monitoring kit           Future Appointments   Date Time Provider Department Center   1/4/2024  1:00 PM [facility] CH[facility]  [facility] CHFCL[facility] [place]   1/5/2024  3:15 PM Eduardo Nixon APRN - NP [facility]   1/19/2024  2:00 PM Sihlpa Delacruz MD fp sc TOP   7/26/2024  1:00 PM Jame Loaiza MD [facility]

## 2023-12-29 NOTE — TELEPHONE ENCOUNTER
Patient was identified as an appropriate candidate for our GD[facility] Medication Management Outpatient Heart Failure Service.   Referral was sent for signature and approval to patient's Cardiologist.   Amparo Stauffer, Pharm D, [address] [facility]  12/29/2023 4:52 PM

## 2024-01-04 ENCOUNTER — HOSPITAL ENCOUNTER (OUTPATIENT)
Dept: OTHER | Age: 89
Discharge: HOME | End: 2024-01-04
Payer: MEDICARE

## 2024-01-04 ENCOUNTER — HOSPITAL ENCOUNTER (OUTPATIENT)
Age: 89
Setting detail: SPECIMEN
Discharge: HOME | End: 2024-01-04
Payer: MEDICARE

## 2024-01-04 VITALS
OXYGEN SATURATION: 91 % | SYSTOLIC BLOOD PRESSURE: 122 MMHG | BODY MASS INDEX: 26.33 KG/M2 | RESPIRATION RATE: 18 BRPM | HEIGHT: 69 IN | HEART RATE: 91 BPM | DIASTOLIC BLOOD PRESSURE: 72 MMHG | WEIGHT: 177.8 LBS

## 2024-01-04 DIAGNOSIS — I50.43 CHF (CONGESTIVE HEART FAILURE), NYHA CLASS I, ACUTE ON CHRONIC, COMBINED (HCC): Primary | ICD-10-CM

## 2024-01-04 LAB
ANION GAP SERPL CALCULATED.3IONS-SCNC: 10 MMOL/L (ref 9–17)
BNP SERPL-MCNC: ABNORMAL PG/ML
BUN SERPL-MCNC: 29 MG/DL (ref 8–23)
CALCIUM SERPL-MCNC: 8.8 MG/DL (ref 8.6–10.4)
CHLORIDE SERPL-SCNC: 99 MMOL/L (ref 98–107)
CO2 SERPL-SCNC: 29 MMOL/L (ref 20–31)
CREAT SERPL-MCNC: 1.4 MG/DL (ref 0.7–1.2)
GFR SERPL CREATININE-BSD FRML MDRD: 45 ML/MIN/1.73M2
GLUCOSE SERPL-MCNC: 299 MG/DL (ref 70–99)
POTASSIUM SERPL-SCNC: 4.4 MMOL/L (ref 3.7–5.3)
SODIUM SERPL-SCNC: 138 MMOL/L (ref 135–144)

## 2024-01-04 PROCEDURE — 99211 OFF/OP EST MAY X REQ PHY/QHP: CPT

## 2024-01-04 PROCEDURE — 80048 BASIC METABOLIC PNL TOTAL CA: CPT

## 2024-01-04 PROCEDURE — 36415 COLL VENOUS BLD VENIPUNCTURE: CPT

## 2024-01-04 PROCEDURE — 83880 ASSAY OF NATRIURETIC PEPTIDE: CPT

## 2024-01-04 NOTE — PROGRESS NOTES
osteoarthritis involving multiple joints M15.9    Hypomagnesemia E83.42    CHF (congestive heart failure), NYHA class I, acute on chronic, combined ([facility]) I50.43    Acute congestive heart failure ([facility]) I50.9    NSTEMI (non-ST elevated myocardial infarction) ([facility]) I21.4    Primary hypertension I10    SHAYAN (acute kidney injury) ([facility]) N17.9     Social History     Tobacco Use    Smoking status: Former     Current packs/day: 0.00     Average packs/day: 1 pack/day for 21.8 years (21.8 ttl pk-yrs)     Types: Cigarettes     Start date:      Quit date: 10/27/1967     Years since quittin.2     Passive exposure: Past    Smokeless tobacco: Never   Vaping Use    Vaping Use: Never used   Substance Use Topics    Alcohol use: No    Drug use: No     BMP:   Lab Results   Component Value Date/Time     2024 01:09 PM    K 4.4 2024 01:09 PM    CL 99 2024 01:09 PM    CO2 29 2024 01:09 PM    BUN 29 2024 01:09 PM    CREATININE 1.4 2024 01:09 PM     Lab Results   Component Value Date/Time    K 4.4 2024 01:09 PM    K 4.8 2023 02:33 PM    K 4.8 12/15/2023 11:24 AM     Lab Results   Component Value Date/Time    MG 1.8 12/15/2023 11:24 AM    MG 1.7 12/10/2023 07:02 PM    MG 1.8 2023 05:30 AM     Lab Results   Component Value Date/Time    CREATININE 1.4 2024 01:09 PM    CREATININE 1.3 2023 02:33 PM    CREATININE 1.0 12/15/2023 11:24 AM    CREATININE 1.5 12/10/2023 07:02 PM    CREATININE 1.3 2023 05:30 AM    CREATININE 1.1 2023 03:50 PM       ProBNP:   Lab Results   Component Value Date/Time    PROBNP 20,306 2024 01:09 PM    PROBNP 19,743 2023 02:33 PM    PROBNP 11,053 2023 03:50 PM     [unfilled]  BP Readings from Last 3 Encounters:   24 122/72   23 122/64   23 120/70     Wt Readings from Last 6 Encounters:   24 80.6 kg (177 lb 12.8 oz)   23 79.4 kg (175 lb)   23 79.4 kg (175 lb)   23 77.1 kg (170 lb)

## 2024-01-05 ENCOUNTER — HOSPITAL ENCOUNTER (INPATIENT)
Age: 89
LOS: 10 days | Discharge: NURSING, REHAB OR LONG-TERM CARE | DRG: 884 | End: 2024-01-15
Attending: EMERGENCY MEDICINE | Admitting: INTERNAL MEDICINE
Payer: MEDICARE

## 2024-01-05 ENCOUNTER — CARE COORDINATION (OUTPATIENT)
Dept: CARE COORDINATION | Age: 89
End: 2024-01-05

## 2024-01-05 DIAGNOSIS — Z51.5 ENCOUNTER FOR HOSPICE CARE: Primary | ICD-10-CM

## 2024-01-05 PROBLEM — R53.81 DEBILITY: Status: ACTIVE | Noted: 2024-01-05

## 2024-01-05 LAB
ANION GAP SERPL CALCULATED.3IONS-SCNC: 12 MMOL/L (ref 9–17)
BASOPHILS # BLD: 0 K/UL (ref 0–0.2)
BASOPHILS NFR BLD: 1 % (ref 0–2)
BUN SERPL-MCNC: 28 MG/DL (ref 8–23)
CALCIUM SERPL-MCNC: 8.7 MG/DL (ref 8.6–10.4)
CHLORIDE SERPL-SCNC: 97 MMOL/L (ref 98–107)
CO2 SERPL-SCNC: 26 MMOL/L (ref 20–31)
CREAT SERPL-MCNC: 1.2 MG/DL (ref 0.7–1.2)
EOSINOPHIL # BLD: 0.1 K/UL (ref 0–0.4)
EOSINOPHILS RELATIVE PERCENT: 1 % (ref 0–4)
ERYTHROCYTE [DISTWIDTH] IN BLOOD BY AUTOMATED COUNT: 17.7 % (ref 11.5–14.9)
GFR SERPL CREATININE-BSD FRML MDRD: 54 ML/MIN/1.73M2
GLUCOSE BLD-MCNC: 267 MG/DL (ref 75–110)
GLUCOSE SERPL-MCNC: 285 MG/DL (ref 70–99)
HCT VFR BLD AUTO: 29.9 % (ref 41–53)
HGB BLD-MCNC: 9.5 G/DL (ref 13.5–17.5)
LYMPHOCYTES NFR BLD: 0.9 K/UL (ref 1–4.8)
LYMPHOCYTES RELATIVE PERCENT: 19 % (ref 24–44)
MAGNESIUM SERPL-MCNC: 1.7 MG/DL (ref 1.6–2.6)
MCH RBC QN AUTO: 32.7 PG (ref 26–34)
MCHC RBC AUTO-ENTMCNC: 31.9 G/DL (ref 31–37)
MCV RBC AUTO: 102.4 FL (ref 80–100)
MONOCYTES NFR BLD: 0.4 K/UL (ref 0.1–1.3)
MONOCYTES NFR BLD: 9 % (ref 1–7)
NEUTROPHILS NFR BLD: 70 % (ref 36–66)
NEUTS SEG NFR BLD: 3.4 K/UL (ref 1.3–9.1)
PLATELET # BLD AUTO: 142 K/UL (ref 150–450)
PMV BLD AUTO: 9.5 FL (ref 6–12)
POTASSIUM SERPL-SCNC: 3.9 MMOL/L (ref 3.7–5.3)
RBC # BLD AUTO: 2.92 M/UL (ref 4.5–5.9)
SODIUM SERPL-SCNC: 135 MMOL/L (ref 135–144)
WBC OTHER # BLD: 4.8 K/UL (ref 3.5–11)

## 2024-01-05 PROCEDURE — 83735 ASSAY OF MAGNESIUM: CPT

## 2024-01-05 PROCEDURE — 1200000000 HC SEMI PRIVATE

## 2024-01-05 PROCEDURE — 6370000000 HC RX 637 (ALT 250 FOR IP): Performed by: INTERNAL MEDICINE

## 2024-01-05 PROCEDURE — 36415 COLL VENOUS BLD VENIPUNCTURE: CPT

## 2024-01-05 PROCEDURE — 80048 BASIC METABOLIC PNL TOTAL CA: CPT

## 2024-01-05 PROCEDURE — 99223 1ST HOSP IP/OBS HIGH 75: CPT | Performed by: INTERNAL MEDICINE

## 2024-01-05 PROCEDURE — 99285 EMERGENCY DEPT VISIT HI MDM: CPT

## 2024-01-05 PROCEDURE — 85025 COMPLETE CBC W/AUTO DIFF WBC: CPT

## 2024-01-05 PROCEDURE — 2580000003 HC RX 258: Performed by: INTERNAL MEDICINE

## 2024-01-05 PROCEDURE — 6360000002 HC RX W HCPCS: Performed by: INTERNAL MEDICINE

## 2024-01-05 PROCEDURE — 82947 ASSAY GLUCOSE BLOOD QUANT: CPT

## 2024-01-05 RX ORDER — ATORVASTATIN CALCIUM 20 MG/1
20 TABLET, FILM COATED ORAL
Status: DISCONTINUED | OUTPATIENT
Start: 2024-01-05 | End: 2024-01-15 | Stop reason: HOSPADM

## 2024-01-05 RX ORDER — FUROSEMIDE 40 MG/1
40 TABLET ORAL 2 TIMES DAILY
Status: DISCONTINUED | OUTPATIENT
Start: 2024-01-05 | End: 2024-01-08

## 2024-01-05 RX ORDER — TAMSULOSIN HYDROCHLORIDE 0.4 MG/1
0.4 CAPSULE ORAL DAILY
Status: DISCONTINUED | OUTPATIENT
Start: 2024-01-05 | End: 2024-01-15 | Stop reason: HOSPADM

## 2024-01-05 RX ORDER — SODIUM CHLORIDE 0.9 % (FLUSH) 0.9 %
5-40 SYRINGE (ML) INJECTION PRN
Status: DISCONTINUED | OUTPATIENT
Start: 2024-01-05 | End: 2024-01-15 | Stop reason: HOSPADM

## 2024-01-05 RX ORDER — ENOXAPARIN SODIUM 100 MG/ML
30 INJECTION SUBCUTANEOUS DAILY
Status: DISCONTINUED | OUTPATIENT
Start: 2024-01-05 | End: 2024-01-06

## 2024-01-05 RX ORDER — ACETAMINOPHEN 325 MG/1
650 TABLET ORAL EVERY 6 HOURS PRN
Status: DISCONTINUED | OUTPATIENT
Start: 2024-01-05 | End: 2024-01-15 | Stop reason: HOSPADM

## 2024-01-05 RX ORDER — FLUTICASONE PROPIONATE 50 MCG
2 SPRAY, SUSPENSION (ML) NASAL 2 TIMES DAILY
Status: DISCONTINUED | OUTPATIENT
Start: 2024-01-05 | End: 2024-01-15 | Stop reason: HOSPADM

## 2024-01-05 RX ORDER — LOSARTAN POTASSIUM 50 MG/1
50 TABLET ORAL DAILY
Status: DISCONTINUED | OUTPATIENT
Start: 2024-01-05 | End: 2024-01-13

## 2024-01-05 RX ORDER — ACETAMINOPHEN 500 MG
500 TABLET ORAL EVERY 12 HOURS PRN
Status: DISCONTINUED | OUTPATIENT
Start: 2024-01-05 | End: 2024-01-05 | Stop reason: ALTCHOICE

## 2024-01-05 RX ORDER — VITAMIN B COMPLEX
2000 TABLET ORAL DAILY
Status: DISCONTINUED | OUTPATIENT
Start: 2024-01-05 | End: 2024-01-15 | Stop reason: HOSPADM

## 2024-01-05 RX ORDER — ACETAMINOPHEN 650 MG/1
650 SUPPOSITORY RECTAL EVERY 6 HOURS PRN
Status: DISCONTINUED | OUTPATIENT
Start: 2024-01-05 | End: 2024-01-15 | Stop reason: HOSPADM

## 2024-01-05 RX ORDER — AMLODIPINE BESYLATE 5 MG/1
5 TABLET ORAL DAILY
Status: DISCONTINUED | OUTPATIENT
Start: 2024-01-05 | End: 2024-01-13

## 2024-01-05 RX ORDER — INSULIN LISPRO 100 [IU]/ML
0-4 INJECTION, SOLUTION INTRAVENOUS; SUBCUTANEOUS
Status: DISCONTINUED | OUTPATIENT
Start: 2024-01-05 | End: 2024-01-15 | Stop reason: HOSPADM

## 2024-01-05 RX ORDER — SODIUM CHLORIDE 0.9 % (FLUSH) 0.9 %
5-40 SYRINGE (ML) INJECTION EVERY 12 HOURS SCHEDULED
Status: DISCONTINUED | OUTPATIENT
Start: 2024-01-05 | End: 2024-01-15 | Stop reason: HOSPADM

## 2024-01-05 RX ORDER — ALBUTEROL SULFATE 90 UG/1
2 AEROSOL, METERED RESPIRATORY (INHALATION) EVERY 6 HOURS PRN
Status: DISCONTINUED | OUTPATIENT
Start: 2024-01-05 | End: 2024-01-15 | Stop reason: HOSPADM

## 2024-01-05 RX ORDER — DOCUSATE SODIUM 100 MG/1
100 CAPSULE, LIQUID FILLED ORAL 2 TIMES DAILY
Status: DISCONTINUED | OUTPATIENT
Start: 2024-01-05 | End: 2024-01-15 | Stop reason: HOSPADM

## 2024-01-05 RX ORDER — INSULIN LISPRO 100 [IU]/ML
0-4 INJECTION, SOLUTION INTRAVENOUS; SUBCUTANEOUS NIGHTLY
Status: DISCONTINUED | OUTPATIENT
Start: 2024-01-05 | End: 2024-01-15 | Stop reason: HOSPADM

## 2024-01-05 RX ORDER — LEVOTHYROXINE SODIUM 0.03 MG/1
25 TABLET ORAL
Status: DISCONTINUED | OUTPATIENT
Start: 2024-01-06 | End: 2024-01-15 | Stop reason: HOSPADM

## 2024-01-05 RX ORDER — SODIUM CHLORIDE 9 MG/ML
INJECTION, SOLUTION INTRAVENOUS PRN
Status: DISCONTINUED | OUTPATIENT
Start: 2024-01-05 | End: 2024-01-15 | Stop reason: HOSPADM

## 2024-01-05 RX ORDER — POLYETHYLENE GLYCOL 3350 17 G/17G
17 POWDER, FOR SOLUTION ORAL DAILY PRN
Status: DISCONTINUED | OUTPATIENT
Start: 2024-01-05 | End: 2024-01-15 | Stop reason: HOSPADM

## 2024-01-05 RX ORDER — LANOLIN ALCOHOL/MO/W.PET/CERES
1000 CREAM (GRAM) TOPICAL DAILY
Status: DISCONTINUED | OUTPATIENT
Start: 2024-01-05 | End: 2024-01-15 | Stop reason: HOSPADM

## 2024-01-05 RX ORDER — DEXTROSE MONOHYDRATE 100 MG/ML
INJECTION, SOLUTION INTRAVENOUS CONTINUOUS PRN
Status: DISCONTINUED | OUTPATIENT
Start: 2024-01-05 | End: 2024-01-15 | Stop reason: HOSPADM

## 2024-01-05 RX ADMIN — ATORVASTATIN CALCIUM 20 MG: 20 TABLET, FILM COATED ORAL at 20:07

## 2024-01-05 RX ADMIN — Medication 2000 UNITS: at 20:08

## 2024-01-05 RX ADMIN — DOCUSATE SODIUM 100 MG: 100 CAPSULE, LIQUID FILLED ORAL at 20:07

## 2024-01-05 RX ADMIN — INSULIN LISPRO 2 UNITS: 100 INJECTION, SOLUTION INTRAVENOUS; SUBCUTANEOUS at 18:47

## 2024-01-05 RX ADMIN — TAMSULOSIN HYDROCHLORIDE 0.4 MG: 0.4 CAPSULE ORAL at 20:08

## 2024-01-05 RX ADMIN — ACETAMINOPHEN 650 MG: 325 TABLET ORAL at 20:06

## 2024-01-05 RX ADMIN — CYANOCOBALAMIN TAB 1000 MCG 1000 MCG: 1000 TAB at 20:08

## 2024-01-05 RX ADMIN — SODIUM CHLORIDE, PRESERVATIVE FREE 10 ML: 5 INJECTION INTRAVENOUS at 20:14

## 2024-01-05 RX ADMIN — FUROSEMIDE 40 MG: 40 TABLET ORAL at 20:07

## 2024-01-05 RX ADMIN — ENOXAPARIN SODIUM 30 MG: 100 INJECTION SUBCUTANEOUS at 20:06

## 2024-01-05 ASSESSMENT — PAIN - FUNCTIONAL ASSESSMENT: PAIN_FUNCTIONAL_ASSESSMENT: NONE - DENIES PAIN

## 2024-01-05 NOTE — ED NOTES
Spoke with BERTRAND Olivas [facility] who states that they can meet with patient and son at home to get set up with home hospice services. Son will be going home to make phone calls. Son notified that patient will be transported back home via squad but unsure with transport time frame. BERTRAND Olivas called back and notified about delayed transport times states she will let son know to call them when patient arrives back home.

## 2024-01-05 NOTE — ED NOTES
TRANSFER - OUT REPORT:    Verbal report given to BERTRAND Ivan on Corbin Meyer  being transferred to Med/Surg 2077 for routine progression of patient care       Report consisted of patient's Situation, Background, Assessment and   Recommendations(SBAR).     Information from the following report(s) Nurse Handoff Report, ED SBAR, Adult Overview, Recent Results, and Event Log was reviewed with the receiving nurse.    [place] Fall Assessment:                           Lines:   Peripheral IV 01/05/24 Left;Posterior Forearm (Active)        Opportunity for questions and clarification was provided.      Patient transported with:  Tech

## 2024-01-05 NOTE — CARE COORDINATION
Patient currently in the ED with plans to admit to [facility]. Will exclude from CM program.

## 2024-01-05 NOTE — ED NOTES
Per patient's son he was concerned about taking patient home due to being unable to care for patient and patient's status decreasing. Patient was unable to get [facility] nurse out until Tuesday and [facility] unsure about when they could get someone out so patient is being medically admitted with social work consult to get patient home and under hospice services safely. Patient's son ok with this plan and will be up at hospital tomorrow to discuss hospice needs.

## 2024-01-05 NOTE — ED PROVIDER NOTES
EMERGENCY DEPARTMENT ENCOUNTER    Pt Name: Corbin Meyer  MRN: [id number]  Birthdate 9/20/1924  Date of evaluation: 1/5/24  CHIEF COMPLAINT       Chief Complaint   Patient presents with    OTHER     HISTORY OF PRESENT ILLNESS   89-year-old male presenting to the ER with family in order to be set up with hospice.  The patient does have a DNR CC signed.  Patient is here with his son.  The patient is altered and the son was a little concerned about that but still wants to set up with hospice and does not want to do any medical investigating.  Son states that he has not had time to do it outpatient yet because of the holidays.    The history is provided by a relative.   Illness             REVIEW OF SYSTEMS     Review of Systems   Unable to perform ROS: Mental status change     PASTMEDICAL HISTORY     Past Medical History:   Diagnosis Date    Acute bronchitis due to infection 9/1/2017    Allergic rhinitis due to allergen 7/22/2018    Anemia 1/6/2017    Aortic stenosis, moderate-severe 2/16/2017    Benign hypertension with CKD (chronic kidney disease) stage III ([facility]) 10/27/2016    CHF (congestive heart failure), NYHA class I, acute on chronic, combined ([facility]) 12/8/2023    Chronic kidney disease     Chronic right-sided low back pain with right-sided sciatica 3/4/2020    Diabetes ([facility])     Diabetic nephropathy associated with type 2 diabetes mellitus ([facility]) 2/16/2017    Diastolic dysfunction without heart failure 2/16/2017    Gastroesophageal reflux disease without esophagitis 12/10/2018    H. pylori infection     Hearing loss     Helicobacter pylori gastritis 8/31/2017    Hyperlipidemia     Hypertension     Hypothyroidism     Iron deficiency anemia 2/20/2017    LVH (left ventricular hypertrophy) due to hypertensive disease 2/16/2017    Osteoarthritis     Pulmonary hypertension ([facility]) 9/19/2017    RVSP 51 mmHg on 9/13/17       PVD (peripheral vascular disease) ([facility])     Type 2 diabetes mellitus with diabetic polyneuropathy, with

## 2024-01-06 PROBLEM — Z51.5 ENCOUNTER FOR HOSPICE CARE: Status: ACTIVE | Noted: 2024-01-06

## 2024-01-06 LAB
GLUCOSE BLD-MCNC: 202 MG/DL (ref 75–110)
GLUCOSE BLD-MCNC: 232 MG/DL (ref 75–110)
GLUCOSE BLD-MCNC: 263 MG/DL (ref 75–110)
GLUCOSE BLD-MCNC: 289 MG/DL (ref 75–110)
TROPONIN I SERPL HS-MCNC: 136 NG/L (ref 0–22)
TROPONIN I SERPL HS-MCNC: 139 NG/L (ref 0–22)

## 2024-01-06 PROCEDURE — 6370000000 HC RX 637 (ALT 250 FOR IP): Performed by: INTERNAL MEDICINE

## 2024-01-06 PROCEDURE — 1200000000 HC SEMI PRIVATE

## 2024-01-06 PROCEDURE — 6360000002 HC RX W HCPCS: Performed by: INTERNAL MEDICINE

## 2024-01-06 PROCEDURE — 82947 ASSAY GLUCOSE BLOOD QUANT: CPT

## 2024-01-06 PROCEDURE — 2580000003 HC RX 258: Performed by: INTERNAL MEDICINE

## 2024-01-06 PROCEDURE — 99223 1ST HOSP IP/OBS HIGH 75: CPT | Performed by: INTERNAL MEDICINE

## 2024-01-06 PROCEDURE — 36415 COLL VENOUS BLD VENIPUNCTURE: CPT

## 2024-01-06 PROCEDURE — 99232 SBSQ HOSP IP/OBS MODERATE 35: CPT | Performed by: INTERNAL MEDICINE

## 2024-01-06 PROCEDURE — 6360000002 HC RX W HCPCS: Performed by: NURSE PRACTITIONER

## 2024-01-06 PROCEDURE — 84484 ASSAY OF TROPONIN QUANT: CPT

## 2024-01-06 RX ORDER — MORPHINE SULFATE 4 MG/ML
4 INJECTION, SOLUTION INTRAMUSCULAR; INTRAVENOUS ONCE
Status: COMPLETED | OUTPATIENT
Start: 2024-01-06 | End: 2024-01-06

## 2024-01-06 RX ORDER — ENOXAPARIN SODIUM 100 MG/ML
40 INJECTION SUBCUTANEOUS DAILY
Status: DISCONTINUED | OUTPATIENT
Start: 2024-01-07 | End: 2024-01-11

## 2024-01-06 RX ADMIN — FUROSEMIDE 40 MG: 40 TABLET ORAL at 20:15

## 2024-01-06 RX ADMIN — CYANOCOBALAMIN TAB 1000 MCG 1000 MCG: 1000 TAB at 09:38

## 2024-01-06 RX ADMIN — AMLODIPINE BESYLATE 5 MG: 5 TABLET ORAL at 09:38

## 2024-01-06 RX ADMIN — SODIUM CHLORIDE, PRESERVATIVE FREE 10 ML: 5 INJECTION INTRAVENOUS at 09:43

## 2024-01-06 RX ADMIN — ENOXAPARIN SODIUM 30 MG: 100 INJECTION SUBCUTANEOUS at 09:38

## 2024-01-06 RX ADMIN — INSULIN LISPRO 1 UNITS: 100 INJECTION, SOLUTION INTRAVENOUS; SUBCUTANEOUS at 09:38

## 2024-01-06 RX ADMIN — INSULIN LISPRO 2 UNITS: 100 INJECTION, SOLUTION INTRAVENOUS; SUBCUTANEOUS at 12:29

## 2024-01-06 RX ADMIN — SODIUM CHLORIDE, PRESERVATIVE FREE 10 ML: 5 INJECTION INTRAVENOUS at 20:17

## 2024-01-06 RX ADMIN — FUROSEMIDE 40 MG: 40 TABLET ORAL at 09:39

## 2024-01-06 RX ADMIN — LEVOTHYROXINE SODIUM 25 MCG: 0.03 TABLET ORAL at 05:56

## 2024-01-06 RX ADMIN — LOSARTAN POTASSIUM 50 MG: 50 TABLET, FILM COATED ORAL at 09:39

## 2024-01-06 RX ADMIN — TAMSULOSIN HYDROCHLORIDE 0.4 MG: 0.4 CAPSULE ORAL at 09:39

## 2024-01-06 RX ADMIN — Medication 2000 UNITS: at 09:39

## 2024-01-06 RX ADMIN — DOCUSATE SODIUM 100 MG: 100 CAPSULE, LIQUID FILLED ORAL at 20:15

## 2024-01-06 RX ADMIN — INSULIN LISPRO 1 UNITS: 100 INJECTION, SOLUTION INTRAVENOUS; SUBCUTANEOUS at 17:14

## 2024-01-06 RX ADMIN — ATORVASTATIN CALCIUM 20 MG: 20 TABLET, FILM COATED ORAL at 17:14

## 2024-01-06 RX ADMIN — FLUTICASONE PROPIONATE 2 SPRAY: 50 SPRAY, METERED NASAL at 20:16

## 2024-01-06 RX ADMIN — FLUTICASONE PROPIONATE 2 SPRAY: 50 SPRAY, METERED NASAL at 12:29

## 2024-01-06 RX ADMIN — DOCUSATE SODIUM 100 MG: 100 CAPSULE, LIQUID FILLED ORAL at 09:38

## 2024-01-06 RX ADMIN — MORPHINE SULFATE 4 MG: 4 INJECTION, SOLUTION INTRAMUSCULAR; INTRAVENOUS at 20:49

## 2024-01-06 ASSESSMENT — PAIN SCALES - GENERAL: PAINLEVEL_OUTOF10: 7

## 2024-01-06 ASSESSMENT — PAIN DESCRIPTION - LOCATION: LOCATION: CHEST

## 2024-01-07 ENCOUNTER — APPOINTMENT (OUTPATIENT)
Dept: GENERAL RADIOLOGY | Age: 89
DRG: 884 | End: 2024-01-07
Payer: MEDICARE

## 2024-01-07 LAB
GLUCOSE BLD-MCNC: 217 MG/DL (ref 75–110)
GLUCOSE BLD-MCNC: 224 MG/DL (ref 75–110)
GLUCOSE BLD-MCNC: 224 MG/DL (ref 75–110)

## 2024-01-07 PROCEDURE — 82947 ASSAY GLUCOSE BLOOD QUANT: CPT

## 2024-01-07 PROCEDURE — 6370000000 HC RX 637 (ALT 250 FOR IP): Performed by: INTERNAL MEDICINE

## 2024-01-07 PROCEDURE — 93005 ELECTROCARDIOGRAM TRACING: CPT | Performed by: NURSE PRACTITIONER

## 2024-01-07 PROCEDURE — 71046 X-RAY EXAM CHEST 2 VIEWS: CPT

## 2024-01-07 PROCEDURE — 6360000002 HC RX W HCPCS: Performed by: INTERNAL MEDICINE

## 2024-01-07 PROCEDURE — 2580000003 HC RX 258: Performed by: INTERNAL MEDICINE

## 2024-01-07 PROCEDURE — 99232 SBSQ HOSP IP/OBS MODERATE 35: CPT | Performed by: INTERNAL MEDICINE

## 2024-01-07 PROCEDURE — 1200000000 HC SEMI PRIVATE

## 2024-01-07 RX ADMIN — CYANOCOBALAMIN TAB 1000 MCG 1000 MCG: 1000 TAB at 09:14

## 2024-01-07 RX ADMIN — FUROSEMIDE 40 MG: 40 TABLET ORAL at 21:52

## 2024-01-07 RX ADMIN — FUROSEMIDE 40 MG: 40 TABLET ORAL at 09:15

## 2024-01-07 RX ADMIN — FLUTICASONE PROPIONATE 2 SPRAY: 50 SPRAY, METERED NASAL at 09:15

## 2024-01-07 RX ADMIN — ENOXAPARIN SODIUM 40 MG: 100 INJECTION SUBCUTANEOUS at 09:15

## 2024-01-07 RX ADMIN — SODIUM CHLORIDE, PRESERVATIVE FREE 10 ML: 5 INJECTION INTRAVENOUS at 09:30

## 2024-01-07 RX ADMIN — Medication 2000 UNITS: at 09:14

## 2024-01-07 RX ADMIN — DOCUSATE SODIUM 100 MG: 100 CAPSULE, LIQUID FILLED ORAL at 21:52

## 2024-01-07 RX ADMIN — INSULIN LISPRO 1 UNITS: 100 INJECTION, SOLUTION INTRAVENOUS; SUBCUTANEOUS at 09:15

## 2024-01-07 RX ADMIN — TAMSULOSIN HYDROCHLORIDE 0.4 MG: 0.4 CAPSULE ORAL at 09:14

## 2024-01-07 RX ADMIN — ATORVASTATIN CALCIUM 20 MG: 20 TABLET, FILM COATED ORAL at 17:31

## 2024-01-07 RX ADMIN — SODIUM CHLORIDE, PRESERVATIVE FREE 10 ML: 5 INJECTION INTRAVENOUS at 21:52

## 2024-01-07 RX ADMIN — DOCUSATE SODIUM 100 MG: 100 CAPSULE, LIQUID FILLED ORAL at 09:14

## 2024-01-07 RX ADMIN — INSULIN LISPRO 1 UNITS: 100 INJECTION, SOLUTION INTRAVENOUS; SUBCUTANEOUS at 11:59

## 2024-01-07 RX ADMIN — LOSARTAN POTASSIUM 50 MG: 50 TABLET, FILM COATED ORAL at 09:14

## 2024-01-07 RX ADMIN — POLYETHYLENE GLYCOL 3350 17 G: 17 POWDER, FOR SOLUTION ORAL at 23:24

## 2024-01-07 RX ADMIN — FLUTICASONE PROPIONATE 2 SPRAY: 50 SPRAY, METERED NASAL at 21:53

## 2024-01-07 RX ADMIN — AMLODIPINE BESYLATE 5 MG: 5 TABLET ORAL at 09:14

## 2024-01-07 RX ADMIN — INSULIN LISPRO 1 UNITS: 100 INJECTION, SOLUTION INTRAVENOUS; SUBCUTANEOUS at 17:31

## 2024-01-07 RX ADMIN — LEVOTHYROXINE SODIUM 25 MCG: 0.03 TABLET ORAL at 05:51

## 2024-01-07 ASSESSMENT — PAIN SCALES - GENERAL: PAINLEVEL_OUTOF10: 0

## 2024-01-07 NOTE — DISCHARGE INSTR - COC
Continuity of Care Form    Patient Name: Corbin Pitt   :  1924  MRN:  [id number]    Admit date:  2024  Discharge date:  01/15/2024    Code Status Order: DNR-CCA   Advance Directives:     Admitting Physician:  Joey Salamanca MD  PCP: Shilpa Delacruz MD    Discharging Nurse: Temi Murrell RN  Discharging Hospital Unit/Room#: 2077/7-  Discharging Unit Phone Number: [phone]    Emergency Contact:   Extended Emergency Contact Information  Primary Emergency Contact: Corbin Conteh Jr  Address: [address] [place] [address]  Home Phone: [phone]  Relation: Child  Other needs: None  Preferred language: English  Secondary Emergency Contact: stanley pitt  Home Phone: [phone]  Relation: Daughter-in-Law   needed? No    Past Surgical History:  Past Surgical History:   Procedure Laterality Date    CATARACT REMOVAL WITH IMPLANT Left 10/03/2017    [facility]/Complex with iris stretching    CATARACT REMOVAL WITH IMPLANT Right 2018    [facility]/Complex with iris stretching    COLONOSCOPY  2017    retained stools    HERNIA REPAIR      DE COLON CA SCRN NOT HI RSK IND N/A 2017     DIAGNOSTIC COLONOSCOPY performed by Kleber Brambila MD at [facility] OR    DE EGD TRANSORAL BIOPSY SINGLE/MULTIPLE N/A 2017    EGD BIOPSY performed by Kleber Brambila MD at [facility] OR    DE XCAPSL CTRC RMVL INSJ IO LENS PROSTH W/O ECP Left 10/3/2017    EYE CATARACT EMULSIFICATION IOL IMPLANT performed by Stevie Andre MD at [facility] OR    DE XCAPSL CTRC RMVL INSJ IO LENS PROSTH W/O ECP Right 3/1/2018    EYE CATARACT EMUSIFICATION WITH IOL RIGHT performed by Stevie Andre MD at [facility] OR    UPPER GASTROINTESTINAL ENDOSCOPY  2017    H Pylori       Immunization History:   Immunization History   Administered Date(s) Administered    COVID-19, PFIZER Bivalent, DO NOT Dilute, (age 12y+), IM, 30 mcg/0.3 mL 2022    COVID-19, PFIZER

## 2024-01-08 ENCOUNTER — APPOINTMENT (OUTPATIENT)
Dept: GENERAL RADIOLOGY | Age: 89
DRG: 884 | End: 2024-01-08
Payer: MEDICARE

## 2024-01-08 LAB
GLUCOSE BLD-MCNC: 173 MG/DL (ref 75–110)
GLUCOSE BLD-MCNC: 212 MG/DL (ref 75–110)
GLUCOSE BLD-MCNC: 226 MG/DL (ref 75–110)
GLUCOSE BLD-MCNC: 278 MG/DL (ref 75–110)

## 2024-01-08 PROCEDURE — 92611 MOTION FLUOROSCOPY/SWALLOW: CPT

## 2024-01-08 PROCEDURE — 2580000003 HC RX 258: Performed by: INTERNAL MEDICINE

## 2024-01-08 PROCEDURE — 6370000000 HC RX 637 (ALT 250 FOR IP): Performed by: INTERNAL MEDICINE

## 2024-01-08 PROCEDURE — 99232 SBSQ HOSP IP/OBS MODERATE 35: CPT | Performed by: INTERNAL MEDICINE

## 2024-01-08 PROCEDURE — 92610 EVALUATE SWALLOWING FUNCTION: CPT

## 2024-01-08 PROCEDURE — 6360000002 HC RX W HCPCS: Performed by: INTERNAL MEDICINE

## 2024-01-08 PROCEDURE — 1200000000 HC SEMI PRIVATE

## 2024-01-08 PROCEDURE — 74230 X-RAY XM SWLNG FUNCJ C+: CPT

## 2024-01-08 PROCEDURE — 82947 ASSAY GLUCOSE BLOOD QUANT: CPT

## 2024-01-08 RX ORDER — FUROSEMIDE 10 MG/ML
20 INJECTION INTRAMUSCULAR; INTRAVENOUS 2 TIMES DAILY
Status: DISCONTINUED | OUTPATIENT
Start: 2024-01-08 | End: 2024-01-09

## 2024-01-08 RX ADMIN — LEVOTHYROXINE SODIUM 25 MCG: 0.03 TABLET ORAL at 06:31

## 2024-01-08 RX ADMIN — FLUTICASONE PROPIONATE 2 SPRAY: 50 SPRAY, METERED NASAL at 20:45

## 2024-01-08 RX ADMIN — DOCUSATE SODIUM 100 MG: 100 CAPSULE, LIQUID FILLED ORAL at 08:53

## 2024-01-08 RX ADMIN — ATORVASTATIN CALCIUM 20 MG: 20 TABLET, FILM COATED ORAL at 17:34

## 2024-01-08 RX ADMIN — AMLODIPINE BESYLATE 5 MG: 5 TABLET ORAL at 08:52

## 2024-01-08 RX ADMIN — ENOXAPARIN SODIUM 40 MG: 100 INJECTION SUBCUTANEOUS at 08:53

## 2024-01-08 RX ADMIN — SODIUM CHLORIDE, PRESERVATIVE FREE 10 ML: 5 INJECTION INTRAVENOUS at 08:56

## 2024-01-08 RX ADMIN — FUROSEMIDE 40 MG: 40 TABLET ORAL at 08:53

## 2024-01-08 RX ADMIN — Medication 2000 UNITS: at 08:52

## 2024-01-08 RX ADMIN — FLUTICASONE PROPIONATE 2 SPRAY: 50 SPRAY, METERED NASAL at 08:54

## 2024-01-08 RX ADMIN — SODIUM CHLORIDE, PRESERVATIVE FREE 10 ML: 5 INJECTION INTRAVENOUS at 20:45

## 2024-01-08 RX ADMIN — INSULIN LISPRO 1 UNITS: 100 INJECTION, SOLUTION INTRAVENOUS; SUBCUTANEOUS at 08:53

## 2024-01-08 RX ADMIN — TAMSULOSIN HYDROCHLORIDE 0.4 MG: 0.4 CAPSULE ORAL at 08:52

## 2024-01-08 RX ADMIN — DOCUSATE SODIUM 100 MG: 100 CAPSULE, LIQUID FILLED ORAL at 20:45

## 2024-01-08 RX ADMIN — FUROSEMIDE 20 MG: 10 INJECTION, SOLUTION INTRAMUSCULAR; INTRAVENOUS at 17:34

## 2024-01-08 RX ADMIN — LOSARTAN POTASSIUM 50 MG: 50 TABLET, FILM COATED ORAL at 08:53

## 2024-01-08 RX ADMIN — CYANOCOBALAMIN TAB 1000 MCG 1000 MCG: 1000 TAB at 08:53

## 2024-01-08 RX ADMIN — INSULIN LISPRO 1 UNITS: 100 INJECTION, SOLUTION INTRAVENOUS; SUBCUTANEOUS at 11:38

## 2024-01-08 NOTE — PROCEDURES
INSTRUMENTAL SWALLOW REPORT  MODIFIED BARIUM SWALLOW    NAME: Corbin Meyer   : 1924  MRN: [id number]       Date of Eval: 2024              Referring Diagnosis(es):  dysphagia    Past Medical History:  has a past medical history of Acute bronchitis due to infection, Allergic rhinitis due to allergen, Anemia, Aortic stenosis, moderate-severe, Benign hypertension with CKD (chronic kidney disease) stage III ([facility]), CHF (congestive heart failure), NYHA class I, acute on chronic, combined ([facility]), Chronic kidney disease, Chronic right-sided low back pain with right-sided sciatica, Diabetes ([facility]), Diabetic nephropathy associated with type 2 diabetes mellitus ([facility]), Diastolic dysfunction without heart failure, Gastroesophageal reflux disease without esophagitis, H. pylori infection, Hearing loss, Helicobacter pylori gastritis, Hyperlipidemia, Hypertension, Hypothyroidism, Iron deficiency anemia, LVH (left ventricular hypertrophy) due to hypertensive disease, Osteoarthritis, Pulmonary hypertension ([facility]), PVD (peripheral vascular disease) ([facility]), and Type 2 diabetes mellitus with diabetic polyneuropathy, with long-term current use of insulin ([facility]).  Past Surgical History:  has a past surgical history that includes hernia repair; pr egd transoral biopsy single/multiple (N/A, 2017); pr colon ca scrn not hi rsk ind (N/A, 2017); Upper gastrointestinal endoscopy (2017); Colonoscopy (2017); Cataract removal with implant (Left, 10/03/2017); pr xcapsl ctrc rmvl insj io lens prosth w/o ecp (Left, 10/3/2017); Cataract removal with implant (Right, 2018); and pr xcapsl ctrc rmvl insj io lens prosth w/o ecp (Right, 3/1/2018).               Type of Study: Initial MBS       Recent CXR/CT of Chest: Date - CXR- CHF with moderate right pleural effusion, similar to prior examination.     Patient Complaints/Reason for Referral:  Corbin Meyer was referred for a MBS to assess the efficiency of his/her swallow

## 2024-01-08 NOTE — ACP (ADVANCE CARE PLANNING)
..Advance Care Planning     Advance Care Planning Activator (Inpatient)  Conversation Note      Date of ACP Conversation: 1/8/2024     Conversation Conducted with: Patient's son Corbin Meyer Jr is the patient's HCPOA, and the document is scanned into Epic.     ACP Activator: Janis Trevino RN          Health Care Decision Maker:     Current Designated Health Care Decision Maker:     Primary Decision Maker: Mitch Clement *hipaa,Corbin - Mario - [phone]  Click here to complete Healthcare Decision Makers including section of the Healthcare Decision Maker Relationship (ie \"Primary\")      Care Preferences    Ventilation:  \"If you were in your present state of health and suddenly became very ill and were unable to breathe on your own, what would your preference be about the use of a ventilator (breathing machine) if it were available to you?\"      Would the patient desire the use of ventilator (breathing machine)?: no    \"If your health worsens and it becomes clear that your chance of recovery is unlikely, what would your preference be about the use of a ventilator (breathing machine) if it were available to you?\"     Would the patient desire the use of ventilator (breathing machine)?: No      Resuscitation  \"CPR works best to restart the heart when there is a sudden event, like a heart attack, in someone who is otherwise healthy. Unfortunately, CPR does not typically restart the heart for people who have serious health conditions or who are very sick.\"    \"In the event your heart stopped as a result of an underlying serious health condition, would you want attempts to be made to restart your heart (answer \"yes\" for attempt to resuscitate) or would you prefer a natural death (answer \"no\" for do not attempt to resuscitate)?\" no       [x] Yes   [] No   Educated Patient / Decision Maker regarding differences between Advance Directives and portable DNR orders.    Length of ACP Conversation in minutes:      Conversation

## 2024-01-09 LAB
EKG ATRIAL RATE: 85 BPM
EKG ATRIAL RATE: 91 BPM
EKG ATRIAL RATE: 96 BPM
EKG P AXIS: 2 DEGREES
EKG P AXIS: 30 DEGREES
EKG P AXIS: 59 DEGREES
EKG P-R INTERVAL: 216 MS
EKG P-R INTERVAL: 226 MS
EKG P-R INTERVAL: 236 MS
EKG Q-T INTERVAL: 352 MS
EKG Q-T INTERVAL: 352 MS
EKG Q-T INTERVAL: 408 MS
EKG QRS DURATION: 114 MS
EKG QRS DURATION: 116 MS
EKG QRS DURATION: 120 MS
EKG QTC CALCULATION (BAZETT): 432 MS
EKG QTC CALCULATION (BAZETT): 444 MS
EKG QTC CALCULATION (BAZETT): 485 MS
EKG R AXIS: 65 DEGREES
EKG R AXIS: 75 DEGREES
EKG R AXIS: 88 DEGREES
EKG T AXIS: -173 DEGREES
EKG T AXIS: 165 DEGREES
EKG T AXIS: 168 DEGREES
EKG VENTRICULAR RATE: 85 BPM
EKG VENTRICULAR RATE: 91 BPM
EKG VENTRICULAR RATE: 96 BPM
GLUCOSE BLD-MCNC: 122 MG/DL (ref 75–110)
GLUCOSE BLD-MCNC: 177 MG/DL (ref 75–110)
GLUCOSE BLD-MCNC: 218 MG/DL (ref 75–110)
GLUCOSE BLD-MCNC: 257 MG/DL (ref 75–110)

## 2024-01-09 PROCEDURE — 93010 ELECTROCARDIOGRAM REPORT: CPT | Performed by: INTERNAL MEDICINE

## 2024-01-09 PROCEDURE — 97166 OT EVAL MOD COMPLEX 45 MIN: CPT

## 2024-01-09 PROCEDURE — 6370000000 HC RX 637 (ALT 250 FOR IP): Performed by: INTERNAL MEDICINE

## 2024-01-09 PROCEDURE — 82947 ASSAY GLUCOSE BLOOD QUANT: CPT

## 2024-01-09 PROCEDURE — 97162 PT EVAL MOD COMPLEX 30 MIN: CPT

## 2024-01-09 PROCEDURE — 1200000000 HC SEMI PRIVATE

## 2024-01-09 PROCEDURE — 97530 THERAPEUTIC ACTIVITIES: CPT

## 2024-01-09 PROCEDURE — 99232 SBSQ HOSP IP/OBS MODERATE 35: CPT | Performed by: INTERNAL MEDICINE

## 2024-01-09 PROCEDURE — 2580000003 HC RX 258: Performed by: INTERNAL MEDICINE

## 2024-01-09 PROCEDURE — 6360000002 HC RX W HCPCS: Performed by: INTERNAL MEDICINE

## 2024-01-09 RX ORDER — PANTOPRAZOLE SODIUM 40 MG/1
40 TABLET, DELAYED RELEASE ORAL
Status: DISCONTINUED | OUTPATIENT
Start: 2024-01-10 | End: 2024-01-15 | Stop reason: HOSPADM

## 2024-01-09 RX ORDER — INSULIN GLARGINE 100 [IU]/ML
5 INJECTION, SOLUTION SUBCUTANEOUS DAILY
Status: DISCONTINUED | OUTPATIENT
Start: 2024-01-09 | End: 2024-01-15 | Stop reason: HOSPADM

## 2024-01-09 RX ORDER — CETIRIZINE HYDROCHLORIDE 10 MG/1
5 TABLET ORAL DAILY
Status: DISCONTINUED | OUTPATIENT
Start: 2024-01-09 | End: 2024-01-15 | Stop reason: HOSPADM

## 2024-01-09 RX ORDER — FUROSEMIDE 10 MG/ML
40 INJECTION INTRAMUSCULAR; INTRAVENOUS 2 TIMES DAILY
Status: DISCONTINUED | OUTPATIENT
Start: 2024-01-09 | End: 2024-01-10

## 2024-01-09 RX ADMIN — SODIUM CHLORIDE, PRESERVATIVE FREE 10 ML: 5 INJECTION INTRAVENOUS at 19:48

## 2024-01-09 RX ADMIN — FUROSEMIDE 40 MG: 10 INJECTION, SOLUTION INTRAMUSCULAR; INTRAVENOUS at 17:43

## 2024-01-09 RX ADMIN — CYANOCOBALAMIN TAB 1000 MCG 1000 MCG: 1000 TAB at 09:46

## 2024-01-09 RX ADMIN — ATORVASTATIN CALCIUM 20 MG: 20 TABLET, FILM COATED ORAL at 17:42

## 2024-01-09 RX ADMIN — DOCUSATE SODIUM 100 MG: 100 CAPSULE, LIQUID FILLED ORAL at 19:46

## 2024-01-09 RX ADMIN — ENOXAPARIN SODIUM 40 MG: 100 INJECTION SUBCUTANEOUS at 09:53

## 2024-01-09 RX ADMIN — INSULIN LISPRO 1 UNITS: 100 INJECTION, SOLUTION INTRAVENOUS; SUBCUTANEOUS at 07:15

## 2024-01-09 RX ADMIN — FLUTICASONE PROPIONATE 2 SPRAY: 50 SPRAY, METERED NASAL at 09:54

## 2024-01-09 RX ADMIN — INSULIN GLARGINE 5 UNITS: 100 INJECTION, SOLUTION SUBCUTANEOUS at 10:06

## 2024-01-09 RX ADMIN — SODIUM CHLORIDE, PRESERVATIVE FREE 10 ML: 5 INJECTION INTRAVENOUS at 10:08

## 2024-01-09 RX ADMIN — FUROSEMIDE 40 MG: 10 INJECTION, SOLUTION INTRAMUSCULAR; INTRAVENOUS at 10:00

## 2024-01-09 RX ADMIN — CETIRIZINE HYDROCHLORIDE 5 MG: 10 TABLET, FILM COATED ORAL at 10:05

## 2024-01-09 RX ADMIN — LEVOTHYROXINE SODIUM 25 MCG: 0.03 TABLET ORAL at 06:33

## 2024-01-09 RX ADMIN — DOCUSATE SODIUM 100 MG: 100 CAPSULE, LIQUID FILLED ORAL at 09:46

## 2024-01-09 RX ADMIN — Medication 2000 UNITS: at 09:46

## 2024-01-09 RX ADMIN — INSULIN LISPRO 2 UNITS: 100 INJECTION, SOLUTION INTRAVENOUS; SUBCUTANEOUS at 11:38

## 2024-01-09 RX ADMIN — TAMSULOSIN HYDROCHLORIDE 0.4 MG: 0.4 CAPSULE ORAL at 09:46

## 2024-01-09 NOTE — CONSULTS
Reviewed patient's chart.  Per notes, he was brought to the ED by his family in order to set up hospice care.  Palliative care has been consulted, and per their notes, a meeting with [facility] has been scheduled for tomorrow.  Further plans for discharge are to be determined by patient, family, and care team after this meeting.      Aline Paulino MD    
See note per Janis Trevino R.N Palliative Care consult note 1/8/2024    
insurance. 1/5/17   Shilpa Delacruz MD   Alcohol Swabs PADS Please dispense according to patients insurance/device. Testing 1-2 /day 1/5/17   Shilpa Delacruz MD       sodium chloride flush, 5-40 mL, IntraVENous, 2 times per day    enoxaparin, 30 mg, SubCUTAneous, Daily    amLODIPine, 5 mg, Oral, Daily    atorvastatin, 20 mg, Oral, Dinner    cyanocobalamin, 1,000 mcg, Oral, Daily    docusate sodium, 100 mg, Oral, BID    fluticasone, 2 spray, Each Nostril, BID    furosemide, 40 mg, Oral, BID    levothyroxine, 25 mcg, Oral, QAM AC    losartan, 50 mg, Oral, Daily    tamsulosin, 0.4 mg, Oral, Daily    Vitamin D, 2,000 Units, Oral, Daily    insulin lispro, 0-4 Units, SubCUTAneous, TID WC    insulin lispro, 0-4 Units, SubCUTAneous, Nightly      Allergies:  Aspirin and Sulfa antibiotics    Social History:   reports that he quit smoking about 56 years ago. His smoking use included cigarettes. He started smoking about 78 years ago. He has a 21.8 pack-year smoking history. He has been exposed to tobacco smoke. He has never used smokeless tobacco. He reports that he does not drink alcohol and does not use drugs.     Family History: family history includes Diabetes in his mother; High Blood Pressure in his father and mother; Thyroid Disease in his brother and father. No h/o sudden cardiac death.    REVIEW OF SYSTEMS:    Unable to obtain      PHYSICAL EXAM:      /66   Pulse 98   Temp 97.5 °F (36.4 °C)   Resp 24   Ht 1.77 m (5' 9.69\")   Wt 81.1 kg (178 lb 12.7 oz)   SpO2 90%   BMI 25.89 kg/m²    Constitutional and General Appearance: confused  HEENT: confused  Respiratory:  Clear anteriorly  Cardiovascular:  Heart tones are crisp and normal. regular S1 and S2. 3/6 LASHONDA, M=>S2  Jugular venous pulsation Normal  Peripheral pulses are symmetrical and full   Abdomen:   Soft  No masses or tenderness  Extremities:   No Cyanosis or Clubbing   Lower extremity edema: No   Skin: Warm and 
LOC full/confusion  ___50%  Mainly sit/lie; Extensive disease; Can't do any work; Considerable assist; intake normal or reduced; LOC full/confusion  _X__40%  Mainly in bed; Extensive disease; Mainly assist; intake normal or reduced; LOC full/confusion   ___30%  Bed Bound; Extensive disease; Total care; intake reduced; LOC full/confusion  ___20%  Bed Bound; Extensive disease; Total care; intake minimal; Drowsy/coma  ___10%  Bed Bound; Extensive disease; Total care; Mouth care only; Drowsy/coma  ___0       Death      Plan      Palliative Interaction:Patient is in bed and he is sleeping and is dyspneic at rest. He has swelling in his legs. He has oxygen per nasal cannula.     I call the patient's son Corbin Clement and I introduce my palliative care role to him, and he remembers talking with me in December.   I ask Corbin Clement about his Dad and he states\" he was at home and he is getting worse and worse.\" He is weak and he fell at home and that's why I brought him here.\" We talk about his Dad's disease and that it is not curable and that it will get worse and worse as time goes on.   I ask about the plan for care and he talks about hospice at home. I tell him the reality of that, and that the care is not given by hospice staff, it is given at home by family members.   We talked at length about the hospice choices and as well the only company that has inpatient hospice care, and it is covered by Medicare and it is [facility].     We talk about meeting with them and that his Dad will have 24 hour hospice care and that he can just spend that time with his Dad until the end of his life. After much discussion Corbin Clement is agreeable to meet with Hospice.     Corbin had other questions about personal paperwork and POA and I tell him that he can bring his own notary here, but that we do not offer those services here at the hospital. I answer all questions and I offer Corbin Clement much emotional support.     I call [facility] and they will

## 2024-01-10 LAB
ANION GAP SERPL CALCULATED.3IONS-SCNC: 11 MMOL/L (ref 9–17)
BASOPHILS # BLD: 0 K/UL (ref 0–0.2)
BASOPHILS NFR BLD: 1 % (ref 0–2)
BUN SERPL-MCNC: 41 MG/DL (ref 8–23)
CALCIUM SERPL-MCNC: 8.9 MG/DL (ref 8.6–10.4)
CHLORIDE SERPL-SCNC: 95 MMOL/L (ref 98–107)
CO2 SERPL-SCNC: 30 MMOL/L (ref 20–31)
CREAT SERPL-MCNC: 1.4 MG/DL (ref 0.7–1.2)
EOSINOPHIL # BLD: 0 K/UL (ref 0–0.4)
EOSINOPHILS RELATIVE PERCENT: 0 % (ref 0–4)
ERYTHROCYTE [DISTWIDTH] IN BLOOD BY AUTOMATED COUNT: 16.6 % (ref 11.5–14.9)
GFR SERPL CREATININE-BSD FRML MDRD: 45 ML/MIN/1.73M2
GLUCOSE BLD-MCNC: 133 MG/DL (ref 75–110)
GLUCOSE BLD-MCNC: 163 MG/DL (ref 75–110)
GLUCOSE BLD-MCNC: 197 MG/DL (ref 75–110)
GLUCOSE BLD-MCNC: 217 MG/DL (ref 75–110)
GLUCOSE SERPL-MCNC: 174 MG/DL (ref 70–99)
HCT VFR BLD AUTO: 30.2 % (ref 41–53)
HGB BLD-MCNC: 9.5 G/DL (ref 13.5–17.5)
LYMPHOCYTES NFR BLD: 0.4 K/UL (ref 1–4.8)
LYMPHOCYTES RELATIVE PERCENT: 14 % (ref 24–44)
MAGNESIUM SERPL-MCNC: 1.9 MG/DL (ref 1.6–2.6)
MCH RBC QN AUTO: 32.2 PG (ref 26–34)
MCHC RBC AUTO-ENTMCNC: 31.6 G/DL (ref 31–37)
MCV RBC AUTO: 101.9 FL (ref 80–100)
MONOCYTES NFR BLD: 0.4 K/UL (ref 0.1–1.3)
MONOCYTES NFR BLD: 14 % (ref 1–7)
NEUTROPHILS NFR BLD: 71 % (ref 36–66)
NEUTS SEG NFR BLD: 2.3 K/UL (ref 1.3–9.1)
PLATELET # BLD AUTO: 124 K/UL (ref 150–450)
PMV BLD AUTO: 9.8 FL (ref 6–12)
POTASSIUM SERPL-SCNC: 4 MMOL/L (ref 3.7–5.3)
RBC # BLD AUTO: 2.96 M/UL (ref 4.5–5.9)
SODIUM SERPL-SCNC: 136 MMOL/L (ref 135–144)
WBC OTHER # BLD: 3.2 K/UL (ref 3.5–11)

## 2024-01-10 PROCEDURE — 1200000000 HC SEMI PRIVATE

## 2024-01-10 PROCEDURE — 6370000000 HC RX 637 (ALT 250 FOR IP): Performed by: INTERNAL MEDICINE

## 2024-01-10 PROCEDURE — 97535 SELF CARE MNGMENT TRAINING: CPT

## 2024-01-10 PROCEDURE — 99232 SBSQ HOSP IP/OBS MODERATE 35: CPT | Performed by: INTERNAL MEDICINE

## 2024-01-10 PROCEDURE — 85025 COMPLETE CBC W/AUTO DIFF WBC: CPT

## 2024-01-10 PROCEDURE — 6360000002 HC RX W HCPCS: Performed by: INTERNAL MEDICINE

## 2024-01-10 PROCEDURE — 36415 COLL VENOUS BLD VENIPUNCTURE: CPT

## 2024-01-10 PROCEDURE — 83735 ASSAY OF MAGNESIUM: CPT

## 2024-01-10 PROCEDURE — 2580000003 HC RX 258: Performed by: INTERNAL MEDICINE

## 2024-01-10 PROCEDURE — 80048 BASIC METABOLIC PNL TOTAL CA: CPT

## 2024-01-10 PROCEDURE — 82947 ASSAY GLUCOSE BLOOD QUANT: CPT

## 2024-01-10 RX ORDER — FUROSEMIDE 40 MG/1
40 TABLET ORAL DAILY
Status: DISCONTINUED | OUTPATIENT
Start: 2024-01-11 | End: 2024-01-12

## 2024-01-10 RX ADMIN — ATORVASTATIN CALCIUM 20 MG: 20 TABLET, FILM COATED ORAL at 16:26

## 2024-01-10 RX ADMIN — INSULIN LISPRO 1 UNITS: 100 INJECTION, SOLUTION INTRAVENOUS; SUBCUTANEOUS at 16:27

## 2024-01-10 RX ADMIN — SODIUM CHLORIDE, PRESERVATIVE FREE 10 ML: 5 INJECTION INTRAVENOUS at 10:05

## 2024-01-10 RX ADMIN — ENOXAPARIN SODIUM 40 MG: 100 INJECTION SUBCUTANEOUS at 10:08

## 2024-01-10 RX ADMIN — FLUTICASONE PROPIONATE 2 SPRAY: 50 SPRAY, METERED NASAL at 19:59

## 2024-01-10 RX ADMIN — Medication 2000 UNITS: at 09:59

## 2024-01-10 RX ADMIN — DOCUSATE SODIUM 100 MG: 100 CAPSULE, LIQUID FILLED ORAL at 19:59

## 2024-01-10 RX ADMIN — FUROSEMIDE 40 MG: 10 INJECTION, SOLUTION INTRAMUSCULAR; INTRAVENOUS at 10:04

## 2024-01-10 RX ADMIN — CETIRIZINE HYDROCHLORIDE 5 MG: 10 TABLET, FILM COATED ORAL at 09:59

## 2024-01-10 RX ADMIN — DOCUSATE SODIUM 100 MG: 100 CAPSULE, LIQUID FILLED ORAL at 09:59

## 2024-01-10 RX ADMIN — INSULIN GLARGINE 5 UNITS: 100 INJECTION, SOLUTION SUBCUTANEOUS at 10:10

## 2024-01-10 RX ADMIN — TAMSULOSIN HYDROCHLORIDE 0.4 MG: 0.4 CAPSULE ORAL at 10:00

## 2024-01-10 RX ADMIN — PANTOPRAZOLE SODIUM 40 MG: 40 TABLET, DELAYED RELEASE ORAL at 05:23

## 2024-01-10 RX ADMIN — CYANOCOBALAMIN TAB 1000 MCG 1000 MCG: 1000 TAB at 10:00

## 2024-01-10 RX ADMIN — FLUTICASONE PROPIONATE 2 SPRAY: 50 SPRAY, METERED NASAL at 10:11

## 2024-01-10 RX ADMIN — FUROSEMIDE 40 MG: 10 INJECTION, SOLUTION INTRAMUSCULAR; INTRAVENOUS at 17:41

## 2024-01-10 RX ADMIN — SODIUM CHLORIDE, PRESERVATIVE FREE 10 ML: 5 INJECTION INTRAVENOUS at 20:02

## 2024-01-10 RX ADMIN — LEVOTHYROXINE SODIUM 25 MCG: 0.03 TABLET ORAL at 05:23

## 2024-01-10 ASSESSMENT — PAIN SCALES - GENERAL: PAINLEVEL_OUTOF10: 0

## 2024-01-11 LAB
GLUCOSE BLD-MCNC: 145 MG/DL (ref 75–110)
GLUCOSE BLD-MCNC: 181 MG/DL (ref 75–110)
GLUCOSE BLD-MCNC: 195 MG/DL (ref 75–110)
GLUCOSE BLD-MCNC: 207 MG/DL (ref 75–110)

## 2024-01-11 PROCEDURE — 6360000002 HC RX W HCPCS: Performed by: INTERNAL MEDICINE

## 2024-01-11 PROCEDURE — 2580000003 HC RX 258: Performed by: INTERNAL MEDICINE

## 2024-01-11 PROCEDURE — 1200000000 HC SEMI PRIVATE

## 2024-01-11 PROCEDURE — 6360000002 HC RX W HCPCS: Performed by: NURSE PRACTITIONER

## 2024-01-11 PROCEDURE — 6370000000 HC RX 637 (ALT 250 FOR IP): Performed by: INTERNAL MEDICINE

## 2024-01-11 PROCEDURE — 94640 AIRWAY INHALATION TREATMENT: CPT

## 2024-01-11 PROCEDURE — 82947 ASSAY GLUCOSE BLOOD QUANT: CPT

## 2024-01-11 PROCEDURE — 99231 SBSQ HOSP IP/OBS SF/LOW 25: CPT | Performed by: INTERNAL MEDICINE

## 2024-01-11 RX ORDER — ENOXAPARIN SODIUM 100 MG/ML
30 INJECTION SUBCUTANEOUS DAILY
Status: DISCONTINUED | OUTPATIENT
Start: 2024-01-12 | End: 2024-01-15 | Stop reason: HOSPADM

## 2024-01-11 RX ORDER — MORPHINE SULFATE 2 MG/ML
2 INJECTION, SOLUTION INTRAMUSCULAR; INTRAVENOUS ONCE
Status: COMPLETED | OUTPATIENT
Start: 2024-01-11 | End: 2024-01-11

## 2024-01-11 RX ADMIN — CYANOCOBALAMIN TAB 1000 MCG 1000 MCG: 1000 TAB at 08:57

## 2024-01-11 RX ADMIN — SODIUM CHLORIDE, PRESERVATIVE FREE 5 ML: 5 INJECTION INTRAVENOUS at 08:58

## 2024-01-11 RX ADMIN — ATORVASTATIN CALCIUM 20 MG: 20 TABLET, FILM COATED ORAL at 16:55

## 2024-01-11 RX ADMIN — ENOXAPARIN SODIUM 40 MG: 100 INJECTION SUBCUTANEOUS at 08:59

## 2024-01-11 RX ADMIN — FLUTICASONE PROPIONATE 2 SPRAY: 50 SPRAY, METERED NASAL at 09:01

## 2024-01-11 RX ADMIN — INSULIN LISPRO 1 UNITS: 100 INJECTION, SOLUTION INTRAVENOUS; SUBCUTANEOUS at 13:35

## 2024-01-11 RX ADMIN — LOSARTAN POTASSIUM 50 MG: 50 TABLET, FILM COATED ORAL at 08:58

## 2024-01-11 RX ADMIN — Medication 2000 UNITS: at 08:57

## 2024-01-11 RX ADMIN — TAMSULOSIN HYDROCHLORIDE 0.4 MG: 0.4 CAPSULE ORAL at 08:57

## 2024-01-11 RX ADMIN — ALBUTEROL SULFATE 2 PUFF: 90 AEROSOL, METERED RESPIRATORY (INHALATION) at 02:01

## 2024-01-11 RX ADMIN — MORPHINE SULFATE 2 MG: 2 INJECTION, SOLUTION INTRAMUSCULAR; INTRAVENOUS at 02:10

## 2024-01-11 RX ADMIN — SODIUM CHLORIDE, PRESERVATIVE FREE 10 ML: 5 INJECTION INTRAVENOUS at 02:10

## 2024-01-11 RX ADMIN — AMLODIPINE BESYLATE 5 MG: 5 TABLET ORAL at 08:58

## 2024-01-11 RX ADMIN — PANTOPRAZOLE SODIUM 40 MG: 40 TABLET, DELAYED RELEASE ORAL at 06:07

## 2024-01-11 RX ADMIN — DOCUSATE SODIUM 100 MG: 100 CAPSULE, LIQUID FILLED ORAL at 08:58

## 2024-01-11 RX ADMIN — FUROSEMIDE 40 MG: 40 TABLET ORAL at 08:58

## 2024-01-11 RX ADMIN — CETIRIZINE HYDROCHLORIDE 5 MG: 10 TABLET, FILM COATED ORAL at 08:58

## 2024-01-11 RX ADMIN — INSULIN GLARGINE 5 UNITS: 100 INJECTION, SOLUTION SUBCUTANEOUS at 08:59

## 2024-01-11 RX ADMIN — LEVOTHYROXINE SODIUM 25 MCG: 0.03 TABLET ORAL at 06:07

## 2024-01-11 ASSESSMENT — PAIN SCALES - GENERAL
PAINLEVEL_OUTOF10: 5
PAINLEVEL_OUTOF10: 0
PAINLEVEL_OUTOF10: 4

## 2024-01-12 LAB
GLUCOSE BLD-MCNC: 159 MG/DL (ref 75–110)
GLUCOSE BLD-MCNC: 166 MG/DL (ref 75–110)
GLUCOSE BLD-MCNC: 188 MG/DL (ref 75–110)
GLUCOSE BLD-MCNC: 191 MG/DL (ref 75–110)

## 2024-01-12 PROCEDURE — 6370000000 HC RX 637 (ALT 250 FOR IP): Performed by: INTERNAL MEDICINE

## 2024-01-12 PROCEDURE — 97116 GAIT TRAINING THERAPY: CPT

## 2024-01-12 PROCEDURE — 6360000002 HC RX W HCPCS: Performed by: INTERNAL MEDICINE

## 2024-01-12 PROCEDURE — 97535 SELF CARE MNGMENT TRAINING: CPT

## 2024-01-12 PROCEDURE — 97530 THERAPEUTIC ACTIVITIES: CPT

## 2024-01-12 PROCEDURE — 82947 ASSAY GLUCOSE BLOOD QUANT: CPT

## 2024-01-12 PROCEDURE — 99233 SBSQ HOSP IP/OBS HIGH 50: CPT | Performed by: INTERNAL MEDICINE

## 2024-01-12 PROCEDURE — 1200000000 HC SEMI PRIVATE

## 2024-01-12 PROCEDURE — 2580000003 HC RX 258: Performed by: INTERNAL MEDICINE

## 2024-01-12 RX ORDER — FUROSEMIDE 10 MG/ML
20 INJECTION INTRAMUSCULAR; INTRAVENOUS 2 TIMES DAILY
Status: DISCONTINUED | OUTPATIENT
Start: 2024-01-12 | End: 2024-01-15 | Stop reason: HOSPADM

## 2024-01-12 RX ORDER — FUROSEMIDE 10 MG/ML
40 INJECTION INTRAMUSCULAR; INTRAVENOUS DAILY
Status: DISCONTINUED | OUTPATIENT
Start: 2024-01-12 | End: 2024-01-12

## 2024-01-12 RX ADMIN — SODIUM CHLORIDE, PRESERVATIVE FREE 10 ML: 5 INJECTION INTRAVENOUS at 09:22

## 2024-01-12 RX ADMIN — LEVOTHYROXINE SODIUM 25 MCG: 0.03 TABLET ORAL at 06:15

## 2024-01-12 RX ADMIN — PANTOPRAZOLE SODIUM 40 MG: 40 TABLET, DELAYED RELEASE ORAL at 06:15

## 2024-01-12 RX ADMIN — ATORVASTATIN CALCIUM 20 MG: 20 TABLET, FILM COATED ORAL at 17:46

## 2024-01-12 RX ADMIN — FUROSEMIDE 40 MG: 40 TABLET ORAL at 09:19

## 2024-01-12 RX ADMIN — SODIUM CHLORIDE, PRESERVATIVE FREE 10 ML: 5 INJECTION INTRAVENOUS at 21:42

## 2024-01-12 RX ADMIN — INSULIN GLARGINE 5 UNITS: 100 INJECTION, SOLUTION SUBCUTANEOUS at 09:19

## 2024-01-12 RX ADMIN — DOCUSATE SODIUM 100 MG: 100 CAPSULE, LIQUID FILLED ORAL at 21:42

## 2024-01-12 RX ADMIN — Medication 2000 UNITS: at 09:19

## 2024-01-12 RX ADMIN — ENOXAPARIN SODIUM 30 MG: 100 INJECTION SUBCUTANEOUS at 09:18

## 2024-01-12 RX ADMIN — CYANOCOBALAMIN TAB 1000 MCG 1000 MCG: 1000 TAB at 09:19

## 2024-01-12 RX ADMIN — DOCUSATE SODIUM 100 MG: 100 CAPSULE, LIQUID FILLED ORAL at 09:19

## 2024-01-12 RX ADMIN — FLUTICASONE PROPIONATE 2 SPRAY: 50 SPRAY, METERED NASAL at 09:22

## 2024-01-12 RX ADMIN — FLUTICASONE PROPIONATE 2 SPRAY: 50 SPRAY, METERED NASAL at 21:42

## 2024-01-12 RX ADMIN — CETIRIZINE HYDROCHLORIDE 5 MG: 10 TABLET, FILM COATED ORAL at 09:19

## 2024-01-12 RX ADMIN — FLUTICASONE PROPIONATE 2 SPRAY: 50 SPRAY, METERED NASAL at 00:34

## 2024-01-12 RX ADMIN — TAMSULOSIN HYDROCHLORIDE 0.4 MG: 0.4 CAPSULE ORAL at 09:19

## 2024-01-12 RX ADMIN — SODIUM CHLORIDE, PRESERVATIVE FREE 10 ML: 5 INJECTION INTRAVENOUS at 00:33

## 2024-01-12 RX ADMIN — FUROSEMIDE 20 MG: 10 INJECTION, SOLUTION INTRAMUSCULAR; INTRAVENOUS at 17:46

## 2024-01-12 RX ADMIN — AMLODIPINE BESYLATE 5 MG: 5 TABLET ORAL at 09:19

## 2024-01-12 RX ADMIN — LOSARTAN POTASSIUM 50 MG: 50 TABLET, FILM COATED ORAL at 09:19

## 2024-01-12 RX ADMIN — DOCUSATE SODIUM 100 MG: 100 CAPSULE, LIQUID FILLED ORAL at 00:38

## 2024-01-13 LAB
ANION GAP SERPL CALCULATED.3IONS-SCNC: 6 MMOL/L (ref 9–17)
BUN SERPL-MCNC: 41 MG/DL (ref 8–23)
CALCIUM SERPL-MCNC: 8.4 MG/DL (ref 8.6–10.4)
CHLORIDE SERPL-SCNC: 94 MMOL/L (ref 98–107)
CO2 SERPL-SCNC: 35 MMOL/L (ref 20–31)
CREAT SERPL-MCNC: 1.4 MG/DL (ref 0.7–1.2)
GFR SERPL CREATININE-BSD FRML MDRD: 45 ML/MIN/1.73M2
GLUCOSE BLD-MCNC: 111 MG/DL (ref 75–110)
GLUCOSE BLD-MCNC: 128 MG/DL (ref 75–110)
GLUCOSE BLD-MCNC: 161 MG/DL (ref 75–110)
GLUCOSE BLD-MCNC: 276 MG/DL (ref 75–110)
GLUCOSE SERPL-MCNC: 140 MG/DL (ref 70–99)
POTASSIUM SERPL-SCNC: 3.9 MMOL/L (ref 3.7–5.3)
SODIUM SERPL-SCNC: 135 MMOL/L (ref 135–144)

## 2024-01-13 PROCEDURE — 6370000000 HC RX 637 (ALT 250 FOR IP): Performed by: INTERNAL MEDICINE

## 2024-01-13 PROCEDURE — 36415 COLL VENOUS BLD VENIPUNCTURE: CPT

## 2024-01-13 PROCEDURE — 6360000002 HC RX W HCPCS: Performed by: INTERNAL MEDICINE

## 2024-01-13 PROCEDURE — 2580000003 HC RX 258: Performed by: INTERNAL MEDICINE

## 2024-01-13 PROCEDURE — 1200000000 HC SEMI PRIVATE

## 2024-01-13 PROCEDURE — 82947 ASSAY GLUCOSE BLOOD QUANT: CPT

## 2024-01-13 PROCEDURE — 80048 BASIC METABOLIC PNL TOTAL CA: CPT

## 2024-01-13 RX ADMIN — TAMSULOSIN HYDROCHLORIDE 0.4 MG: 0.4 CAPSULE ORAL at 08:10

## 2024-01-13 RX ADMIN — ENOXAPARIN SODIUM 30 MG: 100 INJECTION SUBCUTANEOUS at 08:10

## 2024-01-13 RX ADMIN — CETIRIZINE HYDROCHLORIDE 5 MG: 10 TABLET, FILM COATED ORAL at 08:09

## 2024-01-13 RX ADMIN — LEVOTHYROXINE SODIUM 25 MCG: 0.03 TABLET ORAL at 05:57

## 2024-01-13 RX ADMIN — Medication 2000 UNITS: at 08:10

## 2024-01-13 RX ADMIN — PANTOPRAZOLE SODIUM 40 MG: 40 TABLET, DELAYED RELEASE ORAL at 05:57

## 2024-01-13 RX ADMIN — FLUTICASONE PROPIONATE 2 SPRAY: 50 SPRAY, METERED NASAL at 20:47

## 2024-01-13 RX ADMIN — SODIUM CHLORIDE, PRESERVATIVE FREE 10 ML: 5 INJECTION INTRAVENOUS at 08:14

## 2024-01-13 RX ADMIN — FUROSEMIDE 20 MG: 10 INJECTION, SOLUTION INTRAMUSCULAR; INTRAVENOUS at 08:14

## 2024-01-13 RX ADMIN — ATORVASTATIN CALCIUM 20 MG: 20 TABLET, FILM COATED ORAL at 17:22

## 2024-01-13 RX ADMIN — CYANOCOBALAMIN TAB 1000 MCG 1000 MCG: 1000 TAB at 08:09

## 2024-01-13 RX ADMIN — FUROSEMIDE 20 MG: 10 INJECTION, SOLUTION INTRAMUSCULAR; INTRAVENOUS at 17:22

## 2024-01-13 RX ADMIN — DOCUSATE SODIUM 100 MG: 100 CAPSULE, LIQUID FILLED ORAL at 08:09

## 2024-01-13 RX ADMIN — INSULIN LISPRO 2 UNITS: 100 INJECTION, SOLUTION INTRAVENOUS; SUBCUTANEOUS at 17:22

## 2024-01-13 RX ADMIN — INSULIN GLARGINE 5 UNITS: 100 INJECTION, SOLUTION SUBCUTANEOUS at 08:13

## 2024-01-13 RX ADMIN — DOCUSATE SODIUM 100 MG: 100 CAPSULE, LIQUID FILLED ORAL at 20:40

## 2024-01-13 RX ADMIN — SODIUM CHLORIDE, PRESERVATIVE FREE 10 ML: 5 INJECTION INTRAVENOUS at 20:47

## 2024-01-14 LAB
GLUCOSE BLD-MCNC: 135 MG/DL (ref 75–110)
GLUCOSE BLD-MCNC: 168 MG/DL (ref 75–110)
GLUCOSE BLD-MCNC: 217 MG/DL (ref 75–110)
GLUCOSE BLD-MCNC: 271 MG/DL (ref 75–110)

## 2024-01-14 PROCEDURE — 6370000000 HC RX 637 (ALT 250 FOR IP): Performed by: INTERNAL MEDICINE

## 2024-01-14 PROCEDURE — 2580000003 HC RX 258: Performed by: INTERNAL MEDICINE

## 2024-01-14 PROCEDURE — 82947 ASSAY GLUCOSE BLOOD QUANT: CPT

## 2024-01-14 PROCEDURE — 1200000000 HC SEMI PRIVATE

## 2024-01-14 PROCEDURE — 6360000002 HC RX W HCPCS: Performed by: INTERNAL MEDICINE

## 2024-01-14 PROCEDURE — 97530 THERAPEUTIC ACTIVITIES: CPT

## 2024-01-14 RX ADMIN — DOCUSATE SODIUM 100 MG: 100 CAPSULE, LIQUID FILLED ORAL at 22:16

## 2024-01-14 RX ADMIN — ENOXAPARIN SODIUM 30 MG: 100 INJECTION SUBCUTANEOUS at 08:09

## 2024-01-14 RX ADMIN — CYANOCOBALAMIN TAB 1000 MCG 1000 MCG: 1000 TAB at 08:09

## 2024-01-14 RX ADMIN — INSULIN LISPRO 1 UNITS: 100 INJECTION, SOLUTION INTRAVENOUS; SUBCUTANEOUS at 16:44

## 2024-01-14 RX ADMIN — SODIUM CHLORIDE, PRESERVATIVE FREE 10 ML: 5 INJECTION INTRAVENOUS at 08:17

## 2024-01-14 RX ADMIN — TAMSULOSIN HYDROCHLORIDE 0.4 MG: 0.4 CAPSULE ORAL at 08:09

## 2024-01-14 RX ADMIN — FLUTICASONE PROPIONATE 2 SPRAY: 50 SPRAY, METERED NASAL at 22:24

## 2024-01-14 RX ADMIN — SODIUM CHLORIDE, PRESERVATIVE FREE 10 ML: 5 INJECTION INTRAVENOUS at 22:17

## 2024-01-14 RX ADMIN — LEVOTHYROXINE SODIUM 25 MCG: 0.03 TABLET ORAL at 06:01

## 2024-01-14 RX ADMIN — INSULIN GLARGINE 5 UNITS: 100 INJECTION, SOLUTION SUBCUTANEOUS at 08:09

## 2024-01-14 RX ADMIN — CETIRIZINE HYDROCHLORIDE 5 MG: 10 TABLET, FILM COATED ORAL at 08:09

## 2024-01-14 RX ADMIN — Medication 2000 UNITS: at 08:09

## 2024-01-14 RX ADMIN — FLUTICASONE PROPIONATE 2 SPRAY: 50 SPRAY, METERED NASAL at 08:16

## 2024-01-14 RX ADMIN — ATORVASTATIN CALCIUM 20 MG: 20 TABLET, FILM COATED ORAL at 16:44

## 2024-01-14 RX ADMIN — FUROSEMIDE 20 MG: 10 INJECTION, SOLUTION INTRAMUSCULAR; INTRAVENOUS at 16:44

## 2024-01-14 RX ADMIN — FUROSEMIDE 20 MG: 10 INJECTION, SOLUTION INTRAMUSCULAR; INTRAVENOUS at 08:09

## 2024-01-14 RX ADMIN — PANTOPRAZOLE SODIUM 40 MG: 40 TABLET, DELAYED RELEASE ORAL at 06:01

## 2024-01-14 RX ADMIN — DOCUSATE SODIUM 100 MG: 100 CAPSULE, LIQUID FILLED ORAL at 08:09

## 2024-01-15 VITALS
OXYGEN SATURATION: 94 % | HEIGHT: 70 IN | RESPIRATION RATE: 16 BRPM | DIASTOLIC BLOOD PRESSURE: 56 MMHG | TEMPERATURE: 97.9 F | WEIGHT: 178.79 LBS | SYSTOLIC BLOOD PRESSURE: 118 MMHG | HEART RATE: 90 BPM | BODY MASS INDEX: 25.6 KG/M2

## 2024-01-15 LAB
GLUCOSE BLD-MCNC: 158 MG/DL (ref 75–110)
GLUCOSE BLD-MCNC: 178 MG/DL (ref 75–110)

## 2024-01-15 PROCEDURE — 6360000002 HC RX W HCPCS: Performed by: INTERNAL MEDICINE

## 2024-01-15 PROCEDURE — 6370000000 HC RX 637 (ALT 250 FOR IP): Performed by: INTERNAL MEDICINE

## 2024-01-15 PROCEDURE — 97110 THERAPEUTIC EXERCISES: CPT

## 2024-01-15 PROCEDURE — 82947 ASSAY GLUCOSE BLOOD QUANT: CPT

## 2024-01-15 PROCEDURE — 2580000003 HC RX 258: Performed by: INTERNAL MEDICINE

## 2024-01-15 RX ADMIN — DOCUSATE SODIUM 100 MG: 100 CAPSULE, LIQUID FILLED ORAL at 09:48

## 2024-01-15 RX ADMIN — INSULIN GLARGINE 5 UNITS: 100 INJECTION, SOLUTION SUBCUTANEOUS at 09:49

## 2024-01-15 RX ADMIN — FLUTICASONE PROPIONATE 2 SPRAY: 50 SPRAY, METERED NASAL at 09:50

## 2024-01-15 RX ADMIN — CETIRIZINE HYDROCHLORIDE 5 MG: 10 TABLET, FILM COATED ORAL at 09:48

## 2024-01-15 RX ADMIN — CYANOCOBALAMIN TAB 1000 MCG 1000 MCG: 1000 TAB at 09:49

## 2024-01-15 RX ADMIN — PANTOPRAZOLE SODIUM 40 MG: 40 TABLET, DELAYED RELEASE ORAL at 06:30

## 2024-01-15 RX ADMIN — FUROSEMIDE 20 MG: 10 INJECTION, SOLUTION INTRAMUSCULAR; INTRAVENOUS at 09:49

## 2024-01-15 RX ADMIN — SODIUM CHLORIDE, PRESERVATIVE FREE 10 ML: 5 INJECTION INTRAVENOUS at 09:49

## 2024-01-15 RX ADMIN — TAMSULOSIN HYDROCHLORIDE 0.4 MG: 0.4 CAPSULE ORAL at 09:48

## 2024-01-15 RX ADMIN — LEVOTHYROXINE SODIUM 25 MCG: 0.03 TABLET ORAL at 06:30

## 2024-01-15 RX ADMIN — Medication 2000 UNITS: at 09:48

## 2024-01-15 RX ADMIN — ENOXAPARIN SODIUM 30 MG: 100 INJECTION SUBCUTANEOUS at 09:49

## 2024-01-15 NOTE — PROGRESS NOTES
Physical Therapy Cancel Note      DATE: 1/10/2024    NAME: Corbin Meyer  MRN: [id number]   : 1924      Patient not seen this date for Physical Therapy due to:    Patient Declined: Attempt @ 1449, pt resting and declining tx, requesting assistance with repositioning pillow and covering B feet.       Electronically signed by Neena Lopez PTA on 1/10/2024 at 3:11 PM    
     [facility] Internal Medicine  Jey Little MD; Darryn Junior MD; Nathen Ibrahim MD; MD Meghann Faulkner MD; Sami Maddox MD    [facility] Internal Medicine   IN-PATIENT SERVICE   [facility]    HISTORY AND PHYSICAL EXAMINATION            Date:   1/9/2024  Patient name:  Corbin Meyer  Date of admission:  1/5/2024  1:49 PM  MRN:   [id number]  Account:  [id number]  YOB: 1924  PCP:    Shilpa Delacruz MD  Room:   2060/2060-01  Code Status:    DNR-CCA    Chief Complaint:     Chief Complaint   Patient presents with    OTHER   Fall injury generalized weakness    History Obtained From:     Patient medical record nursing staff and patient's son who is present bedside in the room    History of Present Illness:     Corbin Meyer is a 89 y.o.  /  male who presents with OTHER   and is admitted to the hospital for the management of Encounter for admission to hospice care.  89-year-old gentleman with a history of diabetes mellitus history of CKD stage III history of chronic systolic heart failure ejection fraction less than 35% with a severe aortic stenosis patient was offered medical intervention treatment.  He declined patient has been living at home with his son son claims that he tripped and fell got a small laceration on the left side of the lower lip no head injury no new weakness no nausea vomiting  Sons  expectation is father to get better and get home  He does not have a clear information on hospice palliative care        Past Medical History:     Past Medical History:   Diagnosis Date    Acute bronchitis due to infection 9/1/2017    Allergic rhinitis due to allergen 7/22/2018    Anemia 1/6/2017    Aortic stenosis, moderate-severe 2/16/2017    Benign hypertension with CKD (chronic kidney disease) stage III ([facility]) 10/27/2016    CHF (congestive heart failure), NYHA class I, acute on chronic, combined ([facility]) 12/8/2023    Chronic 
     [facility] Internal Medicine  Jey Little MD; Darryn Junior MD; Nathen Ibrahim MD; MD Meghann Faulkner MD; Sami Maddox MD    [facility] Internal Medicine   IN-PATIENT SERVICE   [facility]    HISTORY AND PHYSICAL EXAMINATION            Date:   1/7/2024  Patient name:  Corbin Meyer  Date of admission:  1/5/2024  1:49 PM  MRN:   [id number]  Account:  [id number]  YOB: 1924  PCP:    Shilpa Delacruz MD  Room:   2077/2077-01  Code Status:    DNR-CCA    Chief Complaint:     Chief Complaint   Patient presents with    OTHER   Fall injury generalized weakness    History Obtained From:     Patient medical record nursing staff and patient's son who is present bedside in the room    History of Present Illness:     Corbin Meyer is a 89 y.o.  /  male who presents with OTHER   and is admitted to the hospital for the management of Encounter for admission to hospice care.  89-year-old gentleman with a history of diabetes mellitus history of CKD stage III history of chronic systolic heart failure ejection fraction less than 35% with a severe aortic stenosis patient was offered medical intervention treatment.  He declined patient has been living at home with his son son claims that he tripped and fell got a small laceration on the left side of the lower lip no head injury no new weakness no nausea vomiting  Sons  expectation is father to get better and get home  He does not have a clear information on hospice palliative care        Past Medical History:     Past Medical History:   Diagnosis Date    Acute bronchitis due to infection 9/1/2017    Allergic rhinitis due to allergen 7/22/2018    Anemia 1/6/2017    Aortic stenosis, moderate-severe 2/16/2017    Benign hypertension with CKD (chronic kidney disease) stage III ([facility]) 10/27/2016    CHF (congestive heart failure), NYHA class I, acute on chronic, combined ([facility]) 12/8/2023    Chronic 
   01/09/24 1114   Encounter Summary   Encounter Overview/Reason  Spiritual/Emotional Needs   Service Provided For: Patient not available  (Sleeping)   Referral/Consult From: Palliative Care   Last Encounter  01/09/24   Complexity of Encounter Low   Assessment/Intervention/Outcome   Assessment Unable to assess  (Sleeping)   Intervention Prayer (assurance of)/[place]       
   01/09/24 1159   Encounter Summary   Encounter Overview/Reason  Volunteer Encounter   Service Provided For: Patient   Referral/Consult From: Ralph   Last Encounter  01/09/24   Complexity of Encounter Low   Spiritual/Emotional needs   Type Spiritual Support   Rituals, Rites and Sacraments   Type Shinto Communion       
   01/09/24 1208   Encounter Summary   Encounter Overview/Reason   Encounter   Service Provided For: Patient   Referral/Consult From: Ralph   Last Encounter  01/09/24   Complexity of Encounter Low   Spiritual/Emotional needs   Type Spiritual Support   Rituals, Rites and Sacraments   Type Sacrament of Sick  (1/9/24)       
   01/09/24 1830   Encounter Summary   Encounter Overview/Reason  Spiritual/Emotional Needs   Service Provided For: Patient   Referral/Consult From: Palliative Care   Last Encounter  01/09/24   Complexity of Encounter Low   Spiritual/Emotional needs   Type Spiritual Support   Assessment/Intervention/Outcome   Assessment Unable to assess  (patient sleeping)   Intervention Prayer (assurance of)/[place]       
   01/10/24 1205   Encounter Summary   Encounter Overview/Reason  Volunteer Encounter   Service Provided For: Patient   Referral/Consult From: Ralph   Last Encounter  01/10/24   Complexity of Encounter Low   Spiritual/Emotional needs   Type Spiritual Support   Assessment/Intervention/Outcome   Intervention Prayer (assurance of)/[place]       
   01/10/24 1819   Encounter Summary   Encounter Overview/Reason  Spiritual/Emotional Needs   Service Provided For: Patient   Referral/Consult From: Palliative Care   Last Encounter  01/10/24   Complexity of Encounter Low   Spiritual/Emotional needs   Type Spiritual Support   Palliative Care   Type Palliative Care, Follow-up   Assessment/Intervention/Outcome   Assessment Unable to assess  (patient sleeping)   Intervention Prayer (assurance of)/[place]       
   01/11/24 1520   Encounter Summary   Encounter Overview/Reason  Attempted Encounter   Service Provided For: Patient not available  (Staff in room)       
   01/12/24 1401   Encounter Summary   Encounter Overview/Reason  Volunteer Encounter   Service Provided For: Patient   Referral/Consult From: Ralph   Last Encounter  01/12/24   Complexity of Encounter Low   Spiritual/Emotional needs   Type Spiritual Support   Assessment/Intervention/Outcome   Intervention Prayer (assurance of)/[place]       
   01/12/24 1800   Encounter Summary   Encounter Overview/Reason  Spiritual/Emotional Needs   Service Provided For: Patient   Referral/Consult From: Palliative Care   Last Encounter  01/12/24   Complexity of Encounter Low   Spiritual/Emotional needs   Type Spiritual Support   Palliative Care   Type Palliative Care, Follow-up   Assessment/Intervention/Outcome   Assessment Unable to assess  (patient sleeping)   Intervention Prayer (assurance of)/[place]       
   01/13/24 1524   Encounter Summary   Encounter Overview/Reason  Spiritual/Emotional Needs   Service Provided For: Patient   Referral/Consult From: Palliative Care   Complexity of Encounter Low   Spiritual/Emotional needs   Type Spiritual Support   Assessment/Intervention/Outcome   Assessment Unable to assess   Intervention Prayer (assurance of)/[place]       
   01/14/24 1449   Encounter Summary   Encounter Overview/Reason  Volunteer Encounter   Service Provided For: Patient   Referral/Consult From: Ralph   Last Encounter  01/14/24   Complexity of Encounter Low   Spiritual/Emotional needs   Type Spiritual Support   Assessment/Intervention/Outcome   Intervention Prayer (assurance of)/[place]       
   01/15/24 1300   Encounter Summary   Encounter Overview/Reason  Spiritual/Emotional Needs   Service Provided For: Patient   Referral/Consult From: Palliative Care   Support System Children   Last Encounter  01/15/24   Complexity of Encounter Low   Spiritual/Emotional needs   Type Spiritual Support   Palliative Care   Type Palliative Care, Follow-up   Assessment/Intervention/Outcome   Assessment Unable to assess  (Sleeping)   Intervention Prayer (assurance of)/[place]       
  Physician Progress Note      PATIENT:               PETER ALLEN  [facility] #:                  [id number]  :                       1924  ADMIT DATE:       2024 1:49 PM  DISCH DATE:  RESPONDING  PROVIDER #:        Carmencita Sanchez MD          QUERY TEXT:    Pt admitted with generalized weakness and noted to have debility.  If   possible, please document in the progress notes and discharge summary if you   are evaluating and / or treating any of the following:    The medical record reflects the following:  Risk Factors: fall, generalized weakness, 89 years old  Clinical Indicators: presented after fall with weakness and debility, refusing   treatment and wants to be put on hospice care  Treatment: OT, declines treatment  Options provided:  -- Age Related Physical Debility  -- Frailty  -- Weakness due to other, Please document other cause.  -- Other - I will add my own diagnosis  -- Disagree - Not applicable / Not valid  -- Disagree - Clinically unable to determine / Unknown  -- Refer to Clinical Documentation Reviewer    PROVIDER RESPONSE TEXT:    This patient has age related physical debility.    Query created by: Anitha Herring on 2024 9:18 AM      Electronically signed by:  Carmencita Sanchez MD 2024 4:00 PM          
[facility]   OCCUPATIONAL THERAPY MISSED TREATMENT NOTE   INPATIENT   Date: 24  Patient Name: Corbin Meyer       Room:   MRN: [id number]   Account #: [id number]    : 1924  (89 y.o.)  Gender: male   Referring Practitioner: Carmencita Sanchez MD  Diagnosis: Debility             REASON FOR MISSED TREATMENT:  Patient declined   -    pt refusing therapy, RN Danilo reports pt having difficult morning, pt demonstrates poor breathing this AM, wearing O2, will continue to follow as able.             Electronically signed by Nannette HI on 24 at 11:01 AM EST  
[facility]  IN-PATIENT SERVICE  [facility]    PROGRESS NOTE             Date:   1/10/2024  Patient name:  Corbin Meyer  Date of admission:  1/5/2024  1:49 PM  MRN:   [id number]  YOB: 1924    INTERVAL HISTORY:      SUBJECTIVE:  Very difficult to understand patient however denies concerns.    No other concerns.    Objective   Vitals:    01/09/24 0651 01/09/24 1318 01/09/24 1733 01/10/24 0615   BP: 125/63  108/61 137/67   Pulse: 87  93 91   Resp: 18 18 18   Temp: 97.2 °F (36.2 °C)  98.1 °F (36.7 °C) 97.7 °F (36.5 °C)   TempSrc:   Oral    SpO2: 100% 98% 100% 97%   Weight:       Height:         BP Readings from Last 6 Encounters:   01/10/24 137/67   01/04/24 122/72   12/28/23 122/64   12/18/23 120/70   12/11/23 (!) 101/48   10/04/23 130/74        No intake or output data in the 24 hours ending 01/10/24 0818    General appearance: well nourished on 2 L  HEENT: Normocephalic, no lesions, without obvious abnormality.pupils equal and reactive, EOM normal  Lungs: crackles aat bases   Heart: regular rate and rhythm, S1, S2 normal, no murmur, click, rub or gallop  Abdomen: soft, non-tender; bowel sounds normal; no masses,  no organomegaly  Extremities: extremities normal, atraumatic, 2+ edema bilateral le. Pulses 2+ and symmetrical in all 4 extremities  Neurological: Alert oriented x 3, moves all 4 extremities spontaneously.  Makes adequate conversation    CBC:   Recent Labs     01/10/24  0611   WBC 3.2*   HGB 9.5*   *     BMP:    Recent Labs     01/10/24  0611      K 4.0   CL 95*   CO2 30   BUN 41*   CREATININE 1.4*   CALCIUM 8.9     Magnesium:  Recent Labs     01/10/24  0611   MG 1.9     No results for input(s): \"AST\", \"ALT\", \"ALB\", \"TP\" in the last 72 hours.    Invalid input(s): \"ALP\", \"TBIL\", \"BILID\"  Phosphorus:No results for input(s): \"PHOS\" in the last 72 hours.  Glucose:  Recent Labs     01/08/24  2036 01/09/24  0652 01/09/24  1105 01/09/24  1609 
Comprehensive Nutrition Assessment    Type and Reason for Visit:  RD Nutrition Re-Screen/LOS    Nutrition Recommendations/Plan:   Will continue to provide Soft/Bite Sized diet with Nectar thick liquids  Will add Magic Cup supplements twice daily to maximize intake.     Malnutrition Assessment:  Malnutrition Status:  At risk for malnutrition (Comment) (01/12/24 1310)    Context:  Chronic Illness     Findings of the 6 clinical characteristics of malnutrition:  Energy Intake:  Mild decrease in energy intake (Comment)  Weight Loss:  No significant weight loss     Body Fat Loss:  Unable to assess     Muscle Mass Loss:  Unable to assess    Fluid Accumulation:  Severe Extremities   Strength:  Not Performed    Nutrition Assessment:    Pt was admitted due to debility. He is consuming bites of food provided this morning when fed by nursing. Discussion of Hospice vs ECF noted. Speech Path recommended Soft/Bites Sized diet with Nectar thick liquids on 1/8    Nutrition Related Findings:    Severe BLE edema, mild sacral edema, Labs: POC Glu 159-191. Meds: Synthroid, BM 1/11 Wound Type: None       Current Nutrition Intake & Therapies:    Average Meal Intake: 1-25%     ADULT DIET; Dysphagia - Soft and Bite Sized; Mildly Thick ([place])  ADULT ORAL NUTRITION SUPPLEMENT; Lunch, Dinner; Frozen Oral Supplement    Anthropometric Measures:  Height: 177 cm (5' 9.69\")  Ideal Body Weight (IBW): 164 lbs (75 kg)    Admission Body Weight: 80.3 kg (177 lb)  Current Body Weight: 80.7 kg (178 lb),   IBW. Weight Source: Bed Scale  Current BMI (kg/m2): 25.8  Usual Body Weight:  (170-176#)                       BMI Categories: Overweight (BMI 25.0-29.9)    Estimated Daily Nutrient Needs:  Energy Requirements Based On: Formula  Weight Used for Energy Requirements: Current  Energy (kcal/day): [place] x 1.2= 1700 kcal  Weight Used for Protein Requirements: Current  Protein (g/day): 1.3g/kg= 105 g     Fluid (ml/day): no less than 1500 ml    Nutrition 
Corbin Meyer was evaluated today and a DME order was entered for a wheeled walker because he requires this to successfully complete daily living tasks of eating, bathing, toileting, personal cares, ambulating, grooming, and hygiene.  A wheeled walker is necessary due to the patient's unsteady gait, upper body weakness, and inability to  an ambulation device; and he can ambulate only by pushing a walker instead of a lesser assistive device such as a cane, crutch, or standard walker.  The need for this equipment was discussed with the patient and he understands and is in agreement.   
DATE: 2024    NAME: Corbin Meyer  MRN: [id number]   : 1924    Patient not seen this date for Physical Therapy due to:      [] Cancel by RN or physician due to:    [] Hemodialysis    [] Critical Lab Value Level     [] Blood transfusion in progress    [] Acute or unstable cardiovascular status   _MAP < 55 or more than >115  _HR < 40 or > 130    [] Acute or unstable pulmonary status   -FiO2 > 60%   _RR < 5 or >40    _O2 sats < 85%    [] Strict Bedrest    [] Off Unit for surgery or procedure    [] Off Unit for testing       [] Pending imaging to R/O fracture    [x] Refusal by Patient, pt declined PT, pt having difficult time breathing (on 02), spoke to nursing (Danilo) who confirmed he was having difficult morning, will continue to pursue when more appropriate.     [] Other      [] PT being discontinued at this time. Patient independent. No further needs.     [] PT being discontinued at this time as the patient has been transferred to hospice care. No further needs.      Juliana Christianson, PTA    
Dr brown aware of lasix 40 po changed to ivp but already given with a bp of 103/52. Repeat bp 100/58 and scheduled to give now. Order changed to 20 iv bid and to give first dose tonight.   
[facility]  IN-PATIENT SERVICE  [facility]    PROGRESS NOTE             Date:   1/11/2024  Patient name:  Corbin Meyer  Date of admission:  1/5/2024  1:49 PM  MRN:   [id number]  YOB: 1924    INTERVAL HISTORY:      SUBJECTIVE:  Very difficult to understand patient however denies concerns.    No other concerns.    Objective   Vitals:    01/10/24 2005 01/11/24 0048 01/11/24 0201 01/11/24 0617   BP: (!) 112/58 121/65  123/61   Pulse: 95 98 98 98   Resp: 19 20 20 20   Temp: 97.4 °F (36.3 °C) 97.9 °F (36.6 °C)  97.3 °F (36.3 °C)   TempSrc:  Oral     SpO2: 99%  98% 97%   Weight:       Height:         BP Readings from Last 6 Encounters:   01/11/24 123/61   01/04/24 122/72   12/28/23 122/64   12/18/23 120/70   12/11/23 (!) 101/48   10/04/23 130/74          Intake/Output Summary (Last 24 hours) at 1/11/2024 1122  Last data filed at 1/11/2024 0858  Gross per 24 hour   Intake 5 ml   Output 625 ml   Net -620 ml       General appearance: poor nourished on 2 L  HEENT: Normocephalic, no lesions, without obvious abnormality.pupils equal and reactive, EOM normal  Lungs: crackles aat bases   Heart: regular rate and rhythm, S1, S2 normal, no murmur, click, rub or gallop  Abdomen: soft, non-tender; bowel sounds normal; no masses,  no organomegaly  Extremities: extremities normal, atraumatic, 2+ edema bilateral le. Pulses 2+ and symmetrical in all 4 extremities  Neurological: Alert oriented x 3, moves all 4 extremities spontaneously.  Makes adequate conversation    CBC:   Recent Labs     01/10/24  0611   WBC 3.2*   HGB 9.5*   *       BMP:    Recent Labs     01/10/24  0611      K 4.0   CL 95*   CO2 30   BUN 41*   CREATININE 1.4*   CALCIUM 8.9       Magnesium:  Recent Labs     01/10/24  0611   MG 1.9       No results for input(s): \"AST\", \"ALT\", \"ALB\", \"TP\" in the last 72 hours.    Invalid input(s): \"ALP\", \"TBIL\", \"BILID\"  Phosphorus:No results for input(s): \"PHOS\" in 
[facility]  IN-PATIENT SERVICE  [facility]    PROGRESS NOTE             Date:   1/13/2024  Patient name:  Corbin Meyer  Date of admission:  1/5/2024  1:49 PM  MRN:   [id number]  YOB: 1924    INTERVAL HISTORY:      SUBJECTIVE:  Very difficult to understand patient however denies concerns.    No other concerns.    Objective   Vitals:    01/12/24 2354 01/13/24 0122 01/13/24 0613 01/13/24 0808   BP: (!) 99/47 114/66 (!) 104/51 (!) 101/54   Pulse: 67 75 72 69   Resp: 16  17    Temp: 97.3 °F (36.3 °C)  97.3 °F (36.3 °C)    TempSrc:       SpO2: 100% 99% 100% 97%   Weight:       Height:         BP Readings from Last 6 Encounters:   01/13/24 (!) 101/54   01/04/24 122/72   12/28/23 122/64   12/18/23 120/70   12/11/23 (!) 101/48   10/04/23 130/74          Intake/Output Summary (Last 24 hours) at 1/13/2024 1134  Last data filed at 1/12/2024 2128  Gross per 24 hour   Intake 240 ml   Output --   Net 240 ml       General appearance: poor nourished on 2 L  HEENT: Normocephalic, no lesions, without obvious abnormality.pupils equal and reactive, EOM normal  Lungs: crackles aat bases   Heart: regular rate and rhythm, S1, S2 normal, no murmur, click, rub or gallop  Abdomen: soft, non-tender; bowel sounds normal; no masses,  no organomegaly  Extremities: extremities normal, atraumatic, 2+ edema bilateral le. Pulses 2+ and symmetrical in all 4 extremities  Neurological: Alert oriented x 3, moves all 4 extremities spontaneously.  Makes adequate conversation    CBC:   No results for input(s): \"WBC\", \"HGB\", \"PLT\" in the last 72 hours.    BMP:    Recent Labs     01/13/24  0617      K 3.9   CL 94*   CO2 35*   BUN 41*   CREATININE 1.4*   CALCIUM 8.4*       Magnesium:  No results for input(s): \"MG\" in the last 72 hours.    No results for input(s): \"AST\", \"ALT\", \"ALB\", \"TP\" in the last 72 hours.    Invalid input(s): \"ALP\", \"TBIL\", \"BILID\"  Phosphorus:No results for input(s): 
Intermittent chest pains over night. NP made aware. Trops and EKG ordered and done. Please see results. One time dose for morphine also given as per order.  
[facility]   INPATIENT OCCUPATIONAL THERAPY  PROGRESS NOTE  Date: 1/10/2024  Patient Name: Corbin Meyer       Room:   MRN: [id number]    : 1924  (89 y.o.)  Gender: male   Referring Practitioner: Carmencita Sanchez MD  Diagnosis: Debility      Discharge Recommendations:  Further Occupational Therapy is recommended upon facility discharge.    OT Equipment Recommendations  Other: TBD if pt able to d/c home in future    Restrictions/Precautions  Restrictions/Precautions  Restrictions/Precautions: General Precautions;Fall Risk  Required Braces or Orthoses?: No  Position Activity Restriction  Other position/activity restrictions: Concern for aspiration, evaluated by speech therapy on the modified diet- nectar thick liquids, soft and bite sized food.  Turk[place] is 1st launguage.    O2 Device: Nasal cannula  Comment: 3 L; SpO2 remains at 98% or greater with rest and exertion; however pt displaying dyspnea and complaints of SOB    Subjective      \"Nothing is wrong.\"  Pt notes no changes in abilities.     Objective  Orientation  Orientation Level: Oriented to place;Oriented to person;Disoriented to situation;Disoriented to time  Cognition  Arousal/Alertness: Delayed responses to stimuli  Following Commands: Follows one step commands with repetition;Follows one step commands with increased time  Attention Span: Attends with cues to redirect  Safety Judgement: Decreased awareness of need for assistance;Decreased awareness of need for safety  Problem Solving: Assistance required to generate solutions;Assistance required to implement solutions;Assistance required to identify errors made;Assistance required to correct errors made;Decreased awareness of errors  Insights: Decreased awareness of deficits  Initiation: Requires cues for some  Sequencing: Requires cues for some  Cognition Comment: pt was able to accurately read the clock.  pt unable to recount which meals he ate today.  pt did 
[facility]  IN-PATIENT SERVICE  [facility]    PROGRESS NOTE             Date:   1/15/2024  Patient name:  Corbin Meyer  Date of admission:  1/5/2024  1:49 PM  MRN:   [id number]  YOB: 1924    INTERVAL HISTORY:      SUBJECTIVE:  Very difficult to understand patient however denies concerns.    No other concerns.    Objective   Vitals:    01/14/24 1645 01/14/24 1818 01/14/24 2258 01/15/24 0607   BP: (!) 117/53 (!) 126/55 (!) 127/57 (!) 118/56   Pulse: 78 81 95 90   Resp:  16 18 16   Temp:  97.9 °F (36.6 °C) 98.2 °F (36.8 °C) 97.9 °F (36.6 °C)   TempSrc:       SpO2:  95% 93% 94%   Weight:       Height:         BP Readings from Last 6 Encounters:   01/15/24 (!) 118/56   01/04/24 122/72   12/28/23 122/64   12/18/23 120/70   12/11/23 (!) 101/48   10/04/23 130/74          Intake/Output Summary (Last 24 hours) at 1/15/2024 0923  Last data filed at 1/15/2024 0634  Gross per 24 hour   Intake 480 ml   Output 600 ml   Net -120 ml       General appearance: poor nourished on 2 L  HEENT: Normocephalic, no lesions, without obvious abnormality.pupils equal and reactive, EOM normal  Lungs: crackles aat bases   Heart: regular rate and rhythm, S1, S2 normal, no murmur, click, rub or gallop  Abdomen: soft, non-tender; bowel sounds normal; no masses,  no organomegaly  Extremities: extremities normal, atraumatic, 2+ edema bilateral le. Pulses 2+ and symmetrical in all 4 extremities  Neurological: Alert oriented x 3, moves all 4 extremities spontaneously.  Makes adequate conversation    CBC:   No results for input(s): \"WBC\", \"HGB\", \"PLT\" in the last 72 hours.    BMP:    Recent Labs     01/13/24  0617      K 3.9   CL 94*   CO2 35*   BUN 41*   CREATININE 1.4*   CALCIUM 8.4*       Magnesium:  No results for input(s): \"MG\" in the last 72 hours.    No results for input(s): \"AST\", \"ALT\", \"ALB\", \"TP\" in the last 72 hours.    Invalid input(s): \"ALP\", \"TBIL\", \"BILID\"  Phosphorus:No 
[facility]  IN-PATIENT SERVICE  [facility]    PROGRESS NOTE             Date:   1/14/2024  Patient name:  Corbin Meyer  Date of admission:  1/5/2024  1:49 PM  MRN:   [id number]  YOB: 1924    INTERVAL HISTORY:      SUBJECTIVE:  Very difficult to understand patient however denies concerns.    No other concerns.    Objective   Vitals:    01/13/24 0808 01/13/24 1801 01/14/24 0006 01/14/24 0628   BP: (!) 101/54 114/61 (!) 131/57 122/63   Pulse: 69 98 81 78   Resp:  18 19 18   Temp:  97.3 °F (36.3 °C) 97.5 °F (36.4 °C) 97.5 °F (36.4 °C)   TempSrc:       SpO2: 97% 98% 95% 98%   Weight:       Height:         BP Readings from Last 6 Encounters:   01/14/24 122/63   01/04/24 122/72   12/28/23 122/64   12/18/23 120/70   12/11/23 (!) 101/48   10/04/23 130/74        No intake or output data in the 24 hours ending 01/14/24 1139    General appearance: poor nourished on 2 L  HEENT: Normocephalic, no lesions, without obvious abnormality.pupils equal and reactive, EOM normal  Lungs: crackles aat bases   Heart: regular rate and rhythm, S1, S2 normal, no murmur, click, rub or gallop  Abdomen: soft, non-tender; bowel sounds normal; no masses,  no organomegaly  Extremities: extremities normal, atraumatic, 2+ edema bilateral le. Pulses 2+ and symmetrical in all 4 extremities  Neurological: Alert oriented x 3, moves all 4 extremities spontaneously.  Makes adequate conversation    CBC:   No results for input(s): \"WBC\", \"HGB\", \"PLT\" in the last 72 hours.    BMP:    Recent Labs     01/13/24  0617      K 3.9   CL 94*   CO2 35*   BUN 41*   CREATININE 1.4*   CALCIUM 8.4*       Magnesium:  No results for input(s): \"MG\" in the last 72 hours.    No results for input(s): \"AST\", \"ALT\", \"ALB\", \"TP\" in the last 72 hours.    Invalid input(s): \"ALP\", \"TBIL\", \"BILID\"  Phosphorus:No results for input(s): \"PHOS\" in the last 72 hours.  Glucose:  Recent Labs     01/13/24  0531 01/13/24  1115 
[facility]   Occupational Therapy Evaluation  Date: 24  Patient Name: Corbin Meyer       Room:   MRN: [id number]  Account: [id number]   : 1924  (89 y.o.) Gender: male     Discharge Recommendations:  Further Occupational Therapy is recommended upon facility discharge.      OT Equipment Recommendations  Other: TBD    Referring Practitioner: Carmencita Sanchez MD  Diagnosis: Debility      Treatment Diagnosis: Impaired self-care status    Past Medical History:  has a past medical history of Acute bronchitis due to infection, Allergic rhinitis due to allergen, Anemia, Aortic stenosis, moderate-severe, Benign hypertension with CKD (chronic kidney disease) stage III (Formerly [facility]), CHF (congestive heart failure), NYHA class I, acute on chronic, combined ([facility]ly [facility]), Chronic kidney disease, Chronic right-sided low back pain with right-sided sciatica, Diabetes ([facility]ly [facility]), Diabetic nephropathy associated with type 2 diabetes mellitus ([facility]ly [facility]), Diastolic dysfunction without heart failure, Gastroesophageal reflux disease without esophagitis, H. pylori infection, Hearing loss, Helicobacter pylori gastritis, Hyperlipidemia, Hypertension, Hypothyroidism, Iron deficiency anemia, LVH (left ventricular hypertrophy) due to hypertensive disease, Osteoarthritis, Pulmonary hypertension ([facility]ly [facility]), PVD (peripheral vascular disease) (Formerly [facility]), and Type 2 diabetes mellitus with diabetic polyneuropathy, with long-term current use of insulin ([facility]ly [facility]).    Past Surgical History:   has a past surgical history that includes hernia repair; pr egd transoral biopsy single/multiple (N/A, 2017); pr colon ca scrn not hi rsk ind (N/A, 2017); Upper gastrointestinal endoscopy (2017); Colonoscopy (2017); Cataract removal with implant (Left, 10/03/2017); pr xcapsl ctrc rmvl insj io lens prosth w/o ecp (Left, 10/3/2017); Cataract removal with implant (Right, 2018); and pr xcapsl ctrc rmvl insj io lens prosth w/o ecp 
Patient had just returned to room and is sleeping soundly   Prayer at bedside      01/08/24 1531   Encounter Summary   Encounter Overview/Reason  Palliative Care;Spiritual/Emotional Needs   Service Provided For: Patient   Referral/Consult From: Palliative Care   Complexity of Encounter Low   Assessment/Intervention/Outcome   Assessment Unable to assess  (just returned to room - sleeping soundly)   Intervention Prayer (assurance of)/[place]       
Pharmacy Medication History Note      List of current medications patient is taking is complete.     Source of information: dispense report, OARRS    Changes made to medication list:  Medications flagged for removal (include reason, ex. noncompliance):  None     Medications removed (include reason, ex. therapy complete or physician discontinued):  None     Medications added/doses adjusted:  None     Other notes (ex. Recent course of antibiotics, Coumadin dosing):  OARRS negative    Denies use of other OTC or herbal medications.      Allergies clarified    Medication list provided to the patient: no   Medication education provided to the patient: none      Electronically signed by Francia Choi RPH on 1/5/2024 at 6:37 PM                                                     
Physical Therapy  Facility/Department: [facility] MED SURG  Physical Therapy Initial Assessment    Name: Corbin Meyer  : 1924  MRN: [id number]  Date of Service: 2024    Discharge Recommendations:  Therapy recommended at discharge, Patient would benefit from continued therapy after discharge   PT Equipment Recommendations  Equipment Needed: No      Patient Diagnosis(es): The encounter diagnosis was Encounter for hospice care.  Past Medical History:  has a past medical history of Acute bronchitis due to infection, Allergic rhinitis due to allergen, Anemia, Aortic stenosis, moderate-severe, Benign hypertension with CKD (chronic kidney disease) stage III (Formerly [facility]), CHF (congestive heart failure), NYHA class I, acute on chronic, combined (Formerly [facility]), Chronic kidney disease, Chronic right-sided low back pain with right-sided sciatica, Diabetes ([facility]), Diabetic nephropathy associated with type 2 diabetes mellitus ([facility]), Diastolic dysfunction without heart failure, Gastroesophageal reflux disease without esophagitis, H. pylori infection, Hearing loss, Helicobacter pylori gastritis, Hyperlipidemia, Hypertension, Hypothyroidism, Iron deficiency anemia, LVH (left ventricular hypertrophy) due to hypertensive disease, Osteoarthritis, Pulmonary hypertension ([facility]), PVD (peripheral vascular disease) ([facility]), and Type 2 diabetes mellitus with diabetic polyneuropathy, with long-term current use of insulin (Formerly [facility]).  Past Surgical History:  has a past surgical history that includes hernia repair; pr egd transoral biopsy single/multiple (N/A, 2017); pr colon ca scrn not hi rsk ind (N/A, 2017); Upper gastrointestinal endoscopy (2017); Colonoscopy (2017); Cataract removal with implant (Left, 10/03/2017); pr xcapsl ctrc rmvl insj io lens prosth w/o ecp (Left, 10/3/2017); Cataract removal with implant (Right, 2018); and pr xcapsl ctrc rmvl insj io lens prosth w/o ecp (Right, 3/1/2018).    Assessment   Body Structures, 
Physical Therapy  Facility/Department: [facility] MED SURG  Physical Therapy Initial Assessment    Name: Corbin Meyer  : 1924  MRN: [id number]  Date of Service: 2024    Discharge Recommendations:  Therapy recommended at discharge, Patient would benefit from continued therapy after discharge   PT Equipment Recommendations  Equipment Needed: No      Patient Diagnosis(es): The encounter diagnosis was Encounter for hospice care.  Past Medical History:  has a past medical history of Acute bronchitis due to infection, Allergic rhinitis due to allergen, Anemia, Aortic stenosis, moderate-severe, Benign hypertension with CKD (chronic kidney disease) stage III (Formerly [facility]), CHF (congestive heart failure), NYHA class I, acute on chronic, combined ([facility]), Chronic kidney disease, Chronic right-sided low back pain with right-sided sciatica, Diabetes ([facility]), Diabetic nephropathy associated with type 2 diabetes mellitus ([facility]), Diastolic dysfunction without heart failure, Gastroesophageal reflux disease without esophagitis, H. pylori infection, Hearing loss, Helicobacter pylori gastritis, Hyperlipidemia, Hypertension, Hypothyroidism, Iron deficiency anemia, LVH (left ventricular hypertrophy) due to hypertensive disease, Osteoarthritis, Pulmonary hypertension ([facility]), PVD (peripheral vascular disease) ([facility]), and Type 2 diabetes mellitus with diabetic polyneuropathy, with long-term current use of insulin (Formerly [facility]).  Past Surgical History:  has a past surgical history that includes hernia repair; pr egd transoral biopsy single/multiple (N/A, 2017); pr colon ca scrn not hi rsk ind (N/A, 2017); Upper gastrointestinal endoscopy (2017); Colonoscopy (2017); Cataract removal with implant (Left, 10/03/2017); pr xcapsl ctrc rmvl insj io lens prosth w/o ecp (Left, 10/3/2017); Cataract removal with implant (Right, 2018); and pr xcapsl ctrc rmvl insj io lens prosth w/o ecp (Right, 3/1/2018).    Assessment   Assessment: Pt presents 
Rehabilitation Physical Therapy    Date: 1/15/2024  Patient Name: Corbin Meyer        MRN: [id number]    : 1924  (89 y.o.)  Gender: male   Referring Practitioner: Dr. Ibrahim  Diagnosis: debility    Additional Pertinent Hx: Corbin Meyer is a 89 y.o.  /  male who presents with OTHER   and is admitted to the hospital for the management of Encounter for admission to hospice care.  89-year-old gentleman with a history of diabetes mellitus history of CKD stage III history of chronic systolic heart failure ejection fraction less than 35% with a severe aortic stenosis patient was offered medical intervention treatment.  He declined patient has been living at home with his son son claims that he tripped and fell got a small laceration on the left side of the lower lip no head injury no new weakness no nausea vomiting    Discharge Recommendations:  Therapy recommended at discharge, Patient would benefit from continued therapy after discharge  Equipment Needed: No    Assessment  Assessment  Activity Tolerance: Patient limited by fatigue;Patient limited by endurance;Other (comment) (Pt reports feeling weak, pt is having a hard time keeping his head up and sitting upright without right UE resting on table and head resting on right UE. Lunch arrived limiting tx.)  Discharge Recommendations: Therapy recommended at discharge;Patient would benefit from continued therapy after discharge    Plan  Physical Therapy Plan  General Plan: 3-5 times per week  Current Treatment Recommendations: Strengthening, Balance training, Functional mobility training, Transfer training, Endurance training, Gait training, Stair training, Safety education & training, Positioning, Therapeutic activities     Restrictions  Restrictions/Precautions: General Precautions, Fall Risk  Other position/activity restrictions: Concern for aspiration, evaluated by speech therapy on the modified diet- nectar thick liquids, soft and bite sized food.  
[facility]  IN-PATIENT SERVICE  [facility]    PROGRESS NOTE             Date:   1/12/2024  Patient name:  Corbin Meyer  Date of admission:  1/5/2024  1:49 PM  MRN:   [id number]  YOB: 1924    INTERVAL HISTORY:      SUBJECTIVE:  Very difficult to understand patient however denies concerns.    No other concerns.    Objective   Vitals:    01/11/24 0201 01/11/24 0617 01/11/24 1830 01/12/24 0640   BP:  123/61 (!) 113/59 (!) 103/52   Pulse: 98 98 92 80   Resp: 20 20 20 20   Temp:  97.3 °F (36.3 °C) 98.5 °F (36.9 °C) 97.3 °F (36.3 °C)   TempSrc:   Axillary    SpO2: 98% 97% 100% 100%   Weight:       Height:         BP Readings from Last 6 Encounters:   01/12/24 (!) 103/52   01/04/24 122/72   12/28/23 122/64   12/18/23 120/70   12/11/23 (!) 101/48   10/04/23 130/74          Intake/Output Summary (Last 24 hours) at 1/12/2024 0959  Last data filed at 1/12/2024 0621  Gross per 24 hour   Intake --   Output 200 ml   Net -200 ml     General appearance: poor nourished on 2 L  HEENT: Normocephalic, no lesions, without obvious abnormality.pupils equal and reactive, EOM normal  Lungs: crackles aat bases   Heart: regular rate and rhythm, S1, S2 normal, no murmur, click, rub or gallop  Abdomen: soft, non-tender; bowel sounds normal; no masses,  no organomegaly  Extremities: extremities normal, atraumatic, 2+ edema bilateral le. Pulses 2+ and symmetrical in all 4 extremities  Neurological: Alert oriented x 3, moves all 4 extremities spontaneously.  Makes adequate conversation    CBC:   Recent Labs     01/10/24  0611   WBC 3.2*   HGB 9.5*   *     BMP:    Recent Labs     01/10/24  0611      K 4.0   CL 95*   CO2 30   BUN 41*   CREATININE 1.4*   CALCIUM 8.9     Magnesium:  Recent Labs     01/10/24  0611   MG 1.9     No results for input(s): \"AST\", \"ALT\", \"ALB\", \"TP\" in the last 72 hours.    Invalid input(s): \"ALP\", \"TBIL\", \"BILID\"  Phosphorus:No results for input(s): 
[facility]   OCCUPATIONAL THERAPY MISSED TREATMENT NOTE   INPATIENT   Date: 1/10/24  Patient Name: Corbin Meyer       Room:   MRN: [id number]   Account #: [id number]    : 1924  (89 y.o.)  Gender: male   Referring Practitioner: Carmencita Sanchez MD  Diagnosis: Debility             REASON FOR MISSED TREATMENT:  Patient declined   -    pt eating lunch, will attempt at later time if able, will continue to follow as needed.             Electronically signed by Nannette HI on 1/10/24 at 2:14 PM EST  
[facility]  IN-PATIENT SERVICE  [facility]    PROGRESS NOTE             Date:   1/9/2024  Patient name:  Corbin Meyer  Date of admission:  1/5/2024  1:49 PM  MRN:   [id number]  YOB: 1924    INTERVAL HISTORY:      SUBJECTIVE:  Reports being able to tolerate modified diet.  No coughing.    No other concerns.    Objective   Vitals:    01/07/24 1836 01/08/24 0604 01/08/24 1803 01/09/24 0651   BP: 106/60 121/74 (!) 104/51 125/63   Pulse: 92 93 90 87   Resp: 22 20 17 18   Temp: 97.2 °F (36.2 °C) 97.9 °F (36.6 °C) 97.9 °F (36.6 °C) 97.2 °F (36.2 °C)   TempSrc: Oral Oral Oral    SpO2: 100% 95% 100% 100%   Weight:       Height:         BP Readings from Last 6 Encounters:   01/09/24 125/63   01/04/24 122/72   12/28/23 122/64   12/18/23 120/70   12/11/23 (!) 101/48   10/04/23 130/74          Intake/Output Summary (Last 24 hours) at 1/9/2024 0946  Last data filed at 1/9/2024 0035  Gross per 24 hour   Intake --   Output 700 ml   Net -700 ml     General appearance: well nourished on 2 L  HEENT: Normocephalic, no lesions, without obvious abnormality.pupils equal and reactive, EOM normal  Lungs: crackles aat bases   Heart: regular rate and rhythm, S1, S2 normal, no murmur, click, rub or gallop  Abdomen: soft, non-tender; bowel sounds normal; no masses,  no organomegaly  Extremities: extremities normal, atraumatic, 2+ edema bilateral le. Pulses 2+ and symmetrical in all 4 extremities  Neurological: Alert oriented x 3, moves all 4 extremities spontaneously.  Makes adequate conversation    CBC: No results for input(s): \"WBC\", \"HGB\", \"PLT\" in the last 72 hours.  BMP:  No results for input(s): \"NA\", \"K\", \"CL\", \"CO2\", \"BUN\", \"CREATININE\", \"CALCIUM\" in the last 72 hours.  Magnesium:No results for input(s): \"MG\" in the last 72 hours.  No results for input(s): \"AST\", \"ALT\", \"ALB\", \"TP\" in the last 72 hours.    Invalid input(s): \"ALP\", \"TBIL\", \"BILID\"  Phosphorus:No results for 
The patient remains sleepy, and I get an update from Eric the bedside nurse. He states that the patient was up to the side of the bed and ate breakfast today, and after that he was very fatigued.     His family arrives and son Corbin Clement, wife Trudi and 2 grand daughters. Corbin Clement states that he does have an appointment with [facility] tomorrow at 10 am. Corbin Clement. states that as well he has a notary that will come to the hospital as he wants to compete a Durable POA with his Dad.     I offer much emotional support. Will follow after the hospice meeting for goals of care and support.           ..[facility]  Janis Trevino RN,CHPN   [facility]: [phone]  [facility]: [phone]  [facility]: [phone]       
There is a palliative care consult in for this pt. Writer attempted to see but pt sleeping and no family present.    01/06/24 1330   Encounter Summary   Encounter Overview/Reason  Initial Encounter   Service Provided For: Patient   Referral/Consult From: Ralph;Palliative Care   Last Encounter  01/06/24   Complexity of Encounter Low   Spiritual/Emotional needs   Type Spiritual Support   Assessment/Intervention/Outcome   Assessment Unable to assess  (sleeping)   Intervention Prayer (assurance of)/[place]       
patient just had a run of v-tach of 14 beats. his BP currently is 102/49, HR: 94 and respirations are 24. he denies chest pain, but complains of pain to the right upper quadrant abdominal pain when he coughs.  Secure message sent to NP.  New orders received for EKG and magnesium level. Will continue to monitor    Cardiology notified  
Chest: (CXR  Date 01/07 )CHF with moderate right pleural effusion, similar to prior examination.    Date of Eval: 1/8/2024  Evaluating Therapist: ANA MARÍA Malagon    Current Diet level:  Current Diet : Regular  Current Liquid Diet : Thin    Primary Complaint   Per IM H&P: Corbin Meyer is a 89 y.o.  /  male who presents with OTHER   and is admitted to the hospital for the management of Encounter for admission to hospice care.  89-year-old gentleman with a history of diabetes mellitus history of CKD stage III history of chronic systolic heart failure ejection fraction less than 35% with a severe aortic stenosis patient was offered medical intervention treatment.  He declined patient has been living at home with his son son claims that he tripped and fell got a small laceration on the left side of the lower lip no head injury no new weakness no nausea vomiting  Sons  expectation is father to get better and get home  He does not have a clear information on hospice palliative care    Per RN, pt. Demo. No overt s/s aspiration this am when taking pills c thin liquids via straw.     Pain:  Pain Assessment  Pain Assessment: None - Denies Pain  Pain Level: 0  Pain Location: Chest    Reason for Referral  Corbin Meyer was referred for a bedside swallow evaluation to assess the efficiency of his swallow function, identify signs and symptoms of aspiration and make recommendations regarding safe dietary consistencies, effective compensatory strategies, and safe eating environment.    Impression  Dysphagia Diagnosis: Suspected needs further assessment  Dysphagia Impression : Recommend video swallow study to r/o or confirm aspiration        Treatment Plan  Requires SLP Intervention: Yes             Recommended Diet and Intervention  Diet Solids Recommendation: NPO  Liquid Consistency Recommendation: NPO     Recommendations: Modified barium swallow study           General  Behavior/Cognition: 
Patient with a glucose monitoring kit that insurance will cover.  Testing twice a day 12/29/23   Shilpa Delacruz MD   [facility]. Devices (WHEEL CHAIR K1 BASIC DESK ARM) MISC Please provide lightweight wheelchair. 12/19/23   Magno Brambila MD   furosemide (LASIX) 40 MG tablet Take 1 tablet by mouth daily  Patient taking differently: Take 1 tablet by mouth 2 times daily 12/29 matt valverde, Dr. MANE Larsen 12/11/23   Joey Salamanca MD   omeprazole (PRILOSEC) 40 MG delayed release capsule TAKE 1 CAPSULE BY MOUTH EVERY MORNING BEFORE BREAKFAST 11/28/23   Shilpa Delacruz MD   Magnesium Oxide (MAGNESIUM-OXIDE) 250 MG TABS tablet Take 1 tablet by mouth daily Take with food, in the evening 11/3/23   Shilpa Delacruz MD   fluticasone (FLONASE) 50 MCG/ACT nasal spray 2 sprays by Each Nostril route in the morning and at bedtime 9/26/23   Shilpa Delacruz MD   albuterol sulfate HFA (PROVENTIL HFA) 108 (90 Base) MCG/ACT inhaler Inhale 2 puffs into the lungs every 6 hours as needed for Wheezing or Shortness of Breath (cough) 9/26/23   Shilpa Delacruz MD   Polyvinyl Alcohol-Povidone PF (REFRESH) 1.4-0.6 % SOLN ophthalmic solution Apply 2 drops to eye every 3-4 hours as needed (For dry eyes) Okay to substitute. For itchy eyes 9/26/23   Shilpa Delacruz MD   Blood Glucose Monitoring Suppl (ACCU-CHEK JOEL PLUS) w/Device KIT Testing twice a day 9/26/23   Shilpa Delacruz MD   Lancets 30G [facility] Testing twice a day.  Please dispense according to patients insurance. 9/26/23   Shilpa Delacruz MD   blood glucose monitor strips Testing  twice a day.  Please dispense according to patients insurance. 9/26/23   Shilpa Delacruz MD   levothyroxine (SYNTHROID) 25 MCG tablet Take 1 tablet by mouth every morning (before breakfast) 9/22/23   Magno Brambila MD   tamsulosin (FLOMAX) 0.4 MG capsule Take 1 capsule by mouth daily 9/7/23   Dino Goss MD   diclofenac sodium (VOLTAREN) 1 % GEL Apply 2 g topically 4 times 
Training  Education Provided Comments: Pt/family education provided on proper positioning in bed and chair to promote skin integrity to reduce skin breakdown and bed sores. Writer and PT demonstrated proper positioning with pillows while pt supine in bed. Pt sons verbalized Good understanding.  Education Method: Verbal, Demonstration  Barriers to Learning: Cognition (Lux[place] is 1st language)  Education Outcome: Verbalized understanding, Demonstrated understanding, Continued education needed    Goals  Short Term Goals  Time Frame for Short Term Goals: By discharge  Short Term Goal 1: Pt will perform UB ADLs SUP and LB ADLs SBA with Good safety and use of AE/modified techniques as needed  Short Term Goal 2: Pt will perform functional transfers/mobility SBA during self-care tasks with Good safety and use of least restrictive device  Short Term Goal 3: Pt will tolerate standing 5+ minutes SBA during functional activity of choice to improve balance/tolerance for self-care/functional tasks  Short Term Goal 4: Pt/family will verbalize/demonstrate Good understanding of home safety/fall prevention strategies to promote safety and independence with daily activities  Short Term Goal 5: Pt will actively participate in 15+ mintues of therapeutic exercises/functional activity to improve strength and activity tolerance for ADL performance  Occupational Therapy Plan  Times Per Week: 3-5  Current Treatment Recommendations: Strengthening, ROM, Functional mobility training, Balance training, Pain management, Safety education & training, Patient/Caregiver education & training, Equipment evaluation, education, & procurement, Self-Care / ADL, Home management training, Coordination training    Assessment  Activity Tolerance  Activity Tolerance: Patient limited by fatigue, Patient Tolerated treatment well  Assessment  Performance deficits / Impairments: Decreased functional mobility , Decreased ADL status, Decreased strength, Decreased 
transfer)  Assist Level: Minimal assistance  Functional Mobility Comments: CGA initially with mobility. pt  demo unsteadiness c 2 minor LOB requiring min A to recover. VCs for safety c RW positioning.      OT Exercises  A/AROM Exercises: pt engages in BUE AROM x 8-10 reps to improve strength and activity tolerance for ADL performance. pt completes shoulder flexion to ~90*, horizontal AB/ADduction, and elbow flexion. pt requires VC for initiation/continuation of tasks    Patient Education  Patient Education  Education Given To: Patient, Family  Education Provided: Role of Therapy, Plan of Care, Transfer Training  Education Provided Comments: Pt/family education provided on proper positioning in bed and chair to promote skin integrity to reduce skin breakdown and bed sores. Writer and PT demonstrated proper positioning with pillows while pt supine in bed. Pt sons verbalized Good understanding.  Education Method: Verbal, Demonstration  Barriers to Learning: Cognition (Iranian is 1st language)  Education Outcome: Verbalized understanding, Demonstrated understanding, Continued education needed    Goals  Short Term Goals  Time Frame for Short Term Goals: By discharge  Short Term Goal 1: Pt will perform UB ADLs SUP and LB ADLs SBA with Good safety and use of AE/modified techniques as needed  Short Term Goal 2: Pt will perform functional transfers/mobility SBA during self-care tasks with Good safety and use of least restrictive device  Short Term Goal 3: Pt will tolerate standing 5+ minutes SBA during functional activity of choice to improve balance/tolerance for self-care/functional tasks  Short Term Goal 4: Pt/family will verbalize/demonstrate Good understanding of home safety/fall prevention strategies to promote safety and independence with daily activities  Short Term Goal 5: Pt will actively participate in 15+ mintues of therapeutic exercises/functional activity to improve strength and activity tolerance for ADL

## 2024-01-15 NOTE — PLAN OF CARE
Problem: Discharge Planning  Goal: Discharge to home or other facility with appropriate resources  1/10/2024 0428 by Jennifer Bridges RN  Outcome: Progressing     Problem: Safety - Adult  Goal: Free from fall injury  1/10/2024 0428 by Jennifer Bridges RN  Outcome: Progressing     Problem: Skin/Tissue Integrity  Goal: Absence of new skin breakdown  Description: 1.  Monitor for areas of redness and/or skin breakdown  2.  Assess vascular access sites hourly  3.  Every 4-6 hours minimum:  Change oxygen saturation probe site  4.  Every 4-6 hours:  If on nasal continuous positive airway pressure, respiratory therapy assess nares and determine need for appliance change or resting period.  1/10/2024 0428 by Jennifer Bridges RN  Outcome: Progressing     Problem: ABCDS Injury Assessment  Goal: Absence of physical injury  1/10/2024 0428 by Jennifer Bridges, RN  Outcome: Progressing     Problem: Chronic Conditions and Co-morbidities  Goal: Patient's chronic conditions and co-morbidity symptoms are monitored and maintained or improved  1/10/2024 0428 by Jennifer Bridges, RN  Outcome: Progressing     
  Problem: Discharge Planning  Goal: Discharge to home or other facility with appropriate resources  1/10/2024 1504 by Deisy Soria RN  Outcome: Progressing     Problem: Safety - Adult  Goal: Free from fall injury  1/10/2024 1504 by Deisy Soria RN  Outcome: Progressing     Problem: Skin/Tissue Integrity  Goal: Absence of new skin breakdown  Description: 1.  Monitor for areas of redness and/or skin breakdown  2.  Assess vascular access sites hourly  3.  Every 4-6 hours minimum:  Change oxygen saturation probe site  4.  Every 4-6 hours:  If on nasal continuous positive airway pressure, respiratory therapy assess nares and determine need for appliance change or resting period.  1/10/2024 1504 by Deisy Soria RN  Outcome: Progressing     Problem: ABCDS Injury Assessment  Goal: Absence of physical injury  1/10/2024 1504 by Deisy Soria, RN  Outcome: Progressing     Problem: Chronic Conditions and Co-morbidities  Goal: Patient's chronic conditions and co-morbidity symptoms are monitored and maintained or improved  1/10/2024 1504 by Deisy Soria, RN  Outcome: Progressing     
  Problem: Discharge Planning  Goal: Discharge to home or other facility with appropriate resources  1/11/2024 0132 by Paula Pina RN  Outcome: Progressing  Flowsheets (Taken 1/8/2024 1930 by Andres Hoang, RN)  Discharge to home or other facility with appropriate resources: Identify barriers to discharge with patient and caregiver  1/10/2024 1504 by Deisy Soria RN  Outcome: Progressing     Problem: Safety - Adult  Goal: Free from fall injury  1/11/2024 0132 by Paula Pina RN  Outcome: Progressing  Note: Patient instructed on safety measures.  1/10/2024 1504 by Deisy Soria RN  Outcome: Progressing     Problem: Skin/Tissue Integrity  Goal: Absence of new skin breakdown  Description: 1.  Monitor for areas of redness and/or skin breakdown  2.  Assess vascular access sites hourly  3.  Every 4-6 hours minimum:  Change oxygen saturation probe site  4.  Every 4-6 hours:  If on nasal continuous positive airway pressure, respiratory therapy assess nares and determine need for appliance change or resting period.  1/11/2024 0132 by Paula Pina RN  Outcome: Progressing  1/10/2024 1504 by Deisy Soria RN  Outcome: Progressing     Problem: ABCDS Injury Assessment  Goal: Absence of physical injury  1/11/2024 0132 by Paula Pina RN  Outcome: Progressing  Flowsheets (Taken 1/8/2024 2236 by Andres Hoang, RN)  Absence of Physical Injury: Implement safety measures based on patient assessment  1/10/2024 1504 by Deisy Soria RN  Outcome: Progressing     Problem: Chronic Conditions and Co-morbidities  Goal: Patient's chronic conditions and co-morbidity symptoms are monitored and maintained or improved  1/11/2024 0132 by Paula Pina RN  Outcome: Progressing  Flowsheets (Taken 1/8/2024 1930 by Andres Hoang, RN)  Care Plan - Patient's Chronic Conditions and Co-Morbidity Symptoms are Monitored and Maintained or Improved: Monitor and assess patient's chronic conditions and comorbid symptoms for 
  Problem: Discharge Planning  Goal: Discharge to home or other facility with appropriate resources  1/14/2024 0406 by Esperanza Avalos, RN  Outcome: Progressing  Flowsheets (Taken 1/14/2024 0406)  Discharge to home or other facility with appropriate resources:   Identify barriers to discharge with patient and caregiver   Identify discharge learning needs (meds, wound care, etc)   Refer to discharge planning if patient needs post-hospital services based on physician order or complex needs related to functional status, cognitive ability or social support system   Arrange for needed discharge resources and transportation as appropriate  Note: Inform pt. Of discharge teaching and planned. Instructed pt. To inform me if further teaching need to be done.      Problem: Safety - Adult  Goal: Free from fall injury  1/14/2024 0406 by Esperanza Avalos, RN  Outcome: Progressing  Flowsheets (Taken 1/14/2024 0406)  Free From Fall Injury:   Instruct family/caregiver on patient safety   Based on caregiver fall risk screen, instruct family/caregiver to ask for assistance with transferring infant if caregiver noted to have fall risk factors  Note: Made sure call light was in reach, a clear pathway and adequate lighting was provided. Also made sure that the pt. Was wearing non-slip socks.      Problem: Skin/Tissue Integrity  Goal: Absence of new skin breakdown  Description: 1.  Monitor for areas of redness and/or skin breakdown  2.  Assess vascular access sites hourly  3.  Every 4-6 hours minimum:  Change oxygen saturation probe site  4.  Every 4-6 hours:  If on nasal continuous positive airway pressure, respiratory therapy assess nares and determine need for appliance change or resting period.  1/14/2024 0406 by Esperanza Avalos, RN  Outcome: Progressing  Note: Educated pt. About the importance of frequent turns and adequate nutrition and pitting pillows on bony parts of the body      Problem: Chronic Conditions and 
  Problem: Discharge Planning  Goal: Discharge to home or other facility with appropriate resources  1/14/2024 1400 by Natty Gibbons RN  Outcome: Progressing     Problem: Safety - Adult  Goal: Free from fall injury  1/14/2024 1400 by Natty Gibbons RN  Outcome: Progressing     Problem: Skin/Tissue Integrity  Goal: Absence of new skin breakdown  Description: 1.  Monitor for areas of redness and/or skin breakdown  2.  Assess vascular access sites hourly  3.  Every 4-6 hours minimum:  Change oxygen saturation probe site  4.  Every 4-6 hours:  If on nasal continuous positive airway pressure, respiratory therapy assess nares and determine need for appliance change or resting period.  1/14/2024 1400 by Natty Gibbons RN  Outcome: Progressing  1/14/2024 0406 by Esperanza Avalos RN  Outcome: Progressing  Note: Educated pt. About the importance of frequent turns and adequate nutrition and pitting pillows on bony parts of the body      Problem: ABCDS Injury Assessment  Goal: Absence of physical injury  Outcome: Progressing     Problem: Chronic Conditions and Co-morbidities  Goal: Patient's chronic conditions and co-morbidity symptoms are monitored and maintained or improved  1/14/2024 1400 by Natty Gibbons RN  Outcome: Progressing     Problem: Pain  Goal: Verbalizes/displays adequate comfort level or baseline comfort level  Outcome: Progressing     Problem: Nutrition Deficit:  Goal: Optimize nutritional status  1/14/2024 1400 by Natty Gibbons RN  Outcome: Progressing     
  Problem: Discharge Planning  Goal: Discharge to home or other facility with appropriate resources  1/15/2024 1601 by Kailee Owens RN  Outcome: Adequate for Discharge  1/15/2024 1601 by Kailee Owens RN  Outcome: Adequate for Discharge  1/15/2024 0451 by Esperanza Avalos RN  Outcome: Progressing  Flowsheets (Taken 1/15/2024 0451)  Discharge to home or other facility with appropriate resources:   Identify barriers to discharge with patient and caregiver   Identify discharge learning needs (meds, wound care, etc)   Refer to discharge planning if patient needs post-hospital services based on physician order or complex needs related to functional status, cognitive ability or social support system   Arrange for needed discharge resources and transportation as appropriate  Note: Inform pt. Of discharge teaching and planned. Instructed pt. To inform me if further teaching need to be done.      Problem: Safety - Adult  Goal: Free from fall injury  1/15/2024 1601 by Kailee Owens RN  Outcome: Adequate for Discharge  1/15/2024 1601 by Kailee Owens RN  Outcome: Adequate for Discharge  1/15/2024 0451 by Esperanza Avalos RN  Outcome: Progressing  Flowsheets (Taken 1/15/2024 0451)  Free From Fall Injury:   Instruct family/caregiver on patient safety   Based on caregiver fall risk screen, instruct family/caregiver to ask for assistance with transferring infant if caregiver noted to have fall risk factors  Note: Made sure call light was in reach, a clear pathway and adequate lighting was provided. Also made sure that the pt. Was wearing non-slip socks.      Problem: Skin/Tissue Integrity  Goal: Absence of new skin breakdown  Description: 1.  Monitor for areas of redness and/or skin breakdown  2.  Assess vascular access sites hourly  3.  Every 4-6 hours minimum:  Change oxygen saturation probe site  4.  Every 4-6 hours:  If on nasal continuous positive airway pressure, respiratory therapy assess 
  Problem: Discharge Planning  Goal: Discharge to home or other facility with appropriate resources  1/6/2024 1930 by Isamar Sorto RN  Outcome: Progressing  Flowsheets (Taken 1/6/2024 1924)  Discharge to home or other facility with appropriate resources:   Identify barriers to discharge with patient and caregiver   Arrange for needed discharge resources and transportation as appropriate   Identify discharge learning needs (meds, wound care, etc)     Problem: Safety - Adult  Goal: Free from fall injury  1/6/2024 1930 by Isamar Sorto RN  Outcome: Progressing     Problem: Skin/Tissue Integrity  Goal: Absence of new skin breakdown  Description: 1.  Monitor for areas of redness and/or skin breakdown  2.  Assess vascular access sites hourly  3.  Every 4-6 hours minimum:  Change oxygen saturation probe site  4.  Every 4-6 hours:  If on nasal continuous positive airway pressure, respiratory therapy assess nares and determine need for appliance change or resting period.  1/6/2024 1930 by Isamar Sorto RN  Outcome: Progressing     Problem: ABCDS Injury Assessment  Goal: Absence of physical injury  1/6/2024 1930 by Isamar Sorto RN  Outcome: Progressing     Problem: Chronic Conditions and Co-morbidities  Goal: Patient's chronic conditions and co-morbidity symptoms are monitored and maintained or improved  1/6/2024 1930 by Isamar Sorto RN  Outcome: Progressing  Flowsheets (Taken 1/6/2024 1924)  Care Plan - Patient's Chronic Conditions and Co-Morbidity Symptoms are Monitored and Maintained or Improved:   Monitor and assess patient's chronic conditions and comorbid symptoms for stability, deterioration, or improvement   Collaborate with multidisciplinary team to address chronic and comorbid conditions and prevent exacerbation or deterioration   Update acute care plan with appropriate goals if chronic or comorbid symptoms are exacerbated and prevent overall improvement and discharge     
  Problem: Discharge Planning  Goal: Discharge to home or other facility with appropriate resources  Outcome: Progressing     Problem: Safety - Adult  Goal: Free from fall injury  1/6/2024 1214 by Eva Morgan RN  Outcome: Progressing     Problem: Skin/Tissue Integrity  Goal: Absence of new skin breakdown  1/6/2024 1214 by Eva Morgan RN  Outcome: Progressing     Problem: ABCDS Injury Assessment  Goal: Absence of physical injury  1/6/2024 1214 by Eva Morgan RN  Outcome: Progressing     Problem: Chronic Conditions and Co-morbidities  Goal: Patient's chronic conditions and co-morbidity symptoms are monitored and maintained or improved  Outcome: Progressing     
  Problem: Discharge Planning  Goal: Discharge to home or other facility with appropriate resources  Outcome: Progressing     Problem: Safety - Adult  Goal: Free from fall injury  Outcome: Progressing     Problem: Skin/Tissue Integrity  Goal: Absence of new skin breakdown  Description: 1.  Monitor for areas of redness and/or skin breakdown  2.  Assess vascular access sites hourly  3.  Every 4-6 hours minimum:  Change oxygen saturation probe site  4.  Every 4-6 hours:  If on nasal continuous positive airway pressure, respiratory therapy assess nares and determine need for appliance change or resting period.  Outcome: Progressing     Problem: ABCDS Injury Assessment  Goal: Absence of physical injury  Outcome: Progressing     Problem: Chronic Conditions and Co-morbidities  Goal: Patient's chronic conditions and co-morbidity symptoms are monitored and maintained or improved  Outcome: Progressing     
  Problem: Discharge Planning  Goal: Discharge to home or other facility with appropriate resources  Outcome: Progressing     Problem: Safety - Adult  Goal: Free from fall injury  Outcome: Progressing     Problem: Skin/Tissue Integrity  Goal: Absence of new skin breakdown  Description: 1.  Monitor for areas of redness and/or skin breakdown  2.  Assess vascular access sites hourly  3.  Every 4-6 hours minimum:  Change oxygen saturation probe site  4.  Every 4-6 hours:  If on nasal continuous positive airway pressure, respiratory therapy assess nares and determine need for appliance change or resting period.  Outcome: Progressing     Problem: ABCDS Injury Assessment  Goal: Absence of physical injury  Outcome: Progressing     Problem: Chronic Conditions and Co-morbidities  Goal: Patient's chronic conditions and co-morbidity symptoms are monitored and maintained or improved  Outcome: Progressing     
  Problem: Discharge Planning  Goal: Discharge to home or other facility with appropriate resources  Outcome: Progressing     Problem: Safety - Adult  Goal: Free from fall injury  Outcome: Progressing     Problem: Skin/Tissue Integrity  Goal: Absence of new skin breakdown  Outcome: Progressing     Problem: ABCDS Injury Assessment  Goal: Absence of physical injury  Outcome: Progressing     
  Problem: Discharge Planning  Goal: Discharge to home or other facility with appropriate resources  Outcome: Progressing  Flowsheets (Taken 1/13/2024 0423)  Discharge to home or other facility with appropriate resources:   Identify barriers to discharge with patient and caregiver   Identify discharge learning needs (meds, wound care, etc)   Refer to discharge planning if patient needs post-hospital services based on physician order or complex needs related to functional status, cognitive ability or social support system  Note: Inform pt. Of discharge teaching and planned. Instructed pt. To inform me if further teaching need to be done.      Problem: Safety - Adult  Goal: Free from fall injury  Outcome: Progressing  Flowsheets (Taken 1/13/2024 0423)  Free From Fall Injury: Based on caregiver fall risk screen, instruct family/caregiver to ask for assistance with transferring infant if caregiver noted to have fall risk factors  Note: Made sure call light was in reach, a clear pathway and adequate lighting was provided. Also made sure that the pt. Was wearing non-slip socks.      Problem: Chronic Conditions and Co-morbidities  Goal: Patient's chronic conditions and co-morbidity symptoms are monitored and maintained or improved  Outcome: Progressing  Flowsheets (Taken 1/13/2024 0423)  Care Plan - Patient's Chronic Conditions and Co-Morbidity Symptoms are Monitored and Maintained or Improved:   Monitor and assess patient's chronic conditions and comorbid symptoms for stability, deterioration, or improvement   Collaborate with multidisciplinary team to address chronic and comorbid conditions and prevent exacerbation or deterioration   Update acute care plan with appropriate goals if chronic or comorbid symptoms are exacerbated and prevent overall improvement and discharge  Note: Made sure pt. Understood what their conditions was and why it was occurring. Also educated pt. On ways to prevent the condition from worsening 
  Problem: Discharge Planning  Goal: Discharge to home or other facility with appropriate resources  Outcome: Progressing  Flowsheets (Taken 1/15/2024 0451)  Discharge to home or other facility with appropriate resources:   Identify barriers to discharge with patient and caregiver   Identify discharge learning needs (meds, wound care, etc)   Refer to discharge planning if patient needs post-hospital services based on physician order or complex needs related to functional status, cognitive ability or social support system   Arrange for needed discharge resources and transportation as appropriate  Note: Inform pt. Of discharge teaching and planned. Instructed pt. To inform me if further teaching need to be done.      Problem: Safety - Adult  Goal: Free from fall injury  Outcome: Progressing  Flowsheets (Taken 1/15/2024 0451)  Free From Fall Injury:   Instruct family/caregiver on patient safety   Based on caregiver fall risk screen, instruct family/caregiver to ask for assistance with transferring infant if caregiver noted to have fall risk factors  Note: Made sure call light was in reach, a clear pathway and adequate lighting was provided. Also made sure that the pt. Was wearing non-slip socks.      Problem: Skin/Tissue Integrity  Goal: Absence of new skin breakdown  Description: 1.  Monitor for areas of redness and/or skin breakdown  2.  Assess vascular access sites hourly  3.  Every 4-6 hours minimum:  Change oxygen saturation probe site  4.  Every 4-6 hours:  If on nasal continuous positive airway pressure, respiratory therapy assess nares and determine need for appliance change or resting period.  Outcome: Progressing  Note: Educated pt. About the importance of frequent turns and adequate nutrition and pitting pillows on bony parts of the body      Problem: Chronic Conditions and Co-morbidities  Goal: Patient's chronic conditions and co-morbidity symptoms are monitored and maintained or improved  Outcome: 
  Problem: Discharge Planning  Goal: Discharge to home or other facility with appropriate resources  Outcome: Progressing  Flowsheets (Taken 1/5/2024 1745)  Discharge to home or other facility with appropriate resources: Identify barriers to discharge with patient and caregiver   Pt to discharge to home once medically cleared.   Problem: Safety - Adult  Goal: Free from fall injury  Outcome: Progressing  Flowsheets (Taken 1/5/2024 1756)  Free From Fall Injury: Instruct family/caregiver on patient safety   No new injury noted this shift. Pt had recent fall with multiple injury/bruising. See flowsheet.   Problem: Skin/Tissue Integrity  Goal: Absence of new skin breakdown  Description: 1.  Monitor for areas of redness and/or skin breakdown  2.  Assess vascular access sites hourly  3.  Every 4-6 hours minimum:  Change oxygen saturation probe site  4.  Every 4-6 hours:  If on nasal continuous positive airway pressure, respiratory therapy assess nares and determine need for appliance change or resting period.  Outcome: Progressing   Monitoring.   Problem: ABCDS Injury Assessment  Goal: Absence of physical injury  Outcome: Progressing  Flowsheets (Taken 1/5/2024 1756)  Absence of Physical Injury: Implement safety measures based on patient assessment   No new injury noted this shift.   
  Problem: Safety - Adult  Goal: Free from fall injury  1/5/2024 2304 by Isamar Sorto, RN  Outcome: Progressing     Problem: Skin/Tissue Integrity  Goal: Absence of new skin breakdown  Description: 1.  Monitor for areas of redness and/or skin breakdown  2.  Assess vascular access sites hourly  3.  Every 4-6 hours minimum:  Change oxygen saturation probe site  4.  Every 4-6 hours:  If on nasal continuous positive airway pressure, respiratory therapy assess nares and determine need for appliance change or resting period.  1/5/2024 2304 by Isamar Sorto, RN  Outcome: Progressing     Problem: ABCDS Injury Assessment  Goal: Absence of physical injury  1/5/2024 2304 by Isamar Sorto, RN  Outcome: Progressing     
  Problem: Safety - Adult  Goal: Free from fall injury  Outcome: Progressing  Note: Patient remains free of falls and injuries throughout shift. Bed remains in the lowest position, wheels locked, call light and bedside table are within reach.      Problem: Skin/Tissue Integrity  Goal: Absence of new skin breakdown  Description: 1.  Monitor for areas of redness and/or skin breakdown  2.  Assess vascular access sites hourly  3.  Every 4-6 hours minimum:  Change oxygen saturation probe site  4.  Every 4-6 hours:  If on nasal continuous positive airway pressure, respiratory therapy assess nares and determine need for appliance change or resting period.  Outcome: Progressing  Note: Assess the overall condition of the skin. Check on bony prominences such as the sacrum, trochanters, scapulae, elbows, heels, inner and outer malleolus, inner and outer knees, back of head). Reinforce the importance of turning, mobility, and ambulation.      
Patient's Chronic Conditions and Co-Morbidity Symptoms are Monitored and Maintained or Improved: Monitor and assess patient's chronic conditions and comorbid symptoms for stability, deterioration, or improvement     
improvement  1/7/2024 1705 by Eva Morgan, RN  Outcome: Progressing     
maintained or improved  1/13/2024 1550 by Temi Murrell, RN  Outcome: Progressing  1/13/2024 0423 by Esperanza Avalos, RN  Outcome: Progressing  Flowsheets (Taken 1/13/2024 0423)  Care Plan - Patient's Chronic Conditions and Co-Morbidity Symptoms are Monitored and Maintained or Improved:   Monitor and assess patient's chronic conditions and comorbid symptoms for stability, deterioration, or improvement   Collaborate with multidisciplinary team to address chronic and comorbid conditions and prevent exacerbation or deterioration   Update acute care plan with appropriate goals if chronic or comorbid symptoms are exacerbated and prevent overall improvement and discharge  Note: Made sure pt. Understood what their conditions was and why it was occurring. Also educated pt. On ways to prevent the condition from worsening and from occurring again.      Problem: Pain  Goal: Verbalizes/displays adequate comfort level or baseline comfort level  Outcome: Progressing     Problem: Nutrition Deficit:  Goal: Optimize nutritional status  Outcome: Progressing

## 2024-01-15 NOTE — CARE COORDINATION
Case Management Assessment  Initial Evaluation    Date/Time of Evaluation: 1/6/2024 2:49 PM  Assessment Completed by: Jonna Smith RN    If patient is discharged prior to next notation, then this note serves as note for discharge by case management.    Patient Name: Corbin Meyer                   YOB: 1924  Diagnosis: Debility [R53.81]  Encounter for hospice care [Z51.5]  Encounter for admission to hospice care [Z51.5]                   Date / Time: 1/5/2024  1:49 PM    Patient Admission Status: Inpatient   Readmission Risk (Low < 19, Mod (19-27), High > 27): Readmission Risk Score: 19    Current PCP: Shilpa Delacruz MD  PCP verified by CM? Yes    Chart Reviewed: Yes      History Provided by: Child/Family, Medical Record  Patient Orientation: Alert and Oriented    Patient Cognition: Alert    Hospitalization in the last 30 days (Readmission):  No    If yes, Readmission Assessment in CM Navigator will be completed.    Advance Directives:      Code Status: DNR-CCA   Patient's Primary Decision Maker is: Named in Scanned ACP Document    Primary Decision Maker: Mitch HermanhipCorbin chris - [phone]    Discharge Planning:    Patient lives with: Children, Family Members Type of Home: House  Primary Care Giver: Family  Patient Support Systems include: Family Members   Current Financial resources: Medicare  Current community resources: Other (Comment) (CHF CLINIC)  Current services prior to admission: Durable Medical Equipment            Current DME: Bedside Commode, Hospital Bed, Wheelchair, Glucometer, Other (Comment) (BP cuff)            Type of Home Care services:  None    ADLS  Prior functional level: Assistance with the following:, Bathing, Dressing, Toileting, Mobility, Housework, Cooking, Shopping  Current functional level: Assistance with the following:, Bathing, Dressing, Toileting, Cooking, Housework, Shopping, Mobility    PT AM-PAC:   /24  OT AM-PAC:   /24    Family can provide 
DISCHARGE PLANNING NOTE:    Palliative care RN, Janis, spoke with pt's son, Corbin Clement, and he agreed to meeting with [facility].      Writer also received call from Abbey at [facility] stating she spoke with pt's son Friday, and she is following along.    Deisy from [facility] is also following for Palliative Care at home.    Writer called [facility] and spoke with Homa who states son is refusing to set up hospice meeting. He is not interested at this time, and he would like to continue with medical management.  Writer called and spoke with son. He states he DOES want [facility]. States he was confused. Phone number given to son for [facility], and he will set up meeting.  Writer called Hospice back and informed Homa of this.    Electronically signed by Windy Arce RN on 1/8/2024 at 3:35 PM    Received call back from Homa at [facility] stating meeting is set up for 1/8 @ 11am.    Electronically signed by Windy Arce RN on 1/8/2024 at 3:44 PM    
DISCHARGE PLANNING NOTE:    Spoke with Diamond from [facility] who states pt does not qualify for inpatient hospice.  He could have hospice services at home, however pt's son states he cannot provide 24/7 care at home.    Spoke with pt's son, Corbin.  He would like patient to go to SNF and eventually look into long term care with hospice services.  Corbin would like [facility], [facility], or [facility]. Referral sent to these facilities.    Will continue to follow for additional discharge needs.    Electronically signed by Windy Arce RN on 1/9/2024 at 12:52 PM    Spoke with Chelle at [facility] who states pt's insurance is out of network with their facility.    Spoke with Lacie from [facility] who states they only have one bed available and no long term care beds. They are unable to accept patient.    Electronically signed by Windy Arce RN on 1/9/2024 at 2:11 PM     Informed pt's son, Corbin, of the above. He would like referral sent to [facility] and [facility]. Referrals sent.    Electronically signed by Windy Arce RN on 1/9/2024 at 2:25 PM    Received call from Norma at [facility] stating their financial team will be reaching out to pt's son to discuss the transition to long term care and the financials. Norma will call writer back.    Electronically signed by Windy Arce RN on 1/9/2024 at 3:17 PM    Spoke with Lorena from [facility] who states she would like to see therapy notes prior to accepting patient. Writer requested that therapy see patient earlier today, but no notes at this time.    Electronically signed by Windy Arce RN on 1/9/2024 at 4:09 PM    
HENS complete  Document ID : [id number]  Electronically signed by CLIVE Reid on 1/15/2024 at 1:46 PM    
IMM letter provided to patient maeve Alaniz.  Patient representative offered four hours to make informed decision regarding appeal process; patient representative agreeable to discharge.   Electronically signed by CLIVE Redi on 1/15/2024 at 2:45 PM    
ONGOING DISCHARGE PLAN:    Patient is alert.    Spoke with patient's son regarding discharge plan and patient confirms that plan is still home with [facility]. Deisy stated they can accept.    95% on 3L, following for home O2    Chest xray pending    Cardiology consult-CHF ef 35%, aortic stenosis, oral lasix    Palliative Care consult    Wants a walker at discharge    Will continue to follow for additional discharge needs.    If patient is discharged prior to next notation, then this note serves as note for discharge by case management.    Electronically signed by Jonna Smith RN on 1/7/2024 at 1:17 PM   
ONGOING DISCHARGE PLAN:    Writer reviewed chart notes.     Plan remains for Writer to follow for SNF Placement.     Writer was notified, by Norma from [facility], that they are still reviewing.      Writer was notified, by Lorena from [facility], that they can accept for services.      Per Notes, Pt. Did NOT meet Criteria for Inpatient Hospice.     Will follow for Home O2/Walker, if pt. Ends up going home w/ Son/Family.    Will touch base w/ Family, when writer hears back from Norma, from [facility]. Per notes, their financial team was to reach out to Pt's Son, to discuss Long Term Care/Financials.     Pt will need Pre-Cert.     Will continue to follow for additional discharge needs.    If patient is discharged prior to next notation, then this note serves as note for discharge by case management.    Electronically signed by Marcella Weeks RN on 1/10/2024 at 2:39 PM   
ONGOING DISCHARGE PLANNING NOTE:     Writer reviewed LSW notes, and discharge plan is [facility] [place] with auth started 1/12.     Checked [employer] online portal and auth is still pending.    Electronically signed by Marcy Painter RN on 1/14/2024 at 12:07 PM    
ONGOING DISCHARGE PLANNING NOTE:    Writer reviewed LSW notes, and discharge plan is [facility] with auth started 1/12.    Checked [employer] online portal and auth is still pending.    Electronically signed by Windy Arce RN on 1/13/2024 at 10:30 AM        
Transportation arranged for patient to go to [facility] at 1500 via [facility]. Facility informed. Bedside nurse informed.     Number for Report: [phone]  Electronically signed by CLIVE Reid on 1/15/2024 at 1:03 PM      
Writer attempted to call [facility] for report. No answer. Writer left voicemail.  
Writer attempted to call [facility] for report twice. No answer.  
Writer is following for potential discharge plans.    Writer placed call to Letty with [facility] at [facility] regarding financial discussion with family. Per RN Letty Abrams called to discuss this with writer prior to shift start.    No answer, voicemail left.  Electronically signed by CLIVE Reid on 1/12/2024 at 9:37 AM    Writer placed call back to [facility] regarding this pt. [facility] has concern for pt needing long-term care and pt would need to apply for Medicaid. [facility] talked with pt son Corbin and pt has too many assets for Medicaid and is not willing to private pay.    Writer placed call to Corbin regarding placement options and Medicaid application. Corbin confirms his plan is to take pt home after rehab stay. If pt needed long-term care Corbin states he is willing to apply for Medicaid if facility will help with this.    Writer placed call to Lorena at [facility]. Facility willing to accept this pt and help family apply for Medicaid if it is necessary.     Writer placed perfectserve message to all of phsyical therapy regarding therapy need for this pt to start authorization.  Electronically signed by CLIVE Reid on 1/12/2024 at 12:23 PM    
Writer is following for potential discharge to [facility].  Authorization has been approved for this.  01/15/2024-01/17/2024    Writer spoke to Lorena with [facility], facility agreeable to accept this pt when ready.  Electronically signed by CLIVE Reid on 1/15/2024 at 11:29 AM    
Writer met with pt and family in room and spoke to pt son Corbin.    Writer updated on facility acceptance at this time, still waiting to hear from [facility].     Writer placed call to [facility] at [facility]. [facility] needed to verify financials with family for pt admission.  Electronically signed by CLIVE Reid on 1/11/2024 at 2:59 PM    
Writer placed call to pt son Corbin regarding discharge plans.  Corbin agreeable, states he will be at the hospital at 1400 to sign IMM letter for pt.  Electronically signed by CLIVE Reid on 1/15/2024 at 1:06 PM    Writer met with Corbin in room. No further questions at this time.  Electronically signed by CLIVE Reid on 1/15/2024 at 2:45 PM    
[facility] [place]  [facility] Auth ID: [id number]   Electronically signed by CLIVE Reid on 1/12/2024 at 4:09 PM    
Writer was notified, by Norma, from [facility], that they are still reviewing.     Writer was notified, by Lorena, from [facility], that they can accept for services.     Writer is following.   
Jonna Smith, RN on 1/7/24 at 9:24 AM EST    PHYSICIAN SECTION    Prognosis: Good    Condition at Discharge: Stable    Rehab Potential (if transferring to Rehab): Good    Recommended Labs or Other Treatments After Discharge:     Physician Certification: I certify the above information and transfer of Corbin Meyer  is necessary for the continuing treatment of the diagnosis listed and that he requires Skilled Nursing Facility for less 30 days.     Update Admission H&P: No change in H&P    PHYSICIAN SIGNATURE:  Electronically signed by Carmencita Sanchez MD on 1/10/24 at 9:51 AM EST    Medication List    CONTINUE taking these medications    CONTINUE taking these medications  * Accu-Chek Bailee Plus w/Device Kit  Testing twice a day   * glucose monitoring kit  Please supply Patient with a glucose monitoring kit that insurance will cover. Testing twice a day   * Accu-Chek FastClix Lancets Misc  USE TO TEST BLOOD GLUCOSE ONCE A DAY   * Lancets 30G Misc  Testing twice a day. Please dispense according to patients insurance.   Accu-Chek Multiclix Lancet Dev Kit  Please dispense according to patients insurance.   Alcohol Swabs Pads  Please dispense according to patients insurance/device. Testing 1-2 /day   Wheel Chair K1 Basic Desk Arm Misc  Please provide lightweight wheelchair.    very important  * This list has 4 medication(s) that are the same as other medications prescribed for you. Read the directions carefully, and ask your doctor or other care provider to review them with you.     ASK your doctor about these medications    ASK your doctor about these medications  acetaminophen 500 MG tablet  Commonly known as: TYLENOL  Take 1 tablet by mouth every 12 hours as needed for Pain   albuterol sulfate  (90 Base) MCG/ACT inhaler  Commonly known as: Proventil HFA  Inhale 2 puffs into the lungs every 6 hours as needed for Wheezing or Shortness of Breath (cough)   amLODIPine 5 MG tablet  Commonly known as: NORVASC  TAKE

## 2024-01-30 ENCOUNTER — CARE COORDINATION (OUTPATIENT)
Dept: CASE MANAGEMENT | Age: 89
End: 2024-01-30

## 2024-01-30 NOTE — CARE COORDINATION
Care Transitions Post-Acute Facility Update Call    2024    Patient: Corbin Meyer Patient : 1924   MRN: <[id number]>  Reason for Admission:   Discharge Date: 1/15/24 RARS: Readmission Risk Score: 16.6    Per Patientping patient discharged from [facility] to home 24. Per review of chart patient discharged home with [facility] hospice services.

## 2024-02-02 DIAGNOSIS — J20.9 ACUTE BRONCHITIS DUE TO INFECTION: ICD-10-CM

## 2024-02-02 RX ORDER — ALBUTEROL SULFATE 90 UG/1
2 AEROSOL, METERED RESPIRATORY (INHALATION) EVERY 6 HOURS PRN
Qty: 8 G | Refills: 0 | Status: SHIPPED | OUTPATIENT
Start: 2024-02-02

## 2024-02-02 NOTE — TELEPHONE ENCOUNTER
Please Approve or Refuse.  Send to Pharmacy per Pt's Request:      Next Visit Date:  2/29/2024   Last Visit Date: 12/18/2023    Hemoglobin A1C (%)   Date Value   09/26/2023 6.3   06/02/2023 6.5   01/13/2023 6.5             ( goal A1C is < 7)   BP Readings from Last 3 Encounters:   01/15/24 (!) 118/56   01/04/24 122/72   12/28/23 122/64          (goal 120/80)  BUN   Date Value Ref Range Status   01/13/2024 41 (H) 8 - 23 mg/dL Final     Creatinine   Date Value Ref Range Status   01/13/2024 1.4 (H) 0.7 - 1.2 mg/dL Final     Potassium   Date Value Ref Range Status   01/13/2024 3.9 3.7 - 5.3 mmol/L Final

## 2024-02-02 NOTE — TELEPHONE ENCOUNTER
Please Approve or Refuse.  Send to Pharmacy per Pt's Request:      Next Visit Date:  2/2/2024   Last Visit Date: 12/18/2023    Hemoglobin A1C (%)   Date Value   09/26/2023 6.3   06/02/2023 6.5   01/13/2023 6.5             ( goal A1C is < 7)   BP Readings from Last 3 Encounters:   01/15/24 (!) 118/56   01/04/24 122/72   12/28/23 122/64          (goal 120/80)  BUN   Date Value Ref Range Status   01/13/2024 41 (H) 8 - 23 mg/dL Final     Creatinine   Date Value Ref Range Status   01/13/2024 1.4 (H) 0.7 - 1.2 mg/dL Final     Potassium   Date Value Ref Range Status   01/13/2024 3.9 3.7 - 5.3 mmol/L Final

## 2024-02-05 RX ORDER — ALBUTEROL SULFATE 90 UG/1
AEROSOL, METERED RESPIRATORY (INHALATION)
Qty: 6.7 G | Status: CANCELLED | OUTPATIENT
Start: 2024-02-05

## 2024-02-07 ENCOUNTER — TELEPHONE (OUTPATIENT)
Dept: FAMILY MEDICINE CLINIC | Age: 89
End: 2024-02-07

## 2024-02-07 NOTE — TELEPHONE ENCOUNTER
Care Transitions Initial Follow Up Call    Outreach made within 2 business days of discharge: Yes    Patient: Corbin Meyer Patient : 1924   MRN: [id number]  Reason for Admission: There are no discharge diagnoses documented for the most recent discharge.  Discharge Date: 1/15/24       Spoke with: milly for pt to call the office     Discharge department/facility: [facility] Patient Contact:  Was patient able to fill all prescriptions:   Was patient instructed to bring all medications to the follow-up visit:   Is patient taking all medications as directed in the discharge summary?   Does patient understand their discharge instructions:   Does patient have questions or concerns that need addressed prior to 7-14 day follow up office visit:       Follow Up  Future Appointments   Date Time Provider Department Center   2024 12:00 PM Magno Brambila MD [facility]   2024  1:00 PM Jame Loaiza MD [facility]       Abbey Weinberg MA

## 2024-02-09 ENCOUNTER — TELEPHONE (OUTPATIENT)
Dept: FAMILY MEDICINE CLINIC | Age: 89
End: 2024-02-09

## 2024-02-09 NOTE — TELEPHONE ENCOUNTER
Incoming call came from Home Health Care Facility:  [facility].   Nurse jack is calling to Let  know that patient was seen and evaluated vitals were good 116/70 pulse is 83. Respirations were 28 and he has been using 3 liters of his oxygen. She just wanted to let           Future Appointments   Date Time Provider Department Center   2/13/2024  4:30 PM [facility]ires [facility]   7/26/2024  1:00 PM [facility]

## 2024-02-13 ENCOUNTER — OFFICE VISIT (OUTPATIENT)
Dept: FAMILY MEDICINE CLINIC | Age: 89
End: 2024-02-13
Payer: MEDICARE

## 2024-02-13 VITALS
TEMPERATURE: 97.4 F | HEIGHT: 70 IN | OXYGEN SATURATION: 97 % | WEIGHT: 178 LBS | DIASTOLIC BLOOD PRESSURE: 64 MMHG | HEART RATE: 67 BPM | BODY MASS INDEX: 25.48 KG/M2 | SYSTOLIC BLOOD PRESSURE: 106 MMHG

## 2024-02-13 DIAGNOSIS — I50.43 CHF (CONGESTIVE HEART FAILURE), NYHA CLASS I, ACUTE ON CHRONIC, COMBINED (HCC): ICD-10-CM

## 2024-02-13 DIAGNOSIS — I10 PRIMARY HYPERTENSION: ICD-10-CM

## 2024-02-13 DIAGNOSIS — E11.42 TYPE 2 DIABETES MELLITUS WITH DIABETIC POLYNEUROPATHY, WITHOUT LONG-TERM CURRENT USE OF INSULIN (HCC): Primary | ICD-10-CM

## 2024-02-13 LAB — HBA1C MFR BLD: 9.1 %

## 2024-02-13 PROCEDURE — 1036F TOBACCO NON-USER: CPT | Performed by: NURSE PRACTITIONER

## 2024-02-13 PROCEDURE — G9687 HOSPICE ANYTIME MSMT PER: HCPCS | Performed by: NURSE PRACTITIONER

## 2024-02-13 PROCEDURE — G8427 DOCREV CUR MEDS BY ELIG CLIN: HCPCS | Performed by: NURSE PRACTITIONER

## 2024-02-13 PROCEDURE — 83036 HEMOGLOBIN GLYCOSYLATED A1C: CPT | Performed by: NURSE PRACTITIONER

## 2024-02-13 PROCEDURE — G8417 CALC BMI ABV UP PARAM F/U: HCPCS | Performed by: NURSE PRACTITIONER

## 2024-02-13 PROCEDURE — G9691 PT HOSP DUR MSMT PERIOD: HCPCS | Performed by: NURSE PRACTITIONER

## 2024-02-13 PROCEDURE — G8484 FLU IMMUNIZE NO ADMIN: HCPCS | Performed by: NURSE PRACTITIONER

## 2024-02-13 PROCEDURE — 99215 OFFICE O/P EST HI 40 MIN: CPT | Performed by: NURSE PRACTITIONER

## 2024-02-13 PROCEDURE — G9692 HOSP RECD BY PT DUR MSMT PER: HCPCS | Performed by: NURSE PRACTITIONER

## 2024-02-13 RX ORDER — FUROSEMIDE 40 MG/1
40 TABLET ORAL DAILY
Qty: 60 TABLET | Refills: 3 | Status: SHIPPED | OUTPATIENT
Start: 2024-02-13 | End: 2024-02-13

## 2024-02-13 RX ORDER — ZINC GLUCONATE 50 MG
50 TABLET ORAL DAILY
COMMUNITY
Start: 2024-01-30

## 2024-02-13 RX ORDER — ASCORBIC ACID 500 MG
500 TABLET ORAL DAILY
COMMUNITY
Start: 2024-01-30

## 2024-02-13 RX ORDER — FUROSEMIDE 40 MG/1
40 TABLET ORAL DAILY
Qty: 60 TABLET | Refills: 3 | Status: SHIPPED | OUTPATIENT
Start: 2024-02-13

## 2024-02-13 RX ORDER — CETIRIZINE HYDROCHLORIDE 10 MG/1
10 TABLET ORAL DAILY
COMMUNITY

## 2024-02-13 NOTE — PROGRESS NOTES
Visit Information    Have you changed or started any medications since your last visit including any over-the-counter medicines, vitamins, or herbal medicines? no   Have you stopped taking any of your medications? Is so, why? -  no  Are you having any side effects from any of your medications? - no    Have you seen any other physician or provider since your last visit?  no   Have you had any other diagnostic tests since your last visit?  yes    Have you been seen in the emergency room and/or had an admission in a hospital since we last saw you?  yes    Have you had your routine dental cleaning in the past 6 months?  no     Do you have an active MyChart account? If no, what is the barrier?  Yes    Patient Care Team:  Shilpa Delacruz MD as PCP - General (Family Medicine)  Shilpa Delacruz MD as PCP - Empaneled Provider  Juan Diaz MD as Surgeon (Cardiology)  Dino Goss MD as Consulting Physician (Urology)  Kleber Brambila MD as Consulting Physician (Gastroenterology)  Stevie Andre MD as Surgeon (Ophthalmology)  Jame Loaiza MD as Consulting Physician (Oncology)  Oz Shah DPM as Consulting Physician (Podiatry)    Medical History Review  Past Medical, Family, and Social History reviewed and does contribute to the patient presenting condition    Health Maintenance   Topic Date Due    Lipids  Discontinued    Depression Screen  Discontinued    DTaP/Tdap/Td vaccine  Discontinued    Flu vaccine  Discontinued    Shingles vaccine  Discontinued    Pneumococcal 65+ years Vaccine  Discontinued    COVID-19 Vaccine  Discontinued

## 2024-02-13 NOTE — PROGRESS NOTES
Corbin Meyer (:  1924) is a 89 y.o. male,Established patient, here for evaluation of the following chief complaint(s): Congestive Heart Failure and Shortness of Breath      ASSESSMENT/PLAN:    Corbin received counseling on the following healthy behaviors: nutrition, exercise, and medication adherence  Reviewed prior labs and health maintenance  Discussed use, benefit, and side effects of prescribed medications.   Barriers to medication compliance addressed.   Patient given educational materials - see patient instructions  All patient questions answered.  Patient voiced understanding.  The patient's past medical,surgical, social, and family history as well as his current medications and allergies were reviewed as documented in today's encounter.    Medications, labs, diagnostic studies, consultations and follow-up as documented in this encounter.    Corbin was seen today for congestive heart failure and shortness of breath.    Diagnoses and all orders for this visit:    Type 2 diabetes mellitus with diabetic polyneuropathy, without long-term current use of insulin (HCC)  -Worsening  -Most recent A1c increasing  -Patient was recently taken off of metformin  -Patient's son unsure if he wants patient on medication, but is going to touch base with [facility] this week  -For now we will start patient on Jardiance  -See note below  -     Ambulatory Referral to Care Management  -     POCT glycosylated hemoglobin (Hb A1C)  -     empagliflozin (JARDIANCE) 10 MG tablet; Take 1 tablet by mouth daily    Primary hypertension  -Controlled  -No change in regimen  -     Ambulatory Referral to Care Management    CHF (congestive heart failure), NYHA class I, acute on chronic, combined (HCC)  -Worsening/not well-controlled  -For the next 7 days plan increase patient's Lasix to 40 mg twice a day  -Son needs to touch base with patient's cardiologist to see what the appropriate maintenance dose should be for

## 2024-02-14 ENCOUNTER — CARE COORDINATION (OUTPATIENT)
Dept: CARE COORDINATION | Age: 89
End: 2024-02-14

## 2024-02-14 NOTE — CARE COORDINATION
Patient was referred to [facility] for care management. He had been hospitalized 1/5-1/15 and was discharged to a facility with palliative care. He was discharged to home on 1/29 with [facility]- [facility]. Seen in the ED, 1/20, for Covid. He had f/u with PCP office yesterday. Some of his medications had been stopped and his son is unsure if he wants to restart them. He wanted to contact Hospice first.   Called [facility] and was informed that they are meeting with patient and family on 2/16 for initial evaluation.   Will follow up on 2/19 to see if he is admitted under hospice care.

## 2024-02-14 NOTE — PROGRESS NOTES
I called and spoke with Airam THURSTON with [facility] to inform her of our visit yesterday, and plan of care for Mr. Meyer.  While on the phone I did speak with patient's son, Corbin Meyer, who states they plan on reaching out to hospice today for evaluation.  He states that he feels that patient's condition is declining, and he does not think he is going to get stronger, or improve.  He states he just wants his father to be comfortable at this time.  He states he does have paperwork stating the patient is a DNR.  I did discuss with patient to please keep us informed on their decision and he states that they will.  In review of his chart I do not see any advance care directives in place, patient's son states he does believe that they do have this filled out though, if they would like to drop this off so we can scan into his chart that would be great.  Please have this available when meeting with hospice.  Patient's son states an understanding.    Electronically signed by LUL Driver CNP on 2/14/2024 at 12:33 PM

## 2024-02-19 ENCOUNTER — CARE COORDINATION (OUTPATIENT)
Dept: CARE COORDINATION | Age: 89
End: 2024-02-19

## 2024-02-19 NOTE — CARE COORDINATION
Called [facility] to verify services. The patient was admitted to home hospice services on Friday, 2/16/24.     Patient Excluded from Care Coordination? Yes     The patient will be excluded from Care Coordination for the following reason: Patient enrolled in Hospice/Palliative Care

## 2025-06-11 NOTE — H&P
When Should The Patient Follow-Up For Their Next Full-Body Skin Exam?: 3 Months Detail Level: Simple Quality 137: Melanoma: Continuity Of Care - Recall System: Patient information entered into a recall system that includes: target date for the next exam specified AND a process to follow up with patients regarding missed or unscheduled appointments Detail Level: Generalized 01/05/24  4:55 PM   Result Value Ref Range    Sodium 135 135 - 144 mmol/L    Potassium PENDING mmol/L    Chloride 97 (L) 98 - 107 mmol/L    CO2 26 20 - 31 mmol/L    Anion Gap 12 9 - 17 mmol/L    Glucose 285 (H) 70 - 99 mg/dL    BUN 28 (H) 8 - 23 mg/dL    Creatinine 1.2 0.7 - 1.2 mg/dL    Est, Glom Filt Rate 54 (L) >60 mL/min/1.73m2    Calcium 8.7 8.6 - 10.4 mg/dL       Imaging/Diagnostics:  No results found.    Assessment :      Hospital Problems             Last Modified POA    * (Principal) Encounter for admission to hospice care 1/5/2024 Yes    Debility 1/5/2024 Yes       Plan:     Patient status inpatient in the Med/Surge  89-year-old gentleman with a history of diabetes mellitus history of CKD stage III history of chronic systolic heart failure ejection fraction less than 35% with a severe aortic stenosis patient was offered medical intervention treatment.  He declined patient has been living at home with his son son claims that he tripped and fell got a small laceration on the left side of the lower lip no head injury no new weakness no nausea vomiting  Sons  expectation is father to get better and get home  He does not have a clear information on hospice palliative care  Lengthy discussion with son and patient in the presence of nurse  Family and patient expectation is to get better I recommend DNR CCA OT PT patient may benefit from extended-care facility rehab  As patient has been mobile and being semiindependent living with son  Patient and family do not want any intervention for the aortic stenosis at this time but are willing to see a cardiologist      Consultations:   IP CONSULT TO HOSPICE  IP CONSULT TO INTERNAL MEDICINE  IP CONSULT TO SOCIAL WORK  IP CONSULT TO PALLIATIVE CARE  IP CONSULT TO PALLIATIVE CARE  IP CONSULT TO HOSPICE     Patient is admitted as inpatient status because of co-morbidities listed above, severity of signs and symptoms as outlined, requirement for current medical therapies and

## (undated) DEVICE — COVER,MAYO STAND,STERILE: Brand: MEDLINE

## (undated) DEVICE — Device

## (undated) DEVICE — JELLY,LUBE,STERILE,FLIP TOP,TUBE,2-OZ: Brand: MEDLINE

## (undated) DEVICE — PREP SOL PVP IODINE 4%  4 OZ/BTL

## (undated) DEVICE — SOLUTION IRRIGATION BAL SALT SOLUTION 500 ML STRL BSS

## (undated) DEVICE — THE MONARCH® "D" CARTRIDGE IS A SINGLE-USE POLYPROPYLENE CARTRIDGE FOR POSTERIOR CHAMBER IOL DELIVERY: Brand: MONARCH® III

## (undated) DEVICE — SNARE ENDOSCP L240CM LOOP W27MM SHTH DIA2.4MM WRK CHN 2.8MM

## (undated) DEVICE — GLOVE SURG SZ 75 STD WHT LTX SYN POLYMER BEAD REINF ANTI RL

## (undated) DEVICE — TOWEL,OR,DSP,ST,WHITE,DLX,2/PK,40PK/CS: Brand: MEDLINE

## (undated) DEVICE — Z DISCONTINUED BY MEDLINE USE 2711682 TRAY SKIN PREP DRY W/ PREM GLV

## (undated) DEVICE — BITEBLOCK 54FR W/ DENT RIM BLOX

## (undated) DEVICE — NO USE 18 MONTHS GOWN STD LG  1153D

## (undated) DEVICE — TOWEL,OR,DSP,ST,BLUE,STD,6/PK,12PK/CS: Brand: MEDLINE

## (undated) DEVICE — FORCEPS BX L240CM JAW DIA2.4MM ORNG L CAP W/ NDL DISP RAD

## (undated) DEVICE — SURGICAL PROCEDURE PACK LT EYE CUST ST CHARLES STRL LF DISP

## (undated) DEVICE — GOWN,AURORA,NONREINFORCED,LARGE: Brand: MEDLINE

## (undated) DEVICE — GAUZE,SPONGE,4"X4",16PLY,XRAY,STRL,LF: Brand: MEDLINE

## (undated) DEVICE — DRAPE EQUIP STAND XL MAYO CVR

## (undated) DEVICE — TOWEL SURG W16XL26IN WHT NONFENESTRATED ST 4 PER PK

## (undated) DEVICE — DEFENDO AIR WATER SUCTION AND BIOPSY VALVE KIT FOR  OLYMPUS: Brand: DEFENDO AIR/WATER/SUCTION AND BIOPSY VALVE

## (undated) DEVICE — AIRLIFE™ NASAL OXYGEN CANNULA CURVED, FLARED TIP, WITH 7 FEET (2.1 M) CRUSH RESISTANT TUBING, OVER-THE-EAR STYLE: Brand: AIRLIFE™